# Patient Record
Sex: MALE | Race: WHITE | Employment: OTHER | URBAN - METROPOLITAN AREA
[De-identification: names, ages, dates, MRNs, and addresses within clinical notes are randomized per-mention and may not be internally consistent; named-entity substitution may affect disease eponyms.]

---

## 2017-02-07 DIAGNOSIS — E10.65 HYPERGLYCEMIA DUE TO TYPE 1 DIABETES MELLITUS (HCC): ICD-10-CM

## 2017-02-07 DIAGNOSIS — Z96.41 INSULIN PUMP STATUS: ICD-10-CM

## 2017-02-07 RX ORDER — LOSARTAN POTASSIUM 100 MG/1
100 TABLET ORAL DAILY
Qty: 30 TAB | Refills: 6 | Status: SHIPPED | OUTPATIENT
Start: 2017-02-07 | End: 2019-11-27 | Stop reason: ALTCHOICE

## 2017-02-07 NOTE — TELEPHONE ENCOUNTER
Patient needs a refill of blood pressure medication Losartan and test strips. He said she usually doesn't send in the bp med but he hasn't been seen at a PCP here since he moved back.

## 2017-03-23 RX ORDER — INSULIN LISPRO 100 [IU]/ML
INJECTION, SOLUTION INTRAVENOUS; SUBCUTANEOUS
Qty: 2 VIAL | Refills: 6 | Status: SHIPPED | OUTPATIENT
Start: 2017-03-23 | End: 2019-11-27 | Stop reason: SDUPTHER

## 2019-02-13 ENCOUNTER — ED HISTORICAL/CONVERTED ENCOUNTER (OUTPATIENT)
Dept: OTHER | Age: 49
End: 2019-02-13

## 2019-10-06 ENCOUNTER — ED HISTORICAL/CONVERTED ENCOUNTER (OUTPATIENT)
Dept: OTHER | Age: 49
End: 2019-10-06

## 2019-11-27 ENCOUNTER — OFFICE VISIT (OUTPATIENT)
Dept: ENDOCRINOLOGY | Age: 49
End: 2019-11-27

## 2019-11-27 VITALS
TEMPERATURE: 97.8 F | WEIGHT: 206.2 LBS | RESPIRATION RATE: 18 BRPM | OXYGEN SATURATION: 100 % | HEIGHT: 76 IN | BODY MASS INDEX: 25.11 KG/M2 | HEART RATE: 105 BPM | DIASTOLIC BLOOD PRESSURE: 108 MMHG | SYSTOLIC BLOOD PRESSURE: 175 MMHG

## 2019-11-27 DIAGNOSIS — G62.9 NEUROPATHY: ICD-10-CM

## 2019-11-27 DIAGNOSIS — Z96.41 INSULIN PUMP STATUS: ICD-10-CM

## 2019-11-27 DIAGNOSIS — E10.65 HYPERGLYCEMIA DUE TO TYPE 1 DIABETES MELLITUS (HCC): Primary | ICD-10-CM

## 2019-11-27 DIAGNOSIS — I10 ESSENTIAL HYPERTENSION: ICD-10-CM

## 2019-11-27 RX ORDER — LANCING DEVICE/LANCETS
KIT MISCELLANEOUS
Qty: 200 KIT | Refills: 6 | Status: SHIPPED | OUTPATIENT
Start: 2019-11-27 | End: 2021-07-16

## 2019-11-27 RX ORDER — SILDENAFIL 25 MG/1
25 TABLET, FILM COATED ORAL AS NEEDED
Qty: 20 TAB | Refills: 6 | Status: SHIPPED | OUTPATIENT
Start: 2019-11-27 | End: 2021-11-19 | Stop reason: SDUPTHER

## 2019-11-27 RX ORDER — INSULIN LISPRO 100 [IU]/ML
INJECTION, SOLUTION INTRAVENOUS; SUBCUTANEOUS
Qty: 2 VIAL | Refills: 6 | Status: SHIPPED | OUTPATIENT
Start: 2019-11-27 | End: 2020-11-25 | Stop reason: SDUPTHER

## 2019-11-27 NOTE — PROGRESS NOTES
1. Have you been to the ER, urgent care clinic since your last visit? Patient [facility]/October 2019  Hospitalized since your last visit? No    2. Have you seen or consulted any other health care providers outside of the [address] [place] [address] since your last visit? Include any pap smears or colon screening. No       Wt Readings from Last 3 Encounters:   11/27/19 206 lb 3.2 oz (93.5 kg)   12/20/16 202 lb (91.6 kg)   07/28/15 210 lb (95.3 kg)     Temp Readings from Last 3 Encounters:   11/27/19 97.8 °F (36.6 °C) (Oral)   12/20/16 97.7 °F (36.5 °C) (Oral)   07/28/15 98.5 °F (36.9 °C)     BP Readings from Last 3 Encounters:   11/27/19 (!) 175/108   12/20/16 141/89   07/28/15 141/86     Pulse Readings from Last 3 Encounters:   11/27/19 (!) 105   12/20/16 99   07/28/15 100     Lab Results   Component Value Date/Time    Hemoglobin A1c (POC) 13.2 12/20/2016 10:34 AM     Patient has pump today.   Patient needs eye exam referral.

## 2019-11-27 NOTE — PROGRESS NOTES
HISTORY OF PRESENT ILLNESS  Arlen Vargas is a 54 y.o. male. HPI  Patient here for Lana  initial visit of Type 1 diabetes mellitus  FROM DEC 2016     ALMOST A GAP OF 3 YEARS   HE GOT THE PUMP STARTED AND NEVER CAME BACK FOR FOLLOW UP     HE IS BACK HERE AS HE [place]OV[place] INTO TOWN   HE USED TO  BE A [employer] OFFICER   Comes in with 2 hear old grand daughter           OLD HISTORY       Current A1C is 13.2 % and symptoms/problems include polyuria, polydipsia and visual disturbances   H/o DM type 1 for 30 years         He moved from [address] [place] [address], goyo blair was his pcp   Many years ago , he was seeing Dr. Trisha Claude      Current diabetic medications include insulin pump     Current monitoring regimen: home blood tests - rarely  Any episodes of hypoglycemia? no     Weight trend: increasing steadily  Prior visit with dietician: no  Current diet: \"unhealthy\" diet in general  Current exercise: no regular exercise     Known diabetic complications: retinopathy and peripheral neuropathy  Cardiovascular risk factors: dyslipidemia, diabetes mellitus, male gender, stress      Eye exam current (within one year): no  CESAR: no                 Past Medical History   Diagnosis Date    DM [facility]diabetes mellitus) ([facility] [address]      HTN (hypertension)      Hypogonadism, male               Past Surgical History   Procedure Laterality Date    Hx tonsillectomy        Hx free skin graft Right         Leg    Hx other surgical Left         4 surgeries for staph infection           Current Outpatient Prescriptions   Medication Sig    HUMALOG 100 unit/mL injection USE IN INSULIN PUMP AS DIRECTED. DX: 250.00    losartan (COZAAR) 100 mg tablet Take 1 Tab by mouth daily.      No current facility-administered medications for this visit.          Review of Systems   Constitutional: Negative. HENT: Negative. Eyes: Negative for pain and redness. Respiratory: Negative.     Cardiovascular: Negative for chest pain, palpitations and leg swelling. Gastrointestinal: Negative. Negative for constipation. Genitourinary: Negative. Musculoskeletal: Negative for myalgias. Skin: Negative. Neurological: Negative. Endo/Heme/Allergies: Negative. Psychiatric/Behavioral: Negative for depression and memory loss. The patient does not have insomnia.          Physical Exam   Constitutional: He is oriented to person, place, and time. He appears well-developed and well-nourished. HENT:   Head: Normocephalic. Eyes: Conjunctivae and EOM are normal. Pupils are equal, round, and reactive to light. Neck: Normal range of motion. Neck supple. No JVD present. No tracheal deviation present. No thyromegaly present. Cardiovascular: Normal rate, regular rhythm and normal heart sounds. Pulmonary/Chest: Effort normal and breath sounds normal.   Abdominal: Soft. Bowel sounds are normal.   Musculoskeletal: Normal range of motion. Lymphadenopathy:     He has no cervical adenopathy. Neurological: He is alert and oriented to person, place, and time. He has normal reflexes. Skin: Skin is warm. Psychiatric: He has a normal mood and affect.    Diabetic foot exam:     Left Foot:   Visual Exam: callous - present on plantar aspect    Pulse DP: 2+ (normal)   Filament test: normal sensation    Vibratory sensation: normal      Right Foot:   Visual Exam: callous - present    Pulse DP: 2+ (normal)   Filament test: normal sensation    Vibratory sensation: normal  '        No labs are available          ASSESSMENT and PLAN     1. Type 1DM, POORLY controlled , on insulin pump.:    A1C IS OVER 10 %   FROM PT FIRST     Reviewed download, and noticed that there are no sugars on download and no carbs entered on it     Discussed patho-physiology of type1 diabetes again and the need for using carb wizard  Surprinsingly, I saw the wizard Is off      changes  on pump settings are made.  Refer to the note.     Patient has no clue of pump management, has no idea of carb counting, has not seen a singly entry of bolus shots   Bolus wizard is set up by me      Has no idea of pump use, insulin actions and need for glycemic control   Strictly advised on using bolus wizard for every food item. Emphasized on avoiding high glycemic index foods and provided some examples.     Patient is educated about the importance of glycemic control to prevent progression of complications. Patient is advised about checking blood sugars 6 times a day and maintaining log book.      The danger of having low blood sugars has been explained with inappropriate use of insulin  Patient voiced understanding and using the printed instructions at home. specific diabetic recommendations: foot care discussed and Podiatry visits discussed, annual eye examinations at Ophthalmology discussed, glycohemoglobin and other lab monitoring discussed and labs immediately prior to next visit        2. Hypoglycemia :  Educated on treating the hypoglycemia. Discussed BAQSIMI use and prescribed     3. HTN : SEVERELY UNCONTROLLED . Patient is educated about importance of compliance with anti-hypertensives especially ARB/ACEI  Refilled all the meds      4. Dyslipidemia : continue current meds. Patient is educated about benefits and adverse effects of statins and explained how benefits outweigh risk.     5. use of aspirin to prevent MI and TIA's discussed      6. PT HAS RHEUMATOLOGICAL PROBLEM - GETS STEROIDS ON AND OFF   HE HAS ARTHRITIS        7. NEUROPATHY  - MILD TO MODERATE       8.  ED - pt  Is requesting viagra samples- filled it         F/u in 4 weeks     > 50 % of time is spent on counseling on pump use, carb counting insulin actions, contacted medtronic rep  Use

## 2019-11-27 NOTE — PATIENT INSTRUCTIONS
Do not skip meals Do not eat in between meals Reduce carbs- pasta, rice, potatoes, bread Do not drink juices or sodas Donot eat peanut butter Do not eat sugar free cookies and cakes Do not eat peaCHES, ORANGES, GRAPES,  PINEAPPLES, AND FRUIT MEDLEYS  
 
 
------------------------------------------------------------------------------------------------------------------- 
 
START ON MICARDIS  40 MG A DAY Check blood sugars immediately before each meal and at bedtime Take  LANTUS  insulin  36 units  at bed time Take NOVOLOG  insulin  10   GM   : 1 UNIT   before  MEALS Also, add additional NOVOLOG  as follows with meals  If blood sugars are[de-identified] 
 
150-200 mg 1 units 201-250 mg 2 units 251-300 mg 3 units 301-350 mg 4 units 351-400 mg 5 units 401-450 mg 6 units 451-500 mg 7 units Less than 70 mg NO INSULIN

## 2019-11-27 NOTE — Clinical Note
11/27/19 Patient: Sean Dunbar YOB: 1970 Date of Visit: 11/27/2019 Self Referred, MD 
Self Referred Provider VIA Dear Tasha Gallegos MD, Thank you for referring Mr. Sean Dunbar to [facility] for evaluation. My notes for this consultation are attached. If you have questions, please do not hesitate to call me. I look forward to following your patient along with you. Sincerely, Keysha Stock MD

## 2019-11-28 LAB
ALBUMIN SERPL-MCNC: 4.5 G/DL (ref 3.5–5.5)
ALBUMIN/CREAT UR: 111 MG/G CREAT (ref 0–30)
ALBUMIN/GLOB SERPL: 1.6 {RATIO} (ref 1.2–2.2)
ALP SERPL-CCNC: 102 IU/L (ref 39–117)
ALT SERPL-CCNC: 17 IU/L (ref 0–44)
AST SERPL-CCNC: 23 IU/L (ref 0–40)
BILIRUB SERPL-MCNC: 0.4 MG/DL (ref 0–1.2)
BUN SERPL-MCNC: 13 MG/DL (ref 6–24)
BUN/CREAT SERPL: 12 (ref 9–20)
CALCIUM SERPL-MCNC: 9.8 MG/DL (ref 8.7–10.2)
CHLORIDE SERPL-SCNC: 101 MMOL/L (ref 96–106)
CHOLEST SERPL-MCNC: 209 MG/DL (ref 100–199)
CO2 SERPL-SCNC: 24 MMOL/L (ref 20–29)
CREAT SERPL-MCNC: 1.09 MG/DL (ref 0.76–1.27)
CREAT UR-MCNC: 78 MG/DL
EST. AVERAGE GLUCOSE BLD GHB EST-MCNC: 298 MG/DL
GLOBULIN SER CALC-MCNC: 2.8 G/DL (ref 1.5–4.5)
GLUCOSE SERPL-MCNC: 130 MG/DL (ref 65–99)
HBA1C MFR BLD: 12 % (ref 4.8–5.6)
HDLC SERPL-MCNC: 61 MG/DL
INTERPRETATION, 910389: NORMAL
LDLC SERPL CALC-MCNC: 137 MG/DL (ref 0–99)
Lab: NORMAL
MICROALBUMIN UR-MCNC: 86.6 UG/ML
POTASSIUM SERPL-SCNC: 4.5 MMOL/L (ref 3.5–5.2)
PROT SERPL-MCNC: 7.3 G/DL (ref 6–8.5)
SODIUM SERPL-SCNC: 141 MMOL/L (ref 134–144)
TRIGL SERPL-MCNC: 55 MG/DL (ref 0–149)
VLDLC SERPL CALC-MCNC: 11 MG/DL (ref 5–40)

## 2020-01-22 ENCOUNTER — OFFICE VISIT (OUTPATIENT)
Dept: ENDOCRINOLOGY | Age: 50
End: 2020-01-22

## 2020-01-22 VITALS
SYSTOLIC BLOOD PRESSURE: 169 MMHG | OXYGEN SATURATION: 100 % | RESPIRATION RATE: 20 BRPM | WEIGHT: 207.6 LBS | DIASTOLIC BLOOD PRESSURE: 90 MMHG | BODY MASS INDEX: 25.28 KG/M2 | TEMPERATURE: 97.3 F | HEART RATE: 98 BPM | HEIGHT: 76 IN

## 2020-01-22 DIAGNOSIS — Z96.41 INSULIN PUMP STATUS: ICD-10-CM

## 2020-01-22 DIAGNOSIS — I10 ESSENTIAL HYPERTENSION: ICD-10-CM

## 2020-01-22 DIAGNOSIS — G62.9 NEUROPATHY: ICD-10-CM

## 2020-01-22 RX ORDER — ESCITALOPRAM OXALATE 10 MG/1
10 TABLET ORAL
Qty: 30 TAB | Refills: 5 | Status: SHIPPED | OUTPATIENT
Start: 2020-01-22 | End: 2020-04-21 | Stop reason: ALTCHOICE

## 2020-01-22 RX ORDER — TELMISARTAN AND HYDROCHLORTHIAZIDE 40; 12.5 MG/1; MG/1
1 TABLET ORAL DAILY
Qty: 30 TAB | Refills: 6 | Status: SHIPPED | OUTPATIENT
Start: 2020-01-22 | End: 2020-08-13 | Stop reason: SDUPTHER

## 2020-01-22 RX ORDER — GABAPENTIN 300 MG/1
300 CAPSULE ORAL 2 TIMES DAILY
Qty: 60 CAP | Refills: 4 | Status: SHIPPED | OUTPATIENT
Start: 2020-01-22 | End: 2020-04-21 | Stop reason: SDUPTHER

## 2020-01-22 RX ORDER — ATORVASTATIN CALCIUM 20 MG/1
20 TABLET, FILM COATED ORAL
Qty: 30 TAB | Refills: 6 | Status: SHIPPED | OUTPATIENT
Start: 2020-01-22 | End: 2020-08-13 | Stop reason: SDUPTHER

## 2020-01-22 NOTE — PROGRESS NOTES
1. Have you been to the ER, urgent care clinic since your last visit? No  Hospitalized since your last visit? No    2. Have you seen or consulted any other health care providers outside of the [address] [place] [address] since your last visit? Include any pap smears or colon screening. No    Wt Readings from Last 3 Encounters:   01/22/20 207 lb 9.6 oz (94.2 kg)   11/27/19 206 lb 3.2 oz (93.5 kg)   12/20/16 202 lb (91.6 kg)     Temp Readings from Last 3 Encounters:   01/22/20 97.3 °F (36.3 °C) (Oral)   11/27/19 97.8 °F (36.6 °C) (Oral)   12/20/16 97.7 °F (36.5 °C) (Oral)     BP Readings from Last 3 Encounters:   01/22/20 169/90   11/27/19 (!) 175/108   12/20/16 141/89     Pulse Readings from Last 3 Encounters:   01/22/20 98   11/27/19 (!) 105   12/20/16 99     Lab Results   Component Value Date/Time    Hemoglobin A1c 12.0 (H) 11/27/2019 02:10 PM    Hemoglobin A1c (POC) 13.2 12/20/2016 10:34 AM     Patient has pump today.   Patient needs eye exam referral.

## 2020-01-22 NOTE — PATIENT INSTRUCTIONS
-------------------------------------------------------------------------------------------- Refills    -    please call your pharmacy and have them send us a refill request 
 
Results  -  allow up to a week for lab results to be processed and reviewed. Phone calls  -  Allow upto 24 hrs. for non-urgent calls to be retained Prior authorization - It may take up to 4 weeks to process, depending on your insurance Forms  -  FMLA, DMV, patient assistance, etc. will take up to 2 weeks to process Cancellations - please notify the office in advance if you cannot keep your appointment Samples  - will only be dispensed at visits as supply is limited If you are having a medical emergency call 911 
 
-------------------------------------------------------------------------------------------- Start on lexapro 10 mg at night Start on  micardis   20-12.5 one pill  A day Start  On atorvostatin 20 mg at night Start  On  Gabapentin  300 mg  Twice a day

## 2020-01-22 NOTE — Clinical Note
1/22/20 Patient: Laisha Ambrosio YOB: 1970 Date of Visit: 1/22/2020 Self Referred, MD 
Self Referred Provider VIA Dear Gaurav Brooks MD, Thank you for referring Mr. Laisha Ambrosio to [address] [place] [address] for evaluation. My notes for this consultation are attached. If you have questions, please do not hesitate to call me. I look forward to following your patient along with you. Sincerely, Madeleine Dawn MD

## 2020-01-22 NOTE — PROGRESS NOTES
HISTORY OF PRESENT ILLNESS  Kathrine Conn is a 54 y.o. male. 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 Bettie Buck      Current diabetic medications include insulin pump     Current monitoring regimen: home blood tests - rarely  Any episodes of hypoglycemia? no     Weight trend: increasing steadily  Prior visit with dietician: no  Current diet: \"unhealthy\" diet in general  Current exercise: no regular exercise     Known diabetic complications: retinopathy and peripheral neuropathy  Cardiovascular risk factors: dyslipidemia, diabetes mellitus, male gender, stress      Eye exam current (within one year): no  CESAR: no                 Past Medical History   Diagnosis Date    DM (diabetes mellitus) ([address]      HTN (hypertension)      Hypogonadism, male               Past Surgical History   Procedure Laterality Date    Hx tonsillectomy        Hx free skin graft Right         Leg    Hx other surgical Left         4 surgeries for staph infection           Current Outpatient Prescriptions   Medication Sig    HUMALOG 100 unit/mL injection USE IN INSULIN PUMP AS DIRECTED.  DX: 250.00    losartan (COZAAR) 100 mg tablet Take 1 Tab by mouth daily.      No current facility-administered medications for this visit.          Review of Systems   Constitutional: Negative. HENT: Negative. Eyes: Negative for pain and redness. Respiratory: Negative. Cardiovascular: Negative for chest pain, palpitations and leg swelling. Gastrointestinal: Negative. Negative for constipation. Genitourinary: Negative. Musculoskeletal: Negative for myalgias. Skin: Negative. Neurological: tingling numbness of feet bilaterally   Endo/Heme/Allergies: Negative. Psychiatric/Behavioral: Negative for depression and memory loss. The patient does not have insomnia.          Physical Exam   Constitutional: He is oriented to person, place, and time. He appears well-developed and well-nourished. HENT:   Head: Normocephalic. Eyes: Conjunctivae and EOM are normal. Pupils are equal, round, and reactive to light. Neck: Normal range of motion. Neck supple. No JVD present. No tracheal deviation present. No thyromegaly present. Cardiovascular: Normal rate, regular rhythm and normal heart sounds. Pulmonary/Chest: Effort normal and breath sounds normal.   Abdominal: Soft. Bowel sounds are normal.   Musculoskeletal: Normal range of motion. Lymphadenopathy:     He has no cervical adenopathy. Neurological: He is alert and oriented to person, place, and time. He has normal reflexes. Skin: Skin is warm. Psychiatric: He has a normal mood and affect.    Diabetic foot exam:     Left Foot:   Visual Exam: callous - present on plantar aspect    Pulse DP: 2+ (normal)   Filament test: normal sensation    Vibratory sensation: normal      Right Foot:   Visual Exam: callous - present    Pulse DP: 2+ (normal)   Filament test: normal sensation    Vibratory sensation: normal  '        No labs are available          ASSESSMENT and PLAN     1.  Type 1DM, POORLY controlled , on insulin pump.:    A1C IS OVER 10 %   FROM PT FIRST     A long gap in follow up visit from 2016 to 2019 - almost 3 years     Reviewed download, and noticed that there are no sugars on download and no carbs entered on it  Again this visit  Jan 2020   Showed him to use bolus wizard again today   The bolus wizard was turned back on by me at last visit, but he has not used it yet    Encouraged him to do so   He is using relion meter and test strips      Strictly advised on using bolus wizard for every food item. Emphasized on avoiding high glycemic index foods and provided some examples.     Patient is educated about the importance of glycemic control to prevent progression of complications. Patient is advised about checking blood sugars 6 times a day and maintaining log book.      The danger of having low blood sugars has been explained with inappropriate use of insulin  Patient voiced understanding and using the printed instructions at home. specific diabetic recommendations: foot care discussed and Podiatry visits discussed, annual eye examinations at Ophthalmology discussed, glycohemoglobin and other lab monitoring discussed and labs immediately prior to next visit        2. Hypoglycemia :  Educated on treating the hypoglycemia. Discussed BAQSIMI use and prescribed     3. HTN :  UNCONTROLLED . Patient is educated about importance of compliance with anti-hypertensives especially ARB/ACEI  Start on micardis 40 - 12.5      4. Dyslipidemia : start on statin  Patient is educated about benefits and adverse effects of statins and explained how benefits outweigh risk.     5. use of aspirin to prevent MI and TIA's discussed      6. PT HAS RHEUMATOLOGICAL PROBLEM - GETS STEROIDS ON AND OFF   HE HAS ARTHRITIS        7. NEUROPATHY  - MILD TO MODERATE   Starting on neurontin 300 mg bid       8. ED - pt  Is requesting viagra samples- filled it nov 2019      9.  Depression - start on lexapro 10 mg at night         F/u in 6 weeks     > 50 % of time is spent on counseling on pump use, carb counting insulin actions, contacted medtronic rep  Use

## 2020-04-03 ENCOUNTER — TELEPHONE (OUTPATIENT)
Dept: ENDOCRINOLOGY | Age: 50
End: 2020-04-03

## 2020-04-03 ENCOUNTER — PATIENT MESSAGE (OUTPATIENT)
Dept: ENDOCRINOLOGY | Age: 50
End: 2020-04-03

## 2020-04-03 DIAGNOSIS — E10.65 HYPERGLYCEMIA DUE TO TYPE 1 DIABETES MELLITUS (HCC): Primary | ICD-10-CM

## 2020-04-03 DIAGNOSIS — I10 ESSENTIAL HYPERTENSION: ICD-10-CM

## 2020-04-03 NOTE — TELEPHONE ENCOUNTER
Order placed for pt per verbal order with read back from Dr. Anny Frey 04/03/20      Lab slip mailed

## 2020-04-21 ENCOUNTER — VIRTUAL VISIT (OUTPATIENT)
Dept: ENDOCRINOLOGY | Age: 50
End: 2020-04-21

## 2020-04-21 DIAGNOSIS — F32.A DEPRESSION, UNSPECIFIED DEPRESSION TYPE: ICD-10-CM

## 2020-04-21 DIAGNOSIS — G62.9 NEUROPATHY: ICD-10-CM

## 2020-04-21 DIAGNOSIS — E78.2 MIXED HYPERLIPIDEMIA: ICD-10-CM

## 2020-04-21 DIAGNOSIS — I10 ESSENTIAL HYPERTENSION: ICD-10-CM

## 2020-04-21 DIAGNOSIS — E10.65 HYPERGLYCEMIA DUE TO TYPE 1 DIABETES MELLITUS (HCC): Primary | ICD-10-CM

## 2020-04-21 DIAGNOSIS — Z79.4 ENCOUNTER FOR LONG-TERM (CURRENT) USE OF INSULIN (HCC): ICD-10-CM

## 2020-04-21 DIAGNOSIS — Z96.41 INSULIN PUMP STATUS: ICD-10-CM

## 2020-04-21 RX ORDER — GABAPENTIN 300 MG/1
300 CAPSULE ORAL 2 TIMES DAILY
Qty: 60 CAP | Refills: 4 | Status: SHIPPED | OUTPATIENT
Start: 2020-04-21 | End: 2020-08-13 | Stop reason: SDUPTHER

## 2020-04-21 RX ORDER — GLUCAGON INJECTION, SOLUTION 1 MG/.2ML
1 INJECTION, SOLUTION SUBCUTANEOUS ONCE
Qty: 1 EACH | Refills: 1 | Status: SHIPPED | OUTPATIENT
Start: 2020-04-21 | End: 2020-04-21

## 2020-04-21 RX ORDER — PAROXETINE HYDROCHLORIDE 20 MG/1
20 TABLET, FILM COATED ORAL
Qty: 30 TAB | Refills: 5 | Status: SHIPPED | OUTPATIENT
Start: 2020-04-21 | End: 2020-08-13 | Stop reason: ALTCHOICE

## 2020-04-21 NOTE — PROGRESS NOTES
1. Have you been to the ER, urgent care clinic since your last visit? No  Hospitalized since your last visit? No    2. Have you seen or consulted any other health care providers outside of the [address] [place] [address] since your last visit? Include any pap smears or colon screening.  No

## 2020-04-21 NOTE — PATIENT INSTRUCTIONS
Stop lexapro Start on paxil  20 mg at night Stay  on  micardis   20-12.5 one pill  A day Stay   On atorvostatin 20 mg at night Stay   On  Gabapentin  300 mg  Twice a day Back up regimen  :  
Could never be done as patient never gave an idea of how much insulin he needs other than basal insulin he is gotten from the insulin pump

## 2020-04-21 NOTE — PROGRESS NOTES
**THIS IS A VIRTUAL VISIT VIA AUDIO- VIDEO SYNCHRONOUS DISCUSSION. PATIENT AGREED TO HAVE THEIR CARE DELIVERED OVER A Quantitative Medicine/DOXY. ME VIDEO VISIT IN PLACE OF THEIR REGULARLY SCHEDULED OFFICE VISIT**   Pt  is aware that this is a billable encounter and is responsible for copays/deductibles   Patient gave a verbal consent to proceed with virtual video visit   Patient is at home and I, the provider,  am at the office care diabetes and endocrinology        HISTORY OF PRESENT ILLNESS  Sebastian Jean is a 54 y.o. male. HPI  Patient here for    FOLLOW UP  AFTER  initial visit of Type 1 diabetes mellitus  FROM January 2020       He has no meter in hand   Wants to get a new pump -         Old history :     He came in with no meter   The pump download has no info   Accompanied by his grand daughter     He is requesting help with neuropathy  Requesting small dose of anti-depressant       Old history ;     Last visit  Sept 2017   ALMOST A GAP OF 3 YEARS   HE GOT THE PUMP STARTED AND NEVER CAME BACK FOR FOLLOW UP     HE IS BACK HERE AS HE MOVED INTO [facility]   HE USED TO  BE A [employer] OFFICER   Comes in with 2 yr old grand daughter           OLD HISTORY       Current A1C is 13.2 % and symptoms/problems include polyuria, polydipsia and visual disturbances   H/o DM type 1 for 30 years         He moved from [address] [place] [address], goyo blair was his pcp   Many years ago , he was seeing Dr. Cali Galeana      Current diabetic medications include insulin pump     Current monitoring regimen: home blood tests - rarely  Any episodes of hypoglycemia? no     Weight trend: increasing steadily  Prior visit with dietician: no  Current diet: \"unhealthy\" diet in general  Current exercise: no regular exercise     Known diabetic complications: retinopathy and peripheral neuropathy  Cardiovascular risk factors: dyslipidemia, diabetes mellitus, male gender, stress      Eye exam current (within one year): no  CESAR: no                 Past Medical History Diagnosis Date    DM [facility]diabetes mellitus) ([facility] [address]      HTN (hypertension)      Hypogonadism, male               Past Surgical History   Procedure Laterality Date    Hx tonsillectomy        Hx free skin graft Right         Leg    Hx other surgical Left         4 surgeries for staph infection           Current Outpatient Prescriptions   Medication Sig    HUMALOG 100 unit/mL injection USE IN INSULIN PUMP AS DIRECTED. DX: 250.00    losartan (COZAAR) 100 mg tablet Take 1 Tab by mouth daily.      No current facility-administered medications for this visit.                      Review of Systems   Constitutional: Negative. HENT: Negative. Eyes: Negative for pain and redness. Respiratory: Negative. Cardiovascular: Negative for chest pain, palpitations and leg swelling. Gastrointestinal: Negative. Negative for constipation. Genitourinary: Negative. Musculoskeletal: Negative for myalgias. Skin: Negative. Neurological: Negative. Endo/Heme/Allergies: Negative. Psychiatric/Behavioral: Negative for depression and memory loss. The patient does not have insomnia. Physical Exam   Constitutional: oriented to person, place, and time. appears well-developed and well-nourished. HENT:   Head: Normocephalic. Eyes: normal , noted no swelling or redness. Neck: Normal range of motion. Cardiovascular: could nto be examined  Pulmonary/Chest: appears breathing effortlessly  Abdominal: Soft. Musculoskeletal: appears relatively nprmal  range of motion. Neurological: He is alert and oriented to person, place, and time.  Appears to have no focal deficits    Psychiatric: He has a normal mood and affect.             Lab Results   Component Value Date/Time    Hemoglobin A1c 12.0 (H) 11/27/2019 02:10 PM    Glucose 130 (H) 11/27/2019 02:10 PM    Glucose (POC) 168 (H) 07/28/2015 10:57 AM    Microalb/Creat ratio (ug/mg creat.) 111.0 (H) 11/27/2019 02:10 PM    LDL, calculated 137 (H) 11/27/2019 02:10 PM    Creatinine 1.09 11/27/2019 02:10 PM      Lab Results   Component Value Date/Time    Cholesterol, total 209 (H) 11/27/2019 02:10 PM    HDL Cholesterol 61 11/27/2019 02:10 PM    LDL, calculated 137 (H) 11/27/2019 02:10 PM    Triglyceride 55 11/27/2019 02:10 PM     Lab Results   Component Value Date/Time    ALT (SGPT) 17 11/27/2019 02:10 PM    AST (SGOT) 23 11/27/2019 02:10 PM    Alk. phosphatase 102 11/27/2019 02:10 PM    Bilirubin, total 0.4 11/27/2019 02:10 PM    Albumin 4.5 11/27/2019 02:10 PM    Protein, total 7.3 11/27/2019 02:10 PM     Lab Results   Component Value Date/Time    GFR est non-AA 80 11/27/2019 02:10 PM    GFR est AA 93 11/27/2019 02:10 PM    Creatinine 1.09 11/27/2019 02:10 PM    BUN 13 11/27/2019 02:10 PM    Sodium 141 11/27/2019 02:10 PM    Potassium 4.5 11/27/2019 02:10 PM    Chloride 101 11/27/2019 02:10 PM    CO2 24 11/27/2019 02:10 PM            ASSESSMENT and PLAN     1. Type 1DM, POORLY controlled , on insulin pump.:  a1c is 12 %   From nov 2019, compared to  A1C IS OVER 10 %   FROM PT FIRST   A long gap in follow up visit from 2016 to 2019 - almost 3 years     April 2020 visit   He hardly checks sugars -670 G Medtronic pump is going to be not helping this patient at all because of his noncompliance with checking sugars  Non compliant pt   Told him to upload medtronic pump for review   Recommending T slim pump and dexcom         Jan 2020  :  Reviewed download, and noticed that there are no sugars on download and no carbs entered on it  Again this visit  Jan 2020   Sharene Lints him to use bolus wizard again today   The bolus wizard was turned back on by me at last visit, but he has not used it yet  Encouraged him to do so   He is using relion meter and test strips    Strictly advised on using bolus wizard for every food item.  Emphasized on avoiding high glycemic index foods and provided some examples.     Patient is educated about the importance of glycemic control to prevent progression of complications. Patient is advised about checking blood sugars 6 times a day and maintaining log book.           2. Hypoglycemia :  Educated on treating the hypoglycemia. Discussed g-voke and prescribed      3. HTN :  Cannot comment   on micardis 40 - 12.5      4. Dyslipidemia : on statin  Patient is educated about benefits and adverse effects of statins and explained how benefits outweigh risk.     5. use of aspirin to prevent MI and TIA's discussed      6. PT HAS RHEUMATOLOGICAL PROBLEM - GETS STEROIDS ON AND OFF   HE HAS ARTHRITIS        7. NEUROPATHY  - MILD TO MODERATE   Starting on neurontin 300 mg bid       8. ED - pt  Is requesting viagra samples- filled it nov 2019      9. Depression -had nightmares on  Lexapro 10 mg . Stopped it .   Will try Paxil        F/u in 3  months     > 50 % of time is spent on counseling on pump use, carb counting insulin actions, contacted T-slim      Pursuant  To the Emergency declaration under the [address] and the [address] authority and the [facility], to reduce the patient's risk of exposure to  COVID-19 and provide continuity of care for an established patient.

## 2020-08-13 ENCOUNTER — OFFICE VISIT (OUTPATIENT)
Dept: ENDOCRINOLOGY | Age: 50
End: 2020-08-13
Payer: COMMERCIAL

## 2020-08-13 VITALS
BODY MASS INDEX: 25.45 KG/M2 | RESPIRATION RATE: 12 BRPM | HEART RATE: 95 BPM | SYSTOLIC BLOOD PRESSURE: 152 MMHG | WEIGHT: 209 LBS | DIASTOLIC BLOOD PRESSURE: 95 MMHG | TEMPERATURE: 97.8 F | HEIGHT: 76 IN

## 2020-08-13 DIAGNOSIS — Z79.4 ENCOUNTER FOR LONG-TERM (CURRENT) USE OF INSULIN (HCC): ICD-10-CM

## 2020-08-13 DIAGNOSIS — E78.2 MIXED HYPERLIPIDEMIA: ICD-10-CM

## 2020-08-13 DIAGNOSIS — R53.83 FATIGUE, UNSPECIFIED TYPE: ICD-10-CM

## 2020-08-13 DIAGNOSIS — E10.65 HYPERGLYCEMIA DUE TO TYPE 1 DIABETES MELLITUS (HCC): ICD-10-CM

## 2020-08-13 DIAGNOSIS — Z96.41 INSULIN PUMP STATUS: ICD-10-CM

## 2020-08-13 DIAGNOSIS — G62.9 NEUROPATHY: ICD-10-CM

## 2020-08-13 LAB — HBA1C MFR BLD HPLC: 12.9 %

## 2020-08-13 PROCEDURE — 99215 OFFICE O/P EST HI 40 MIN: CPT | Performed by: INTERNAL MEDICINE

## 2020-08-13 PROCEDURE — 83036 HEMOGLOBIN GLYCOSYLATED A1C: CPT | Performed by: INTERNAL MEDICINE

## 2020-08-13 RX ORDER — GABAPENTIN 300 MG/1
300 CAPSULE ORAL 2 TIMES DAILY
Qty: 60 CAP | Refills: 4 | Status: SHIPPED | OUTPATIENT
Start: 2020-08-13 | End: 2020-12-02 | Stop reason: SDUPTHER

## 2020-08-13 RX ORDER — GLUCAGON INJECTION, SOLUTION 1 MG/.2ML
1 INJECTION, SOLUTION SUBCUTANEOUS AS NEEDED
Qty: 1 BOX | Refills: 1 | Status: SHIPPED | OUTPATIENT
Start: 2020-08-13 | End: 2021-07-16 | Stop reason: SDUPTHER

## 2020-08-13 RX ORDER — TELMISARTAN AND HYDROCHLORTHIAZIDE 40; 12.5 MG/1; MG/1
1 TABLET ORAL DAILY
Qty: 30 TAB | Refills: 6 | Status: SHIPPED | OUTPATIENT
Start: 2020-08-13 | End: 2020-11-18 | Stop reason: ALTCHOICE

## 2020-08-13 RX ORDER — ATORVASTATIN CALCIUM 20 MG/1
20 TABLET, FILM COATED ORAL
Qty: 30 TAB | Refills: 6 | Status: SHIPPED | OUTPATIENT
Start: 2020-08-13 | End: 2021-07-08 | Stop reason: SDUPTHER

## 2020-08-13 RX ORDER — PAROXETINE HYDROCHLORIDE 20 MG/1
20 TABLET, FILM COATED ORAL DAILY
Qty: 30 TAB | Refills: 6 | Status: CANCELLED | OUTPATIENT
Start: 2020-08-13

## 2020-08-13 NOTE — PROGRESS NOTES
Wt Readings from Last 3 Encounters:   08/13/20 209 lb (94.8 kg)   01/22/20 207 lb 9.6 oz (94.2 kg)   11/27/19 206 lb 3.2 oz (93.5 kg)     Temp Readings from Last 3 Encounters:   08/13/20 97.8 °F (36.6 °C) (Oral)   01/22/20 97.3 °F (36.3 °C) (Oral)   11/27/19 97.8 °F (36.6 °C) (Oral)     BP Readings from Last 3 Encounters:   08/13/20 (!) 165/99   01/22/20 169/90   11/27/19 (!) 175/108     Pulse Readings from Last 3 Encounters:   08/13/20 95   01/22/20 98   11/27/19 (!) 105     Lab Results   Component Value Date/Time    Hemoglobin A1c 12.0 (H) 11/27/2019 02:10 PM    Hemoglobin A1c (POC) 13.2 12/20/2016 10:34 AM

## 2020-08-13 NOTE — LETTER
8/13/20 Patient: Carmela Kilgore YOB: 1970 Date of Visit: 8/13/2020 [place] 
[id number] [facility] [address] [address] VIA Facsimile: [phone] Dear Lavonda Holter, MD, Thank you for referring Mr. Carmela Kilgore to [address] [place] [address] for evaluation. My notes for this consultation are attached. If you have questions, please do not hesitate to call me. I look forward to following your patient along with you. Sincerely, Debby Dowell MD

## 2020-08-13 NOTE — PATIENT INSTRUCTIONS
Stay  on  micardis   20-12.5 one pill  A day Stay   On atorvostatin 20 mg at night Stay   On  Gabapentin  300 mg  Twice a day  
 
 
-------------------------------------------------------------------------------------------------------------------- Back up regimen  :  
Could never be done as patient never gave an idea of how much insulin he needs other than basal insulin he is gotten from the insulin pump 
 
 
----------------------------------------------------------------------------------------------------------------- Check blood sugars immediately before each meal and at bedtime Take  lantus  insulin  30  units  at bed time Take humalog  Insulin at carb to insulin ratio of 10 gm : 1 unit Also, add additional humalog  as follows with meals  If blood sugars are[de-identified] 
 
150-200 mg 1 units 201-250 mg 2 units 251-300 mg 3 units 301-350 mg 4 units 351-400 mg 5 units 401-450 mg 6 units 451-500 mg 7 units Less than 70 mg NO INSULIN

## 2020-08-13 NOTE — PROGRESS NOTES
HISTORY OF PRESENT ILLNESS  Bhavik Henry is a 54 y.o. male. HPI  Patient here for    FOLLOW UP  AFTER  initial visit of Type 1 diabetes mellitus  FROM April 2020     He has no meter in hand   He got the new tandem pump     He had one virtual  Brief visit   with the  and was not helpful       He is asking for TT shots   He feels fatigued       Old history :     He came in with no meter   The pump download has no info   Accompanied by his grand daughter   He is requesting help with neuropathy  Requesting small dose of anti-depressant       Old history ;     Last visit  Sept 2017   ALMOST A GAP OF 3 YEARS   HE GOT THE PUMP STARTED AND NEVER CAME BACK FOR FOLLOW UP   HE IS BACK HERE AS [place]   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 Ethan Amaral      Current diabetic medications include insulin pump     Current monitoring regimen: home blood tests - rarely  Any episodes of hypoglycemia? no     Weight trend: increasing steadily  Prior visit with dietician: no  Current diet: \"unhealthy\" diet in general  Current exercise: no regular exercise     Known diabetic complications: retinopathy and peripheral neuropathy  Cardiovascular risk factors: dyslipidemia, diabetes mellitus, male gender, stress      Eye exam current (within one year): no  CESAR: no                 Past Medical History   Diagnosis Date    DM [facility]diabetes mellitus) ([facility] [address]      HTN (hypertension)      Hypogonadism, male               Past Surgical History   Procedure Laterality Date    Hx tonsillectomy        Hx free skin graft Right         Leg    Hx other surgical Left         4 surgeries for staph infection           Current Outpatient Prescriptions Medication Sig    HUMALOG 100 unit/mL injection USE IN INSULIN PUMP AS DIRECTED. DX: 250.00    losartan (COZAAR) 100 mg tablet Take 1 Tab by mouth daily.      No current facility-administered medications for this visit.          Review of Systems   Constitutional: Negative. HENT: Negative. Eyes: Negative for pain and redness. Respiratory: Negative. Cardiovascular: Negative for chest pain, palpitations and leg swelling. Gastrointestinal: Negative. Negative for constipation. Genitourinary: Negative. Musculoskeletal: Negative for myalgias. Skin: Negative. Neurological: Negative. Endo/Heme/Allergies: Negative. Psychiatric/Behavioral: Negative for depression and memory loss. The patient does not have insomnia. Physical Exam   Constitutional: oriented to person, place, and time. appears well-developed and well-nourished. HENT:   Head: Normocephalic. Eyes: normal , noted no swelling or redness. Neck: Normal range of motion. Cardiovascular: could nto be examined  Pulmonary/Chest: appears breathing effortlessly  Abdominal: Soft. Musculoskeletal: appears relatively nprmal  range of motion. Neurological: He is alert and oriented to person, place, and time.  Appears to have no focal deficits    Psychiatric: He has a normal mood and affect.             Lab Results   Component Value Date/Time    Hemoglobin A1c 12.0 (H) 11/27/2019 02:10 PM    Glucose 130 (H) 11/27/2019 02:10 PM    Glucose (POC) 168 (H) 07/28/2015 10:57 AM    Microalb/Creat ratio (ug/mg creat.) 111.0 (H) 11/27/2019 02:10 PM    LDL, calculated 137 (H) 11/27/2019 02:10 PM    Creatinine 1.09 11/27/2019 02:10 PM      Lab Results   Component Value Date/Time    Cholesterol, total 209 (H) 11/27/2019 02:10 PM    HDL Cholesterol 61 11/27/2019 02:10 PM    LDL, calculated 137 (H) 11/27/2019 02:10 PM    Triglyceride 55 11/27/2019 02:10 PM     Lab Results   Component Value Date/Time    ALT (SGPT) 17 11/27/2019 02:10 PM Alk. phosphatase 102 11/27/2019 02:10 PM    Bilirubin, total 0.4 11/27/2019 02:10 PM    Albumin 4.5 11/27/2019 02:10 PM    Protein, total 7.3 11/27/2019 02:10 PM     Lab Results   Component Value Date/Time    GFR est non-AA 80 11/27/2019 02:10 PM    GFR est AA 93 11/27/2019 02:10 PM    Creatinine 1.09 11/27/2019 02:10 PM    BUN 13 11/27/2019 02:10 PM    Sodium 141 11/27/2019 02:10 PM    Potassium 4.5 11/27/2019 02:10 PM    Chloride 101 11/27/2019 02:10 PM    CO2 24 11/27/2019 02:10 PM            ASSESSMENT and PLAN     1. Type 1DM, POORLY controlled , on insulin pump.:  a1c is   12.9 %      By  POC     Compared   To    12 %   From nov 2019, compared to  A1C IS OVER 10 %   FROM PT [facility]   A long gap in follow up visit from 2016 to 2019 - almost 3 years       August 2020   No glycemic control at all   Has T-slim  And dexcom in hand   Await training   Does not  Maintain  Log , he does not check sugars at all    STOP diet cokes       Hoping that when he gets onto dexcom , that he can get better control          April 2020 visit   He hardly checks sugars -670 G Medtronic pump is going to be not helping this patient at all because of his noncompliance with checking sugars  Non compliant pt   Told him to upload medtronic pump for review   Recommending T slim pump and dexcom         Jan 2020  :  Reviewed download, and noticed that there are no sugars on download and no carbs entered on it  Again this visit  Jan 2020   Pedrito Casanovaa him to use bolus wizard again today   The bolus wizard was turned back on by me at last visit, but he has not used it yet  Encouraged him to do so   He is using relion meter and test strips    Strictly advised on using bolus wizard for every food item. Emphasized on avoiding high glycemic index foods and provided some examples.     Patient is educated about the importance of glycemic control to prevent progression of complications.  Patient is advised about checking blood sugars 6 times a day and maintaining log book.           2. Hypoglycemia :  Educated on treating the hypoglycemia. Discussed g-voke and prescribed      3. HTN :  Cannot comment   on micardis 40 - 12.5      4. Dyslipidemia : on statin  Patient is educated about benefits and adverse effects of statins and explained how benefits outweigh risk.     5. use of aspirin to prevent MI and TIA's discussed      6. PT HAS RHEUMATOLOGICAL PROBLEM - GETS STEROIDS ON AND OFF   HE HAS ARTHRITIS        7. NEUROPATHY  - MILD TO MODERATE   Starting on neurontin 300 mg bid       8. ED - pt  Is requesting viagra samples- filled it nov 2019      9. Depression -had nightmares on  Lexapro 10 mg . Stopped it . Gave  Paxil, not of help         10.    Hypogonadism / fatigue    He recalls getting TT shots a while ago from urologist   I mentioned to him that I will need  Labs first     Denies any  Prostrate cancer or CAD in family         F/u in 3  months     > 50 % of time is spent on counseling on pump use, carb counting insulin actions, contacted Des[facility] GONZALEZE

## 2020-08-19 LAB
FSH SERPL-ACNC: 3.2 MIU/ML (ref 1.5–12.4)
LH SERPL-ACNC: 8.3 MIU/ML (ref 1.7–8.6)
TESTOST FREE SERPL-MCNC: 10.3 PG/ML (ref 6.8–21.5)
TESTOST SERPL-MCNC: 295 NG/DL (ref 264–916)

## 2020-09-03 ENCOUNTER — DOCUMENTATION ONLY (OUTPATIENT)
Dept: ENDOCRINOLOGY | Age: 50
End: 2020-09-03

## 2020-09-03 LAB
ALBUMIN SERPL-MCNC: 4.3 G/DL (ref 4–5)
ALBUMIN/CREAT UR: 56 MG/G CREAT (ref 0–29)
ALBUMIN/GLOB SERPL: 1.8 {RATIO} (ref 1.2–2.2)
ALP SERPL-CCNC: 81 IU/L (ref 39–117)
ALT SERPL-CCNC: 13 IU/L (ref 0–44)
AST SERPL-CCNC: 22 IU/L (ref 0–40)
BILIRUB SERPL-MCNC: 0.5 MG/DL (ref 0–1.2)
BUN SERPL-MCNC: 14 MG/DL (ref 6–24)
BUN/CREAT SERPL: 14 (ref 9–20)
CALCIUM SERPL-MCNC: 9.5 MG/DL (ref 8.7–10.2)
CHLORIDE SERPL-SCNC: 103 MMOL/L (ref 96–106)
CHOLEST SERPL-MCNC: 139 MG/DL (ref 100–199)
CO2 SERPL-SCNC: 26 MMOL/L (ref 20–29)
CREAT SERPL-MCNC: 1.02 MG/DL (ref 0.76–1.27)
CREAT UR-MCNC: 57.1 MG/DL
EST. AVERAGE GLUCOSE BLD GHB EST-MCNC: 324 MG/DL
FSH SERPL-ACNC: 3.1 MIU/ML (ref 1.5–12.4)
GLOBULIN SER CALC-MCNC: 2.4 G/DL (ref 1.5–4.5)
GLUCOSE SERPL-MCNC: 174 MG/DL (ref 65–99)
HBA1C MFR BLD: 12.9 % (ref 4.8–5.6)
HDLC SERPL-MCNC: 49 MG/DL
INTERPRETATION, 910389: NORMAL
LDLC SERPL CALC-MCNC: 71 MG/DL (ref 0–99)
LH SERPL-ACNC: 7.9 MIU/ML (ref 1.7–8.6)
Lab: NORMAL
MICROALBUMIN UR-MCNC: 32.1 UG/ML
POTASSIUM SERPL-SCNC: 4.5 MMOL/L (ref 3.5–5.2)
PROT SERPL-MCNC: 6.7 G/DL (ref 6–8.5)
SODIUM SERPL-SCNC: 142 MMOL/L (ref 134–144)
TESTOST FREE SERPL-MCNC: 10.7 PG/ML (ref 6.8–21.5)
TESTOST SERPL-MCNC: 364 NG/DL (ref 264–916)
TRIGL SERPL-MCNC: 105 MG/DL (ref 0–149)
VLDLC SERPL CALC-MCNC: 19 MG/DL (ref 5–40)

## 2020-09-04 RX ORDER — CLOMIPRAMINE HYDROCHLORIDE 25 MG/1
25 CAPSULE ORAL DAILY
Qty: 30 CAP | Refills: 6 | Status: CANCELLED | OUTPATIENT
Start: 2020-09-04

## 2020-09-04 RX ORDER — CLOMIPHENE CITRATE 50 MG/1
TABLET ORAL
Qty: 15 TAB | Refills: 4 | Status: SHIPPED | OUTPATIENT
Start: 2020-09-04 | End: 2021-07-16

## 2020-09-04 NOTE — PROGRESS NOTES
Inform pt that his TT levels are low, but not low enough to get TT from his insurance plan       Michael Machado MD

## 2020-09-04 NOTE — TELEPHONE ENCOUNTER
Dennys Llanes MD  You 1 hour ago (2:11 PM)          Try clomiphene citrate 25 mg a day          Documentation       You  Dennys Llanes MD 3 hours ago (11:50 AM)          Pt would like to know what would be the next step?          Documentation       Dennys Llanes MD routed conversation to You 16 hours ago (10:38 PM)       Dennys Llanes MD 16 hours ago (10:38 PM)          Inform pt that his TT levels are low, but not low enough to get TT from his insurance plan         Dante Sanchez MD

## 2020-09-13 DIAGNOSIS — Z79.4 ENCOUNTER FOR LONG-TERM (CURRENT) USE OF INSULIN (HCC): ICD-10-CM

## 2020-09-13 DIAGNOSIS — Z96.41 INSULIN PUMP STATUS: ICD-10-CM

## 2020-09-13 DIAGNOSIS — E78.2 MIXED HYPERLIPIDEMIA: ICD-10-CM

## 2020-09-13 DIAGNOSIS — R53.83 FATIGUE, UNSPECIFIED TYPE: ICD-10-CM

## 2020-09-13 DIAGNOSIS — E10.65 HYPERGLYCEMIA DUE TO TYPE 1 DIABETES MELLITUS (HCC): ICD-10-CM

## 2020-10-16 ENCOUNTER — TELEPHONE (OUTPATIENT)
Dept: ENDOCRINOLOGY | Age: 50
End: 2020-10-16

## 2020-10-16 NOTE — TELEPHONE ENCOUNTER
Pt called in stating BS is reading \"high\", instructed pt to take Lantus 30 units and Humalog 10 units now and drink plenty of water, recheck BS in four hours and follow sliding scale, if read \"high\" again take another 10 units. If not feeling better go to ER.

## 2020-11-18 LAB
ALBUMIN SERPL-MCNC: 3.6 G/DL (ref 3.5–5)
ALBUMIN/GLOB SERPL: 1 {RATIO} (ref 1.1–2.2)
ALP SERPL-CCNC: 85 U/L (ref 45–117)
ALT SERPL-CCNC: 28 U/L (ref 12–78)
ANION GAP SERPL CALC-SCNC: 3 MMOL/L (ref 5–15)
AST SERPL-CCNC: 28 U/L (ref 15–37)
BILIRUB SERPL-MCNC: 0.3 MG/DL (ref 0.2–1)
BUN SERPL-MCNC: 31 MG/DL (ref 6–20)
BUN/CREAT SERPL: 23 (ref 12–20)
CALCIUM SERPL-MCNC: 8.8 MG/DL (ref 8.5–10.1)
CHLORIDE SERPL-SCNC: 110 MMOL/L (ref 97–108)
CHOLEST SERPL-MCNC: 172 MG/DL
CO2 SERPL-SCNC: 27 MMOL/L (ref 21–32)
CREAT SERPL-MCNC: 1.37 MG/DL (ref 0.7–1.3)
CREAT UR-MCNC: 68 MG/DL
EST. AVERAGE GLUCOSE BLD GHB EST-MCNC: 169 MG/DL
FSH SERPL-ACNC: 3.9 MIU/ML
GLOBULIN SER CALC-MCNC: 3.5 G/DL (ref 2–4)
GLUCOSE SERPL-MCNC: 179 MG/DL (ref 65–100)
HBA1C MFR BLD: 7.5 % (ref 4–5.6)
HDLC SERPL-MCNC: 52 MG/DL
HDLC SERPL: 3.3 {RATIO} (ref 0–5)
LDLC SERPL CALC-MCNC: 96.8 MG/DL (ref 0–100)
LH SERPL-ACNC: 8.7 MIU/ML
LIPID PROFILE,FLP: NORMAL
MICROALBUMIN UR-MCNC: 1.31 MG/DL
MICROALBUMIN/CREAT UR-RTO: 19 MG/G (ref 0–30)
POTASSIUM SERPL-SCNC: 4.6 MMOL/L (ref 3.5–5.1)
PROT SERPL-MCNC: 7.1 G/DL (ref 6.4–8.2)
SODIUM SERPL-SCNC: 140 MMOL/L (ref 136–145)
TRIGL SERPL-MCNC: 116 MG/DL (ref ?–150)
VLDLC SERPL CALC-MCNC: 23.2 MG/DL

## 2020-11-18 RX ORDER — LISINOPRIL 20 MG/1
20 TABLET ORAL DAILY
Qty: 30 TAB | Refills: 3 | Status: SHIPPED | OUTPATIENT
Start: 2020-11-18 | End: 2021-07-16 | Stop reason: SDUPTHER

## 2020-11-18 RX ORDER — PANTOPRAZOLE SODIUM 40 MG/1
40 TABLET, DELAYED RELEASE ORAL DAILY
Qty: 30 TAB | Refills: 3 | Status: SHIPPED | OUTPATIENT
Start: 2020-11-18 | End: 2021-07-16 | Stop reason: ALTCHOICE

## 2020-11-19 LAB
TESTOST FREE SERPL-MCNC: 15.1 PG/ML (ref 6.8–21.5)
TESTOST SERPL-MCNC: 367 NG/DL (ref 264–916)

## 2020-11-20 NOTE — PROGRESS NOTES
His TT levels are good  On clomiphene pill   He asked me to send TT shots - I do not have to based on her labs

## 2020-11-23 NOTE — PROGRESS NOTES
Patient informed. Patient states he is tired all the time and thinks he should have TT shots refilled. Patient states he does not come in for a visit until 12/2 and would need TT shot before then.

## 2020-11-24 NOTE — PROGRESS NOTES
I cannot write a TT shot without knowing background   Did I write the TT for him ? Dec 2 is not a far away at all .  Moreover, it is a controlled substance and I better document before I prescribe it   On my end, I see clomiphine pill only

## 2020-11-25 RX ORDER — INSULIN LISPRO 100 [IU]/ML
INJECTION, SOLUTION INTRAVENOUS; SUBCUTANEOUS
Qty: 2 VIAL | Refills: 6 | Status: SHIPPED | OUTPATIENT
Start: 2020-11-25 | End: 2020-12-15 | Stop reason: SDUPTHER

## 2020-12-02 ENCOUNTER — OFFICE VISIT (OUTPATIENT)
Dept: ENDOCRINOLOGY | Age: 50
End: 2020-12-02
Payer: COMMERCIAL

## 2020-12-02 VITALS
BODY MASS INDEX: 27.89 KG/M2 | OXYGEN SATURATION: 98 % | HEART RATE: 113 BPM | SYSTOLIC BLOOD PRESSURE: 113 MMHG | HEIGHT: 76 IN | WEIGHT: 229 LBS | DIASTOLIC BLOOD PRESSURE: 71 MMHG | RESPIRATION RATE: 18 BRPM | TEMPERATURE: 98.6 F

## 2020-12-02 DIAGNOSIS — Z79.4 ENCOUNTER FOR LONG-TERM (CURRENT) USE OF INSULIN (HCC): ICD-10-CM

## 2020-12-02 DIAGNOSIS — I10 ESSENTIAL HYPERTENSION: ICD-10-CM

## 2020-12-02 DIAGNOSIS — Z96.41 INSULIN PUMP STATUS: ICD-10-CM

## 2020-12-02 DIAGNOSIS — E10.65 HYPERGLYCEMIA DUE TO TYPE 1 DIABETES MELLITUS (HCC): Primary | ICD-10-CM

## 2020-12-02 DIAGNOSIS — G62.9 NEUROPATHY: ICD-10-CM

## 2020-12-02 DIAGNOSIS — E78.2 MIXED HYPERLIPIDEMIA: ICD-10-CM

## 2020-12-02 PROBLEM — E11.40 TYPE 2 DIABETES MELLITUS WITH DIABETIC NEUROPATHY (HCC): Status: ACTIVE | Noted: 2020-12-02

## 2020-12-02 PROBLEM — E11.21 TYPE 2 DIABETES WITH NEPHROPATHY (HCC): Status: ACTIVE | Noted: 2020-12-02

## 2020-12-02 PROCEDURE — 3051F HG A1C>EQUAL 7.0%<8.0%: CPT | Performed by: INTERNAL MEDICINE

## 2020-12-02 PROCEDURE — 99215 OFFICE O/P EST HI 40 MIN: CPT | Performed by: INTERNAL MEDICINE

## 2020-12-02 RX ORDER — GABAPENTIN 300 MG/1
CAPSULE ORAL
Qty: 90 CAP | Refills: 6 | Status: SHIPPED | OUTPATIENT
Start: 2020-12-02 | End: 2021-06-16

## 2020-12-02 NOTE — PROGRESS NOTES
Luis Armando Lopez is a 52 y.o. male here for   Chief Complaint   Patient presents with    Diabetes       1. Have you been to the ER, urgent care clinic since your last visit? Hospitalized since your last visit? -[facility] a month ago for DKA    2. Have you seen or consulted any other health care providers outside of the [address] [place] [address] since your last visit?   Include any pap smears or colon screening.-no

## 2020-12-02 NOTE — PATIENT INSTRUCTIONS
Stay  on  Lisinopril     Stay   On atorvostatin 20 mg at night     increase   On  Gabapentin  300 mg  One pill AM   And  2 pills at night      -----------------------------------------------------------------------------------------------------------------      Back up regimen       Check blood sugars immediately before each meal and at bedtime      Take  lantus  insulin  30  units  at bed time    Take humalog  Insulin at carb to insulin ratio of 10 gm : 1 unit       Also, add additional humalog  as follows with meals  If blood sugars are[de-identified]    150-200 mg 1 units    201-250 mg 2 units    251-300 mg 3 units    301-350 mg 4 units    351-400 mg 5 units    401-450 mg 6 units    451-500 mg 7 units     Less than 70 mg NO INSULIN

## 2020-12-02 NOTE — PROGRESS NOTES
HISTORY OF PRESENT ILLNESS  Brandon Brown is a 52 y.o. male. HPI  Patient here for    FOLLOW UP  AFTER  initial visit of Type 1 diabetes mellitus  FROM August 2020     Pt is following up first time on tandem pump   He is dis- appointed with weight gain       He feels tired, c/o ED etc.,   He is VERY EAGER TO START ON TESTOSTERONE   HE feels that he felt so good on TT in the past           August 2020     He has no meter in hand   He got the new tandem pump     He had one virtual  Brief visit   with the  and was not helpful   He is asking for TT shots   He feels fatigued       Old history :     He came in with no meter   The pump download has no info   Accompanied by his grand daughter   He is requesting help with neuropathy  Requesting small dose of anti-depressant       Old history ;     Last visit  Sept 2017   ALMOST A GAP OF 3 YEARS   HE GOT THE PUMP STARTED AND NEVER CAME BACK FOR FOLLOW UP   HE IS BACK HERE AS HE [place] INTO [place]OWN   HE USED TO  BE A [employer] OFFICER   Comes in with 2 yr old grand daughter           OLD HISTORY       Current A1C is 13.2 % and symptoms/problems include polyuria, polydipsia and visual disturbances   H/o DM type 1 for 30 years    He moved from [address] [place] [address], goyo blair was his pcp   Many years ago , he was seeing Dr. Martha Aguilera      Current diabetic medications include insulin pump     Current monitoring regimen: home blood tests - rarely  Any episodes of hypoglycemia? no     Weight trend: increasing steadily  Prior visit with dietician: no  Current diet: \"unhealthy\" diet in general  Current exercise: no regular exercise     Known diabetic complications: retinopathy and peripheral neuropathy  Cardiovascular risk factors: dyslipidemia, diabetes mellitus, male gender, stress      Eye exam current (within one year): no  CESAR: no                 Past Medical History   Diagnosis Date    DM (diabetes mellitus) ([facility] [address]      HTN (hypertension)      Hypogonadism, male       Past Surgical History   Procedure Laterality Date    Hx tonsillectomy        Hx free skin graft Right         Leg    Hx other surgical Left         4 surgeries for staph infection           Current Outpatient Prescriptions   Medication Sig    HUMALOG 100 unit/mL injection USE IN INSULIN PUMP AS DIRECTED. DX: 250.00    losartan (COZAAR) 100 mg tablet Take 1 Tab by mouth daily.      No current facility-administered medications for this visit.          Review of Systems   Constitutional: Negative. HENT: Negative. Eyes: Negative for pain and redness. Respiratory: Negative. Cardiovascular: Negative for chest pain, palpitations and leg swelling. Gastrointestinal: Negative. Negative for constipation. Genitourinary: Negative. Musculoskeletal: Negative for myalgias. Skin: Negative. Neurological: Negative. Endo/Heme/Allergies: Negative. Psychiatric/Behavioral: Negative for depression and memory loss. The patient does not have insomnia. Physical Exam   Constitutional: oriented to person, place, and time. appears well-developed and well-nourished. HENT:   Head: Normocephalic. Eyes: normal , noted no swelling or redness. Neck: Normal range of motion. Cardiovascular: could nto be examined  Pulmonary/Chest: appears breathing effortlessly  Abdominal: Soft. Musculoskeletal: appears relatively nprmal  range of motion. Neurological: He is alert and oriented to person, place, and time.  Appears to have no focal deficits    Psychiatric: He has a normal mood and affect.             Lab Results   Component Value Date/Time    Hemoglobin A1c 7.5 (H) 11/18/2020 08:26 AM    Hemoglobin A1c 12.9 (H) 09/01/2020 11:48 AM    Hemoglobin A1c 12.0 (H) 11/27/2019 02:10 PM    Glucose 179 (H) 11/18/2020 08:26 AM    Glucose (POC) 168 (H) 07/28/2015 10:57 AM    Microalbumin/Creat ratio (mg/g creat) 19 11/18/2020 08:26 AM    Microalbumin,urine random 1.31 11/18/2020 08:26 AM    LDL, calculated 96.8 11/18/2020 08:26 AM    Creatinine 1.37 (H) 11/18/2020 08:26 AM      Lab Results   Component Value Date/Time    Cholesterol, total 172 11/18/2020 08:26 AM    HDL Cholesterol 52 11/18/2020 08:26 AM    LDL, calculated 96.8 11/18/2020 08:26 AM    Triglyceride 116 11/18/2020 08:26 AM    CHOL/HDL Ratio 3.3 11/18/2020 08:26 AM     Lab Results   Component Value Date/Time    ALT (SGPT) 28 11/18/2020 08:26 AM    Alk. phosphatase 85 11/18/2020 08:26 AM    Bilirubin, total 0.3 11/18/2020 08:26 AM    Albumin 3.6 11/18/2020 08:26 AM    Protein, total 7.1 11/18/2020 08:26 AM     Lab Results   Component Value Date/Time    GFR est non-AA 55 (L) 11/18/2020 08:26 AM    GFR est AA >60 11/18/2020 08:26 AM    Creatinine 1.37 (H) 11/18/2020 08:26 AM    BUN 31 (H) 11/18/2020 08:26 AM    Sodium 140 11/18/2020 08:26 AM    Potassium 4.6 11/18/2020 08:26 AM    Chloride 110 (H) 11/18/2020 08:26 AM    CO2 27 11/18/2020 08:26 AM            ASSESSMENT and PLAN     1.  Type 1DM, POORLY controlled , on insulin pump.:  a1c is   7.5  %   By labs from nov 2020    Compared to    12.9 %      By  POC     Compared   To    12 %   From nov 2019, compared to  A1C IS OVER 10 %   FROM PT FIRST   A long gap in follow up visit from 2016 to 2019 - almost 3 years       Dec 2020     Good glycemic control from Tandem pump control IQ  I felt very happy as this is [address]   He has difficulty in understanding the weight gain    He needed good understanding  Again for use of basal bolus regimen   He has no idea of upload of pump, I contacted the CDE to train him             August 2020   No glycemic control at all   Has T-slim  And dexcom in hand   Await training   Does not  Maintain  Log , he does not check sugars at all    STOP diet cokes   Hoping that when he gets onto dexcom , that he can get better control          April 2020 visit   He hardly checks sugars -670 G Medtronic pump is going to be not helping this patient at all because of his noncompliance with checking sugars  Non compliant pt   Told him to upload medtronic pump for review   Recommending T slim pump and dexcom         Jan 2020  :  Reviewed download, and noticed that there are no sugars on download and no carbs entered on it  Again this visit  Jan 2020   Kandace Lamer him to use bolus wizard again today   The bolus wizard was turned back on by me at last visit, but he has not used it yet  Encouraged him to do so   He is using relion meter and test strips    Strictly advised on using bolus wizard for every food item. Emphasized on avoiding high glycemic index foods and provided some examples.     Patient is educated about the importance of glycemic control to prevent progression of complications. Patient is advised about checking blood sugars 6 times a day and maintaining log book.           2. Hypoglycemia :  Educated on treating the hypoglycemia. Discussed g-voke and prescribed      3. HTN :  Used to be on  micardis 40 - 12.5 , pt got hospitalized and pt was given lisinopril 20 mg at discharge        4. Dyslipidemia : on statin  Patient is educated about benefits and adverse effects of statins and explained how benefits outweigh risk.     5. use of aspirin to prevent MI and TIA's discussed      6. PT HAS RHEUMATOLOGICAL PROBLEM - GETS STEROIDS ON AND OFF   HE HAS ARTHRITIS        7. NEUROPATHY  - worsening ,  And is expected with good glycemic control   This is again a difficulty in understaning it   Increase  neurontin to 300 mg am , 600 mg pm       8. ED - pt  Is requesting viagra samples- filled it nov 2019, says it does not work   And I again encourged him to try viagra with cialis       9.     Hypogonadism / fatigue    He recalls getting TT shots a while ago from urologist     He had become very eager to be on TT and called several times  This required counseling as well   Labs were done in [facility]  And from nov 2020  -  Both times , the labs are in 360 s   I cannot start him on  TT - made it clear     I encouraged him to use clomiphene for now   Denies any  Prostrate cancer or CAD in family         F/u in 3  months     > 50 % of time is spent on counseling   Patient voiced understanding her plan of care

## 2020-12-15 RX ORDER — INSULIN LISPRO 100 [IU]/ML
INJECTION, SOLUTION INTRAVENOUS; SUBCUTANEOUS
Qty: 3 VIAL | Refills: 6 | Status: SHIPPED | OUTPATIENT
Start: 2020-12-15 | End: 2021-03-08 | Stop reason: SDUPTHER

## 2021-03-08 ENCOUNTER — OFFICE VISIT (OUTPATIENT)
Dept: ENDOCRINOLOGY | Age: 51
End: 2021-03-08
Payer: COMMERCIAL

## 2021-03-08 VITALS
SYSTOLIC BLOOD PRESSURE: 153 MMHG | WEIGHT: 239.4 LBS | OXYGEN SATURATION: 97 % | BODY MASS INDEX: 29.14 KG/M2 | DIASTOLIC BLOOD PRESSURE: 95 MMHG | TEMPERATURE: 97 F | HEART RATE: 102 BPM | RESPIRATION RATE: 18 BRPM

## 2021-03-08 DIAGNOSIS — G62.9 NEUROPATHY: ICD-10-CM

## 2021-03-08 DIAGNOSIS — E78.2 MIXED HYPERLIPIDEMIA: ICD-10-CM

## 2021-03-08 DIAGNOSIS — E10.65 HYPERGLYCEMIA DUE TO TYPE 1 DIABETES MELLITUS (HCC): Primary | ICD-10-CM

## 2021-03-08 DIAGNOSIS — Z96.41 INSULIN PUMP STATUS: ICD-10-CM

## 2021-03-08 DIAGNOSIS — I10 ESSENTIAL HYPERTENSION: ICD-10-CM

## 2021-03-08 DIAGNOSIS — Z79.4 ENCOUNTER FOR LONG-TERM (CURRENT) USE OF INSULIN (HCC): ICD-10-CM

## 2021-03-08 LAB — HBA1C MFR BLD HPLC: 6.6 %

## 2021-03-08 PROCEDURE — 83036 HEMOGLOBIN GLYCOSYLATED A1C: CPT | Performed by: INTERNAL MEDICINE

## 2021-03-08 PROCEDURE — 99215 OFFICE O/P EST HI 40 MIN: CPT | Performed by: INTERNAL MEDICINE

## 2021-03-08 RX ORDER — INSULIN LISPRO 100 [IU]/ML
INJECTION, SOLUTION INTRAVENOUS; SUBCUTANEOUS
Qty: 4 VIAL | Refills: 6 | Status: SHIPPED | OUTPATIENT
Start: 2021-03-08 | End: 2021-07-16 | Stop reason: SDUPTHER

## 2021-03-08 NOTE — PROGRESS NOTES
HISTORY OF PRESENT ILLNESS  Jacinda Stone is a 52 y.o. male. HPI  Patient here for    FOLLOW UP  AFTER  initial visit of Type 1 diabetes mellitus  FROM  Dec 2 2020  2020     Patient says he took humalog 14 units plus 14 plus  Again and another 10 units pen shot   He gained weight of 10 lbs     Patient says that his sugars are not coming down and he is taking more insulin back to back and from external shots     PT IS TALKATIVE ( BIG CHANGE FROM ALL PRIOR VISITS)          Dec 2020     Pt is following up first time on tandem pump   He is dis- appointed with weight gain   He feels tired, c/o ED etc.,   He is VERY EAGER TO START ON TESTOSTERONE   HE feels that he felt so good on TT in the past           August 2020     He has no meter in hand   He got the new tandem pump     He had one virtual  Brief visit   with the  and was not helpful   He is asking for TT shots   He feels fatigued       Old history :     He came in with no meter   The pump download has no info   Accompanied by his grand daughter   He is requesting help with neuropathy  Requesting small dose of anti-depressant       Old history ; Last visit  Sept 2017   ALMOST A GAP OF 3 YEARS   HE GOT THE PUMP STARTED AND NEVER CAME BACK FOR FOLLOW UP   HE IS BACK HERE AS HE MOVED INTO [facility]   HE USED TO  BE A [employer] OFF[employer]   Comes in with 2 yr old grand daughter           OLD HISTORY       Current A1C is 13.2 % and symptoms/problems include polyuria, polydipsia and visual disturbances   H/o DM type 1 for 30 years    He moved from [address] [place] [address], goyo blair was his pcp   Many years ago , he was seeing Dr. Cierra Melendez   Current diabetic medications include insulin pump      Review of Systems   Had weight gain       Physical Exam   Constitutional: She is oriented to person, place, and time. She appears well-developed and well-nourished. Neck:  Neck supple. No thyromegaly present.    Cardiovascular: Normal rate, regular rhythm   Pulmonary/Chest: Breath sounds normal.   Psychiatric: She has a normal mood and affect.        Lab Results   Component Value Date/Time    Hemoglobin A1c 7.5 (H) 11/18/2020 08:26 AM    Hemoglobin A1c 12.9 (H) 09/01/2020 11:48 AM    Hemoglobin A1c 12.0 (H) 11/27/2019 02:10 PM    Glucose 179 (H) 11/18/2020 08:26 AM    Glucose (POC) 168 (H) 07/28/2015 10:57 AM    Microalbumin/Creat ratio (mg/g creat) 19 11/18/2020 08:26 AM    Microalbumin,urine random 1.31 11/18/2020 08:26 AM    LDL, calculated 96.8 11/18/2020 08:26 AM    Creatinine 1.37 (H) 11/18/2020 08:26 AM      Lab Results   Component Value Date/Time    Cholesterol, total 172 11/18/2020 08:26 AM    HDL Cholesterol 52 11/18/2020 08:26 AM    LDL, calculated 96.8 11/18/2020 08:26 AM    Triglyceride 116 11/18/2020 08:26 AM    CHOL/HDL Ratio 3.3 11/18/2020 08:26 AM     Lab Results   Component Value Date/Time    ALT (SGPT) 28 11/18/2020 08:26 AM    Alk. phosphatase 85 11/18/2020 08:26 AM    Bilirubin, total 0.3 11/18/2020 08:26 AM    Albumin 3.6 11/18/2020 08:26 AM    Protein, total 7.1 11/18/2020 08:26 AM     Lab Results   Component Value Date/Time    GFR est non-AA 55 (L) 11/18/2020 08:26 AM    GFR est AA >60 11/18/2020 08:26 AM    Creatinine 1.37 (H) 11/18/2020 08:26 AM    BUN 31 (H) 11/18/2020 08:26 AM    Sodium 140 11/18/2020 08:26 AM    Potassium 4.6 11/18/2020 08:26 AM    Chloride 110 (H) 11/18/2020 08:26 AM    CO2 27 11/18/2020 08:26 AM            ASSESSMENT and PLAN     1.  Type 1DM, POORLY controlled , on insulin pump.:  a1c is  6.6 %    From    March 2021  Compared to     7.5  %   By labs from nov 2020    Compared to    12.9 %      By  POC     Compared   To    12 %   From nov 2019, compared to  A1C IS OVER 10 %   FROM PT FIRST   A long gap in follow up visit from 2016 to 2019 - almost 3 years       March 2021   Excellent control but promise is taking TOO MUCH INSULIN   REVIEWED SETTINGS ON TANDEM PUMP     TOTAL DAILY BASAL IS 29 UNITS A DAY   TOTAL DOSE IS 70 UNITS A DAY     HE NEEDS ADJUSTMENT ON BASAL , BUT DEFINITELY ON BOLUS   MAX BOLUS TO BE INCREASED TO 25 UNITS   CARB TO INSULIN RATIO BE LOWERED TO 1:8    FROM 1:10       Reviewed  Tandem pump downloaded report for 14  days and discussed with pt       - 14 day average glucose 153   -29  % for high and 4 % low sugars and % in Target  Range 66 %    - percent of utiization 96 %           Dec 2020     Good glycemic control from Tandem pump control IQ  I felt very happy as this is THE FIRST TIME SEEING THIS GOOD CONTROL   He has difficulty in understanding the weight gain  He needed good understanding  Again for use of basal bolus regimen   He has no idea of upload of pump, I contacted the CDE to train him           August 2020   No glycemic control at all   Has T-slim  And dexcom in hand   Await training   Does not  Maintain  Log , he does not check sugars at all    STOP diet cokes   Hoping that when he gets onto dexcom , that he can get better control          April 2020 visit   He hardly checks sugars -670 G Medtronic pump is going to be not helping this patient at all because of his noncompliance with checking sugars  Non compliant pt   Told him to upload medtronic pump for review   Recommending T slim pump and dexcom         Jan 2020  :  Reviewed download, and noticed that there are no sugars on download and no carbs entered on it  Again this visit  Jan 2020   Showed him to use bolus wizard again today   The bolus wizard was turned back on by me at last visit, but he has not used it yet  Encouraged him to do so   He is using relion meter and test strips    Strictly advised on using bolus wizard for every food item. Emphasized on avoiding high glycemic index foods and provided some examples.     Patient is educated about the importance of glycemic control to prevent progression of complications. Patient is advised about checking blood sugars 6 times a day and maintaining log book.           2.  Hypoglycemia :  Educated on treating the hypoglycemia. Discussed g-voke and prescribed      3. HTN :  Used to be on  micardis 40 - 12.5 , pt got hospitalized and pt was given lisinopril 20 mg at discharge        4. Dyslipidemia : on statin  Patient is educated about benefits and adverse effects of statins and explained how benefits outweigh risk.     5. use of aspirin to prevent MI and TIA's discussed      6. PT HAS RHEUMATOLOGICAL PROBLEM - GETS STEROIDS ON AND OFF   HE HAS ARTHRITIS        7. NEUROPATHY  - worsening ,  And is expected with good glycemic control   This is again a difficulty in understaning it   Increase  neurontin to 300 mg am , 600 mg pm       8. ED - pt  Is requesting viagra samples- filled it nov 2019, says it does not work   And I again encourged him to try viagra with cialis       9.     Hypogonadism / fatigue    He recalls getting TT shots a while ago from urologist     He had become very eager to be on TT and called several times  This required counseling as well   Labs were done in [facility]  And from nov 2020  -  Both times , the labs are in 360 s   I cannot start him on  TT - made it clear     I encouraged him to use clomiphene for now   Denies any  Prostrate cancer or CAD in family         F/u in 3  months      Reviewed the CGM and Pump downloads, discussed changes and spent more than 25 min in interpretation and counseling     Total time  : 42 min     Reviewed results with patient and discussed the labs being ordered today/bnv  Patient voiced understanding of plan of care

## 2021-03-08 NOTE — PROGRESS NOTES
1. Have you been to the ER, urgent care clinic since your last visit?no  Hospitalized since your last visit? No    2. Have you seen or consulted any other health care providers outside of the [address] [place] [address] since your last visit? Include any pap smears or colon screening.  No    Wt Readings from Last 3 Encounters:   03/08/21 239 lb 6.4 oz (108.6 kg)   12/02/20 229 lb (103.9 kg)   08/13/20 209 lb (94.8 kg)     Temp Readings from Last 3 Encounters:   03/08/21 97 °F (36.1 °C)   12/02/20 98.6 °F (37 °C) (Oral)   08/13/20 97.8 °F (36.6 °C) (Oral)     BP Readings from Last 3 Encounters:   03/08/21 (!) 153/95   12/02/20 113/71   08/13/20 (!) 152/95     Pulse Readings from Last 3 Encounters:   03/08/21 (!) 102   12/02/20 (!) 113   08/13/20 95     Lab Results   Component Value Date/Time    Hemoglobin A1c 7.5 (H) 11/18/2020 08:26 AM    Hemoglobin A1c (POC) 12.9 08/13/2020 04:00 PM

## 2021-03-08 NOTE — PATIENT INSTRUCTIONS
SPECIFIC INSTRUCTIONS BELOW       DRINK water   Do not skip meals  Do not eat in between meals    Reduce carbs- pasta, rice, potatoes, bread   Try to avoid processed bread products like BISCUITS, CROISSANTS, MUFFINS    Do not drink juices or sodas, even if they are calorie zero or diet drinks and especially avoid using powders like crystalloids , ALEX-AIDS     Do not eat peanut butter     Do not eat sugar free cookies and cakes   Do not eat peaches, oranges, pineapples, raisins, grapes , canteloupe , honey dew, mangoes , watermelon  and fruit medleys    ---------------------------------------------------------------------------------------------------------------    Change to non carb snacks  -       Chicken, veggies, and eggs and nuts and cheese     -----------------------------------------------------------------------------------------    Stay  on  Lisinopril     Stay   On atorvostatin 20 mg at night      Gabapentin  300 mg  One pill AM   And  2 pills at night      -----------------------------------------------------------------------------------------------------------------      Increase humalog to 4 vials in the pump   ( nearly 100 units a day )      Back up regimen       Check blood sugars immediately before each meal and at bedtime      Take  lantus  insulin  30  units  at bed time    Take humalog  Insulin at carb to insulin ratio of 10 gm : 1 unit       Also, add additional humalog  as follows with meals  If blood sugars are[de-identified]    150-200 mg 1 units    201-250 mg 2 units    251-300 mg 3 units    301-350 mg 4 units    351-400 mg 5 units    401-450 mg 6 units    451-500 mg 7 units     Less than 70 mg NO INSULIN            PAY ATTENTION TO THESE GENERAL INSTRUCTIONS     - HEALTH MAINTENANCE IS NOT GOING TO BE UP TO DATE ON YOUR AVS- PLEASE IGNORE   - YOUR MED LIST IS NOT UP TO DATE AS SOME CHANGES ARE BEING MADE AFTER THE VISIT - FOLLOW SPECIFIC INSTRUCTIONS ABOVE     Results     *Normal results will not be notified by a phone call starting January 1 2021   *If you have an upcoming visit, the results will be discussed at the visit   *Please sign up for MY CHART if you want access to your lab and test results  *Abnormal results which require immediate attention will be notified by phone call   *Abnormal results which do not require immediate assistance will be notified in 1-2 weeks       Refills    -    have your pharmacy send us a refill request  Phone calls  -  Allow  24 hrs.  for non-urgent calls to be returned  Prior authorization - It may take 2-4 weeks to process  Forms  -  FMLA, DMV etc., will take up to 2 weeks to process  Cancellations - please notify the office 2 days in advance   Samples  - will only be dispensed at visits     --------------------------------------------------------------------------------------------

## 2021-03-08 NOTE — LETTER
3/14/2021 Patient: Venancio Narayanan YOB: 1970 Date of Visit: 3/8/2021 Markel Drew MD 
[address] [facility] [phone] Via Fax: [phone] Dear Markel Drew MD, Thank you for referring Mr. Venancio Narayanan to [address] [place] [address] for evaluation. My notes for this consultation are attached. If you have questions, please do not hesitate to call me. I look forward to following your patient along with you. Sincerely, Rajeev Miller MD

## 2021-03-14 ENCOUNTER — DOCUMENTATION ONLY (OUTPATIENT)
Dept: ENDOCRINOLOGY | Age: 51
End: 2021-03-14

## 2021-03-14 NOTE — PROGRESS NOTES
Can you add TT, Free TT , fsh and LH along with future labs - DM4   Please let me know how it is done when we order labs in between, so we do not have many unresulted orders       Tegan Ovalle MD

## 2021-03-15 DIAGNOSIS — E78.2 MIXED HYPERLIPIDEMIA: ICD-10-CM

## 2021-03-15 DIAGNOSIS — R53.83 FATIGUE, UNSPECIFIED TYPE: ICD-10-CM

## 2021-03-15 DIAGNOSIS — G62.9 NEUROPATHY: ICD-10-CM

## 2021-03-15 DIAGNOSIS — I10 ESSENTIAL HYPERTENSION: ICD-10-CM

## 2021-03-15 DIAGNOSIS — F32.A DEPRESSION, UNSPECIFIED DEPRESSION TYPE: ICD-10-CM

## 2021-04-14 RX ORDER — AMLODIPINE BESYLATE 5 MG/1
TABLET ORAL
Qty: 30 TAB | Refills: 5 | Status: SHIPPED | OUTPATIENT
Start: 2021-04-14 | End: 2022-03-28 | Stop reason: SDUPTHER

## 2021-06-15 DIAGNOSIS — G62.9 NEUROPATHY: ICD-10-CM

## 2021-06-16 RX ORDER — GABAPENTIN 300 MG/1
CAPSULE ORAL
Qty: 90 CAPSULE | Refills: 4 | Status: SHIPPED | OUTPATIENT
Start: 2021-06-16 | End: 2021-11-19 | Stop reason: SDUPTHER

## 2021-07-08 RX ORDER — ATORVASTATIN CALCIUM 20 MG/1
20 TABLET, FILM COATED ORAL
Qty: 30 TABLET | Refills: 6 | Status: SHIPPED | OUTPATIENT
Start: 2021-07-08 | End: 2022-03-28 | Stop reason: SDUPTHER

## 2021-07-16 ENCOUNTER — OFFICE VISIT (OUTPATIENT)
Dept: ENDOCRINOLOGY | Age: 51
End: 2021-07-16
Payer: COMMERCIAL

## 2021-07-16 VITALS
TEMPERATURE: 97 F | SYSTOLIC BLOOD PRESSURE: 154 MMHG | RESPIRATION RATE: 18 BRPM | HEIGHT: 76 IN | BODY MASS INDEX: 28.15 KG/M2 | WEIGHT: 231.2 LBS | DIASTOLIC BLOOD PRESSURE: 92 MMHG | OXYGEN SATURATION: 98 % | HEART RATE: 89 BPM

## 2021-07-16 DIAGNOSIS — I10 ESSENTIAL HYPERTENSION: ICD-10-CM

## 2021-07-16 DIAGNOSIS — E29.1 HYPOGONADISM IN MALE: ICD-10-CM

## 2021-07-16 DIAGNOSIS — Z79.4 ENCOUNTER FOR LONG-TERM (CURRENT) USE OF INSULIN (HCC): ICD-10-CM

## 2021-07-16 DIAGNOSIS — Z96.41 INSULIN PUMP STATUS: ICD-10-CM

## 2021-07-16 DIAGNOSIS — G62.9 NEUROPATHY: ICD-10-CM

## 2021-07-16 DIAGNOSIS — E78.2 MIXED HYPERLIPIDEMIA: ICD-10-CM

## 2021-07-16 LAB — HBA1C MFR BLD HPLC: 6.6 %

## 2021-07-16 PROCEDURE — 83036 HEMOGLOBIN GLYCOSYLATED A1C: CPT | Performed by: INTERNAL MEDICINE

## 2021-07-16 PROCEDURE — 99215 OFFICE O/P EST HI 40 MIN: CPT | Performed by: INTERNAL MEDICINE

## 2021-07-16 RX ORDER — INSULIN LISPRO 100 [IU]/ML
INJECTION, SOLUTION INTRAVENOUS; SUBCUTANEOUS
Qty: 12 VIAL | Refills: 3 | Status: SHIPPED | OUTPATIENT
Start: 2021-07-16 | End: 2022-05-08

## 2021-07-16 RX ORDER — LISINOPRIL 20 MG/1
20 TABLET ORAL DAILY
Qty: 90 TABLET | Refills: 3 | Status: SHIPPED | OUTPATIENT
Start: 2021-07-16 | End: 2022-03-04 | Stop reason: SDUPTHER

## 2021-07-16 RX ORDER — GLUCAGON INJECTION, SOLUTION 1 MG/.2ML
1 INJECTION, SOLUTION SUBCUTANEOUS AS NEEDED
Qty: 1 BOX | Refills: 1 | Status: SHIPPED | OUTPATIENT
Start: 2021-07-16 | End: 2022-05-08

## 2021-07-16 NOTE — LETTER
7/18/2021    Patient: Cammy Lundberg   YOB: 1970   Date of Visit: 7/16/2021     Curt Bernstein MD  [address] [place] [phone]  Via Fax: [phone]    Dear Curt Bernstein MD,      Thank you for referring Mr. Cammy Lundberg to [address] [place] [address] for evaluation. My notes for this consultation are attached. If you have questions, please do not hesitate to call me. I look forward to following your patient along with you.       Sincerely,    Jh Peter MD

## 2021-07-16 NOTE — PROGRESS NOTES
Room 1    Identified pt with two pt identifiers(name and ). Reviewed record in preparation for visit and have obtained necessary documentation. All patient medications has been reviewed. Chief Complaint   Patient presents with    Diabetes       3 most recent PHQ Screens 2021   Little interest or pleasure in doing things Not at all   Feeling down, depressed, irritable, or hopeless Not at all   Total Score PHQ 2 0     Abuse Screening Questionnaire 2021   Do you ever feel afraid of your partner? N   Are you in a relationship with someone who physically or mentally threatens you? N   Is it safe for you to go home? Y       Health Maintenance Review: Patient reminded of \"due or due soon\" health maintenance. I have asked the patient to contact his/her primary care provider (PCP) for follow-up on his/her health maintenance. Vitals:    21 1303   BP: (!) 154/92   Pulse: 89   Resp: 18   Temp: 97 °F (36.1 °C)   TempSrc: Oral   SpO2: 98%   Weight: 231 lb 3.2 oz (104.9 kg)   Height: 6' 4\" (1.93 m)   PainSc:   0 - No pain         Lab Results   Component Value Date/Time    Hemoglobin A1c 7.5 (H) 2020 08:26 AM    Hemoglobin A1c (POC) 6.6 2021 03:51 PM       Coordination of Care Questionnaire:   1) Have you been to an emergency room, urgent care, or hospitalized since your last visit?   no       2. Have seen or consulted any other health care provider since your last visit? NO      Patient is accompanied by self I have received verbal consent from Chandan Terrazas to discuss any/all medical information while they are present in the room.

## 2021-07-16 NOTE — PROGRESS NOTES
HISTORY OF PRESENT ILLNESS      Dhruv Johnson is a 52 y.o. male. HPI  Patient here for    FOLLOW UP  AFTER  initial visit of Type 1 diabetes mellitus  FROM  March 2021     Lost 8 lbs   He is doing well on insulin pump   He is drinking \"zero \" soda     He is asking if he could go to  \" [facility]'s clinic \"          March 2021     Patient says he took humalog 14 units plus 14 plus  Again and another 10 units pen shot   He gained weight of 10 lbs   Patient says that his sugars are not coming down and he is taking more insulin back to back and from external shots   PT IS TALKATIVE ( BIG CHANGE FROM ALL PRIOR VISITS)          Dec 2020     Pt is following up first time on tandem pump   He is dis- appointed with weight gain   He feels tired, c/o ED etc.,   He is VERY EAGER TO START ON TESTOSTERONE   HE feels that he felt so good on TT in the past           August 2020     He has no meter in hand   He got the new tandem pump     He had one virtual  Brief visit   with the  and was not helpful   He is asking for TT shots   He feels fatigued           OLD HISTORY       Current A1C is 13.2 % and symptoms/problems include polyuria, polydipsia and visual disturbances   H/o DM type 1 for 30 years    He moved from [address] [place] [address], goyo blair was his pcp   Many years ago , he was seeing Dr. Shefali Avila   Current diabetic medications include insulin pump            Review of Systems   Had weight gain       Physical Exam   Constitutional: She is oriented to person, place, and time. She appears well-developed and well-nourished. Psychiatric: She has a normal mood and affect.               ASSESSMENT and PLAN     1.  Type 1DM, POORLY controlled , on insulin pump.:  a1c is      6.6 %      From    Today by  [facility]    July 2021     Compared to     6.6 %    From    March 2021  Compared to     7.5  %   By labs from nov 2020    Compared to    12.9 %      By  POC     Compared   To    12 %   From nov 2019, compared to  A1C IS OVER 10 %   FROM PT FIRST   A long gap in follow up visit from 2016 to 2019 - almost 3 years         July 2021       Reviewed  Tandem pump downloaded report for 14  days and discussed with pt       - 14 day average glucose 167   - 35  % for high and 4 % low sugars and % in Target  Range 61 %    - percent of utiization 96 %       REVIEWED SETTINGS ON TANDEM PUMP   TOTAL DAILY BASAL IS 29 UNITS A DAY   TOTAL DOSE IS 70 UNITS A DAY   HE is doing  BOLUS dosing very well   MAX BOLUS TO BE INCREASED TO 25 UNITS   I  LOWERED CARB TO INSULIN RATIO   TO 1:8    FROM 1:10             March 2021   Excellent control but promise is taking TOO MuCH INSULIN   REVIEWED SETTINGS ON TANDEM PUMP   TOTAL DAILY BASAL IS 29 UNITS A DAY   TOTAL DOSE IS 70 UNITS A DAY   HE NEEDS ADJUSTMENT ON BASAL , BUT DEFINITELY ON BOLUS   MAX BOLUS TO BE INCREASED TO 25 UNITS   CARB TO INSULIN RATIO BE LOWERED TO 1:8    FROM 1:10     Reviewed  Tandem pump downloaded report for 14  days and discussed with pt    - 14 day average glucose 153   -29  % for high and 4 % low sugars and % in Target  Range 66 %    - percent of utiization 96 %           Dec 2020     Good glycemic control from Tandem pump control IQ  I felt very happy as this is THE FIRST TIME SEEING THIS GOOD CONTROL   He has difficulty in understanding the weight gain  He needed good understanding  Again for use of basal bolus regimen   He has no idea of upload of pump, I contacted the CDE to train him           August 2020   No glycemic control at all   Has T-slim  And dexcom in hand   Await training   Does not  Maintain  Log , he does not check sugars at all    STOP diet cokes   Hoping that when he gets onto dexcom , that he can get better control          April 2020 visit   He hardly checks sugars -670 G Medtronic pump is going to be not helping this patient at all because of his noncompliance with checking sugars  Non compliant pt   Told him to upload medtronic pump for review   Recommending T slim pump and dexcom         2. Hypoglycemia :  Educated on treating the hypoglycemia. Discussed g-voke and prescribed      3. HTN :  Used to be on  micardis 40 - 12.5 , pt got hospitalized and pt was given lisinopril 20 mg at discharge        4. Dyslipidemia : on statin        5. use of aspirin to prevent MI and TIA's discussed      6. PT HAS RHEUMATOLOGICAL PROBLEM - GETS STEROIDS ON AND OFF   HE HAS ARTHRITIS        7. NEUROPATHY  - worsening ,  And is expected with good glycemic control   Stay on   neurontin to 300 mg am , 600 mg pm ( asking if medical Mami Rojas is good ? )      8. ED - pt  Is requesting viagra samples- filled it nov 2019, says it does not work   And I again encourged him to try viagra        9.     Hypogonadism / fatigue    He recalls getting TT shots a while ago from urologist     He had become very eager to be on TT and called several times  This required counseling as well   Labs were done in [facility]  And from nov 2020  -  Both times , the labs are in 360 s   I cannot start him on  TT - made it clear     He is off  clomiphene for now   Denies any  Prostrate cancer or CAD in family     Fasting 2 sets of  TT ordered -                Reviewed the CGM and Pump downloads, discussed changes and spent more than 25 min in interpretation and counseling     Total time  : 42 min   Reviewed results with patient and discussed the labs being ordered today/bnv  Patient voiced understanding of plan of care

## 2021-07-16 NOTE — PATIENT INSTRUCTIONS
SPECIFIC INSTRUCTIONS BELOW           DRINK water   Do not skip meals  Do not eat in between meals    Reduce carbs- pasta, rice, potatoes, bread   Try to avoid processed bread products like BISCUITS, CROISSANTS, MUFFINS    Do not drink juices or sodas, even if they are calorie zero or diet drinks and especially avoid using powders like crystalloids , ALEX-AIDS     Do not eat peanut butter     Do not eat sugar free cookies and cakes   Do not eat peaches, oranges, pineapples, raisins, grapes , canteloupe , honey dew, mangoes , watermelon  and fruit medleys    ---------------------------------------------------------------------------------------------------------------    Change to non carb snacks  -       Chicken, veggies, and eggs and nuts and cheese     -----------------------------------------------------------------------------------------    Stay  on  Lisinopril     Stay   On atorvostatin 20 mg at night      Gabapentin  300 mg  One pill AM   And  2 pills at night      -----------------------------------------------------------------------------------------------------------------      Gets the pump and dexcom supplies   - [employer]       Increase humalog to 4 vials in the pump   ( nearly 100 units a day )      Back up regimen       Check blood sugars immediately before each meal and at bedtime      Take  lantus  insulin  30  units  at bed time    Take humalog  Insulin at carb to insulin ratio of 10 gm : 1 unit       Also, add additional humalog  as follows with meals  If blood sugars are[de-identified]    150-200 mg 1 units    201-250 mg 2 units    251-300 mg 3 units    301-350 mg 4 units    351-400 mg 5 units    401-450 mg 6 units    451-500 mg 7 units     Less than 70 mg NO INSULIN          -------------PAY ATTENTION TO THESE GENERAL INSTRUCTIONS -----------------    -ANY tests other than blood work, which you opt to do  outside the  [facility] imaging facilities, you are responsible for prior authorizations if required    - HEALTH MAINTENANCE IS NOT GOING TO BE UP TO DATE ON YOUR AVS- PLEASE IGNORE   - YOUR MED LIST IS NOT UP TO DATE AS SOME CHANGES ARE BEING MADE AFTER THE VISIT - FOLLOW SPECIFIC INSTRUCTIONS  ABOVE     Results     *Normal results will not be notified by a phone call starting January 1 2021   *If you have an upcoming visit, the results will be discussed at the visit   *Please sign up for MY CHART if you want access to your lab and test results  *Abnormal results which require immediate attention will be notified by phone call   *Abnormal results which do not require immediate assistance will be notified in 1-2 weeks       Refills    -    have your pharmacy send us a refill request . Refills are done max for one year and a visit is a must before refills are extended    Follow up appointments -  highly encourage you to make it when you are checking out. We can accommodate you into the schedule based on your clinical situation, but not for extending refills beyond a year. Labs are important to give refills and is important to get labs before the visit     Phone calls  -  Allow  24 hrs.  for non-urgent calls to be returned  Prior authorization - It may take 2-4 weeks to process  Forms  -  FMLA, DMV etc., will take up to 2 weeks to process  Cancellations - please notify the office 2 days in advance   Samples  - will only be dispensed at visits       If not showing for the appointments and cancelling appointments within 24 hours are kept track of and three  of such situations in  two consecutive years will likely be considered for termination from the practice    -------------------------------------------------------------------------------------------------------------------

## 2021-11-19 ENCOUNTER — OFFICE VISIT (OUTPATIENT)
Dept: ENDOCRINOLOGY | Age: 51
End: 2021-11-19
Payer: COMMERCIAL

## 2021-11-19 VITALS
BODY MASS INDEX: 28.69 KG/M2 | SYSTOLIC BLOOD PRESSURE: 154 MMHG | HEIGHT: 76 IN | WEIGHT: 235.6 LBS | RESPIRATION RATE: 20 BRPM | DIASTOLIC BLOOD PRESSURE: 90 MMHG | HEART RATE: 109 BPM | OXYGEN SATURATION: 98 % | TEMPERATURE: 97.8 F

## 2021-11-19 DIAGNOSIS — Z96.41 INSULIN PUMP STATUS: ICD-10-CM

## 2021-11-19 DIAGNOSIS — E78.2 MIXED HYPERLIPIDEMIA: ICD-10-CM

## 2021-11-19 DIAGNOSIS — I10 ESSENTIAL HYPERTENSION: ICD-10-CM

## 2021-11-19 DIAGNOSIS — E29.1 HYPOGONADISM IN MALE: ICD-10-CM

## 2021-11-19 DIAGNOSIS — G62.9 NEUROPATHY: ICD-10-CM

## 2021-11-19 DIAGNOSIS — L97.515 ULCER OF RIGHT FOOT WITH MUSCLE INVOLVEMENT WITHOUT EVIDENCE OF NECROSIS (HCC): Primary | ICD-10-CM

## 2021-11-19 DIAGNOSIS — Z79.4 ENCOUNTER FOR LONG-TERM (CURRENT) USE OF INSULIN (HCC): ICD-10-CM

## 2021-11-19 PROCEDURE — 99215 OFFICE O/P EST HI 40 MIN: CPT | Performed by: INTERNAL MEDICINE

## 2021-11-19 RX ORDER — BUSPIRONE HYDROCHLORIDE 5 MG/1
5 TABLET ORAL 2 TIMES DAILY
Qty: 60 TABLET | Refills: 5 | Status: SHIPPED | OUTPATIENT
Start: 2021-11-19 | End: 2022-03-04

## 2021-11-19 RX ORDER — CLINDAMYCIN HYDROCHLORIDE 300 MG/1
CAPSULE ORAL
Qty: 30 CAPSULE | Refills: 0 | Status: SHIPPED | OUTPATIENT
Start: 2021-11-19 | End: 2022-03-04 | Stop reason: ALTCHOICE

## 2021-11-19 RX ORDER — SILDENAFIL 25 MG/1
TABLET, FILM COATED ORAL
Qty: 32 TABLET | Refills: 6 | Status: SHIPPED | OUTPATIENT
Start: 2021-11-19 | End: 2022-05-08

## 2021-11-19 RX ORDER — GABAPENTIN 300 MG/1
CAPSULE ORAL
Qty: 150 CAPSULE | Refills: 5 | Status: SHIPPED | OUTPATIENT
Start: 2021-11-19 | End: 2022-06-21

## 2021-11-19 NOTE — PATIENT INSTRUCTIONS
SPECIFIC INSTRUCTIONS BELOW       DRINK water   Do not skip meals  Do not eat in between meals    Reduce carbs- pasta, rice, potatoes, bread   Try to avoid processed bread products like BISCUITS, CROISSANTS, MUFFINS    Do not drink juices or sodas, even if they are calorie zero or diet drinks and especially avoid using powders like crystalloids , ALEX-AIDS     Do not eat peanut butter     Do not eat sugar free cookies and cakes   Do not eat peaches, oranges, pineapples, raisins, grapes , canteloupe , honey dew, mangoes , watermelon  and fruit medleys    ---------------------------------------------------------------------------------------------------------------    Change to non carb snacks  -       Chicken, veggies, and eggs and nuts and cheese     -----------------------------------------------------------------------------------------    Stay  on  Lisinopril     Stay   On atorvostatin 20 mg at night      increase  To  Gabapentin  300 mg  2  Pills  AM   And  3 pills at night      Start on clindamycin 300 mg 1 cap three times a day along with ciproflox       Start on buspar 5 mg twice a day     -----------------------------------------------------------------------------------------------------------------      Gets the pump and dexcom supplies   - [employer]        humalog to 4 vials in the pump   ( nearly 100 units a day )      Back up regimen       Check blood sugars immediately before each meal and at bedtime      Take  lantus  insulin  30  units  at bed time    Take humalog  Insulin at carb to insulin ratio of 10 gm : 1 unit       Also, add additional humalog  as follows with meals  If blood sugars are[de-identified]    150-200 mg 1 units    201-250 mg 2 units    251-300 mg 3 units    301-350 mg 4 units    351-400 mg 5 units    401-450 mg 6 units    451-500 mg 7 units     Less than 70 mg NO INSULIN      -------------PAY ATTENTION TO THESE GENERAL INSTRUCTIONS -----------------      - The medications prescribed at this visit will not be available at pharmacy until 6 pm       - YOUR MED LIST IS NOT UP TO DATE AS SOME CHANGES ARE BEING MADE AFTER THE VISIT - FOLLOW SPECIFIC INSTRUCTIONS  ABOVE     -ANY tests other than blood work, which you opt to do  outside the  [facility] imaging facilities, you are responsible for prior authorizations if  required    - [address] UP TO DATE ON YOUR AVS- PLEASE IGNORE     Results     *Normal results will not be notified by a phone call starting January 1 2021   *If you have an upcoming visit, the results will be discussed at the visit   *Please sign up for MY CHART if you want access to your lab and test results  *Abnormal results which require immediate attention will be notified by phone call   *Abnormal results which do not require immediate assistance will be notified in 1-2 weeks       Refills    -    have your pharmacy send us a refill request . Refills are done max for one year and a visit is a must before refills are extended    Follow up appointments -  highly encourage you to make it when you are checking out. We can accommodate you into the schedule based on your clinical situation, but not for extending refills beyond a year. Labs are important to give refills and is important to get labs before the visit     Phone calls  -  Allow  24 hrs.  for non-urgent calls to be returned  Prior authorization - It may take 2-4 weeks to process  Forms  -  FMLA, DMV etc., will take up to 2 weeks to process  Cancellations - please notify the office 2 days in advance   Samples  - will only be dispensed at visits       If not showing for the appointments and cancelling appointments within 24 hours are kept track of and three  of such situations in  two consecutive years will likely be considered for termination from the practice    -------------------------------------------------------------------------------------------------------------------

## 2021-11-19 NOTE — LETTER
11/19/2021    Patient: Priyanka Marino   YOB: 1970   Date of Visit: 11/19/2021     Harvinder Tellez MD  [address] [place] [id number]  Via Fax: [phone]    Dear Harvinder Tellez MD,      Thank you for referring Mr. Priyanka Marino to [address] [place] [address] for evaluation. My notes for this consultation are attached. If you have questions, please do not hesitate to call me. I look forward to following your patient along with you.       Sincerely,    Criss Matos MD

## 2021-11-19 NOTE — PROGRESS NOTES
HISTORY OF PRESENT ILLNESS      Chuckie Gardiner is a 54  y.o. male. HPI  Patient here for    FOLLOW UP  AFTER  initial visit of Type 1 diabetes mellitus  FROM  July 2021       He has developed an ulcer on right metatarsel head area   He is now on ciproflox  A week ago     C/o ED     He wants an anti-anxiety med         July 2021     Lost 8 lbs   He is doing well on insulin pump   He is drinking \"zero \" soda   He is asking if he could go to  \" [facility] clinic \"          March 2021     Patient says he took humalog 14 units plus 14 plus  Again and another 10 units pen shot   He gained weight of 10 lbs   Patient says that his sugars are not coming down and he is taking more insulin back to back and from external shots   PT IS TALKATIVE ( BIG CHANGE FROM ALL PRIOR VISITS)            OLD HISTORY       Current A1C is 13.2 % and symptoms/problems include polyuria, polydipsia and visual disturbances   H/o DM type 1 for 30 years    He moved from [address] [place] [address], goyo blair was his pcp   Many years ago , he was seeing Dr. Merrick Rivero   Current diabetic medications include insulin pump            Review of Systems   Had weight gain       Physical Exam   Constitutional: She is oriented to person, place, and time. She appears well-developed and well-nourished. Psychiatric: She has a normal mood and affect. Right metatarsel area   - has Moderate Diabetic foot ulcer - induration present        NO LABS        ASSESSMENT and PLAN        1. Diabetic foot ulcer -   Refer to podiatry   Added clindamycin to cipro          2. Type 1DM, POORLY controlled , on insulin pump.:  a1c is     ?      Nov 2021    Compared to       6.6 %      From    Today by  [facility]    July 2021     Compared to     6.6 %    From    March 2021  Compared to     7.5  %   By labs from nov 2020    Compared to    12.9 %      By  POC     Compared   To    12 %   From nov 2019, compared to  A1C IS OVER 10 %   FROM PT [facility]   A long gap in follow up visit from 2016 to 2019 - almost 3 years         Nov 2021    Came in with no labs     Downloaded the tandem pump     - 14 day average glucose 180   - 44 %  Above target  2 %  Below target  and % in Target  Range 54 %    - percent of utiization 96 %     TDD   Basal is 28 units a day     1:50  And 1 :8         July 2021       Reviewed  Tandem pump downloaded report for 14  days and discussed with pt       - 14 day average glucose 167   - 35  % for high and 4 % low sugars and % in Target  Range 61 %    - percent of utiization 96 %     REVIEWED SETTINGS ON TANDEM PUMP   TOTAL DAILY BASAL IS 29 UNITS A DAY   TOTAL DOSE IS 70 UNITS A DAY   HE is doing  BOLUS dosing very well   MAX BOLUS TO BE INCREASED TO 25 UNITS   I  LOWERED CARB TO INSULIN RATIO   TO 1:8    FROM 1:10             March 2021   Excellent control but promise is taking TOO MuCH INSULIN   REVIEWED SETTINGS ON TANDEM PUMP   TOTAL DAILY BASAL IS 29 UNITS A DAY   TOTAL DOSE IS 70 UNITS A DAY   HE NEEDS ADJUSTMENT ON BASAL , BUT DEFINITELY ON BOLUS   MAX BOLUS TO BE INCREASED TO 25 UNITS   CARB TO INSULIN RATIO BE LOWERED TO 1:8    FROM 1:10     Reviewed  Tandem pump downloaded report for 14  days and discussed with pt    - 14 day average glucose 153   -29  % for high and 4 % low sugars and % in Target  Range 66 %    - percent of utiization 96 %         2. Hypoglycemia :  Educated on treating the hypoglycemia. Discussed g-voke and prescribed      3. HTN :  Used to be on  micardis 40 - 12.5 , pt got hospitalized and pt was given lisinopril 20 mg at discharge        4. Dyslipidemia : on statin        5. Anxiety disorder - start on buspar bid dosing       7. NEUROPATHY  - worsening ,  And is expected with good glycemic control   Increase    neurontin to 600 mg am , 900 mg pm ( asking if medical [place] [facility] is good ? )      8. ED - pt  Is requesting viagra samples- filled it nov 2019, says it does not work   And I again encourged him to try viagra        9.     Hypogonadism / fatigue    He is interested in taking TT            Reviewed the  Pump downloads, discussed changes and spent more than 25 min in interpretation and counseling   Total time  : 42 min   Reviewed results with patient and discussed the labs being ordered today/bnv  Patient voiced understanding of plan of care

## 2021-12-08 ENCOUNTER — OFFICE VISIT (OUTPATIENT)
Dept: PODIATRY | Age: 51
End: 2021-12-08
Payer: COMMERCIAL

## 2021-12-08 VITALS
TEMPERATURE: 98 F | DIASTOLIC BLOOD PRESSURE: 102 MMHG | OXYGEN SATURATION: 98 % | SYSTOLIC BLOOD PRESSURE: 166 MMHG | BODY MASS INDEX: 28.98 KG/M2 | HEART RATE: 107 BPM | WEIGHT: 238 LBS | HEIGHT: 76 IN

## 2021-12-08 DIAGNOSIS — Z79.4 TYPE 2 DIABETES MELLITUS WITH DIABETIC NEUROPATHY, WITH LONG-TERM CURRENT USE OF INSULIN (HCC): Primary | ICD-10-CM

## 2021-12-08 DIAGNOSIS — E11.40 TYPE 2 DIABETES MELLITUS WITH DIABETIC NEUROPATHY, WITH LONG-TERM CURRENT USE OF INSULIN (HCC): Primary | ICD-10-CM

## 2021-12-08 DIAGNOSIS — L60.3 ONYCHODYSTROPHY: ICD-10-CM

## 2021-12-08 PROCEDURE — 99203 OFFICE O/P NEW LOW 30 MIN: CPT | Performed by: PODIATRIST

## 2021-12-08 PROCEDURE — 11721 DEBRIDE NAIL 6 OR MORE: CPT | Performed by: PODIATRIST

## 2021-12-08 NOTE — PROGRESS NOTES
Chief Complaint   Patient presents with    Diabetic Foot Exam     A1C-6.6 [id number] nonfasting;states he has tingling in his feet and sharp pain states R foot is worse     1. Have you been to the ER, urgent care clinic since your last visit? Hospitalized since your last visit? No    2. Have you seen or consulted any other health care providers outside of the [address] [place] [address] since your last visit? Include any pap smears or colon screening.  No  PCP-Dr Ab Shannon
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
## 2022-02-03 ENCOUNTER — TELEPHONE (OUTPATIENT)
Dept: ENDOCRINOLOGY | Age: 52
End: 2022-02-03

## 2022-02-03 NOTE — TELEPHONE ENCOUNTER
Patient asked if nurse could contact with a list of places other than [facility] for diabetic supplies

## 2022-02-03 NOTE — TELEPHONE ENCOUNTER
Patient states he is not happy with [employer] Requested alternative companies. Patient given 3 additional company names and contact info.

## 2022-03-04 ENCOUNTER — OFFICE VISIT (OUTPATIENT)
Dept: ENDOCRINOLOGY | Age: 52
End: 2022-03-04
Payer: COMMERCIAL

## 2022-03-04 VITALS
OXYGEN SATURATION: 99 % | SYSTOLIC BLOOD PRESSURE: 142 MMHG | BODY MASS INDEX: 28.74 KG/M2 | HEIGHT: 76 IN | WEIGHT: 236 LBS | HEART RATE: 93 BPM | TEMPERATURE: 98.4 F | DIASTOLIC BLOOD PRESSURE: 81 MMHG

## 2022-03-04 DIAGNOSIS — Z79.4 ENCOUNTER FOR LONG-TERM (CURRENT) USE OF INSULIN (HCC): ICD-10-CM

## 2022-03-04 DIAGNOSIS — G62.9 NEUROPATHY: ICD-10-CM

## 2022-03-04 DIAGNOSIS — L97.515 ULCER OF RIGHT FOOT WITH MUSCLE INVOLVEMENT WITHOUT EVIDENCE OF NECROSIS (HCC): ICD-10-CM

## 2022-03-04 DIAGNOSIS — Z96.41 INSULIN PUMP STATUS: ICD-10-CM

## 2022-03-04 DIAGNOSIS — I10 ESSENTIAL HYPERTENSION: ICD-10-CM

## 2022-03-04 PROCEDURE — 99215 OFFICE O/P EST HI 40 MIN: CPT | Performed by: INTERNAL MEDICINE

## 2022-03-04 RX ORDER — BUSPIRONE HYDROCHLORIDE 10 MG/1
TABLET ORAL
Qty: 60 TABLET | Refills: 4 | Status: SHIPPED | OUTPATIENT
Start: 2022-03-04 | End: 2022-03-28 | Stop reason: SDUPTHER

## 2022-03-04 RX ORDER — LISINOPRIL 20 MG/1
20 TABLET ORAL DAILY
Qty: 90 TABLET | Refills: 3 | Status: SHIPPED | OUTPATIENT
Start: 2022-03-04 | End: 2022-03-28 | Stop reason: SDUPTHER

## 2022-03-04 RX ORDER — AMOXICILLIN AND CLAVULANATE POTASSIUM 875; 125 MG/1; MG/1
1 TABLET, FILM COATED ORAL EVERY 12 HOURS
Qty: 14 TABLET | Refills: 0 | Status: SHIPPED | OUTPATIENT
Start: 2022-03-04 | End: 2022-03-28

## 2022-03-04 RX ORDER — SULFAMETHOXAZOLE AND TRIMETHOPRIM 800; 160 MG/1; MG/1
TABLET ORAL
Qty: 14 TABLET | Refills: 0 | Status: SHIPPED | OUTPATIENT
Start: 2022-03-04 | End: 2022-03-28

## 2022-03-04 NOTE — PROGRESS NOTES
Parth Coronel is a 54 y.o. male here for   Chief Complaint   Patient presents with    Diabetes       1. Have you been to the ER, urgent care clinic since your last visit? Hospitalized since your last visit? - no    2. Have you seen or consulted any other health care providers outside of the [address] [place] [address] since your last visit?   Include any pap smears or colon screening.- Podiatrist

## 2022-03-04 NOTE — LETTER
3/6/2022    Patient: Mynor Loera   YOB: 1970   Date of Visit: 3/4/2022     Lb Jacobsen MD  [facility] [address]  Via Fax: [phone]    Dear Lb Jacobsen MD,      Thank you for referring Mr. Mynor Loera to [address] [place] [address] for evaluation. My notes for this consultation are attached. If you have questions, please do not hesitate to call me. I look forward to following your patient along with you.       Sincerely,    Augusta Estevez MD

## 2022-03-04 NOTE — PROGRESS NOTES
HISTORY OF PRESENT ILLNESS      Shazia Garcia is a 54  y.o. male. HPI  Patient here for    FOLLOW UP  AFTER  Last  visit  For  Type 1 diabetes mellitus  FROM nov 2021       He is seeing Dr. Charles Dunbar for podiatry care   He Is c/o diabetic foot ulcer getting worse for few weeks now  On tanadem t-slim pump           Nov 2021       He has developed an ulcer on right metatarsel head area   He is now on ciproflox  A week ago     C/o ED   He wants an anti-anxiety med         July 2021     Lost 8 lbs   He is doing well on insulin pump   He is drinking \"zero \" soda   He is asking if he could go to  \" [facility] \"          March 2021     Patient says he took humalog 14 units plus 14 plus  Again and another 10 units pen shot   He gained weight of 10 lbs   Patient says that his sugars are not coming down and he is taking more insulin back to back and from external shots   PT IS TALKATIVE ( BIG CHANGE FROM ALL PRIOR VISITS)            OLD HISTORY       Current A1C is 13.2 % and symptoms/problems include polyuria, polydipsia and visual disturbances   H/o DM type 1 for 30 years    He moved from [address] [place] [address], goyo blair was his pcp   Many years ago , he was seeing Dr. Colby Burns   Current diabetic medications include insulin pump            Review of Systems   C/o foot ulcer getting worse       Physical Exam   Constitutional :   oriented to person, place, and time. appears well-developed and well-nourished. Right metatarsel area   - has Moderate grade Diabetic foot ulcer - induration present        NO LABS again before the visit        ASSESSMENT and PLAN        1. Diabetic foot ulcer -  Right foot metatarsel area , first   Referred to podiatry again   Starting on  bactrim   And augmentin   Spoke to Dr. Charles Dunbar , he will be seeing him on Monday   ? ID  Or  Wound care            2. Type 1DM, POORLY controlled , on insulin pump.:  a1c is     ?      Nov 2021    Compared to       6.6 %      From    Today by  [facility]    July 2021     Compared to     6.6 %    From    March 2021  Compared to     7.5  %   By labs from nov 2020    Compared to    12.9 %      By  POC     Compared   To    12 %   From nov 2019, compared to  A1C IS OVER 10 %   FROM PT FIRST   A long gap in follow up visit from 2016 to 2019 - almost 3 years         March 2022     Downloaded the tandem pump thru [employer]   Again came in with no labs prior to the visit  ( chronic problem )     i  - 14 day average glucose over 250 to 300 mg   ? Infection or  ? Diet   Significant change in blood sugars       Nov 2021    Came in with no labs     Downloaded the tandem pump     - 14 day average glucose 180   - 44 %  Above target  2 %  Below target  and % in Target  Range 54 %    - percent of utiization 96 %     TDD   Basal is 28 units a day     1:50  And 1 :8         July 2021       Reviewed  Tandem pump downloaded report for 14  days and discussed with pt       - 14 day average glucose 167   - 35  % for high and 4 % low sugars and % in Target  Range 61 %    - percent of utiization 96 %     REVIEWED SETTINGS ON TANDEM PUMP   TOTAL DAILY BASAL IS 29 UNITS A DAY   TOTAL DOSE IS 70 UNITS A DAY   HE is doing  BOLUS dosing very well   MAX BOLUS TO BE INCREASED TO 25 UNITS   I  LOWERED CARB TO INSULIN RATIO   TO 1:8    FROM 1:10             March 2021   Excellent control but promise is taking TOO MuCH INSULIN   REVIEWED SETTINGS ON TANDEM PUMP   TOTAL DAILY BASAL IS 29 UNITS A DAY   TOTAL DOSE IS 70 UNITS A DAY   HE NEEDS ADJUSTMENT ON BASAL , BUT DEFINITELY ON BOLUS   MAX BOLUS TO BE INCREASED TO 25 UNITS   CARB TO INSULIN RATIO BE LOWERED TO 1:8    FROM 1:10     Reviewed  Tandem pump downloaded report for 14  days and discussed with pt    - 14 day average glucose 153   -29  % for high and 4 % low sugars and % in Target  Range 66 %    - percent of utiization 96 %         2. Hypoglycemia :  Educated on treating the hypoglycemia. Discussed gabriela and prescribed      3.  HTN :  On  lisinopril 20 mg      4. Dyslipidemia : on statin        5. Anxiety disorder - increasedc buspar bid dosing       7. NEUROPATHY  - severe,        neurontin to 600 mg am , 900 mg pm        8.  ED - Hypogonadism / fatigue    He is interested in taking TT            Reviewed the  Pump downloads, discussed changes and spent more than 25 min in interpretation and counseling     High risk - diabetic foot ulcer     Total time  : 42 min   Reviewed results with patient and discussed the labs being ordered today/bnv  Patient voiced understanding of plan of care

## 2022-03-04 NOTE — PATIENT INSTRUCTIONS
SPECIFIC INSTRUCTIONS BELOW       DRINK water   Do not skip meals  Do not eat in between meals    Reduce carbs- pasta, rice, potatoes, bread   Try to avoid processed bread products like BISCUITS, CROISSANTS, MUFFINS    Do not drink juices or sodas, even if they are calorie zero or diet drinks and especially avoid using powders like crystalloids , ALEX-AIDS     Do not eat peanut butter     Do not eat sugar free cookies and cakes   Do not eat peaches, oranges, pineapples, raisins, grapes , canteloupe , honey dew, mangoes , watermelon  and fruit medleys    ---------------------------------------------------------------------------------------------------------------    Augmentin   Twice  A day      And     Bactrim twice a day     -----------------------------------------------------------------------------------------    Stay  on  Lisinopril     Stay   On atorvostatin 20 mg at night       Gabapentin  300 mg  2  Pills  AM   And  3 pills at night        -----------------------------------------------------------------------------------------------------------------      Gets the pump and dexcom supplies   - arnoldok        humalog to 4 vials in the pump   ( nearly 100 units a day )      Back up regimen       Check blood sugars immediately before each meal and at bedtime      Take  lantus  insulin  30  units  at bed time    Take humalog  Insulin at carb to insulin ratio of 10 gm : 1 unit       Also, add additional humalog  as follows with meals  If blood sugars are[de-identified]    150-200 mg 1 units    201-250 mg 2 units    251-300 mg 3 units    301-350 mg 4 units    351-400 mg 5 units    401-450 mg 6 units    451-500 mg 7 units     Less than 70 mg NO INSULIN        -------------PAY ATTENTION TO THESE GENERAL INSTRUCTIONS -----------------      - The medications prescribed at this visit will not be available at pharmacy until 6 pm       - YOUR MED LIST IS NOT UP TO DATE AS SOME CHANGES ARE BEING MADE AFTER THE VISIT - FOLLOW SPECIFIC INSTRUCTIONS  ABOVE     -ANY tests other than blood work, which you opt to do  outside the  [facility] imaging facilities, you are responsible for prior authorizations if  required    - [address] UP TO DATE ON YOUR AVS- PLEASE IGNORE     Results     *Normal results will not be notified by a phone call starting January 1 2021   *If you have an upcoming visit, the results will be discussed at the visit   *Please sign up for MY CHART if you want access to your lab and test results  *Abnormal results which require immediate attention will be notified by phone call   *Abnormal results which do not require immediate assistance will be notified in 1-2 weeks       Refills    -    have your pharmacy send us a refill request . Refills are done max for one year and a visit is a must before refills are extended    Follow up appointments -  highly encourage you to make it when you are checking out. We can accommodate you into the schedule based on your clinical situation, but not for extending refills beyond a year. Labs are important to give refills and is important to get labs before the visit     Phone calls  -  Allow  24 hrs.  for non-urgent calls to be returned  Prior authorization - It may take 2-4 weeks to process  Forms  -  FMLA, DMV etc., will take up to 2 weeks to process  Cancellations - please notify the office 2 days in advance   Samples  - will only be dispensed at visits       If not showing for the appointments and cancelling appointments within 24 hours are kept track of and three  of such situations in  two consecutive years will likely be considered for termination from the practice    -------------------------------------------------------------------------------------------------------------------

## 2022-03-07 ENCOUNTER — OFFICE VISIT (OUTPATIENT)
Dept: PODIATRY | Age: 52
End: 2022-03-07
Payer: COMMERCIAL

## 2022-03-07 VITALS
WEIGHT: 236 LBS | TEMPERATURE: 97.7 F | HEIGHT: 76 IN | DIASTOLIC BLOOD PRESSURE: 89 MMHG | HEART RATE: 95 BPM | BODY MASS INDEX: 28.74 KG/M2 | SYSTOLIC BLOOD PRESSURE: 150 MMHG

## 2022-03-07 DIAGNOSIS — L97.512 RIGHT FOOT ULCER, WITH FAT LAYER EXPOSED (HCC): Primary | ICD-10-CM

## 2022-03-07 DIAGNOSIS — Z79.4 TYPE 2 DIABETES MELLITUS WITH DIABETIC NEUROPATHY, WITH LONG-TERM CURRENT USE OF INSULIN (HCC): ICD-10-CM

## 2022-03-07 DIAGNOSIS — E11.40 TYPE 2 DIABETES MELLITUS WITH DIABETIC NEUROPATHY, WITH LONG-TERM CURRENT USE OF INSULIN (HCC): ICD-10-CM

## 2022-03-07 DIAGNOSIS — T14.8XXD DELAYED WOUND HEALING: ICD-10-CM

## 2022-03-07 PROCEDURE — 11042 DBRDMT SUBQ TIS 1ST 20SQCM/<: CPT | Performed by: PODIATRIST

## 2022-03-07 PROCEDURE — 99214 OFFICE O/P EST MOD 30 MIN: CPT | Performed by: PODIATRIST

## 2022-03-07 NOTE — PROGRESS NOTES
Chief Complaint   Patient presents with    Wound Check    Diabetic Foot Exam     1. Have you been to the ER, urgent care clinic since your last visit? Hospitalized since your last visit? No    2. Have you seen or consulted any other health care providers outside of the [address] [place] [address] since your last visit? Include any pap smears or colon screening.  No  PCP-Dr Sushil Baird

## 2022-03-07 NOTE — LETTER
NOTIFICATION RETURN TO WORK / SCHOOL        3/7/2022 9:59 AM          Mr. Milton [address] [id number]      To Whom It May Concern:    Milton Vance is currently under the care of Rober Juan. He will return to work/school on: 03/21/2022    If there are questions or concerns please have the patient contact our office.         Sincerely,              Garcia Tyson DPM

## 2022-03-09 ENCOUNTER — TELEPHONE (OUTPATIENT)
Dept: PODIATRY | Age: 52
End: 2022-03-09
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
## 2022-03-09 NOTE — TELEPHONE ENCOUNTER
Left thomas at [facility] for return call. Crystal Ng with [facility] return call. States that left a welcome message on voice mail and have him scheduled for 03/10/2022. She will reach back out to him.

## 2022-03-14 ENCOUNTER — OFFICE VISIT (OUTPATIENT)
Dept: PODIATRY | Age: 52
End: 2022-03-14
Payer: COMMERCIAL

## 2022-03-14 VITALS
HEIGHT: 76 IN | HEART RATE: 92 BPM | SYSTOLIC BLOOD PRESSURE: 173 MMHG | DIASTOLIC BLOOD PRESSURE: 106 MMHG | TEMPERATURE: 97.5 F | BODY MASS INDEX: 28.74 KG/M2 | WEIGHT: 236 LBS

## 2022-03-14 DIAGNOSIS — Z79.4 TYPE 2 DIABETES MELLITUS WITH DIABETIC NEUROPATHY, WITH LONG-TERM CURRENT USE OF INSULIN (HCC): Primary | ICD-10-CM

## 2022-03-14 DIAGNOSIS — L97.512 RIGHT FOOT ULCER, WITH FAT LAYER EXPOSED (HCC): ICD-10-CM

## 2022-03-14 DIAGNOSIS — E11.40 TYPE 2 DIABETES MELLITUS WITH DIABETIC NEUROPATHY, WITH LONG-TERM CURRENT USE OF INSULIN (HCC): Primary | ICD-10-CM

## 2022-03-14 PROCEDURE — 99213 OFFICE O/P EST LOW 20 MIN: CPT | Performed by: PODIATRIST

## 2022-03-14 RX ORDER — HONEY 100 %
PASTE (ML) TOPICAL DAILY
Qty: 44 ML | Refills: 2 | Status: SHIPPED | OUTPATIENT
Start: 2022-03-14 | End: 2022-05-08

## 2022-03-14 NOTE — PROGRESS NOTES
Chief Complaint   Patient presents with    Wound Check     1. Have you been to the ER, urgent care clinic since your last visit? Hospitalized since your last visit? No    2. Have you seen or consulted any other health care providers outside of the [address] [place] [address] since your last visit? Include any pap smears or colon screening.  No  PCP-Dr Lacey Calderón

## 2022-03-19 PROBLEM — E11.40 TYPE 2 DIABETES MELLITUS WITH DIABETIC NEUROPATHY (HCC): Status: ACTIVE | Noted: 2020-12-02

## 2022-03-20 PROBLEM — E11.21 TYPE 2 DIABETES WITH NEPHROPATHY (HCC): Status: ACTIVE | Noted: 2020-12-02

## 2022-03-28 ENCOUNTER — OFFICE VISIT (OUTPATIENT)
Dept: ENDOCRINOLOGY | Age: 52
End: 2022-03-28

## 2022-03-28 ENCOUNTER — OFFICE VISIT (OUTPATIENT)
Dept: PODIATRY | Age: 52
End: 2022-03-28
Payer: COMMERCIAL

## 2022-03-28 VITALS
WEIGHT: 231.4 LBS | OXYGEN SATURATION: 94 % | BODY MASS INDEX: 28.18 KG/M2 | SYSTOLIC BLOOD PRESSURE: 157 MMHG | TEMPERATURE: 98.4 F | RESPIRATION RATE: 20 BRPM | HEART RATE: 96 BPM | HEIGHT: 76 IN | DIASTOLIC BLOOD PRESSURE: 88 MMHG

## 2022-03-28 VITALS
WEIGHT: 236 LBS | BODY MASS INDEX: 28.74 KG/M2 | SYSTOLIC BLOOD PRESSURE: 160 MMHG | TEMPERATURE: 97.7 F | DIASTOLIC BLOOD PRESSURE: 94 MMHG | HEIGHT: 76 IN | HEART RATE: 94 BPM

## 2022-03-28 DIAGNOSIS — E11.40 TYPE 2 DIABETES MELLITUS WITH DIABETIC NEUROPATHY, WITH LONG-TERM CURRENT USE OF INSULIN (HCC): ICD-10-CM

## 2022-03-28 DIAGNOSIS — E78.2 MIXED HYPERLIPIDEMIA: ICD-10-CM

## 2022-03-28 DIAGNOSIS — L97.512 RIGHT FOOT ULCER, WITH FAT LAYER EXPOSED (HCC): ICD-10-CM

## 2022-03-28 DIAGNOSIS — Z96.41 INSULIN PUMP STATUS: ICD-10-CM

## 2022-03-28 DIAGNOSIS — L97.515 ULCER OF RIGHT FOOT WITH MUSCLE INVOLVEMENT WITHOUT EVIDENCE OF NECROSIS (HCC): ICD-10-CM

## 2022-03-28 DIAGNOSIS — Z79.4 TYPE 2 DIABETES MELLITUS WITH DIABETIC NEUROPATHY, WITH LONG-TERM CURRENT USE OF INSULIN (HCC): ICD-10-CM

## 2022-03-28 DIAGNOSIS — S91.002A ANKLE WOUND, LEFT, INITIAL ENCOUNTER: ICD-10-CM

## 2022-03-28 DIAGNOSIS — M79.672 LEFT FOOT PAIN: Primary | ICD-10-CM

## 2022-03-28 PROCEDURE — 99214 OFFICE O/P EST MOD 30 MIN: CPT | Performed by: PODIATRIST

## 2022-03-28 PROCEDURE — 99215 OFFICE O/P EST HI 40 MIN: CPT | Performed by: INTERNAL MEDICINE

## 2022-03-28 PROCEDURE — 95251 CONT GLUC MNTR ANALYSIS I&R: CPT | Performed by: INTERNAL MEDICINE

## 2022-03-28 RX ORDER — LISINOPRIL 20 MG/1
20 TABLET ORAL DAILY
Qty: 90 TABLET | Refills: 3 | Status: SHIPPED | OUTPATIENT
Start: 2022-03-28 | End: 2022-05-08

## 2022-03-28 RX ORDER — HYDROCHLOROTHIAZIDE 12.5 MG/1
TABLET ORAL
Qty: 90 TABLET | Refills: 3 | Status: SHIPPED | OUTPATIENT
Start: 2022-03-28 | End: 2022-05-08

## 2022-03-28 RX ORDER — BUSPIRONE HYDROCHLORIDE 10 MG/1
TABLET ORAL
Qty: 60 TABLET | Refills: 4 | Status: SHIPPED | OUTPATIENT
Start: 2022-03-28 | End: 2022-08-21

## 2022-03-28 RX ORDER — ATORVASTATIN CALCIUM 20 MG/1
20 TABLET, FILM COATED ORAL
Qty: 90 TABLET | Refills: 3 | Status: SHIPPED | OUTPATIENT
Start: 2022-03-28 | End: 2022-09-21 | Stop reason: SDUPTHER

## 2022-03-28 RX ORDER — AMLODIPINE BESYLATE 5 MG/1
TABLET ORAL
Qty: 90 TABLET | Refills: 3 | Status: SHIPPED | OUTPATIENT
Start: 2022-03-28 | End: 2022-05-08

## 2022-03-28 NOTE — PROGRESS NOTES
Chief Complaint   Patient presents with    Wound Check     pt states he is having sharp throbbing pain     1. Have you been to the ER, urgent care clinic since your last visit? Hospitalized since your last visit? No    2. Have you seen or consulted any other health care providers outside of the [address] [place] [address] since your last visit? Include any pap smears or colon screening.  No  PCP-Dr Min Vilchis

## 2022-03-28 NOTE — LETTER
NOTIFICATION RETURN TO WORK / SCHOOL        3/28/2022 10:36 AM          Mr. Leonardo [facility]Dnovan  [facility] [address] [id number]      To Whom It May Concern:    Leonardo Donovan is currently under the care of Rober Juan. He will return to work/school on: 05/02/2022    If there are questions or concerns please have the patient contact our office.         Sincerely,              Jewel Garcia DPM

## 2022-03-28 NOTE — PATIENT INSTRUCTIONS
SPECIFIC INSTRUCTIONS BELOW       DRINK water   Do not skip meals  Do not eat in between meals    Reduce carbs- pasta, rice, potatoes, bread   Try to avoid processed bread products like BISCUITS, CROISSANTS, MUFFINS    Do not drink juices or sodas, even if they are calorie zero or diet drinks and especially avoid using powders like crystalloids , ALEX-AIDS     Do not eat peanut butter     Do not eat sugar free cookies and cakes   Do not eat peaches, oranges, pineapples, raisins, grapes , canteloupe , honey dew, mangoes , watermelon  and fruit medleys    ---------------------------------------------------------------------------------------------------------------    stay  on  Lisinopril     Stay   On atorvostatin 20 mg at night       Gabapentin  300 mg  2  Pills  AM   And  3 pills at night        -----------------------------------------------------------------------------------------------------------------      Gets the pump and [employer] supplies   - [employer]        humalog to 4 vials in the pump   ( nearly 100 units a day )      Back up regimen       Check blood sugars immediately before each meal and at bedtime      Take  lantus  insulin  30  units  at bed time    Take humalog  Insulin at carb to insulin ratio of 10 gm : 1 unit       Also, add additional humalog  as follows with meals  If blood sugars are[de-identified]    150-200 mg 1 units    201-250 mg 2 units    251-300 mg 3 units    301-350 mg 4 units    351-400 mg 5 units    401-450 mg 6 units    451-500 mg 7 units     Less than 70 mg NO INSULIN        -------------PAY ATTENTION TO THESE GENERAL INSTRUCTIONS -----------------      - The medications prescribed at this visit will not be available at pharmacy until 6 pm       - YOUR MED LIST IS NOT UP TO DATE AS SOME CHANGES ARE BEING MADE AFTER THE VISIT - FOLLOW SPECIFIC INSTRUCTIONS  ABOVE     -ANY tests other than blood work, which you opt to do  outside the  [facility] imaging facilities, you are responsible for prior authorizations if  required    - HEALTH MAINTENANCE IS NOT GOING TO BE UP TO DATE ON YOUR AVS- PLEASE IGNORE     Results     *Normal results will not be notified by a phone call starting January 1 2021   *If you have an upcoming visit, the results will be discussed at the visit   *Please sign up for MY CHART if you want access to your lab and test results  *Abnormal results which require immediate attention will be notified by phone call   *Abnormal results which do not require immediate assistance will be notified in 1-2 weeks       Refills    -    have your pharmacy send us a refill request . Refills are done max for one year and a visit is a must before refills are extended    Follow up appointments -  highly encourage you to make it when you are checking out. We can accommodate you into the schedule based on your clinical situation, but not for extending refills beyond a year. Labs are important to give refills and is important to get labs before the visit     Phone calls  -  Allow  24 hrs.  for non-urgent calls to be returned  Prior authorization - It may take 2-4 weeks to process  Forms  -  FMLA, DMV etc., will take up to 2 weeks to process  Cancellations - please notify the office 2 days in advance   Samples  - will only be dispensed at visits       If not showing for the appointments and cancelling appointments within 24 hours are kept track of and three  of such situations in  two consecutive years will likely be considered for termination from the practice    -------------------------------------------------------------------------------------------------------------------

## 2022-03-30 NOTE — PROGRESS NOTES
HISTORY OF PRESENT ILLNESS      Ranjith Jeronimo is a 54  y.o. male. HPI  Patient here for    FOLLOW UP  AFTER  Last  visit  For  Type 1 diabetes mellitus  FROM  March 4 2022     He is seeing Dr. Zac Ken for podiatry care   Patient felt worse taking the antibiotics. He saw Dr. Gregory Thakkar again this morning. Using tandem pump with Dexcom  Accompanied by his daughter who is around 11years old        March 4 2022     He Is c/o diabetic foot ulcer getting worse for few weeks now  On tanadem t-slim pump           Nov 2021     He has developed an ulcer on right metatarsel head area   He is now on ciproflox  A week ago   C/o ED   He wants an anti-anxiety med         July 2021     Lost 8 lbs   He is doing well on insulin pump   He is drinking \"zero \" soda   He is asking if he could go to  \" [facility] \"          March 2021     Patient says he took humalog 14 units plus 14 plus  Again and another 10 units pen shot   He gained weight of 10 lbs   Patient says that his sugars are not coming down and he is taking more insulin back to back and from external shots   PT IS TALKATIVE ( BIG CHANGE FROM ALL PRIOR VISITS)            OLD HISTORY       Current A1C is 13.2 % and symptoms/problems include polyuria, polydipsia and visual disturbances   H/o DM type 1 for 30 years    He moved from [address] [place] [address], goyo blair was his pcp   Many years ago , he was seeing Dr. Nas Townsend   Current diabetic medications include insulin pump            Review of Systems   C/o foot ulcer getting worse       Physical Exam   Constitutional :   oriented to person, place, and time. appears well-developed and well-nourished. Right metatarsel area   - has Moderate grade Diabetic foot ulcer - induration present        NO LABS again before the visit        ASSESSMENT and PLAN        1.  Diabetic foot ulcer -  Right foot metatarsel area , first   Spoke to Dr. Zac Ken again today  , and to my surprise he mentions that patient had a bone injury for the food as he used to warm compressions to relieve swelling or pain    Patient completely missed giving me this history and had to request Dr. Jessica Duran to keep up with him much more often because of severe noncompliance of this patient       2. Type 1DM, POORLY controlled , on insulin pump.:  a1c is     ? Nov 2021    Compared to       6.6 %      From    Today by  [facility]    July 2021     Compared to     6.6 %    From    March 2021  Compared to     7.5  %   By labs from nov 2020    Compared to    12.9 %      By  POC     Compared   To    12 %   From nov 2019, compared to  A1C IS OVER 10 %   FROM PT FIRST   A long gap in follow up visit from 2016 to 2019 - almost 3 years     March second visit in f/u   Did not get labs done as requested and he wants to go get that done tomorrow. Downloaded the tandem pump again today and noted that the average blood sugar reading is at 200 the sugars seem to be in target range only 40% of time  He is using basal 34 units a day and 4 boluses 40 units a day and total daily dose is at 80 units a day and total carbs she is consuming is 344 g a day    Upon review it did not seem like he is using falls carbs but I strongly had to  him again to not consume any high glycemic drinks and he repeats the same questions again if he could have diet free Gatorade or some other sugar-free sodas    Increase the carb to insulin ratio as well as the sensitivities    He can do a better job which she did in the past and I am surprised that he does not give the same effort to help himself   To help his anxiety I am giving him BuSpar        March 2022     Downloaded the tandem pump thru amber   Again came in with no labs prior to the visit  ( chronic problem )     - 14 day average glucose over 250 to 300 mg   ? Infection or  ?  Diet   Significant change in blood sugars       Nov 2021    Came in with no labs     Downloaded the tandem pump     - 14 day average glucose 180   - 44 %  Above target  2 % Below target  and % in Target  Range 54 %    - percent of utiization 96 %     TDD   Basal is 28 units a day     1:50  And 1 :8         July 2021       Reviewed  Tandem pump downloaded report for 14  days and discussed with pt       - 14 day average glucose 167   - 35  % for high and 4 % low sugars and % in Target  Range 61 %    - percent of utiization 96 %     REVIEWED SETTINGS ON TANDEM PUMP   TOTAL DAILY BASAL IS 29 UNITS A DAY   TOTAL DOSE IS 70 UNITS A DAY   HE is doing  BOLUS dosing very well   MAX BOLUS TO BE INCREASED TO 25 UNITS   I  LOWERED CARB TO INSULIN RATIO   TO 1:8    FROM 1:10           2. Hypoglycemia :  Educated on treating the hypoglycemia. Discussed g-voke and prescribed      3. HTN :  On  lisinopril 20 mg      4. Dyslipidemia : on statin        5. Anxiety disorder - increasedc buspar bid dosing       7. NEUROPATHY  - severe,        neurontin to 600 mg am , 900 mg pm        8.  ED - Hypogonadism / fatigue    He is interested in taking TT , but not started on it          Reviewed the  Pump downloads, discussed changes and spent more than 25 min in interpretation and counseling     High risk - diabetic foot ulcer     Total time  : 42 min   Reviewed results with patient and discussed the labs being ordered today/bnv  Patient voiced understanding of plan of care

## 2022-03-31 RX ORDER — LEVOFLOXACIN 750 MG/1
750 TABLET ORAL DAILY
Qty: 10 TABLET | Refills: 0 | Status: SHIPPED | OUTPATIENT
Start: 2022-03-31 | End: 2022-04-10

## 2022-04-02 LAB
BACTERIA SPEC AEROBE CULT: ABNORMAL
BACTERIA SPEC ANAEROBE CULT: ABNORMAL

## 2022-04-04 ENCOUNTER — OFFICE VISIT (OUTPATIENT)
Dept: PODIATRY | Age: 52
End: 2022-04-04
Payer: COMMERCIAL

## 2022-04-04 VITALS
WEIGHT: 229.6 LBS | DIASTOLIC BLOOD PRESSURE: 91 MMHG | HEIGHT: 76 IN | HEART RATE: 105 BPM | SYSTOLIC BLOOD PRESSURE: 184 MMHG | BODY MASS INDEX: 27.96 KG/M2 | TEMPERATURE: 97.3 F

## 2022-04-04 DIAGNOSIS — Z79.4 TYPE 2 DIABETES MELLITUS WITH DIABETIC NEUROPATHY, WITH LONG-TERM CURRENT USE OF INSULIN (HCC): Primary | ICD-10-CM

## 2022-04-04 DIAGNOSIS — L97.321 SKIN ULCER OF LEFT ANKLE, LIMITED TO BREAKDOWN OF SKIN (HCC): ICD-10-CM

## 2022-04-04 DIAGNOSIS — L97.512 RIGHT FOOT ULCER, WITH FAT LAYER EXPOSED (HCC): ICD-10-CM

## 2022-04-04 DIAGNOSIS — E11.40 TYPE 2 DIABETES MELLITUS WITH DIABETIC NEUROPATHY, WITH LONG-TERM CURRENT USE OF INSULIN (HCC): Primary | ICD-10-CM

## 2022-04-04 PROCEDURE — 11042 DBRDMT SUBQ TIS 1ST 20SQCM/<: CPT | Performed by: PODIATRIST

## 2022-04-04 PROCEDURE — 99213 OFFICE O/P EST LOW 20 MIN: CPT | Performed by: PODIATRIST

## 2022-04-04 NOTE — PROGRESS NOTES
1. Have you been to the ER, urgent care clinic since your last visit? Hospitalized since your last visit? No    2. Have you seen or consulted any other health care providers outside of the [address] [place] [address] since your last visit? Include any pap smears or colon screening.  No     Chief Complaint   Patient presents with    Follow-up     L foot pain
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
## 2022-04-07 ENCOUNTER — HOSPITAL ENCOUNTER (OUTPATIENT)
Dept: GENERAL RADIOLOGY | Age: 52
Discharge: HOME | End: 2022-04-07
Payer: MEDICAID

## 2022-04-07 ENCOUNTER — OFFICE VISIT (OUTPATIENT)
Dept: PODIATRY | Age: 52
End: 2022-04-07
Payer: COMMERCIAL

## 2022-04-07 VITALS
DIASTOLIC BLOOD PRESSURE: 78 MMHG | HEART RATE: 97 BPM | SYSTOLIC BLOOD PRESSURE: 130 MMHG | TEMPERATURE: 98 F | HEIGHT: 76 IN | WEIGHT: 229 LBS | BODY MASS INDEX: 27.89 KG/M2

## 2022-04-07 DIAGNOSIS — M79.672 LEFT FOOT PAIN: ICD-10-CM

## 2022-04-07 DIAGNOSIS — S91.002A ANKLE WOUND, LEFT, INITIAL ENCOUNTER: ICD-10-CM

## 2022-04-07 DIAGNOSIS — S99.192A CLOSED FRACTURE OF BASE OF FIFTH METATARSAL BONE OF LEFT FOOT AT METAPHYSEAL-DIAPHYSEAL JUNCTION, INITIAL ENCOUNTER: Primary | ICD-10-CM

## 2022-04-07 DIAGNOSIS — S92.345A CLOSED NONDISPLACED FRACTURE OF FOURTH METATARSAL BONE OF LEFT FOOT, INITIAL ENCOUNTER: ICD-10-CM

## 2022-04-07 PROCEDURE — 99213 OFFICE O/P EST LOW 20 MIN: CPT | Performed by: PODIATRIST

## 2022-04-07 PROCEDURE — 73630 X-RAY EXAM OF FOOT: CPT

## 2022-04-07 PROCEDURE — 28470 CLTX METATARSAL FX WO MNP EA: CPT | Performed by: PODIATRIST

## 2022-04-07 PROCEDURE — 73610 X-RAY EXAM OF ANKLE: CPT

## 2022-04-07 RX ORDER — OXYCODONE AND ACETAMINOPHEN 5; 325 MG/1; MG/1
1 TABLET ORAL
Qty: 12 TABLET | Refills: 0 | Status: SHIPPED | OUTPATIENT
Start: 2022-04-07 | End: 2022-04-10

## 2022-04-07 NOTE — PROGRESS NOTES
Chief Complaint   Patient presents with    Results     xr results/boot L foot     1. Have you been to the ER, urgent care clinic since your last visit? Hospitalized since your last visit? No    2. Have you seen or consulted any other health care providers outside of the [address] [place] [address] since your last visit? Include any pap smears or colon screening.  No  PCP-Dr Savannah Bland

## 2022-04-13 ENCOUNTER — TELEPHONE (OUTPATIENT)
Dept: PODIATRY | Age: 52
End: 2022-04-13

## 2022-04-13 DIAGNOSIS — G89.4 CHRONIC PAIN SYNDROME: Primary | ICD-10-CM

## 2022-04-18 ENCOUNTER — OFFICE VISIT (OUTPATIENT)
Dept: PODIATRY | Age: 52
End: 2022-04-18
Payer: COMMERCIAL

## 2022-04-18 VITALS
DIASTOLIC BLOOD PRESSURE: 95 MMHG | BODY MASS INDEX: 27.89 KG/M2 | SYSTOLIC BLOOD PRESSURE: 149 MMHG | HEART RATE: 92 BPM | TEMPERATURE: 96.9 F | WEIGHT: 229 LBS | HEIGHT: 76 IN

## 2022-04-18 DIAGNOSIS — E11.40 TYPE 2 DIABETES MELLITUS WITH DIABETIC NEUROPATHY, WITH LONG-TERM CURRENT USE OF INSULIN (HCC): Primary | ICD-10-CM

## 2022-04-18 DIAGNOSIS — E11.621 DIABETIC ULCER OF RIGHT MIDFOOT ASSOCIATED WITH TYPE 2 DIABETES MELLITUS, WITH FAT LAYER EXPOSED (HCC): ICD-10-CM

## 2022-04-18 DIAGNOSIS — L97.909 ULCER OF LOWER EXTREMITY DUE TO DIABETES MELLITUS (HCC): ICD-10-CM

## 2022-04-18 DIAGNOSIS — E11.622 ULCER OF LOWER EXTREMITY DUE TO DIABETES MELLITUS (HCC): ICD-10-CM

## 2022-04-18 DIAGNOSIS — Z79.4 TYPE 2 DIABETES MELLITUS WITH DIABETIC NEUROPATHY, WITH LONG-TERM CURRENT USE OF INSULIN (HCC): Primary | ICD-10-CM

## 2022-04-18 DIAGNOSIS — L97.412 DIABETIC ULCER OF RIGHT MIDFOOT ASSOCIATED WITH TYPE 2 DIABETES MELLITUS, WITH FAT LAYER EXPOSED (HCC): ICD-10-CM

## 2022-04-18 PROCEDURE — 99213 OFFICE O/P EST LOW 20 MIN: CPT | Performed by: PODIATRIST

## 2022-04-18 PROCEDURE — 3051F HG A1C>EQUAL 7.0%<8.0%: CPT | Performed by: PODIATRIST

## 2022-04-18 NOTE — PROGRESS NOTES
Chief Complaint   Patient presents with    Wound Check     pt states he is generally a high tolerance to pain so his pain level is saying something     1. Have you been to the ER, urgent care clinic since your last visit? Hospitalized since your last visit? No    2. Have you seen or consulted any other health care providers outside of the [address] [place] [address] since your last visit? Include any pap smears or colon screening.  No  PCP-Dr Benjamin Elmore

## 2022-04-20 ENCOUNTER — TELEPHONE (OUTPATIENT)
Dept: ENDOCRINOLOGY | Age: 52
End: 2022-04-20

## 2022-04-20 NOTE — TELEPHONE ENCOUNTER
Patient called and asked if we could provide a Dexcom G6 sensor, due to his sensor running out and the supply company running behind on delivery. Called patient and advised that we can provide a 10 day sample sensor. Patient verbally acknowledged understanding.

## 2022-04-21 ENCOUNTER — TELEPHONE (OUTPATIENT)
Dept: PODIATRY | Age: 52
End: 2022-04-21
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
## 2022-04-28 ENCOUNTER — TELEPHONE (OUTPATIENT)
Dept: PODIATRY | Age: 52
End: 2022-04-28

## 2022-04-29 ENCOUNTER — TELEPHONE (OUTPATIENT)
Dept: PODIATRY | Age: 52
End: 2022-04-29

## 2022-04-29 RX ORDER — LEVOFLOXACIN 750 MG/1
750 TABLET ORAL DAILY
Qty: 10 TABLET | Refills: 0 | Status: ON HOLD | OUTPATIENT
Start: 2022-04-29 | End: 2022-05-08 | Stop reason: SDUPTHER

## 2022-04-30 ENCOUNTER — HOSPITAL ENCOUNTER (EMERGENCY)
Age: 52
Discharge: HOME | DRG: 710 | End: 2022-04-30
Attending: EMERGENCY MEDICINE
Payer: MEDICAID

## 2022-04-30 ENCOUNTER — APPOINTMENT (OUTPATIENT)
Dept: GENERAL RADIOLOGY | Age: 52
DRG: 710 | End: 2022-04-30
Attending: EMERGENCY MEDICINE
Payer: MEDICAID

## 2022-04-30 VITALS
OXYGEN SATURATION: 99 % | HEIGHT: 76 IN | TEMPERATURE: 98.9 F | HEART RATE: 109 BPM | BODY MASS INDEX: 28.01 KG/M2 | DIASTOLIC BLOOD PRESSURE: 89 MMHG | RESPIRATION RATE: 18 BRPM | WEIGHT: 230 LBS | SYSTOLIC BLOOD PRESSURE: 174 MMHG

## 2022-04-30 DIAGNOSIS — L97.511 DIABETIC ULCER OF TOE OF RIGHT FOOT ASSOCIATED WITH DIABETES MELLITUS DUE TO UNDERLYING CONDITION, LIMITED TO BREAKDOWN OF SKIN (HCC): Primary | ICD-10-CM

## 2022-04-30 DIAGNOSIS — L03.115 CELLULITIS OF RIGHT LOWER EXTREMITY: ICD-10-CM

## 2022-04-30 DIAGNOSIS — E08.621 DIABETIC ULCER OF TOE OF RIGHT FOOT ASSOCIATED WITH DIABETES MELLITUS DUE TO UNDERLYING CONDITION, LIMITED TO BREAKDOWN OF SKIN (HCC): Primary | ICD-10-CM

## 2022-04-30 LAB
ALBUMIN SERPL-MCNC: 3.4 G/DL (ref 3.5–5)
ALBUMIN/GLOB SERPL: 0.8 {RATIO} (ref 1.1–2.2)
ALP SERPL-CCNC: 96 U/L (ref 45–117)
ALT SERPL-CCNC: 19 U/L (ref 12–78)
ANION GAP SERPL CALC-SCNC: 12 MMOL/L (ref 5–15)
AST SERPL W P-5'-P-CCNC: 20 U/L (ref 15–37)
BASOPHILS # BLD: 0 K/UL (ref 0–0.1)
BASOPHILS NFR BLD: 0 % (ref 0–1)
BILIRUB SERPL-MCNC: 0.7 MG/DL (ref 0.2–1)
BUN SERPL-MCNC: 24 MG/DL (ref 6–20)
BUN/CREAT SERPL: 17 (ref 12–20)
CA-I BLD-MCNC: 9.1 MG/DL (ref 8.5–10.1)
CHLORIDE SERPL-SCNC: 101 MMOL/L (ref 97–108)
CO2 SERPL-SCNC: 24 MMOL/L (ref 21–32)
CREAT SERPL-MCNC: 1.38 MG/DL (ref 0.7–1.3)
CRP SERPL-MCNC: 14.8 MG/DL (ref 0–0.6)
DIFFERENTIAL METHOD BLD: ABNORMAL
EOSINOPHIL # BLD: 0 K/UL (ref 0–0.4)
EOSINOPHIL NFR BLD: 0 % (ref 0–7)
ERYTHROCYTE [DISTWIDTH] IN BLOOD BY AUTOMATED COUNT: 13.9 % (ref 11.5–14.5)
GLOBULIN SER CALC-MCNC: 4.5 G/DL (ref 2–4)
GLUCOSE SERPL-MCNC: 153 MG/DL (ref 65–100)
HCT VFR BLD AUTO: 37.7 % (ref 36.6–50.3)
HGB BLD-MCNC: 12.6 G/DL (ref 12.1–17)
IMM GRANULOCYTES # BLD AUTO: 0 K/UL (ref 0–0.04)
IMM GRANULOCYTES NFR BLD AUTO: 0 % (ref 0–0.5)
LYMPHOCYTES # BLD: 0.9 K/UL (ref 0.8–3.5)
LYMPHOCYTES NFR BLD: 7 % (ref 12–49)
MCH RBC QN AUTO: 29.1 PG (ref 26–34)
MCHC RBC AUTO-ENTMCNC: 33.4 G/DL (ref 30–36.5)
MCV RBC AUTO: 87.1 FL (ref 80–99)
MONOCYTES # BLD: 0.6 K/UL (ref 0–1)
MONOCYTES NFR BLD: 4 % (ref 5–13)
NEUTS SEG # BLD: 12.7 K/UL (ref 1.8–8)
NEUTS SEG NFR BLD: 89 % (ref 32–75)
PLATELET # BLD AUTO: 346 K/UL (ref 150–400)
PMV BLD AUTO: 9.4 FL (ref 8.9–12.9)
POTASSIUM SERPL-SCNC: 4.4 MMOL/L (ref 3.5–5.1)
PROT SERPL-MCNC: 7.9 G/DL (ref 6.4–8.2)
RBC # BLD AUTO: 4.33 M/UL (ref 4.1–5.7)
SODIUM SERPL-SCNC: 137 MMOL/L (ref 136–145)
WBC # BLD AUTO: 14.3 K/UL (ref 4.1–11.1)

## 2022-04-30 PROCEDURE — 86140 C-REACTIVE PROTEIN: CPT

## 2022-04-30 PROCEDURE — 96365 THER/PROPH/DIAG IV INF INIT: CPT

## 2022-04-30 PROCEDURE — 74011250636 HC RX REV CODE- 250/636: Performed by: EMERGENCY MEDICINE

## 2022-04-30 PROCEDURE — 87205 SMEAR GRAM STAIN: CPT

## 2022-04-30 PROCEDURE — 36415 COLL VENOUS BLD VENIPUNCTURE: CPT

## 2022-04-30 PROCEDURE — 87077 CULTURE AEROBIC IDENTIFY: CPT

## 2022-04-30 PROCEDURE — 96375 TX/PRO/DX INJ NEW DRUG ADDON: CPT

## 2022-04-30 PROCEDURE — 80053 COMPREHEN METABOLIC PANEL: CPT

## 2022-04-30 PROCEDURE — 73630 X-RAY EXAM OF FOOT: CPT

## 2022-04-30 PROCEDURE — 96366 THER/PROPH/DIAG IV INF ADDON: CPT

## 2022-04-30 PROCEDURE — 74011250637 HC RX REV CODE- 250/637: Performed by: EMERGENCY MEDICINE

## 2022-04-30 PROCEDURE — 99284 EMERGENCY DEPT VISIT MOD MDM: CPT

## 2022-04-30 PROCEDURE — 87186 SC STD MICRODIL/AGAR DIL: CPT

## 2022-04-30 PROCEDURE — 85025 COMPLETE CBC W/AUTO DIFF WBC: CPT

## 2022-04-30 RX ORDER — MORPHINE SULFATE 4 MG/ML
4 INJECTION INTRAVENOUS ONCE
Status: COMPLETED | OUTPATIENT
Start: 2022-04-30 | End: 2022-04-30

## 2022-04-30 RX ORDER — CEPHALEXIN 500 MG/1
500 CAPSULE ORAL 3 TIMES DAILY
Qty: 21 CAPSULE | Refills: 0 | Status: SHIPPED | OUTPATIENT
Start: 2022-04-30 | End: 2022-05-08

## 2022-04-30 RX ORDER — ACETAMINOPHEN 500 MG
1000 TABLET ORAL ONCE
Status: COMPLETED | OUTPATIENT
Start: 2022-04-30 | End: 2022-04-30

## 2022-04-30 RX ADMIN — SODIUM CHLORIDE 1000 MG: 9 INJECTION, SOLUTION INTRAVENOUS at 21:00

## 2022-04-30 RX ADMIN — ACETAMINOPHEN 1000 MG: 500 TABLET ORAL at 20:21

## 2022-04-30 RX ADMIN — MORPHINE SULFATE 4 MG: 4 INJECTION, SOLUTION INTRAMUSCULAR; INTRAVENOUS at 20:21

## 2022-04-30 RX ADMIN — SODIUM CHLORIDE 1000 MG: 9 INJECTION, SOLUTION INTRAVENOUS at 20:01
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
## 2022-04-30 NOTE — DISCHARGE INSTRUCTIONS
Thank you! Thank you for allowing me to care for you in the emergency department. I sincerely hope that you are satisfied with your visit today. It is my goal to provide you with excellent care. Below you will find a list of your labs and imaging from your visit today. Should you have any questions regarding these results please do not hesitate to call the emergency department. Labs -     Recent Results (from the past 12 hour(s))   CBC WITH AUTOMATED DIFF    Collection Time: 04/30/22  6:30 PM   Result Value Ref Range    WBC 14.3 (H) 4.1 - 11.1 K/uL    RBC 4.33 4.10 - 5.70 M/uL    HGB 12.6 12.1 - 17.0 g/dL    HCT 37.7 36.6 - 50.3 %    MCV 87.1 80.0 - 99.0 FL    MCH 29.1 26.0 - 34.0 PG    MCHC 33.4 30.0 - 36.5 g/dL    RDW 13.9 11.5 - 14.5 %    PLATELET 488 717 - 967 K/uL    MPV 9.4 8.9 - 12.9 FL    NEUTROPHILS 89 (H) 32 - 75 %    LYMPHOCYTES 7 (L) 12 - 49 %    MONOCYTES 4 (L) 5 - 13 %    EOSINOPHILS 0 0 - 7 %    BASOPHILS 0 0 - 1 %    IMMATURE GRANULOCYTES 0 0.0 - 0.5 %    ABS. NEUTROPHILS 12.7 (H) 1.8 - 8.0 K/UL    ABS. LYMPHOCYTES 0.9 0.8 - 3.5 K/UL    ABS. MONOCYTES 0.6 0.0 - 1.0 K/UL    ABS. EOSINOPHILS 0.0 0.0 - 0.4 K/UL    ABS. BASOPHILS 0.0 0.0 - 0.1 K/UL    ABS. IMM.  GRANS. 0.0 0.00 - 0.04 K/UL    DF AUTOMATED     C REACTIVE PROTEIN, QT    Collection Time: 04/30/22  6:30 PM   Result Value Ref Range    C-Reactive protein 14.80 (H) 0.00 - 6.34 mg/dL   METABOLIC PANEL, COMPREHENSIVE    Collection Time: 04/30/22  6:30 PM   Result Value Ref Range    Sodium 137 136 - 145 mmol/L    Potassium 4.4 3.5 - 5.1 mmol/L    Chloride 101 97 - 108 mmol/L    CO2 24 21 - 32 mmol/L    Anion gap 12 5 - 15 mmol/L    Glucose 153 (H) 65 - 100 mg/dL    BUN 24 (H) 6 - 20 mg/dL    Creatinine 1.38 (H) 0.70 - 1.30 mg/dL    BUN/Creatinine ratio 17 12 - 20      GFR est AA >60 >60 ml/min/1.73m2    GFR est non-AA 55 (L) >60 ml/min/1.73m2    Calcium 9.1 8.5 - 10.1 mg/dL    Bilirubin, total 0.7 0.2 - 1.0 mg/dL    AST (SGOT) 20 15 - 37 U/L    ALT (SGPT) 19 12 - 78 U/L    Alk. phosphatase 96 45 - 117 U/L    Protein, total 7.9 6.4 - 8.2 g/dL    Albumin 3.4 (L) 3.5 - 5.0 g/dL    Globulin 4.5 (H) 2.0 - 4.0 g/dL    A-G Ratio 0.8 (L) 1.1 - 2.2         Radiologic Studies -   XR FOOT RT MIN 3 V   Final Result   No acute abnormality demonstrated. CT Results  (Last 48 hours)      None          CXR Results  (Last 48 hours)      None               If you feel that you have not received excellent quality care or timely care, please ask to speak to the nurse manager. Please choose us in the future for your continued health care needs. ------------------------------------------------------------------------------------------------------------  The exam and treatment you received in the Emergency Department were for an urgent problem and are not intended as complete care. It is important that you follow-up with a doctor, nurse practitioner, or physician assistant to:  (1) confirm your diagnosis,  (2) re-evaluation of changes in your illness and treatment, and  (3) for ongoing care. If your symptoms become worse or you do not improve as expected and you are unable to reach your usual health care provider, you should return to the Emergency Department. We are available 24 hours a day. Please take your discharge instructions with you when you go to your follow-up appointment. If you have any problem arranging a follow-up appointment, contact the Emergency Department immediately. If a prescription has been provided, please have it filled as soon as possible to prevent a delay in treatment. Read the entire medication instruction sheet provided to you by the pharmacy. If you have any questions or reservations about taking the medication due to side effects or interactions with other medications, please call your primary care physician or contact the ER to speak with the charge nurse.      Make an appointment with your family doctor or the physician you were referred to for follow-up of this visit as instructed on your discharge paperwork, as this is a mandatory follow-up. Return to the ER if you are unable to be seen or if you are unable to be seen in a timely manner. If you have any problem arranging the follow-up visit, contact the Emergency Department immediately.

## 2022-04-30 NOTE — Clinical Note
[address] [facility] [place] 2[facility] EMERGENCY DEPARTMENT  [address] [facility] [address] [phone]    Work/School Note    Date: 4/30/2022    To Whom It May concern:    Parth Coronel was seen and treated today in the emergency room by the following provider(s):  Attending Provider: Sharif Macedo MD.      Parth Coronel is excused from work/school on 4/30/2022 through 5/2/2022. He is medically clear to return to work/school on 5/3/2022.          Sincerely,          Sadie Walker MD

## 2022-04-30 NOTE — ED NOTES
Report received from VERITO SANDERSON [facility].

## 2022-05-01 NOTE — ED NOTES
Abx infusing, patient c/o sharp shooting pain to R foot, requesting pain medication, provider aware.

## 2022-05-01 NOTE — ED NOTES
RASS score upon discharge 0    Pt rates pain 4 at time of discharge. Discharge instructions reviewed with patient at bedside. Patient denies any questions or concerns at this time. IV d/c'ed, catheter intact, gauze dressing applied. Rx x 1 sent to patient's preferred pharmacy and work note.

## 2022-05-02 ENCOUNTER — HOSPITAL ENCOUNTER (INPATIENT)
Age: 52
LOS: 6 days | Discharge: HOME | DRG: 710 | End: 2022-05-08
Attending: EMERGENCY MEDICINE | Admitting: INTERNAL MEDICINE
Payer: MEDICAID

## 2022-05-02 ENCOUNTER — APPOINTMENT (OUTPATIENT)
Dept: MRI IMAGING | Age: 52
DRG: 710 | End: 2022-05-02
Attending: INTERNAL MEDICINE
Payer: MEDICAID

## 2022-05-02 ENCOUNTER — OFFICE VISIT (OUTPATIENT)
Dept: PODIATRY | Age: 52
End: 2022-05-02

## 2022-05-02 VITALS
BODY MASS INDEX: 28.01 KG/M2 | DIASTOLIC BLOOD PRESSURE: 92 MMHG | HEART RATE: 103 BPM | WEIGHT: 230 LBS | TEMPERATURE: 97.5 F | SYSTOLIC BLOOD PRESSURE: 162 MMHG | HEIGHT: 76 IN

## 2022-05-02 DIAGNOSIS — E11.628 DIABETIC FOOT INFECTION (HCC): ICD-10-CM

## 2022-05-02 DIAGNOSIS — E11.40 TYPE 2 DIABETES MELLITUS WITH DIABETIC NEUROPATHY, WITH LONG-TERM CURRENT USE OF INSULIN (HCC): Primary | ICD-10-CM

## 2022-05-02 DIAGNOSIS — L97.514 DIABETIC ULCER OF TOE OF RIGHT FOOT ASSOCIATED WITH TYPE 1 DIABETES MELLITUS, WITH NECROSIS OF BONE (HCC): ICD-10-CM

## 2022-05-02 DIAGNOSIS — L08.9 WOUND INFECTION: Primary | ICD-10-CM

## 2022-05-02 DIAGNOSIS — E11.65 POORLY CONTROLLED DIABETES MELLITUS (HCC): ICD-10-CM

## 2022-05-02 DIAGNOSIS — E10.42 DIABETIC POLYNEUROPATHY ASSOCIATED WITH TYPE 1 DIABETES MELLITUS (HCC): ICD-10-CM

## 2022-05-02 DIAGNOSIS — E10.621 DIABETIC ULCER OF TOE OF RIGHT FOOT ASSOCIATED WITH TYPE 1 DIABETES MELLITUS, WITH NECROSIS OF BONE (HCC): ICD-10-CM

## 2022-05-02 DIAGNOSIS — L08.9 DIABETIC FOOT INFECTION (HCC): ICD-10-CM

## 2022-05-02 DIAGNOSIS — A41.9 SEPSIS, DUE TO UNSPECIFIED ORGANISM, UNSPECIFIED WHETHER ACUTE ORGAN DYSFUNCTION PRESENT (HCC): ICD-10-CM

## 2022-05-02 DIAGNOSIS — M86.9 OSTEOMYELITIS OF GREAT TOE OF RIGHT FOOT (HCC): ICD-10-CM

## 2022-05-02 DIAGNOSIS — T14.8XXA WOUND INFECTION: Primary | ICD-10-CM

## 2022-05-02 DIAGNOSIS — Z79.4 TYPE 2 DIABETES MELLITUS WITH DIABETIC NEUROPATHY, WITH LONG-TERM CURRENT USE OF INSULIN (HCC): Primary | ICD-10-CM

## 2022-05-02 LAB
ALBUMIN SERPL-MCNC: 3.2 G/DL (ref 3.5–5)
ALBUMIN/GLOB SERPL: 0.7 {RATIO} (ref 1.1–2.2)
ALP SERPL-CCNC: 104 U/L (ref 45–117)
ALT SERPL-CCNC: 33 U/L (ref 12–78)
ANION GAP SERPL CALC-SCNC: 5 MMOL/L (ref 5–15)
AST SERPL W P-5'-P-CCNC: 49 U/L (ref 15–37)
BASOPHILS # BLD: 0.1 K/UL (ref 0–0.1)
BASOPHILS NFR BLD: 0 % (ref 0–1)
BILIRUB SERPL-MCNC: 0.3 MG/DL (ref 0.2–1)
BUN SERPL-MCNC: 28 MG/DL (ref 6–20)
BUN/CREAT SERPL: 18 (ref 12–20)
CA-I BLD-MCNC: 9 MG/DL (ref 8.5–10.1)
CHLORIDE SERPL-SCNC: 109 MMOL/L (ref 97–108)
CO2 SERPL-SCNC: 25 MMOL/L (ref 21–32)
CREAT SERPL-MCNC: 1.57 MG/DL (ref 0.7–1.3)
DIFFERENTIAL METHOD BLD: ABNORMAL
EOSINOPHIL # BLD: 0 K/UL (ref 0–0.4)
EOSINOPHIL NFR BLD: 0 % (ref 0–7)
ERYTHROCYTE [DISTWIDTH] IN BLOOD BY AUTOMATED COUNT: 14.3 % (ref 11.5–14.5)
GLOBULIN SER CALC-MCNC: 4.3 G/DL (ref 2–4)
GLUCOSE SERPL-MCNC: 154 MG/DL (ref 65–100)
HCT VFR BLD AUTO: 36.5 % (ref 36.6–50.3)
HGB BLD-MCNC: 11.9 G/DL (ref 12.1–17)
IMM GRANULOCYTES # BLD AUTO: 0.1 K/UL (ref 0–0.04)
IMM GRANULOCYTES NFR BLD AUTO: 1 % (ref 0–0.5)
LACTATE SERPL-SCNC: 0.8 MMOL/L (ref 0.4–2)
LYMPHOCYTES # BLD: 1.7 K/UL (ref 0.8–3.5)
LYMPHOCYTES NFR BLD: 11 % (ref 12–49)
MCH RBC QN AUTO: 28.5 PG (ref 26–34)
MCHC RBC AUTO-ENTMCNC: 32.6 G/DL (ref 30–36.5)
MCV RBC AUTO: 87.3 FL (ref 80–99)
MONOCYTES # BLD: 0.6 K/UL (ref 0–1)
MONOCYTES NFR BLD: 4 % (ref 5–13)
NEUTS SEG # BLD: 13.3 K/UL (ref 1.8–8)
NEUTS SEG NFR BLD: 84 % (ref 32–75)
NRBC # BLD: 0 K/UL (ref 0–0.01)
NRBC BLD-RTO: 0 PER 100 WBC
PLATELET # BLD AUTO: 414 K/UL (ref 150–400)
PMV BLD AUTO: 9.7 FL (ref 8.9–12.9)
POTASSIUM SERPL-SCNC: 4.4 MMOL/L (ref 3.5–5.1)
PROT SERPL-MCNC: 7.5 G/DL (ref 6.4–8.2)
RBC # BLD AUTO: 4.18 M/UL (ref 4.1–5.7)
SODIUM SERPL-SCNC: 139 MMOL/L (ref 136–145)
WBC # BLD AUTO: 15.8 K/UL (ref 4.1–11.1)

## 2022-05-02 PROCEDURE — 87040 BLOOD CULTURE FOR BACTERIA: CPT

## 2022-05-02 PROCEDURE — 99223 1ST HOSP IP/OBS HIGH 75: CPT | Performed by: PODIATRIST

## 2022-05-02 PROCEDURE — 74011250637 HC RX REV CODE- 250/637: Performed by: INTERNAL MEDICINE

## 2022-05-02 PROCEDURE — 74011250637 HC RX REV CODE- 250/637: Performed by: EMERGENCY MEDICINE

## 2022-05-02 PROCEDURE — 73720 MRI LWR EXTREMITY W/O&W/DYE: CPT

## 2022-05-02 PROCEDURE — A9577 INJ MULTIHANCE: HCPCS | Performed by: INTERNAL MEDICINE

## 2022-05-02 PROCEDURE — 74011250636 HC RX REV CODE- 250/636: Performed by: INTERNAL MEDICINE

## 2022-05-02 PROCEDURE — 85025 COMPLETE CBC W/AUTO DIFF WBC: CPT

## 2022-05-02 PROCEDURE — 99285 EMERGENCY DEPT VISIT HI MDM: CPT

## 2022-05-02 PROCEDURE — 83605 ASSAY OF LACTIC ACID: CPT

## 2022-05-02 PROCEDURE — 65270000029 HC RM PRIVATE

## 2022-05-02 PROCEDURE — 36415 COLL VENOUS BLD VENIPUNCTURE: CPT

## 2022-05-02 PROCEDURE — 74011000250 HC RX REV CODE- 250: Performed by: INTERNAL MEDICINE

## 2022-05-02 PROCEDURE — 80053 COMPREHEN METABOLIC PANEL: CPT

## 2022-05-02 RX ORDER — LABETALOL HCL 20 MG/4 ML
10 SYRINGE (ML) INTRAVENOUS
Status: DISCONTINUED | OUTPATIENT
Start: 2022-05-02 | End: 2022-05-02

## 2022-05-02 RX ORDER — LABETALOL HCL 20 MG/4 ML
10 SYRINGE (ML) INTRAVENOUS
Status: DISCONTINUED | OUTPATIENT
Start: 2022-05-02 | End: 2022-05-03

## 2022-05-02 RX ORDER — HYDROMORPHONE HYDROCHLORIDE 1 MG/ML
1 INJECTION, SOLUTION INTRAMUSCULAR; INTRAVENOUS; SUBCUTANEOUS
Status: DISPENSED | OUTPATIENT
Start: 2022-05-02 | End: 2022-05-04

## 2022-05-02 RX ORDER — OXYCODONE AND ACETAMINOPHEN 5; 325 MG/1; MG/1
2 TABLET ORAL
Status: COMPLETED | OUTPATIENT
Start: 2022-05-02 | End: 2022-05-02

## 2022-05-02 RX ORDER — ONDANSETRON 4 MG/1
4 TABLET, ORALLY DISINTEGRATING ORAL
Status: DISCONTINUED | OUTPATIENT
Start: 2022-05-02 | End: 2022-05-08 | Stop reason: HOSPADM

## 2022-05-02 RX ORDER — POLYETHYLENE GLYCOL 3350 17 G/17G
17 POWDER, FOR SOLUTION ORAL DAILY PRN
Status: DISCONTINUED | OUTPATIENT
Start: 2022-05-02 | End: 2022-05-08 | Stop reason: HOSPADM

## 2022-05-02 RX ORDER — ENOXAPARIN SODIUM 100 MG/ML
40 INJECTION SUBCUTANEOUS DAILY
Status: DISCONTINUED | OUTPATIENT
Start: 2022-05-03 | End: 2022-05-08 | Stop reason: HOSPADM

## 2022-05-02 RX ORDER — SODIUM CHLORIDE 0.9 % (FLUSH) 0.9 %
5-40 SYRINGE (ML) INJECTION AS NEEDED
Status: DISCONTINUED | OUTPATIENT
Start: 2022-05-02 | End: 2022-05-08 | Stop reason: HOSPADM

## 2022-05-02 RX ORDER — SODIUM CHLORIDE 9 MG/ML
50 INJECTION, SOLUTION INTRAVENOUS CONTINUOUS
Status: DISPENSED | OUTPATIENT
Start: 2022-05-02 | End: 2022-05-03

## 2022-05-02 RX ORDER — SODIUM CHLORIDE 0.9 % (FLUSH) 0.9 %
5-40 SYRINGE (ML) INJECTION EVERY 8 HOURS
Status: DISCONTINUED | OUTPATIENT
Start: 2022-05-02 | End: 2022-05-08 | Stop reason: HOSPADM

## 2022-05-02 RX ORDER — ONDANSETRON 2 MG/ML
4 INJECTION INTRAMUSCULAR; INTRAVENOUS
Status: DISCONTINUED | OUTPATIENT
Start: 2022-05-02 | End: 2022-05-08 | Stop reason: HOSPADM

## 2022-05-02 RX ORDER — ACETAMINOPHEN 325 MG/1
650 TABLET ORAL
Status: DISCONTINUED | OUTPATIENT
Start: 2022-05-02 | End: 2022-05-08 | Stop reason: HOSPADM

## 2022-05-02 RX ORDER — ACETAMINOPHEN 650 MG/1
650 SUPPOSITORY RECTAL
Status: DISCONTINUED | OUTPATIENT
Start: 2022-05-02 | End: 2022-05-08 | Stop reason: HOSPADM

## 2022-05-02 RX ADMIN — GADOBENATE DIMEGLUMINE 20 ML: 529 INJECTION, SOLUTION INTRAVENOUS at 23:12

## 2022-05-02 RX ADMIN — VANCOMYCIN HYDROCHLORIDE 2000 MG: 1 INJECTION, POWDER, LYOPHILIZED, FOR SOLUTION INTRAVENOUS at 23:44

## 2022-05-02 RX ADMIN — HYDROMORPHONE HYDROCHLORIDE 1 MG: 1 INJECTION, SOLUTION INTRAMUSCULAR; INTRAVENOUS; SUBCUTANEOUS at 23:39

## 2022-05-02 RX ADMIN — OXYCODONE AND ACETAMINOPHEN 2 TABLET: 5; 325 TABLET ORAL at 20:48

## 2022-05-02 RX ADMIN — SODIUM CHLORIDE 1000 ML: 9 INJECTION, SOLUTION INTRAVENOUS at 21:45

## 2022-05-02 RX ADMIN — SODIUM CHLORIDE, PRESERVATIVE FREE 2 G: 5 INJECTION INTRAVENOUS at 23:40

## 2022-05-02 RX ADMIN — SODIUM CHLORIDE 50 ML/HR: 9 INJECTION, SOLUTION INTRAVENOUS at 23:28

## 2022-05-02 RX ADMIN — ACETAMINOPHEN 650 MG: 325 TABLET ORAL at 23:39

## 2022-05-02 RX ADMIN — LABETALOL HYDROCHLORIDE 10 MG: 5 INJECTION, SOLUTION INTRAVENOUS at 23:39

## 2022-05-02 NOTE — PROGRESS NOTES
Chief Complaint   Patient presents with    Wound Check     pt states his R great toe exploded with puss when he took the bandage off states before that it got real puffy and red states there is a smell    Foot Swelling     R foot    Foot Pain     states it feels like someone is stabbing a knife in his toe states one minute it is ok and the next it puts him on the ground     1. Have you been to the ER, urgent care clinic since your last visit? Hospitalized since your last visit? No    2. Have you seen or consulted any other health care providers outside of the [address] [place] [address] since your last visit? Include any pap smears or colon screening.  No  PCP-Dr Costa Ramires

## 2022-05-02 NOTE — ED TRIAGE NOTES
Reported to have rt foot infection/dm wound. Pt seen by Dr. Jenise Tiwari and told to come to ER for admission and have surgery tomorrow. MRI of foot requested.
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
## 2022-05-03 ENCOUNTER — ANESTHESIA EVENT (OUTPATIENT)
Dept: SURGERY | Age: 52
DRG: 710 | End: 2022-05-03
Payer: COMMERCIAL

## 2022-05-03 ENCOUNTER — ANESTHESIA (OUTPATIENT)
Dept: SURGERY | Age: 52
DRG: 710 | End: 2022-05-03
Payer: COMMERCIAL

## 2022-05-03 LAB
ANION GAP SERPL CALC-SCNC: 6 MMOL/L (ref 5–15)
BASOPHILS # BLD: 0.1 K/UL (ref 0–0.1)
BASOPHILS NFR BLD: 1 % (ref 0–1)
BUN SERPL-MCNC: 22 MG/DL (ref 6–20)
BUN/CREAT SERPL: 13 (ref 12–20)
CA-I BLD-MCNC: 8.6 MG/DL (ref 8.5–10.1)
CHLORIDE SERPL-SCNC: 107 MMOL/L (ref 97–108)
CO2 SERPL-SCNC: 25 MMOL/L (ref 21–32)
CREAT SERPL-MCNC: 1.63 MG/DL (ref 0.7–1.3)
DATE LAST DOSE: NORMAL
DIFFERENTIAL METHOD BLD: ABNORMAL
EOSINOPHIL # BLD: 0.1 K/UL (ref 0–0.4)
EOSINOPHIL NFR BLD: 1 % (ref 0–7)
ERYTHROCYTE [DISTWIDTH] IN BLOOD BY AUTOMATED COUNT: 14.6 % (ref 11.5–14.5)
ERYTHROCYTE [SEDIMENTATION RATE] IN BLOOD: 68 MM/HR (ref 0–15)
GLUCOSE BLD STRIP.AUTO-MCNC: 181 MG/DL (ref 65–117)
GLUCOSE BLD STRIP.AUTO-MCNC: 202 MG/DL (ref 65–117)
GLUCOSE BLD STRIP.AUTO-MCNC: 208 MG/DL (ref 65–117)
GLUCOSE BLD STRIP.AUTO-MCNC: 216 MG/DL (ref 65–117)
GLUCOSE BLD STRIP.AUTO-MCNC: 228 MG/DL (ref 65–117)
GLUCOSE SERPL-MCNC: 285 MG/DL (ref 65–100)
HCT VFR BLD AUTO: 34.2 % (ref 36.6–50.3)
HGB BLD-MCNC: 11 G/DL (ref 12.1–17)
IMM GRANULOCYTES # BLD AUTO: 0.1 K/UL (ref 0–0.04)
IMM GRANULOCYTES NFR BLD AUTO: 1 % (ref 0–0.5)
LYMPHOCYTES # BLD: 2.7 K/UL (ref 0.8–3.5)
LYMPHOCYTES NFR BLD: 18 % (ref 12–49)
MCH RBC QN AUTO: 28.6 PG (ref 26–34)
MCHC RBC AUTO-ENTMCNC: 32.2 G/DL (ref 30–36.5)
MCV RBC AUTO: 88.8 FL (ref 80–99)
MONOCYTES # BLD: 1 K/UL (ref 0–1)
MONOCYTES NFR BLD: 7 % (ref 5–13)
MRSA DNA SPEC QL NAA+PROBE: NOT DETECTED
NEUTS SEG # BLD: 11.5 K/UL (ref 1.8–8)
NEUTS SEG NFR BLD: 72 % (ref 32–75)
NRBC # BLD: 0 K/UL (ref 0–0.01)
NRBC BLD-RTO: 0 PER 100 WBC
PERFORMED BY, TECHID: ABNORMAL
PLATELET # BLD AUTO: 400 K/UL (ref 150–400)
PMV BLD AUTO: 10 FL (ref 8.9–12.9)
POTASSIUM SERPL-SCNC: 4.7 MMOL/L (ref 3.5–5.1)
RBC # BLD AUTO: 3.85 M/UL (ref 4.1–5.7)
REPORTED DOSE,DOSE: NORMAL UNITS
SODIUM SERPL-SCNC: 138 MMOL/L (ref 136–145)
VANCOMYCIN SERPL-MCNC: 13.3 UG/ML
WBC # BLD AUTO: 15.5 K/UL (ref 4.1–11.1)

## 2022-05-03 PROCEDURE — 87186 SC STD MICRODIL/AGAR DIL: CPT

## 2022-05-03 PROCEDURE — 87205 SMEAR GRAM STAIN: CPT

## 2022-05-03 PROCEDURE — 80048 BASIC METABOLIC PNL TOTAL CA: CPT

## 2022-05-03 PROCEDURE — 11044 DBRDMT BONE 1ST 20 SQ CM/<: CPT | Performed by: PODIATRIST

## 2022-05-03 PROCEDURE — 74011250636 HC RX REV CODE- 250/636: Performed by: INTERNAL MEDICINE

## 2022-05-03 PROCEDURE — 2709999900 HC NON-CHARGEABLE SUPPLY: Performed by: PODIATRIST

## 2022-05-03 PROCEDURE — 65270000029 HC RM PRIVATE

## 2022-05-03 PROCEDURE — 74011000250 HC RX REV CODE- 250: Performed by: PODIATRIST

## 2022-05-03 PROCEDURE — 77030039465 HC PRB ASPIR SONICVAC MSNX -F: Performed by: PODIATRIST

## 2022-05-03 PROCEDURE — 99222 1ST HOSP IP/OBS MODERATE 55: CPT | Performed by: INTERNAL MEDICINE

## 2022-05-03 PROCEDURE — 11047 DBRDMT BONE EACH ADDL: CPT | Performed by: PODIATRIST

## 2022-05-03 PROCEDURE — 76010000138 HC OR TIME 0.5 TO 1 HR: Performed by: PODIATRIST

## 2022-05-03 PROCEDURE — 87641 MR-STAPH DNA AMP PROBE: CPT

## 2022-05-03 PROCEDURE — 74011250636 HC RX REV CODE- 250/636: Performed by: NURSE ANESTHETIST, CERTIFIED REGISTERED

## 2022-05-03 PROCEDURE — 74011000258 HC RX REV CODE- 258: Performed by: INTERNAL MEDICINE

## 2022-05-03 PROCEDURE — 82962 GLUCOSE BLOOD TEST: CPT

## 2022-05-03 PROCEDURE — 74011250636 HC RX REV CODE- 250/636: Performed by: ANESTHESIOLOGY

## 2022-05-03 PROCEDURE — 76210000016 HC OR PH I REC 1 TO 1.5 HR: Performed by: PODIATRIST

## 2022-05-03 PROCEDURE — 87077 CULTURE AEROBIC IDENTIFY: CPT

## 2022-05-03 PROCEDURE — 99233 SBSQ HOSP IP/OBS HIGH 50: CPT | Performed by: PODIATRIST

## 2022-05-03 PROCEDURE — 80202 ASSAY OF VANCOMYCIN: CPT

## 2022-05-03 PROCEDURE — 77030039464 HC TU IRR ENDO DISP MSNX -B: Performed by: PODIATRIST

## 2022-05-03 PROCEDURE — 74011250637 HC RX REV CODE- 250/637: Performed by: INTERNAL MEDICINE

## 2022-05-03 PROCEDURE — 88311 DECALCIFY TISSUE: CPT

## 2022-05-03 PROCEDURE — 76060000032 HC ANESTHESIA 0.5 TO 1 HR: Performed by: PODIATRIST

## 2022-05-03 PROCEDURE — 0QBL0ZZ EXCISION OF RIGHT TARSAL, OPEN APPROACH: ICD-10-PCS | Performed by: PODIATRIST

## 2022-05-03 PROCEDURE — 88304 TISSUE EXAM BY PATHOLOGIST: CPT

## 2022-05-03 PROCEDURE — 85025 COMPLETE CBC W/AUTO DIFF WBC: CPT

## 2022-05-03 PROCEDURE — 85652 RBC SED RATE AUTOMATED: CPT

## 2022-05-03 PROCEDURE — 74011000250 HC RX REV CODE- 250: Performed by: INTERNAL MEDICINE

## 2022-05-03 PROCEDURE — 74011000250 HC RX REV CODE- 250: Performed by: NURSE ANESTHETIST, CERTIFIED REGISTERED

## 2022-05-03 RX ORDER — FENTANYL CITRATE 50 UG/ML
50 INJECTION, SOLUTION INTRAMUSCULAR; INTRAVENOUS AS NEEDED
Status: DISCONTINUED | OUTPATIENT
Start: 2022-05-03 | End: 2022-05-03 | Stop reason: HOSPADM

## 2022-05-03 RX ORDER — SODIUM CHLORIDE 0.9 % (FLUSH) 0.9 %
5-40 SYRINGE (ML) INJECTION EVERY 8 HOURS
Status: DISCONTINUED | OUTPATIENT
Start: 2022-05-03 | End: 2022-05-06 | Stop reason: SDUPTHER

## 2022-05-03 RX ORDER — SODIUM CHLORIDE 0.9 % (FLUSH) 0.9 %
5-40 SYRINGE (ML) INJECTION AS NEEDED
Status: DISCONTINUED | OUTPATIENT
Start: 2022-05-03 | End: 2022-05-03 | Stop reason: HOSPADM

## 2022-05-03 RX ORDER — METHOTREXATE 2.5 MG/1
2.5 TABLET ORAL
COMMUNITY
End: 2022-05-08

## 2022-05-03 RX ORDER — FENTANYL CITRATE 50 UG/ML
INJECTION, SOLUTION INTRAMUSCULAR; INTRAVENOUS AS NEEDED
Status: DISCONTINUED | OUTPATIENT
Start: 2022-05-03 | End: 2022-05-03 | Stop reason: HOSPADM

## 2022-05-03 RX ORDER — HYDROMORPHONE HYDROCHLORIDE 1 MG/ML
0.4 INJECTION, SOLUTION INTRAMUSCULAR; INTRAVENOUS; SUBCUTANEOUS
Status: DISCONTINUED | OUTPATIENT
Start: 2022-05-03 | End: 2022-05-03 | Stop reason: HOSPADM

## 2022-05-03 RX ORDER — SODIUM CHLORIDE 0.9 % (FLUSH) 0.9 %
5-40 SYRINGE (ML) INJECTION AS NEEDED
Status: DISCONTINUED | OUTPATIENT
Start: 2022-05-03 | End: 2022-05-08 | Stop reason: HOSPADM

## 2022-05-03 RX ORDER — METOPROLOL TARTRATE 5 MG/5ML
2.5 INJECTION INTRAVENOUS
Status: DISCONTINUED | OUTPATIENT
Start: 2022-05-03 | End: 2022-05-03 | Stop reason: HOSPADM

## 2022-05-03 RX ORDER — LABETALOL HCL 20 MG/4 ML
5 SYRINGE (ML) INTRAVENOUS
Status: DISCONTINUED | OUTPATIENT
Start: 2022-05-03 | End: 2022-05-03 | Stop reason: HOSPADM

## 2022-05-03 RX ORDER — HYDRALAZINE HYDROCHLORIDE 20 MG/ML
20 INJECTION INTRAMUSCULAR; INTRAVENOUS
Status: DISCONTINUED | OUTPATIENT
Start: 2022-05-03 | End: 2022-05-06

## 2022-05-03 RX ORDER — MORPHINE SULFATE 2 MG/ML
2 INJECTION, SOLUTION INTRAMUSCULAR; INTRAVENOUS
Status: DISCONTINUED | OUTPATIENT
Start: 2022-05-03 | End: 2022-05-03 | Stop reason: HOSPADM

## 2022-05-03 RX ORDER — OXYCODONE AND ACETAMINOPHEN 5; 325 MG/1; MG/1
1 TABLET ORAL AS NEEDED
Status: DISCONTINUED | OUTPATIENT
Start: 2022-05-03 | End: 2022-05-03 | Stop reason: HOSPADM

## 2022-05-03 RX ORDER — SODIUM CHLORIDE 0.9 % (FLUSH) 0.9 %
5-40 SYRINGE (ML) INJECTION EVERY 8 HOURS
Status: DISCONTINUED | OUTPATIENT
Start: 2022-05-03 | End: 2022-05-03 | Stop reason: HOSPADM

## 2022-05-03 RX ORDER — NORETHINDRONE AND ETHINYL ESTRADIOL 0.5-0.035
5 KIT ORAL AS NEEDED
Status: DISCONTINUED | OUTPATIENT
Start: 2022-05-03 | End: 2022-05-03 | Stop reason: HOSPADM

## 2022-05-03 RX ORDER — LIDOCAINE HYDROCHLORIDE 20 MG/ML
INJECTION, SOLUTION EPIDURAL; INFILTRATION; INTRACAUDAL; PERINEURAL AS NEEDED
Status: DISCONTINUED | OUTPATIENT
Start: 2022-05-03 | End: 2022-05-03 | Stop reason: HOSPADM

## 2022-05-03 RX ORDER — FENTANYL CITRATE 50 UG/ML
50 INJECTION, SOLUTION INTRAMUSCULAR; INTRAVENOUS
Status: DISCONTINUED | OUTPATIENT
Start: 2022-05-03 | End: 2022-05-03 | Stop reason: HOSPADM

## 2022-05-03 RX ORDER — SODIUM CHLORIDE, SODIUM LACTATE, POTASSIUM CHLORIDE, CALCIUM CHLORIDE 600; 310; 30; 20 MG/100ML; MG/100ML; MG/100ML; MG/100ML
INJECTION, SOLUTION INTRAVENOUS
Status: DISCONTINUED | OUTPATIENT
Start: 2022-05-03 | End: 2022-05-03 | Stop reason: HOSPADM

## 2022-05-03 RX ORDER — LIDOCAINE HYDROCHLORIDE 10 MG/ML
0.1 INJECTION, SOLUTION EPIDURAL; INFILTRATION; INTRACAUDAL; PERINEURAL AS NEEDED
Status: DISCONTINUED | OUTPATIENT
Start: 2022-05-03 | End: 2022-05-03 | Stop reason: HOSPADM

## 2022-05-03 RX ORDER — MIDAZOLAM HYDROCHLORIDE 1 MG/ML
1 INJECTION, SOLUTION INTRAMUSCULAR; INTRAVENOUS AS NEEDED
Status: DISCONTINUED | OUTPATIENT
Start: 2022-05-03 | End: 2022-05-03 | Stop reason: HOSPADM

## 2022-05-03 RX ORDER — PROPOFOL 10 MG/ML
INJECTION, EMULSION INTRAVENOUS
Status: DISCONTINUED | OUTPATIENT
Start: 2022-05-03 | End: 2022-05-03 | Stop reason: HOSPADM

## 2022-05-03 RX ORDER — MIDAZOLAM HYDROCHLORIDE 1 MG/ML
INJECTION, SOLUTION INTRAMUSCULAR; INTRAVENOUS AS NEEDED
Status: DISCONTINUED | OUTPATIENT
Start: 2022-05-03 | End: 2022-05-03 | Stop reason: HOSPADM

## 2022-05-03 RX ORDER — MIDAZOLAM HYDROCHLORIDE 1 MG/ML
0.5 INJECTION, SOLUTION INTRAMUSCULAR; INTRAVENOUS
Status: DISCONTINUED | OUTPATIENT
Start: 2022-05-03 | End: 2022-05-03 | Stop reason: HOSPADM

## 2022-05-03 RX ORDER — FOLIC ACID 1 MG/1
1 TABLET ORAL DAILY
COMMUNITY

## 2022-05-03 RX ORDER — ONDANSETRON 2 MG/ML
4 INJECTION INTRAMUSCULAR; INTRAVENOUS AS NEEDED
Status: DISCONTINUED | OUTPATIENT
Start: 2022-05-03 | End: 2022-05-03 | Stop reason: HOSPADM

## 2022-05-03 RX ORDER — DIPHENHYDRAMINE HYDROCHLORIDE 50 MG/ML
12.5 INJECTION, SOLUTION INTRAMUSCULAR; INTRAVENOUS AS NEEDED
Status: DISCONTINUED | OUTPATIENT
Start: 2022-05-03 | End: 2022-05-03 | Stop reason: HOSPADM

## 2022-05-03 RX ORDER — PREDNISONE 5 MG/1
TABLET ORAL
COMMUNITY
End: 2022-06-15

## 2022-05-03 RX ORDER — HYDRALAZINE HYDROCHLORIDE 20 MG/ML
10 INJECTION INTRAMUSCULAR; INTRAVENOUS
Status: DISCONTINUED | OUTPATIENT
Start: 2022-05-03 | End: 2022-05-03 | Stop reason: HOSPADM

## 2022-05-03 RX ADMIN — FENTANYL CITRATE 25 MCG: 0.05 INJECTION, SOLUTION INTRAMUSCULAR; INTRAVENOUS at 17:41

## 2022-05-03 RX ADMIN — CEFEPIME HYDROCHLORIDE 2 G: 2 INJECTION, POWDER, FOR SOLUTION INTRAVENOUS at 12:13

## 2022-05-03 RX ADMIN — SODIUM CHLORIDE, PRESERVATIVE FREE 10 ML: 5 INJECTION INTRAVENOUS at 22:00

## 2022-05-03 RX ADMIN — HYDROMORPHONE HYDROCHLORIDE 1 MG: 1 INJECTION, SOLUTION INTRAMUSCULAR; INTRAVENOUS; SUBCUTANEOUS at 12:20

## 2022-05-03 RX ADMIN — FENTANYL CITRATE 50 MCG: 50 INJECTION, SOLUTION INTRAMUSCULAR; INTRAVENOUS at 18:39

## 2022-05-03 RX ADMIN — HYDROMORPHONE HYDROCHLORIDE 1 MG: 1 INJECTION, SOLUTION INTRAMUSCULAR; INTRAVENOUS; SUBCUTANEOUS at 16:24

## 2022-05-03 RX ADMIN — PIPERACILLIN AND TAZOBACTAM 3.38 G: 3; .375 INJECTION, POWDER, LYOPHILIZED, FOR SOLUTION INTRAVENOUS at 15:39

## 2022-05-03 RX ADMIN — VANCOMYCIN HYDROCHLORIDE 750 MG: 750 INJECTION, POWDER, LYOPHILIZED, FOR SOLUTION INTRAVENOUS at 16:24

## 2022-05-03 RX ADMIN — MIDAZOLAM HYDROCHLORIDE 2 MG: 2 INJECTION, SOLUTION INTRAMUSCULAR; INTRAVENOUS at 17:28

## 2022-05-03 RX ADMIN — SODIUM CHLORIDE, POTASSIUM CHLORIDE, SODIUM LACTATE AND CALCIUM CHLORIDE: 600; 310; 30; 20 INJECTION, SOLUTION INTRAVENOUS at 17:28

## 2022-05-03 RX ADMIN — FENTANYL CITRATE 25 MCG: 0.05 INJECTION, SOLUTION INTRAMUSCULAR; INTRAVENOUS at 17:35

## 2022-05-03 RX ADMIN — SODIUM CHLORIDE, PRESERVATIVE FREE 10 ML: 5 INJECTION INTRAVENOUS at 15:43

## 2022-05-03 RX ADMIN — PIPERACILLIN AND TAZOBACTAM 3.38 G: 3; .375 INJECTION, POWDER, LYOPHILIZED, FOR SOLUTION INTRAVENOUS at 21:00

## 2022-05-03 RX ADMIN — ONDANSETRON 4 MG: 4 TABLET, ORALLY DISINTEGRATING ORAL at 17:38

## 2022-05-03 RX ADMIN — FENTANYL CITRATE 50 MCG: 50 INJECTION, SOLUTION INTRAMUSCULAR; INTRAVENOUS at 18:24

## 2022-05-03 RX ADMIN — HYDROMORPHONE HYDROCHLORIDE 1 MG: 1 INJECTION, SOLUTION INTRAMUSCULAR; INTRAVENOUS; SUBCUTANEOUS at 03:25

## 2022-05-03 RX ADMIN — LIDOCAINE HYDROCHLORIDE 80 MG: 20 INJECTION, SOLUTION EPIDURAL; INFILTRATION; INTRACAUDAL; PERINEURAL at 17:31

## 2022-05-03 RX ADMIN — LABETALOL HYDROCHLORIDE 10 MG: 5 INJECTION, SOLUTION INTRAVENOUS at 12:32

## 2022-05-03 RX ADMIN — PROPOFOL 50 MCG/KG/MIN: 10 INJECTION, EMULSION INTRAVENOUS at 17:30

## 2022-05-03 RX ADMIN — HYDROMORPHONE HYDROCHLORIDE 1 MG: 1 INJECTION, SOLUTION INTRAMUSCULAR; INTRAVENOUS; SUBCUTANEOUS at 20:17

## 2022-05-03 NOTE — PROGRESS NOTES
Progress Note  Date:5/3/2022       Room:PACU/PL  Patient Name:Gabe Liu     YOB: 1970     Age:51 y.o. Subjective    Subjective   Pt seen at [facility]. Denies any new complaints. Per nursing, no acute overnight events    Review of Systems   Constitutional: Negative.    HENT: Negative.    Eyes: Negative.    Respiratory: Negative.    Cardiovascular: Negative.    Gastrointestinal: Negative.    Endocrine: Negative.    Genitourinary: Negative.    Musculoskeletal: Negative.    Skin: Negative.    Allergic/Immunologic: Positive for immunocompromised state. Neurological: Positive for numbness. Hematological: Negative.    Psychiatric/Behavioral: Negative.    All other systems reviewed and are negative. Objective         Vitals Last 24 Hours:  TEMPERATURE:  Temp  Av.7 °F (37.1 °C)  Min: 97.9 °F (36.6 °C)  Max: 100.1 °F (37.8 °C)  RESPIRATIONS RANGE: Resp  Av.2  Min: 14  Max: 22  PULSE OXIMETRY RANGE: SpO2  Av.1 %  Min: 90 %  Max: 100 %  PULSE RANGE: Pulse  Av.2  Min: 90  Max: 115  BLOOD PRESSURE RANGE: Systolic (81MMO), IUM:562 , Min:127 , PQI:650   ; Diastolic (75MQL), FXC:58, Min:76, Max:105    I/O (24Hr): Intake/Output Summary (Last 24 hours) at 5/3/2022 1840  Last data filed at 5/3/2022 1754  Gross per 24 hour   Intake 447.5 ml   Output --   Net 447.5 ml     Objective   Vitals reviewed. Constitutional:       Appearance: He is overweight. Cardiovascular:      Pulses:           Dorsalis pedis pulses are 2+ on the right side and 2+ on the left side.        Posterior tibial pulses are 2+ on the right side and 2+ on the left side. Pulmonary:      Effort: Pulmonary effort is normal.   Musculoskeletal:      Right lower leg: No edema.      Left lower leg: No edema.      Right foot: Normal range of motion. No deformity or bunion.      Left foot: Normal range of motion. No deformity or bunion. Feet:      Right foot:      Protective Sensation: 10 sites tested. 0 sites sensed.    Skin integrity: Ulcer and erythema present.      Toenail Condition: Right toenails are abnormally thick.      Left foot:      Protective Sensation: 10 sites tested. 0 sites sensed.      Skin integrity: Skin integrity normal.      Toenail Condition: Left toenails are abnormally thick. Lymphadenopathy:      Lower Body: No right inguinal adenopathy. No left inguinal adenopathy. Skin:     General: Skin is warm. Ulcer to the lateral left ankle     Capillary Refill: Capillary refill takes 2 to 3 seconds.      Comments: Absent pedal hair growth with hyperpigmentation   Neurological:      Mental Status: He is alert and oriented to person, place, and time.      Comments: Paresthesias/burning noted   Psychiatric:         Mood and Affect: Mood and affect normal.         Behavior: Behavior is cooperative. Labs/Imaging/Diagnostics    Labs:  CBC:  Recent Labs     05/03/22 0315 05/02/22  1737   WBC 15.5* 15.8*   RBC 3.85* 4.18   HGB 11.0* 11.9*   HCT 34.2* 36.5*   MCV 88.8 87.3   RDW 14.6* 14.3    414*     CHEMISTRIES:  Recent Labs     05/03/22 0315 05/02/22  1737    139   K 4.7 4.4    109*   CO2 25 25   BUN 22* 28*   CA 8.6 9.0   PT/INR:No results for input(s): INR, INREXT in the last 72 hours. No lab exists for component: PROTIME  APTT:No results for input(s): APTT in the last 72 hours. LIVER PROFILE:  Recent Labs     05/02/22  1737   AST 49*   ALT 33     Lab Results   Component Value Date/Time    ALT (SGPT) 33 05/02/2022 05:37 PM    AST (SGOT) 49 (H) 05/02/2022 05:37 PM    Alk. phosphatase 104 05/02/2022 05:37 PM    Bilirubin, total 0.3 05/02/2022 05:37 PM       Imaging Last 24 Hours:  MRI FOOT RT W WO CONT    Result Date: 5/3/2022  Examination: MRI FOOT RT W WO CONT History: osteo? Comparison: Right foot radiographs 4/30/2022.  TECHNIQUE: Multiplanar multisequence MR images of the right forefoot were obtained prior to and following the uneventful administration of 20 mL MultiHance intravenous contrast. FINDINGS: There are multiple soft tissue wounds along the medial aspect of the great toe at the level of the proximal phalanx and the MTP joint. There is also a probable soft tissue ulcer at the plantar aspect of the great toe at the level of the proximal phalanx. Soft tissue swelling of the great toe. No definite organized fluid collection is evident. Hyperenhancement of the soft tissues of the great toe with patchy areas of hypoenhancement, including at the soft tissue ulcers raising concern for some devitalized tissue in these locations. Marrow edema most significantly involving the great toe proximal phalanx with associated enhancement. Given proximity to the soft tissue ulcers this is suspicious for osteomyelitis. Degenerative change of the first MTP joint as well as the great toe interphalangeal joint. The subchondral marrow signal changes within the head of the first metatarsal are favored to be related to the degenerative change without definite osteomyelitis in this location. No significant tenosynovitis within the forefoot. Soft tissue edema additionally involves the second toe. Mild degenerative change within the midfoot. Trace first MTP joint effusion. There is diffuse edema and atrophy within the intrinsic foot musculature, nonspecific, but most commonly the result of diabetic myopathy/denervation. 1. Multiple soft tissue ulcers of the great toe. The degree of edema and enhancement within the marrow of the proximal phalanx adjacent to the soft tissue ulcers is suspicious for osteomyelitis. 2. No organized fluid collections are evident.      Assessment//Plan   Principal Problem:    Diabetic foot infection ([facility] [address] (5/2/2022)      Assessment & Plan    Diabetic foot infection, right foot  Sepsis (leukocytosis, elevated CRP, tachycardia)  Uncontrolled diabetes mellitus with neuropathy      Patient seen and evaluated at bedside  - Current labs personally reviewed and findings dicussed with patient  - MRI images of the right foot personally reviewed and findings discussed with patient. Treatment options discussed in depth, conservative versus procedural.  Possible complications of each reviewed. Patient elected for surgical debridement today in the operating room  - NPO and consent to be signed/witnessed  - Local wound care performed. Strict offloading to the right foot  - Abx per ID  - I will follow closely           Elmo Rahman, [facility], [address] [place] [address] and [address], [address] [place] [address] Goo:  [phone]  F:  [phone]  C:  [phone]    Electronically signed by Will Hair DPM on 5/3/2022 at 6:40 PM
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
## 2022-05-03 NOTE — PROGRESS NOTES
Family updated at 6:07 PM by Sumit Robledo RN. Pt states he does not want me to call family to update.

## 2022-05-03 NOTE — OP NOTES
Operative Report    Patient: Alban Seip MRN: [id number]  SSN: [id number]    YOB: 1970  Age: 54 y.o. Sex: male       Date of Surgery:   05/03/2022     Preoperative Diagnosis:   Diabetic foot ulcer, right foot    Postoperative Diagnosis:   Same    Surgeon(s) and Role:     * Nannette Lozano DPM - Primary    Assistant(s):  None    Anesthesia:   MAC    Procedure:   Debridement of Right Foot Ulcer     Procedure in Detail:   Pt was seen in the pre-operative holding area and all questions were answered and all concerns were addressed. The operative procedure was discussed in great detail, with all possible complications highlighted. Pt verbalized complete understanding and the consent was signed and witnessed. Pt was on scheduled abx per ID, thus no additional abx ordered for surgical prophylaxis. The operative limb was marked and the pt was transported to the operating room. The pt was transferred to the operating table and anesthesia was administered as indicated above. The RLE was scrubbed and draped in sterile fashion. A time out was performed to confirm the correct pt, correct procedure, correct limb, abx, allergies, fire risk and attendees within the operating room. Upon completion, procedure #1 commenced. With a 15 blade, a sharp/excisional debridement was performed to the ulcer noted to the distal/medial aspect of the right foot. The debridement was taken down to the level of bone (with extensive bone taken). Upon completion of the sharp/excisional debridement, an ultrasonic debridement was performed with the Voalteonix system to remove any remaining biofilm. The total area debrided measured 44 cm². Necrotic tissue with malodor was noted initially but bleeding/granular tissue was noted post procedure. A piece of the proximal phalanx was sent to pathology for analysis and a separate piece was sent to microbiology for C&S.   The surgical site was packed with Betadine soaked 4 x 4's and covered with dry 4 x 4's, Kerlix and a light Ace wrap. This is the first of a staged procedure. Patient will need additional debridement/amputation in the near future once the tissue has demarcated and pathology/culture has returned. Pt tolerated the above procedures well and all post operative counts were correct. Pt was transferred to PACU without incident. A thorough neurovascular check was then performed. Upon meeting transfer criteria, pt was transferred back to the medical floor.     Estimated Blood Loss:    20cc    Tourniquet Time:   None    Implants:   None           Specimens:   ID Type Source Tests Collected by Time Destination   1 : RIGHT FOOT BONE AND TISSUE Preservative Foot, Right  Kyle Pichardo DPM 5/3/2022 1750 Pathology   1 : RIGHT FOOT TISSUE Tissue Foot, Right CULTURE, TISSUE [place], [place] 5/3/2022 1745 Microbiology         Drains: None                Complications: None      Signed By:  Branden Lindsay DPM     May 3, 2022
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
## 2022-05-03 NOTE — PROGRESS NOTES
Start   Ordered   05/02/22 2130  IP CONSULT TO PHARMACY - VANCOMYCIN DOSING  (CEFEPIME PLUS VANCOMYCIN )  ONE TIME     Complete     Comments: Pharmacy to dose after once dose.    Ordering Provider: Bella Duran MD   Authorizing Provider: Bella Duran MD   Question Answer Comment   Reason for Consult: Vancomycin dosing    Antibiotic Indications Bone and Joint Infection             Patient received 2000 mg dose in ED 5-2-22 at 2344; Random Vancomycin level scheduled for 1400 today (5-3-2022)

## 2022-05-03 NOTE — ED NOTES
TRANSFER - OUT REPORT:    Verbal report given to [facility] RN on Marilu France  being transferred to [facility] for routine progression of care       Report consisted of patients Situation, Background, Assessment and   Recommendations(SBAR). Information from the following report(s) SBAR, ED Summary and MAR was reviewed with the receiving nurse. Lines:   Peripheral IV 05/02/22 Left Antecubital (Active)        Opportunity for questions and clarification was provided.       Patient transported with:   Monitor  Tech

## 2022-05-03 NOTE — PROGRESS NOTES
Reason for Admission: Diabetic foot infection ([facility] [address]                      RUR Score:  12%                   Plan for utilizing home health:   TBD       PCP: First and Last name:  Ganesh Tinsley MD     Name of Practice:    Are you a current patient: Yes/No: yes   Approximate date of last visit: last summer/2021   Can you participate in a virtual visit with your PCP:                     Current Advanced Directive/Advance Care Plan: Full Code      Healthcare Decision Maker:   Click here to complete [id number] including selection of the Healthcare Decision Maker Relationship (ie \"Primary\")             Primary Decision Maker: Anahi Duckworth - Mother - [phone]                  Transition of Care Plan:                    Spoke with patient at bedside. Lives in two story home on file with daughter, 3 steps to enter dwelling. Has crutches at home and CPAP machine. Stated he got his CPAP machine somewhere in [place] but not sure where. No issues with transportation per patient. Has used Home Health in past but not sure of company. No needs stated by patient currently. Will re-assess after procedure to see what patient needs at time of discharge. Will likely need HH vs placement.     DC plan TBD
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
## 2022-05-03 NOTE — PROGRESS NOTES
Hospitalist Progress Note         UBALDO Evans, FNP-C    Daily Progress Note: 5/3/2022      Subjective:   Subjective   Patient examined alert and oriented lying in bed  Reports occasional nausea or due to n.p.o. status and taking pain medication  No acute distress noted on examination  No overnight events reported    Review of Systems:   Review of Systems   Respiratory: Negative for cough. Cardiovascular: Negative for chest pain. Gastrointestinal: Negative for heartburn. Genitourinary: Negative for dysuria. Musculoskeletal: Negative for myalgias. Skin:        Right foot wound         Objective:   Objective      Vitals:  Patient Vitals for the past 12 hrs:   Temp Pulse Resp BP SpO2   05/03/22 0800 -- (!) 109 -- -- --   05/03/22 0757 99.3 °F (37.4 °C) (!) 109 -- (!) 178/98 90 %   05/03/22 0615 -- (!) 110 -- (!) 148/94 --   05/03/22 0159 98.1 °F (36.7 °C) (!) 104 18 (!) 180/97 97 %   05/03/22 0114 98 °F (36.7 °C) 99 18 (!) 147/87 98 %   05/03/22 0034 -- 97 17 (!) 161/90 96 %   05/02/22 2352 -- 97 19 (!) 183/100 97 %   05/02/22 2330 97.9 °F (36.6 °C) 94 19 (!) 197/105 96 %        Physical Exam:  Physical Exam  Vitals and nursing note reviewed. Constitutional:       Appearance: Normal appearance. Eyes:      Extraocular Movements: Extraocular movements intact. Cardiovascular:      Rate and Rhythm: Tachycardia present. Heart sounds: Normal heart sounds. Pulmonary:      Breath sounds: Normal breath sounds. Abdominal:      General: Bowel sounds are normal.   Musculoskeletal:         General: Normal range of motion. Skin:     General: Skin is warm and dry. Comments: Right plantar great toe and lateral great toe wound. Erythema and swelling noted   Neurological:      Mental Status: He is alert and oriented to person, place, and time.           Lab Results:  Recent Results (from the past 24 hour(s))   CBC WITH AUTOMATED DIFF    Collection Time: 05/02/22  5:37 PM   Result Value Ref Range    WBC 15.8 (H) 4.1 - 11.1 K/uL    RBC 4.18 4.10 - 5.70 M/uL    HGB 11.9 (L) 12.1 - 17.0 g/dL    HCT 36.5 (L) 36.6 - 50.3 %    MCV 87.3 80.0 - 99.0 FL    MCH 28.5 26.0 - 34.0 PG    MCHC 32.6 30.0 - 36.5 g/dL    RDW 14.3 11.5 - 14.5 %    PLATELET 850 (H) 175 - 400 K/uL    MPV 9.7 8.9 - 12.9 FL    NRBC 0.0 0.0  WBC    ABSOLUTE NRBC 0.00 0.00 - 0.01 K/uL    NEUTROPHILS 84 (H) 32 - 75 %    LYMPHOCYTES 11 (L) 12 - 49 %    MONOCYTES 4 (L) 5 - 13 %    EOSINOPHILS 0 0 - 7 %    BASOPHILS 0 0 - 1 %    IMMATURE GRANULOCYTES 1 (H) 0 - 0.5 %    ABS. NEUTROPHILS 13.3 (H) 1.8 - 8.0 K/UL    ABS. LYMPHOCYTES 1.7 0.8 - 3.5 K/UL    ABS. MONOCYTES 0.6 0.0 - 1.0 K/UL    ABS. EOSINOPHILS 0.0 0.0 - 0.4 K/UL    ABS. BASOPHILS 0.1 0.0 - 0.1 K/UL    ABS. IMM. GRANS. 0.1 (H) 0.00 - 0.04 K/UL    DF AUTOMATED     METABOLIC PANEL, COMPREHENSIVE    Collection Time: 05/02/22  5:37 PM   Result Value Ref Range    Sodium 139 136 - 145 mmol/L    Potassium 4.4 3.5 - 5.1 mmol/L    Chloride 109 (H) 97 - 108 mmol/L    CO2 25 21 - 32 mmol/L    Anion gap 5 5 - 15 mmol/L    Glucose 154 (H) 65 - 100 mg/dL    BUN 28 (H) 6 - 20 mg/dL    Creatinine 1.57 (H) 0.70 - 1.30 mg/dL    BUN/Creatinine ratio 18 12 - 20      GFR est AA 57 (L) >60 ml/min/1.73m2    GFR est non-AA 47 (L) >60 ml/min/1.73m2    Calcium 9.0 8.5 - 10.1 mg/dL    Bilirubin, total 0.3 0.2 - 1.0 mg/dL    AST (SGOT) 49 (H) 15 - 37 U/L    ALT (SGPT) 33 12 - 78 U/L    Alk.  phosphatase 104 45 - 117 U/L    Protein, total 7.5 6.4 - 8.2 g/dL    Albumin 3.2 (L) 3.5 - 5.0 g/dL    Globulin 4.3 (H) 2.0 - 4.0 g/dL    A-G Ratio 0.7 (L) 1.1 - 2.2     LACTIC ACID    Collection Time: 05/02/22  5:37 PM   Result Value Ref Range    Lactic acid 0.8 0.4 - 2.0 mmol/L   GLUCOSE, POC    Collection Time: 05/03/22  1:51 AM   Result Value Ref Range    Glucose (POC) 202 (H) 65 - 117 mg/dL    Performed by Sheldon Mercado, BASIC    Collection Time: 05/03/22  3:15 AM   Result Value Ref Range    Sodium 138 136 - 145 mmol/L    Potassium 4.7 3.5 - 5.1 mmol/L    Chloride 107 97 - 108 mmol/L    CO2 25 21 - 32 mmol/L    Anion gap 6 5 - 15 mmol/L    Glucose 285 (H) 65 - 100 mg/dL    BUN 22 (H) 6 - 20 mg/dL    Creatinine 1.63 (H) 0.70 - 1.30 mg/dL    BUN/Creatinine ratio 13 12 - 20      GFR est AA 55 (L) >60 ml/min/1.73m2    GFR est non-AA 45 (L) >60 ml/min/1.73m2    Calcium 8.6 8.5 - 10.1 mg/dL   CBC WITH AUTOMATED DIFF    Collection Time: 05/03/22  3:15 AM   Result Value Ref Range    WBC 15.5 (H) 4.1 - 11.1 K/uL    RBC 3.85 (L) 4.10 - 5.70 M/uL    HGB 11.0 (L) 12.1 - 17.0 g/dL    HCT 34.2 (L) 36.6 - 50.3 %    MCV 88.8 80.0 - 99.0 FL    MCH 28.6 26.0 - 34.0 PG    MCHC 32.2 30.0 - 36.5 g/dL    RDW 14.6 (H) 11.5 - 14.5 %    PLATELET 977 605 - 943 K/uL    MPV 10.0 8.9 - 12.9 FL    NRBC 0.0 0.0  WBC    ABSOLUTE NRBC 0.00 0.00 - 0.01 K/uL    NEUTROPHILS 72 32 - 75 %    LYMPHOCYTES 18 12 - 49 %    MONOCYTES 7 5 - 13 %    EOSINOPHILS 1 0 - 7 %    BASOPHILS 1 0 - 1 %    IMMATURE GRANULOCYTES 1 (H) 0 - 0.5 %    ABS. NEUTROPHILS 11.5 (H) 1.8 - 8.0 K/UL    ABS. LYMPHOCYTES 2.7 0.8 - 3.5 K/UL    ABS. MONOCYTES 1.0 0.0 - 1.0 K/UL    ABS. EOSINOPHILS 0.1 0.0 - 0.4 K/UL    ABS. BASOPHILS 0.1 0.0 - 0.1 K/UL    ABS. IMM.  GRANS. 0.1 (H) 0.00 - 0.04 K/UL    DF AUTOMATED     GLUCOSE, POC    Collection Time: 05/03/22  6:18 AM   Result Value Ref Range    Glucose (POC) 228 (H) 65 - 117 mg/dL    Performed by ELLIS VAIL    MRSA SCREEN - PCR (NASAL)    Collection Time: 05/03/22  8:30 AM   Result Value Ref Range    MRSA by PCR, Nasal Not Detected Not Detected            Diagnostic Images:  CT Results  (Last 48 hours)    None          Current Medications:    Current Facility-Administered Medications:     cefepime (MAXIPIME) 2 g in 0.9% sodium chloride (MBP/ADV) 100 mL MBP, 2 g, IntraVENous, Q12H, Cyrus Ramsay MD    Vancomycin Pharmacy Dosing, , Other, Rx Dosing/Monitoring, Cyrus Ramsay MD    Vancomycin Random Level 5-3-22 at 1400, , Other, ONCE, Jack Ramsay MD    HYDROmorphone (DILAUDID) injection 1 mg, 1 mg, IntraVENous, Q4H PRN, Jack Ramsay MD, 1 mg at 05/03/22 0325    sodium chloride (NS) flush 5-40 mL, 5-40 mL, IntraVENous, Q8H, Jack Ramsay MD    sodium chloride (NS) flush 5-40 mL, 5-40 mL, IntraVENous, PRN, Jack Ramsay MD    acetaminophen (TYLENOL) tablet 650 mg, 650 mg, Oral, Q6H PRN, 650 mg at 05/02/22 2339 **OR** acetaminophen (TYLENOL) suppository 650 mg, 650 mg, Rectal, Q6H PRN, Jack Ramsay MD    polyethylene glycol (MIRALAX) packet 17 g, 17 g, Oral, DAILY PRN, Jack Ramsay MD    ondansetron (ZOFRAN ODT) tablet 4 mg, 4 mg, Oral, Q8H PRN **OR** ondansetron (ZOFRAN) injection 4 mg, 4 mg, IntraVENous, Q6H PRN, Jack Ramsay MD    enoxaparin (LOVENOX) injection 40 mg, 40 mg, SubCUTAneous, DAILY, Jack Ramsay MD    labetaloL (NORMODYNE;TRANDATE) 20 mg/4 mL (5 mg/mL) injection 10 mg, 10 mg, IntraVENous, Q2H PRN, Myranda Miller MD, 10 mg at 05/02/22 3094       ASSESSMENT:    Marianne Fernández is a 54 y.o. male with PMH of diabetic foot ulcer, diabetes on insulin pump, hypertension presented the ED with chief complaint of right foot pain, associated with draining wound and redness. Patient states that he had 1 on the plantar surface for the past few months months that stopped healing.  In addition, since last week, he has developed another ulcer on the medial aspect of the right foot over the ball of the foot, associated with purulent discharge and is not healing as well.  Says he was seen by Dr. Ray Pedroza, podiatrist and asked him to be admitted to the hospital. Otherwise denies fever or chills.      #1  Sepsis/Diabetic foot ulcer, osteomyelitis suspicion  -Tachycardia, low-grade fever, elevated CRP, wound infection  - Imaging: MRI shows soft tissue swelling and osteomyelitis suspicion  - Blood and would culture obtained.   -Wound culture preliminary shows heavy staph auerus  - Antibiotics: Vancomycin and cefepime.   - Consulted podiatry and ID.   - NPO for surgery     #2 Diabetes  -Continue insulin pump  -ac/hs accu check     #3 RUDDY  - likely secondary to to sepsis  - hold IV fluids due to worsening renal function  - continue to follow       #4 Hypertension  -Hold oral medications  -Labetalol as needed  -bp slightly elevated         Full Code  Dvt Prophylaxis lovenox  GI Prophylaxis none  Patient barriers:  - Podiatry evaluation  - Infectious disease evaluation  - Follow wound cultures  - Plan for surgical debridement  - MRI results reviewed  - Case management for long-term IV antibiotic  - Can discontinue IV fluids      Above treatment plan reviewed and discussed with patient in detail at bedside, all questions answered. Care Plan discussed with: interDisciplinary team, patient    Total time spent with patient: 35 minutes.     Katt Bates, NP
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
## 2022-05-03 NOTE — PROGRESS NOTES
Vancomycin Dosing Consult  Day #2 of vancomycin therapy  Consult ordered by Dr. Kevin Dia for this 54 y.o. male for indication of bone and joint infection.   Antibiotic regimen: Vancomycin + Zosyn    Temp (24hrs), Av.4 °F (36.9 °C), Min:97.9 °F (36.6 °C), Max:99.3 °F (37.4 °C)    Recent Labs     22  0315 22  1737 22  1830   WBC 15.5* 15.8* 14.3*   CREA 1.63* 1.57* 1.38*   BUN 22* 28* 24*     Est CrCl: 72.7 ml/min  Concomitant nephrotoxic drugs: None    Cultures:   /2 Blood, paired: NGTD    MRSA Swab: Not detected -Final    Target range: Trough 10-15 mcg/mL    Recent level history:  Date/Time Dose & Interval Measured Level (mcg/mL) Associated AUC/MANDY   5/3 @ 1356 2000 mg IV x 1 13.3         Assessment/Plan:   Afebrile, leukocytosis  RUDDY on CKD  Give Vancomycin 750 mg x 1   Schedule random level for tomorrow with AM labs   Antimicrobial stop date TBD

## 2022-05-03 NOTE — PROGRESS NOTES
[facility]            See hospital consult note          Judie Perez.  Laura Jones, [address] [facility], [address] [place] [address] and [facility]  [address] [place] [address]  O: [phone]  F: [phone]  C: [phone]

## 2022-05-04 LAB
ANION GAP SERPL CALC-SCNC: 10 MMOL/L (ref 5–15)
BACTERIA SPEC CULT: NORMAL
BACTERIA SPEC CULT: NORMAL
BASOPHILS # BLD: 0.1 K/UL (ref 0–0.1)
BASOPHILS NFR BLD: 1 % (ref 0–1)
BUN SERPL-MCNC: 20 MG/DL (ref 6–20)
BUN/CREAT SERPL: 12 (ref 12–20)
CA-I BLD-MCNC: 9 MG/DL (ref 8.5–10.1)
CHLORIDE SERPL-SCNC: 105 MMOL/L (ref 97–108)
CO2 SERPL-SCNC: 21 MMOL/L (ref 21–32)
CREAT SERPL-MCNC: 1.62 MG/DL (ref 0.7–1.3)
CRP SERPL-MCNC: 18.3 MG/DL (ref 0–0.6)
DIFFERENTIAL METHOD BLD: ABNORMAL
EOSINOPHIL # BLD: 0 K/UL (ref 0–0.4)
EOSINOPHIL NFR BLD: 0 % (ref 0–7)
ERYTHROCYTE [DISTWIDTH] IN BLOOD BY AUTOMATED COUNT: 14.4 % (ref 11.5–14.5)
GLUCOSE BLD STRIP.AUTO-MCNC: 441 MG/DL (ref 65–117)
GLUCOSE BLD STRIP.AUTO-MCNC: 529 MG/DL (ref 65–117)
GLUCOSE BLD STRIP.AUTO-MCNC: 550 MG/DL (ref 65–117)
GLUCOSE BLD STRIP.AUTO-MCNC: 562 MG/DL (ref 65–117)
GLUCOSE BLD STRIP.AUTO-MCNC: 566 MG/DL (ref 65–117)
GLUCOSE BLD STRIP.AUTO-MCNC: 585 MG/DL (ref 65–117)
GLUCOSE BLD STRIP.AUTO-MCNC: >600 MG/DL (ref 65–117)
GLUCOSE SERPL-MCNC: 441 MG/DL (ref 65–100)
GRAM STN SPEC: NORMAL
GRAM STN SPEC: NORMAL
HCT VFR BLD AUTO: 35.8 % (ref 36.6–50.3)
HGB BLD-MCNC: 11.4 G/DL (ref 12.1–17)
IMM GRANULOCYTES # BLD AUTO: 0.1 K/UL (ref 0–0.04)
IMM GRANULOCYTES NFR BLD AUTO: 1 % (ref 0–0.5)
LYMPHOCYTES # BLD: 1.1 K/UL (ref 0.8–3.5)
LYMPHOCYTES NFR BLD: 7 % (ref 12–49)
MCH RBC QN AUTO: 28.5 PG (ref 26–34)
MCHC RBC AUTO-ENTMCNC: 31.8 G/DL (ref 30–36.5)
MCV RBC AUTO: 89.5 FL (ref 80–99)
MONOCYTES # BLD: 0.8 K/UL (ref 0–1)
MONOCYTES NFR BLD: 5 % (ref 5–13)
NEUTS SEG # BLD: 14.4 K/UL (ref 1.8–8)
NEUTS SEG NFR BLD: 86 % (ref 32–75)
NRBC # BLD: 0 K/UL (ref 0–0.01)
NRBC BLD-RTO: 0 PER 100 WBC
PERFORMED BY, TECHID: ABNORMAL
PLATELET # BLD AUTO: 419 K/UL (ref 150–400)
PMV BLD AUTO: 10.1 FL (ref 8.9–12.9)
POTASSIUM SERPL-SCNC: 5 MMOL/L (ref 3.5–5.1)
RBC # BLD AUTO: 4 M/UL (ref 4.1–5.7)
SODIUM SERPL-SCNC: 136 MMOL/L (ref 136–145)
SPECIAL REQUESTS,SREQ: NORMAL
VANCOMYCIN SERPL-MCNC: 11.8 UG/ML
WBC # BLD AUTO: 16.5 K/UL (ref 4.1–11.1)

## 2022-05-04 PROCEDURE — 85025 COMPLETE CBC W/AUTO DIFF WBC: CPT

## 2022-05-04 PROCEDURE — 74011250636 HC RX REV CODE- 250/636: Performed by: INTERNAL MEDICINE

## 2022-05-04 PROCEDURE — 80048 BASIC METABOLIC PNL TOTAL CA: CPT

## 2022-05-04 PROCEDURE — 65270000029 HC RM PRIVATE

## 2022-05-04 PROCEDURE — 74011000258 HC RX REV CODE- 258: Performed by: INTERNAL MEDICINE

## 2022-05-04 PROCEDURE — 82962 GLUCOSE BLOOD TEST: CPT

## 2022-05-04 PROCEDURE — 36415 COLL VENOUS BLD VENIPUNCTURE: CPT

## 2022-05-04 PROCEDURE — 74011636637 HC RX REV CODE- 636/637: Performed by: NURSE PRACTITIONER

## 2022-05-04 PROCEDURE — 74011636637 HC RX REV CODE- 636/637: Performed by: STUDENT IN AN ORGANIZED HEALTH CARE EDUCATION/TRAINING PROGRAM

## 2022-05-04 PROCEDURE — 74011000250 HC RX REV CODE- 250: Performed by: INTERNAL MEDICINE

## 2022-05-04 PROCEDURE — 74011636637 HC RX REV CODE- 636/637: Performed by: HOSPITALIST

## 2022-05-04 PROCEDURE — 86140 C-REACTIVE PROTEIN: CPT

## 2022-05-04 PROCEDURE — 74011000250 HC RX REV CODE- 250: Performed by: PODIATRIST

## 2022-05-04 PROCEDURE — 74011250637 HC RX REV CODE- 250/637: Performed by: INTERNAL MEDICINE

## 2022-05-04 PROCEDURE — 80202 ASSAY OF VANCOMYCIN: CPT

## 2022-05-04 PROCEDURE — 99233 SBSQ HOSP IP/OBS HIGH 50: CPT | Performed by: PODIATRIST

## 2022-05-04 PROCEDURE — 99232 SBSQ HOSP IP/OBS MODERATE 35: CPT | Performed by: INTERNAL MEDICINE

## 2022-05-04 PROCEDURE — 74011250636 HC RX REV CODE- 250/636: Performed by: NURSE PRACTITIONER

## 2022-05-04 RX ORDER — DEXTROSE MONOHYDRATE 100 MG/ML
0-250 INJECTION, SOLUTION INTRAVENOUS AS NEEDED
Status: CANCELLED | OUTPATIENT
Start: 2022-05-04

## 2022-05-04 RX ORDER — INSULIN LISPRO 100 [IU]/ML
15 INJECTION, SOLUTION INTRAVENOUS; SUBCUTANEOUS ONCE
Status: COMPLETED | OUTPATIENT
Start: 2022-05-04 | End: 2022-05-04

## 2022-05-04 RX ORDER — INSULIN LISPRO 100 [IU]/ML
INJECTION, SOLUTION INTRAVENOUS; SUBCUTANEOUS
Status: CANCELLED | OUTPATIENT
Start: 2022-05-04

## 2022-05-04 RX ORDER — MAGNESIUM SULFATE 100 %
4 CRYSTALS MISCELLANEOUS AS NEEDED
Status: DISCONTINUED | OUTPATIENT
Start: 2022-05-04 | End: 2022-05-04

## 2022-05-04 RX ORDER — DEXTROSE MONOHYDRATE 100 MG/ML
0-250 INJECTION, SOLUTION INTRAVENOUS AS NEEDED
Status: DISCONTINUED | OUTPATIENT
Start: 2022-05-04 | End: 2022-05-08 | Stop reason: HOSPADM

## 2022-05-04 RX ORDER — INSULIN LISPRO 100 [IU]/ML
INJECTION, SOLUTION INTRAVENOUS; SUBCUTANEOUS
Status: DISCONTINUED | OUTPATIENT
Start: 2022-05-04 | End: 2022-05-04

## 2022-05-04 RX ORDER — OXYCODONE AND ACETAMINOPHEN 5; 325 MG/1; MG/1
1 TABLET ORAL ONCE
Status: COMPLETED | OUTPATIENT
Start: 2022-05-04 | End: 2022-05-04

## 2022-05-04 RX ORDER — MAGNESIUM SULFATE 100 %
4 CRYSTALS MISCELLANEOUS AS NEEDED
Status: DISCONTINUED | OUTPATIENT
Start: 2022-05-04 | End: 2022-05-08 | Stop reason: HOSPADM

## 2022-05-04 RX ORDER — SODIUM CHLORIDE 9 MG/ML
150 INJECTION, SOLUTION INTRAVENOUS CONTINUOUS
Status: DISCONTINUED | OUTPATIENT
Start: 2022-05-04 | End: 2022-05-08 | Stop reason: HOSPADM

## 2022-05-04 RX ORDER — INSULIN LISPRO 100 [IU]/ML
10 INJECTION, SOLUTION INTRAVENOUS; SUBCUTANEOUS
Status: DISCONTINUED | OUTPATIENT
Start: 2022-05-05 | End: 2022-05-05

## 2022-05-04 RX ORDER — INSULIN GLARGINE 100 [IU]/ML
10 INJECTION, SOLUTION SUBCUTANEOUS ONCE
Status: COMPLETED | OUTPATIENT
Start: 2022-05-04 | End: 2022-05-04

## 2022-05-04 RX ORDER — INSULIN GLARGINE 100 [IU]/ML
20 INJECTION, SOLUTION SUBCUTANEOUS
Status: DISCONTINUED | OUTPATIENT
Start: 2022-05-04 | End: 2022-05-04

## 2022-05-04 RX ORDER — INSULIN LISPRO 100 [IU]/ML
10 INJECTION, SOLUTION INTRAVENOUS; SUBCUTANEOUS
Status: COMPLETED | OUTPATIENT
Start: 2022-05-04 | End: 2022-05-04

## 2022-05-04 RX ORDER — INSULIN LISPRO 100 [IU]/ML
5 INJECTION, SOLUTION INTRAVENOUS; SUBCUTANEOUS
Status: DISCONTINUED | OUTPATIENT
Start: 2022-05-04 | End: 2022-05-04

## 2022-05-04 RX ORDER — INSULIN GLARGINE 100 [IU]/ML
35 INJECTION, SOLUTION SUBCUTANEOUS
Status: DISCONTINUED | OUTPATIENT
Start: 2022-05-04 | End: 2022-05-08 | Stop reason: HOSPADM

## 2022-05-04 RX ORDER — AMLODIPINE BESYLATE 5 MG/1
5 TABLET ORAL DAILY
Status: DISCONTINUED | OUTPATIENT
Start: 2022-05-04 | End: 2022-05-06

## 2022-05-04 RX ORDER — MAGNESIUM SULFATE 100 %
4 CRYSTALS MISCELLANEOUS AS NEEDED
Status: CANCELLED | OUTPATIENT
Start: 2022-05-04

## 2022-05-04 RX ORDER — INSULIN LISPRO 100 [IU]/ML
INJECTION, SOLUTION INTRAVENOUS; SUBCUTANEOUS EVERY 4 HOURS
Status: DISCONTINUED | OUTPATIENT
Start: 2022-05-04 | End: 2022-05-08 | Stop reason: HOSPADM

## 2022-05-04 RX ORDER — INSULIN LISPRO 100 [IU]/ML
10 INJECTION, SOLUTION INTRAVENOUS; SUBCUTANEOUS ONCE
Status: COMPLETED | OUTPATIENT
Start: 2022-05-04 | End: 2022-05-04

## 2022-05-04 RX ADMIN — INSULIN LISPRO 15 UNITS: 100 INJECTION, SOLUTION INTRAVENOUS; SUBCUTANEOUS at 13:31

## 2022-05-04 RX ADMIN — ONDANSETRON 4 MG: 2 INJECTION INTRAMUSCULAR; INTRAVENOUS at 10:53

## 2022-05-04 RX ADMIN — VANCOMYCIN HYDROCHLORIDE 750 MG: 750 INJECTION, POWDER, LYOPHILIZED, FOR SOLUTION INTRAVENOUS at 09:02

## 2022-05-04 RX ADMIN — INSULIN LISPRO 10 UNITS: 100 INJECTION, SOLUTION INTRAVENOUS; SUBCUTANEOUS at 17:13

## 2022-05-04 RX ADMIN — INSULIN GLARGINE 10 UNITS: 100 INJECTION, SOLUTION SUBCUTANEOUS at 13:00

## 2022-05-04 RX ADMIN — ONDANSETRON 4 MG: 2 INJECTION INTRAMUSCULAR; INTRAVENOUS at 19:56

## 2022-05-04 RX ADMIN — AMLODIPINE BESYLATE 5 MG: 5 TABLET ORAL at 01:23

## 2022-05-04 RX ADMIN — PIPERACILLIN AND TAZOBACTAM 3.38 G: 3; .375 INJECTION, POWDER, LYOPHILIZED, FOR SOLUTION INTRAVENOUS at 14:02

## 2022-05-04 RX ADMIN — OXYCODONE AND ACETAMINOPHEN 1 TABLET: 5; 325 TABLET ORAL at 22:50

## 2022-05-04 RX ADMIN — SODIUM CHLORIDE, PRESERVATIVE FREE 10 ML: 5 INJECTION INTRAVENOUS at 14:03

## 2022-05-04 RX ADMIN — INSULIN LISPRO 10 UNITS: 100 INJECTION, SOLUTION INTRAVENOUS; SUBCUTANEOUS at 15:42

## 2022-05-04 RX ADMIN — HYDROMORPHONE HYDROCHLORIDE 1 MG: 1 INJECTION, SOLUTION INTRAMUSCULAR; INTRAVENOUS; SUBCUTANEOUS at 01:23

## 2022-05-04 RX ADMIN — SODIUM CHLORIDE 100 ML/HR: 9 INJECTION, SOLUTION INTRAVENOUS at 17:17

## 2022-05-04 RX ADMIN — INSULIN LISPRO 5 UNITS: 100 INJECTION, SOLUTION INTRAVENOUS; SUBCUTANEOUS at 17:16

## 2022-05-04 RX ADMIN — INSULIN LISPRO 15 UNITS: 100 INJECTION, SOLUTION INTRAVENOUS; SUBCUTANEOUS at 18:50

## 2022-05-04 RX ADMIN — ENOXAPARIN SODIUM 40 MG: 100 INJECTION SUBCUTANEOUS at 09:02

## 2022-05-04 RX ADMIN — INSULIN LISPRO 8 UNITS: 100 INJECTION, SOLUTION INTRAVENOUS; SUBCUTANEOUS at 22:48

## 2022-05-04 RX ADMIN — SODIUM CHLORIDE, PRESERVATIVE FREE 10 ML: 5 INJECTION INTRAVENOUS at 14:09

## 2022-05-04 RX ADMIN — SODIUM CHLORIDE, PRESERVATIVE FREE 10 ML: 5 INJECTION INTRAVENOUS at 22:06

## 2022-05-04 RX ADMIN — ONDANSETRON 4 MG: 2 INJECTION INTRAMUSCULAR; INTRAVENOUS at 01:30

## 2022-05-04 RX ADMIN — INSULIN GLARGINE 35 UNITS: 100 INJECTION, SOLUTION SUBCUTANEOUS at 22:00

## 2022-05-04 RX ADMIN — PIPERACILLIN AND TAZOBACTAM 3.38 G: 3; .375 INJECTION, POWDER, LYOPHILIZED, FOR SOLUTION INTRAVENOUS at 21:59

## 2022-05-04 RX ADMIN — INSULIN LISPRO 13 UNITS: 100 INJECTION, SOLUTION INTRAVENOUS; SUBCUTANEOUS at 17:16

## 2022-05-04 RX ADMIN — SODIUM CHLORIDE, PRESERVATIVE FREE 10 ML: 5 INJECTION INTRAVENOUS at 06:00

## 2022-05-04 RX ADMIN — PIPERACILLIN AND TAZOBACTAM 3.38 G: 3; .375 INJECTION, POWDER, LYOPHILIZED, FOR SOLUTION INTRAVENOUS at 05:27

## 2022-05-04 RX ADMIN — SODIUM CHLORIDE, PRESERVATIVE FREE 2.5 MG: 5 INJECTION INTRAVENOUS at 22:42

## 2022-05-04 NOTE — PROGRESS NOTES
Vancomycin Dosing Consult  Day #3 of vancomycin therapy  Consult ordered by Dr. Rayo Flores for this 54 y.o. male for indication of bone and joint infection. Antibiotic regimen: Vancomycin + Zosyn    Temp (24hrs), Av °F (37.2 °C), Min:98.3 °F (36.8 °C), Max:100.1 °F (37.8 °C)    Recent Labs     22  0301 22  0315 22  1737   WBC 16.5* 15.5* 15.8*   CREA 1.62* 1.63* 1.57*   BUN 20 22* 28*     Est CrCl: 73.2 ml/min  Concomitant nephrotoxic drugs: None    Cultures:    Blood, paired: NGTD  5/3 Tissue w/ gram stain: NGTD    MRSA Swab: Not detected -Final    Target range: AUC/MANDY 400-600    Recent level history:  Date/Time Dose & Interval Measured Level (mcg/mL) Associated AUC/MANDY   5/3 @ 1356 2000 mg IV x 1 13.3     @ 0301 750 mg IV x 1  11.8        Assessment/Plan:   Afebrile, leukocytosis trending up, CRP elevated   RUDDY on CKD;  Scr relatively stable   Start on 750 mg q12h with projected AUC of 451  Schedule random level tomorrow @ 0400 on   Antimicrobial stop date TBD

## 2022-05-04 NOTE — PROGRESS NOTES
Patient currently not feeling well, does not want to discuss discharge plans currently due to not feeling well. Nurse in with patient and will reassess discharge plans.

## 2022-05-04 NOTE — PROGRESS NOTES
Problem: Falls - Risk of  Goal: *Absence of Falls  Description: Document Marivel Joseshyann Fall Risk and appropriate interventions in the flowsheet.   Outcome: Progressing Towards Goal  Note: Fall Risk Interventions:  Mobility Interventions: Strengthening exercises (ROM-active/passive)         Medication Interventions: Patient to call before getting OOB    Elimination Interventions: Call light in reach              Problem: Patient Education: Go to Patient Education Activity  Goal: Patient/Family Education  Outcome: Progressing Towards Goal

## 2022-05-04 NOTE — PROGRESS NOTES
Infectious Disease Progress Note               Subjective:   Pt seen and examined at bedside, episode of nausea and vomiting this morning. Denies new complaints. Underwent right foot debridement on , plans for repeat  Debridement per records.      Objective:   Physical Exam:     Visit Vitals  /64   Pulse (!) 120   Temp 99.5 °F (37.5 °C)   Resp 16   Ht 6' 4\" (1.93 m)   Wt 230 lb (104.3 kg)   SpO2 98%   BMI 28.00 kg/m²    O2 Flow Rate (L/min): 2 l/min O2 Device: None (Room air)    Temp (24hrs), Av °F (37.2 °C), Min:98.3 °F (36.8 °C), Max:100.1 °F (37.8 °C)    701 -  1900  In: 350 [I.V.:350]  Out: 650 [Urine:600]    190 -  0700  In: 447.5 [I.V.:447.5]  Out: 925 [Urine:925]    General: NAD, AAO x 4  HEENT: AN, Moist mucosa   Lungs: CTA b/l, decreased BS at the bases   Heart: S1S2+, RRR, no murmur  Abdo: Soft, NT, ND, +BS   : No wetzel   Exts: Right foot dressing in place   Skin: Right great toe ulcer       Data Review:       Recent Days:  Recent Labs     22  0301 22  0315 22  1737   WBC 16.5* 15.5* 15.8*   HGB 11.4* 11.0* 11.9*   HCT 35.8* 34.2* 36.5*   * 400 414*     Recent Labs     22  0301 22  0315 22  1737   BUN 20 22* 28*   CREA 1.62* 1.63* 1.57*       Lab Results   Component Value Date/Time    C-Reactive protein 18.30 (H) 2022 03:01 AM          Microbiology     Results     Procedure Component Value Units Date/Time    CULTURE, TISSUE [place] STAIN [[id number]] Collected: 22    Order Status: Completed Specimen: Tissue Updated: 22    MRSA SCREEN - PCR (NASAL) [[id number]] Collected: 22 0830    Order Status: Completed Specimen: Swab Updated: 22     MRSA by PCR, Nasal Not Detected       CULTURE, WOUND [place] STAIN [[id number]]     Order Status: Sent Specimen: Wound from Foot     CULTURE, BLOOD, PAIRED [[id number]] Collected: 22 1737    Order Status: Completed Specimen: Blood Updated: 05/04/22 0716     Special Requests: No Special Requests        Culture result: No growth 1 day       CULTURE, Portillo Blaise STAIN [[id number]]  (Susceptibility) Collected: 04/30/22 1830    Order Status: Completed Specimen: Wound from Swab Updated: 05/04/22 0710     Special Requests: No Special Requests        GRAM STAIN Occasional WBCs seen               2+ Gram Positive Cocci in pairs           Culture result:       Heavy Staphylococcus aureus                  Moderate Enterococcus faecalis          Susceptibility      Staphylococcus aureus     MANDY     Ciprofloxacin ($) Susceptible     Clindamycin ($) Resistant     Daptomycin ($$$$$) Susceptible     Doxycycline ($$) Susceptible     Erythromycin ($$$$) Susceptible     Gentamicin ($) Susceptible     Levofloxacin ($) Susceptible     Linezolid ($$$$$) Susceptible     Moxifloxacin ($$$$) Susceptible     Oxacillin Susceptible     Rifampin ($$$$) Susceptible  [1]      Tetracycline Susceptible     Trimeth/Sulfa Susceptible     Vancomycin ($) Susceptible                 [1]  Rifampin is not to be used for mono-therapy. Linear View               Susceptibility      Enterococcus faecalis     MANDY     Ampicillin ($) Susceptible     Daptomycin ($$$$$) Susceptible     Linezolid ($$$$$) Susceptible     Vancomycin ($) Susceptible                  Linear View                            Diagnostics   CXR Results  (Last 48 hours)    None             Assessment/Plan     1. 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 PICC line for out pt antibiotics.  Routine labs in the morning    2. Nausea/vomiting, suspected gastroparesis, pt denies being diagnosed w gastroparesis in the past       Continue on Zofran, consider Reglan if no improvement      3.  Acute on chronic renal failure: Cr staying stable w/o evidence of worsening on serial labs      4. Long standing h/o DM type 1 and hypogonadism:BS control to help w wound healing     Marylen Leventhal, MD    5/4/2022

## 2022-05-04 NOTE — PROGRESS NOTES
Bedside shift change report given to Kathie Sands RN (oncoming nurse) by Angelika Griffin RN (offgoing nurse). Report included the following information SBAR.

## 2022-05-04 NOTE — PROGRESS NOTES
Hospitalist Progress Note         UBALDO Putnam, FNP-C    Daily Progress Note: 5/4/2022      Subjective:   Subjective   Patient examined alert and oriented lying in bed  No acute distress noted on examination  No overnight events reported  Reports pain 7/10  Patient educated to ask for prn pain medications    Review of Systems:   Review of Systems   Respiratory: Negative for cough. Cardiovascular: Negative for chest pain. Gastrointestinal: Negative for heartburn. Genitourinary: Negative for dysuria. Musculoskeletal: Negative for myalgias. Skin:        Right foot wound         Objective:   Objective      Vitals:  Patient Vitals for the past 12 hrs:   Temp Pulse Resp BP SpO2   05/04/22 0945 99.5 °F (37.5 °C) (!) 120 16 119/64 --   05/04/22 0800 -- (!) 118 -- -- --   05/04/22 0718 98.4 °F (36.9 °C) (!) 121 18 111/73 98 %   05/04/22 0400 -- 97 -- -- --   05/04/22 0116 98.9 °F (37.2 °C) (!) 125 -- (!) 140/83 --   05/04/22 0000 -- Giovana Roberts 125 -- -- --        Physical Exam:  Physical Exam  Vitals and nursing note reviewed. Constitutional:       Appearance: Normal appearance. Eyes:      Extraocular Movements: Extraocular movements intact. Cardiovascular:      Rate and Rhythm: Tachycardia present. Heart sounds: Normal heart sounds. Pulmonary:      Breath sounds: Normal breath sounds. Abdominal:      General: Bowel sounds are normal.   Musculoskeletal:         General: Normal range of motion. Skin:     General: Skin is warm and dry. Comments: Right foot surgical dressing intact   Neurological:      Mental Status: He is alert and oriented to person, place, and time.           Lab Results:  Recent Results (from the past 24 hour(s))   GLUCOSE, POC    Collection Time: 05/03/22 11:24 AM   Result Value Ref Range    Glucose (POC) 216 (H) 65 - 117 mg/dL    Performed by FAY Prater    Collection Time: 05/03/22  1:56 PM   Result Value Ref Range    Vancomycin, random 13.3 ug/mL Reported dose date Dose Dependent      Reported dose: Dose Dependent Units   GLUCOSE, POC    Collection Time: 05/03/22  4:03 PM   Result Value Ref Range    Glucose (POC) 181 (H) 65 - 117 mg/dL    Performed by Camila Thomas, POC    Collection Time: 05/03/22  7:56 PM   Result Value Ref Range    Glucose (POC) 208 (H) 65 - 117 mg/dL    Performed by Sugey Thomas (Float Pool)    SED RATE (ESR)    Collection Time: 05/03/22 10:43 PM   Result Value Ref Range    Sed rate, automated 68 (H) 0 - 15 mm/hr   CBC WITH AUTOMATED DIFF    Collection Time: 05/04/22  3:01 AM   Result Value Ref Range    WBC 16.5 (H) 4.1 - 11.1 K/uL    RBC 4.00 (L) 4.10 - 5.70 M/uL    HGB 11.4 (L) 12.1 - 17.0 g/dL    HCT 35.8 (L) 36.6 - 50.3 %    MCV 89.5 80.0 - 99.0 FL    MCH 28.5 26.0 - 34.0 PG    MCHC 31.8 30.0 - 36.5 g/dL    RDW 14.4 11.5 - 14.5 %    PLATELET 406 (H) 213 - 400 K/uL    MPV 10.1 8.9 - 12.9 FL    NRBC 0.0 0.0  WBC    ABSOLUTE NRBC 0.00 0.00 - 0.01 K/uL    NEUTROPHILS 86 (H) 32 - 75 %    LYMPHOCYTES 7 (L) 12 - 49 %    MONOCYTES 5 5 - 13 %    EOSINOPHILS 0 0 - 7 %    BASOPHILS 1 0 - 1 %    IMMATURE GRANULOCYTES 1 (H) 0 - 0.5 %    ABS. NEUTROPHILS 14.4 (H) 1.8 - 8.0 K/UL    ABS. LYMPHOCYTES 1.1 0.8 - 3.5 K/UL    ABS. MONOCYTES 0.8 0.0 - 1.0 K/UL    ABS. EOSINOPHILS 0.0 0.0 - 0.4 K/UL    ABS. BASOPHILS 0.1 0.0 - 0.1 K/UL    ABS. IMM.  GRANS. 0.1 (H) 0.00 - 0.04 K/UL    DF AUTOMATED     C REACTIVE PROTEIN, QT    Collection Time: 05/04/22  3:01 AM   Result Value Ref Range    C-Reactive protein 18.30 (H) 0.00 - 9.22 mg/dL   METABOLIC PANEL, BASIC    Collection Time: 05/04/22  3:01 AM   Result Value Ref Range    Sodium 136 136 - 145 mmol/L    Potassium 5.0 3.5 - 5.1 mmol/L    Chloride 105 97 - 108 mmol/L    CO2 21 21 - 32 mmol/L    Anion gap 10 5 - 15 mmol/L    Glucose 441 (H) 65 - 100 mg/dL    BUN 20 6 - 20 mg/dL    Creatinine 1.62 (H) 0.70 - 1.30 mg/dL    BUN/Creatinine ratio 12 12 - 20      GFR est AA 55 (L) >60 ml/min/1.73m2    GFR est non-AA 46 (L) >60 ml/min/1.73m2    Calcium 9.0 8.5 - 10.1 mg/dL   VANCOMYCIN, RANDOM    Collection Time: 05/04/22  3:01 AM   Result Value Ref Range    Vancomycin, random 11.8 ug/mL          Diagnostic Images:  CT Results  (Last 48 hours)    None          Current Medications:    Current Facility-Administered Medications:     amLODIPine (NORVASC) tablet 5 mg, 5 mg, Oral, DAILY, Guero Beltrán MD, 5 mg at 05/04/22 0123    vancomycin (VANCOCIN) 750 mg in 0.9% sodium chloride 250 mL (VIAL-MATE), 750 mg, IntraVENous, Q12H, Adryan Ramsay MD, Last Rate: 250 mL/hr at 05/04/22 0902, 750 mg at 05/04/22 0902    [START ON 5/5/2022] Vancomycin - Draw random level at 0400 on 5/5.  Thanks!, , Other, ONCE, Adryan Ramsay MD    Vancomycin Pharmacy Dosing, , Other, Rx Dosing/Monitoring, Adryan Ramsay MD    piperacillin-tazobactam (ZOSYN) 3.375 g in 0.9% sodium chloride (MBP/ADV) 100 mL MBP, 3.375 g, IntraVENous, Q8H, Vangie White MD, Last Rate: 200 mL/hr at 05/04/22 0527, 3.375 g at 05/04/22 0527    sodium chloride (NS) flush 5-40 mL, 5-40 mL, IntraVENous, Q8H, Jose Raulingane, Elmo, DPM, 10 mL at 05/04/22 0600    sodium chloride (NS) flush 5-40 mL, 5-40 mL, IntraVENous, PRN, Phillingane, Elmo, DPM    hydrALAZINE (APRESOLINE) 20 mg/mL injection 20 mg, 20 mg, IntraVENous, Q6H PRN, Guero Beltrán MD    HYDROmorphone (DILAUDID) injection 1 mg, 1 mg, IntraVENous, Q4H PRN, Adryan Ramsay MD, 1 mg at 05/04/22 0123    sodium chloride (NS) flush 5-40 mL, 5-40 mL, IntraVENous, Q8H, Juan Luis Ramsay MD, 10 mL at 05/03/22 1543    sodium chloride (NS) flush 5-40 mL, 5-40 mL, IntraVENous, PRN, Adryan Ramsay MD    acetaminophen (TYLENOL) tablet 650 mg, 650 mg, Oral, Q6H PRN, 650 mg at 05/02/22 0579 **OR** acetaminophen (TYLENOL) suppository 650 mg, 650 mg, Rectal, Q6H PRN, Adryan Ramsay MD    polyethylene glycol (MIRALAX) packet 17 g, 17 g, Oral, DAILY PRN, Adryan Ramsay MD    ondansetron (ZOFRAN ODT) tablet 4 mg, 4 mg, Oral, Q8H PRN, 4 mg at 05/03/22 1738 **OR** ondansetron (ZOFRAN) injection 4 mg, 4 mg, IntraVENous, Q6H PRN, Gabriela Ramsay MD, 4 mg at 05/04/22 0130    enoxaparin (LOVENOX) injection 40 mg, 40 mg, SubCUTAneous, DAILY, Gabriela Ramsay MD, 40 mg at 05/04/22 0902       ASSESSMENT:    Wendie Jules is a 54 y.o. male with PMH of diabetic foot ulcer, diabetes on insulin pump, hypertension presented the ED with chief complaint of right foot pain, associated with draining wound and redness. Patient states that he had 1 on the plantar surface for the past few months months that stopped healing. In addition, since last week, he has developed another ulcer on the medial aspect of the right foot over the ball of the foot, associated with purulent discharge and is not healing as well.  Says he was seen by Dr. Talita Acosta, podiatrist and asked him to be admitted to the hospital. Otherwise denies fever or chills. On 5/3/22 underwent Debridement of Right Foot Ulcer.      #1  Sepsis  #2 Diabetic foot ulcer, osteomyelitis suspicion  -Tachycardia, low-grade fever, elevated CRP, wound infection  - Imaging: MRI shows soft tissue swelling and osteomyelitis suspicion  - Blood and would culture obtained.   -Wound culture preliminary shows heavy staph auerus and moderate Enterococcus faecalis  - Antibiotics: Vancomycin and zosyn.  Cefepime discontinued  - Podiatry and ID following  - on 5/3/22 underwent Debridement of Right Foot Ulcer   - PT/OT evaluation  - Long term IV antbx, pending neg bcx  - Follow wcx sensitivity report     #3 RUDDY  - likely secondary to to sepsis  - hold IV fluids due to worsening renal function  - continue to follow    #4 Hypertension  -Hold oral medications  -Labetalol as needed  -bp currently at goal     #5 Diabetes  -Continue insulin pump  -ac/hs accu check       Full Code  Dvt Prophylaxis lovenox  GI Prophylaxis none  Patient barriers:  - Podiatry and Infectious disease following  - Follow wound cultures  - Long term IV antbx, pending neg bcx  - Follow bcxs  - Case management for long-term IV antibiotic    NOK: Elisabet Freedman (Mother)   [phone] [facility] Phone)    Above treatment plan reviewed and discussed with patient in detail at bedside, all questions answered. Care Plan discussed with: interDisciplinary team, patient    Total time spent with patient: 35 minutes.     Rosalee Delgadillo, ALINA

## 2022-05-04 NOTE — PROGRESS NOTES
Problem: Falls - Risk of  Goal: *Absence of Falls  Description: Document Nathalie Skill Fall Risk and appropriate interventions in the flowsheet.   Outcome: Progressing Towards Goal  Note: Fall Risk Interventions:  Mobility Interventions: Bed/chair exit alarm,Communicate number of staff needed for ambulation/transfer         Medication Interventions: Evaluate medications/consider consulting pharmacy                   Problem: Patient Education: Go to Patient Education Activity  Goal: Patient/Family Education  Outcome: Progressing Towards Goal

## 2022-05-05 ENCOUNTER — APPOINTMENT (OUTPATIENT)
Dept: ULTRASOUND IMAGING | Age: 52
DRG: 710 | End: 2022-05-05
Attending: INTERNAL MEDICINE
Payer: MEDICAID

## 2022-05-05 ENCOUNTER — DOCUMENTATION ONLY (OUTPATIENT)
Dept: ENDOCRINOLOGY | Age: 52
End: 2022-05-05

## 2022-05-05 LAB
ANION GAP SERPL CALC-SCNC: 6 MMOL/L (ref 5–15)
BASOPHILS # BLD: 0 K/UL (ref 0–0.1)
BASOPHILS NFR BLD: 0 % (ref 0–1)
BUN SERPL-MCNC: 51 MG/DL (ref 6–20)
BUN/CREAT SERPL: 18 (ref 12–20)
CA-I BLD-MCNC: 9.3 MG/DL (ref 8.5–10.1)
CHLORIDE SERPL-SCNC: 112 MMOL/L (ref 97–108)
CO2 SERPL-SCNC: 25 MMOL/L (ref 21–32)
CREAT SERPL-MCNC: 2.83 MG/DL (ref 0.7–1.3)
CRP SERPL-MCNC: 26 MG/DL (ref 0–0.6)
DIFFERENTIAL METHOD BLD: ABNORMAL
EOSINOPHIL # BLD: 0 K/UL (ref 0–0.4)
EOSINOPHIL NFR BLD: 0 % (ref 0–7)
ERYTHROCYTE [DISTWIDTH] IN BLOOD BY AUTOMATED COUNT: 14.1 % (ref 11.5–14.5)
EST. AVERAGE GLUCOSE BLD GHB EST-MCNC: 177 MG/DL
GLUCOSE BLD STRIP.AUTO-MCNC: 111 MG/DL (ref 65–117)
GLUCOSE BLD STRIP.AUTO-MCNC: 111 MG/DL (ref 65–117)
GLUCOSE BLD STRIP.AUTO-MCNC: 141 MG/DL (ref 65–117)
GLUCOSE BLD STRIP.AUTO-MCNC: 183 MG/DL (ref 65–117)
GLUCOSE BLD STRIP.AUTO-MCNC: 213 MG/DL (ref 65–117)
GLUCOSE BLD STRIP.AUTO-MCNC: 256 MG/DL (ref 65–117)
GLUCOSE BLD STRIP.AUTO-MCNC: 260 MG/DL (ref 65–117)
GLUCOSE BLD STRIP.AUTO-MCNC: 275 MG/DL (ref 65–117)
GLUCOSE SERPL-MCNC: 319 MG/DL (ref 65–100)
HBA1C MFR BLD: 7.8 % (ref 4–5.6)
HCT VFR BLD AUTO: 30.8 % (ref 36.6–50.3)
HGB BLD-MCNC: 10.3 G/DL (ref 12.1–17)
IMM GRANULOCYTES # BLD AUTO: 0.1 K/UL (ref 0–0.04)
IMM GRANULOCYTES NFR BLD AUTO: 1 % (ref 0–0.5)
LYMPHOCYTES # BLD: 1.6 K/UL (ref 0.8–3.5)
LYMPHOCYTES NFR BLD: 8 % (ref 12–49)
MCH RBC QN AUTO: 28.9 PG (ref 26–34)
MCHC RBC AUTO-ENTMCNC: 33.4 G/DL (ref 30–36.5)
MCV RBC AUTO: 86.3 FL (ref 80–99)
MONOCYTES # BLD: 1.3 K/UL (ref 0–1)
MONOCYTES NFR BLD: 7 % (ref 5–13)
NEUTS SEG # BLD: 17 K/UL (ref 1.8–8)
NEUTS SEG NFR BLD: 84 % (ref 32–75)
NRBC # BLD: 0 K/UL (ref 0–0.01)
NRBC BLD-RTO: 0 PER 100 WBC
PERFORMED BY, TECHID: ABNORMAL
PERFORMED BY, TECHID: NORMAL
PERFORMED BY, TECHID: NORMAL
PLATELET # BLD AUTO: 414 K/UL (ref 150–400)
PMV BLD AUTO: 9.9 FL (ref 8.9–12.9)
POTASSIUM SERPL-SCNC: 3.8 MMOL/L (ref 3.5–5.1)
RBC # BLD AUTO: 3.57 M/UL (ref 4.1–5.7)
SODIUM SERPL-SCNC: 143 MMOL/L (ref 136–145)
WBC # BLD AUTO: 20.1 K/UL (ref 4.1–11.1)

## 2022-05-05 PROCEDURE — 74011636637 HC RX REV CODE- 636/637: Performed by: HOSPITALIST

## 2022-05-05 PROCEDURE — 76770 US EXAM ABDO BACK WALL COMP: CPT

## 2022-05-05 PROCEDURE — 74011000250 HC RX REV CODE- 250: Performed by: INTERNAL MEDICINE

## 2022-05-05 PROCEDURE — 74011250637 HC RX REV CODE- 250/637: Performed by: INTERNAL MEDICINE

## 2022-05-05 PROCEDURE — 80048 BASIC METABOLIC PNL TOTAL CA: CPT

## 2022-05-05 PROCEDURE — 99232 SBSQ HOSP IP/OBS MODERATE 35: CPT | Performed by: INTERNAL MEDICINE

## 2022-05-05 PROCEDURE — 65270000029 HC RM PRIVATE

## 2022-05-05 PROCEDURE — 86140 C-REACTIVE PROTEIN: CPT

## 2022-05-05 PROCEDURE — 74011000250 HC RX REV CODE- 250: Performed by: PODIATRIST

## 2022-05-05 PROCEDURE — 83036 HEMOGLOBIN GLYCOSYLATED A1C: CPT

## 2022-05-05 PROCEDURE — 85025 COMPLETE CBC W/AUTO DIFF WBC: CPT

## 2022-05-05 PROCEDURE — 74011250636 HC RX REV CODE- 250/636: Performed by: NURSE PRACTITIONER

## 2022-05-05 PROCEDURE — 74011250636 HC RX REV CODE- 250/636: Performed by: HOSPITALIST

## 2022-05-05 PROCEDURE — 74011250636 HC RX REV CODE- 250/636: Performed by: INTERNAL MEDICINE

## 2022-05-05 PROCEDURE — 82962 GLUCOSE BLOOD TEST: CPT

## 2022-05-05 PROCEDURE — 36415 COLL VENOUS BLD VENIPUNCTURE: CPT

## 2022-05-05 PROCEDURE — 74011000258 HC RX REV CODE- 258: Performed by: INTERNAL MEDICINE

## 2022-05-05 RX ORDER — SODIUM CHLORIDE 9 MG/ML
125 INJECTION, SOLUTION INTRAVENOUS CONTINUOUS
Status: DISCONTINUED | OUTPATIENT
Start: 2022-05-05 | End: 2022-05-08 | Stop reason: HOSPADM

## 2022-05-05 RX ORDER — METOCLOPRAMIDE HYDROCHLORIDE 5 MG/ML
5 INJECTION INTRAMUSCULAR; INTRAVENOUS EVERY 6 HOURS
Status: DISCONTINUED | OUTPATIENT
Start: 2022-05-05 | End: 2022-05-08 | Stop reason: HOSPADM

## 2022-05-05 RX ORDER — BLOOD-GLUCOSE SENSOR
EACH MISCELLANEOUS
Qty: 9 EACH | Refills: 3 | Status: SHIPPED | OUTPATIENT
Start: 2022-05-05

## 2022-05-05 RX ADMIN — PIPERACILLIN AND TAZOBACTAM 3.38 G: 3; .375 INJECTION, POWDER, LYOPHILIZED, FOR SOLUTION INTRAVENOUS at 05:37

## 2022-05-05 RX ADMIN — PROMETHAZINE HYDROCHLORIDE 25 MG: 25 INJECTION INTRAMUSCULAR; INTRAVENOUS at 22:17

## 2022-05-05 RX ADMIN — INSULIN GLARGINE 35 UNITS: 100 INJECTION, SOLUTION SUBCUTANEOUS at 22:00

## 2022-05-05 RX ADMIN — SODIUM CHLORIDE, PRESERVATIVE FREE 10 ML: 5 INJECTION INTRAVENOUS at 14:24

## 2022-05-05 RX ADMIN — ENOXAPARIN SODIUM 40 MG: 100 INJECTION SUBCUTANEOUS at 08:37

## 2022-05-05 RX ADMIN — INSULIN LISPRO 10 UNITS: 100 INJECTION, SOLUTION INTRAVENOUS; SUBCUTANEOUS at 12:07

## 2022-05-05 RX ADMIN — ONDANSETRON 4 MG: 2 INJECTION INTRAMUSCULAR; INTRAVENOUS at 19:36

## 2022-05-05 RX ADMIN — INSULIN LISPRO 3 UNITS: 100 INJECTION, SOLUTION INTRAVENOUS; SUBCUTANEOUS at 10:13

## 2022-05-05 RX ADMIN — HYDRALAZINE HYDROCHLORIDE 20 MG: 20 INJECTION, SOLUTION INTRAMUSCULAR; INTRAVENOUS at 16:16

## 2022-05-05 RX ADMIN — SODIUM CHLORIDE, PRESERVATIVE FREE 10 ML: 5 INJECTION INTRAVENOUS at 22:08

## 2022-05-05 RX ADMIN — ONDANSETRON 4 MG: 2 INJECTION INTRAMUSCULAR; INTRAVENOUS at 08:37

## 2022-05-05 RX ADMIN — INSULIN LISPRO 2 UNITS: 100 INJECTION, SOLUTION INTRAVENOUS; SUBCUTANEOUS at 22:00

## 2022-05-05 RX ADMIN — PIPERACILLIN AND TAZOBACTAM 3.38 G: 3; .375 INJECTION, POWDER, LYOPHILIZED, FOR SOLUTION INTRAVENOUS at 22:28

## 2022-05-05 RX ADMIN — INSULIN LISPRO 4 UNITS: 100 INJECTION, SOLUTION INTRAVENOUS; SUBCUTANEOUS at 02:00

## 2022-05-05 RX ADMIN — SODIUM CHLORIDE 150 ML/HR: 9 INJECTION, SOLUTION INTRAVENOUS at 05:34

## 2022-05-05 RX ADMIN — INSULIN LISPRO 10 UNITS: 100 INJECTION, SOLUTION INTRAVENOUS; SUBCUTANEOUS at 08:37

## 2022-05-05 RX ADMIN — SODIUM CHLORIDE, PRESERVATIVE FREE 10 ML: 5 INJECTION INTRAVENOUS at 05:45

## 2022-05-05 RX ADMIN — METOCLOPRAMIDE HYDROCHLORIDE 5 MG: 5 INJECTION INTRAMUSCULAR; INTRAVENOUS at 12:07

## 2022-05-05 RX ADMIN — PIPERACILLIN AND TAZOBACTAM 3.38 G: 3; .375 INJECTION, POWDER, LYOPHILIZED, FOR SOLUTION INTRAVENOUS at 14:24

## 2022-05-05 RX ADMIN — METOCLOPRAMIDE HYDROCHLORIDE 5 MG: 5 INJECTION INTRAMUSCULAR; INTRAVENOUS at 17:50

## 2022-05-05 RX ADMIN — AMLODIPINE BESYLATE 5 MG: 5 TABLET ORAL at 08:37

## 2022-05-05 RX ADMIN — ONDANSETRON 4 MG: 2 INJECTION INTRAMUSCULAR; INTRAVENOUS at 01:58

## 2022-05-05 RX ADMIN — SODIUM CHLORIDE, PRESERVATIVE FREE 2.5 MG: 5 INJECTION INTRAVENOUS at 05:39

## 2022-05-05 RX ADMIN — INSULIN LISPRO 9 UNITS: 100 INJECTION, SOLUTION INTRAVENOUS; SUBCUTANEOUS at 06:00

## 2022-05-05 NOTE — PROGRESS NOTES
Bedside shift change report given to [address] [place] [address] (oncoming nurse) by Michelle Porter RN (offgoing nurse). Report included the following information SBAR.
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
## 2022-05-05 NOTE — PROGRESS NOTES
Problem: Falls - Risk of  Goal: *Absence of Falls  Description: Document Cathy Brizuela Fall Risk and appropriate interventions in the flowsheet. Outcome: Progressing Towards Goal  Note: Fall Risk Interventions:  Mobility Interventions: Strengthening exercises (ROM-active/passive)         Medication Interventions: Patient to call before getting OOB    Elimination Interventions: Call light in reach              Problem: Patient Education: Go to Patient Education Activity  Goal: Patient/Family Education  Outcome: Progressing Towards Goal     Problem: Pressure Injury - Risk of  Goal: *Prevention of pressure injury  Description: Document Nickolas Scale and appropriate interventions in the flowsheet.   Outcome: Progressing Towards Goal  Note: Pressure Injury Interventions:  Sensory Interventions: Minimize linen layers,Keep linens dry and wrinkle-free    Moisture Interventions: Absorbent underpads,Minimize layers    Activity Interventions: Increase time out of bed    Mobility Interventions: HOB 30 degrees or less    Nutrition Interventions: Document food/fluid/supplement intake,Offer support with meals,snacks and hydration    Friction and Shear Interventions: HOB 30 degrees or less,Minimize layers                Problem: Patient Education: Go to Patient Education Activity  Goal: Patient/Family Education  Outcome: Progressing Towards Goal

## 2022-05-05 NOTE — PROGRESS NOTES
Smith Valentin received a phone call to refill G6 sensors to [facility] pharmacies     Lemuel Marcelo MD

## 2022-05-05 NOTE — PROGRESS NOTES
Infectious Disease Progress Note               Subjective:   Stable, intermittent nausea/vomiting. Leukocytosis trending up, repeat wound debridement planned for chart documentations. BS in the 500s on , rising Cr, nephrology consulted   Objective:   Physical Exam:     Visit Vitals  BP (!) 158/96 (BP 1 Location: Right upper arm, BP Patient Position: At rest)   Pulse (!) 110   Temp 99.4 °F (37.4 °C)   Resp 18   Ht 6' 4\" (1.93 m)   Wt 230 lb (104.3 kg)   SpO2 95%   BMI 28.00 kg/m²    O2 Flow Rate (L/min): 2 l/min O2 Device: None (Room air)    Temp (24hrs), Av °F (37.2 °C), Min:98.4 °F (36.9 °C), Max:99.7 °F (37.6 °C)    701 - 1900  In: 1245.8 [P.O.:100;  I.V.:1145.8]  Out: 600 [Urine:550]   1901 -  0700  In: 666.7 [I.V.:666.7]  Out: 1875 [Urine:1825]    General: NAD, AAO x 4  HEENT: AN, Moist mucosa   Lungs: CTA b/l, decreased BS at the bases   Heart: S1S2+, RRR, no murmur  Abdo: Soft, NT, ND, +BS   : No wetzel   Exts: Right foot dressing in place   Skin: Right great toe ulcer       Data Review:       Recent Days:  Recent Labs     22  0236 22  03022  0315   WBC 20.1* 16.5* 15.5*   HGB 10.3* 11.4* 11.0*   HCT 30.8* 35.8* 34.2*   * 419* 400     Recent Labs     22  0236 22  0301 22  0315   BUN 51* 20 22*   CREA 2.83* 1.62* 1.63*       Lab Results   Component Value Date/Time    C-Reactive protein 26.00 (H) 2022 02:36 AM          Microbiology     Results     Procedure Component Value Units Date/Time    CULTURE, TISSUE Alois Citizen STAIN [[id number]] Collected: 22 1745    Order Status: Completed Specimen: Tissue Updated: 22 1506     Special Requests: No Special Requests        GRAM STAIN No wbc's seen         No organisms seen        Culture result:       culture in progress,further updates to follow          MRSA SCREEN - PCR (NASAL) [[id number]] Collected: 22 0830    Order Status: Completed Specimen: Swab Updated: 05/03/22 0951     MRSA by PCR, Nasal Not Detected       CULTURE, Gabriel Belling STAIN [[id number]] Collected: 05/02/22 1800    Order Status: Canceled Specimen: Wound from Foot     CULTURE, BLOOD, PAIRED [[id number]] Collected: 05/02/22 1737    Order Status: Completed Specimen: Blood Updated: 05/05/22 0721     Special Requests: No Special Requests        Culture result: No growth 2 days       CULTURE, Gabriel Belling STAIN [[id number]]  (Susceptibility) Collected: 04/30/22 1830    Order Status: Completed Specimen: Wound from Swab Updated: 05/04/22 0710     Special Requests: No Special Requests        GRAM STAIN Occasional WBCs seen               2+ Gram Positive Cocci in pairs           Culture result:       Heavy Staphylococcus aureus                  Moderate Enterococcus faecalis          Susceptibility      Staphylococcus aureus     MANDY     Ciprofloxacin ($) Susceptible     Clindamycin ($) Resistant     Daptomycin ($$$$$) Susceptible     Doxycycline ($$) Susceptible     Erythromycin ($$$$) Susceptible     Gentamicin ($) Susceptible     Levofloxacin ($) Susceptible     Linezolid ($$$$$) Susceptible     Moxifloxacin ($$$$) Susceptible     Oxacillin Susceptible     Rifampin ($$$$) Susceptible  [1]      Tetracycline Susceptible     Trimeth/Sulfa Susceptible     Vancomycin ($) Susceptible                 [1]  Rifampin is not to be used for mono-therapy. Linear View               Susceptibility      Enterococcus faecalis     MANDY     Ampicillin ($) Susceptible     Daptomycin ($$$$$) Susceptible     Linezolid ($$$$$) Susceptible     Vancomycin ($) Susceptible                  Linear View                            Diagnostics   CXR Results  (Last 48 hours)    None             Assessment/Plan     1. 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  Faecalis isolated from wound Cx (04/30)       Rising WBC, Afebrile, CRP elevated at 26.0      Continue on Zosyn for MSSA, GNR and E.faecalis coverage, d/c Vanc       Repeat wound debridement reportedly planned       Continue routine wound care, CBC, CRP and ESR ordered for AM       Will be needing long term IV antibiotics upon d/c         2. Nausea/vomiting, suspected gastroparesis, Reglan ordered       Gastric emptying study ordered      3.  Acute on chronic renal failure: Rising Cr, seen by nephrology      4. Long standing h/o DM type 1, Blood sugars have been uncontrolled on current admission         Cheri Soares MD    5/5/2022

## 2022-05-05 NOTE — PROGRESS NOTES
Progress Note  Date: 2022     Room:[facility]  Patient Name:Gabe Wynne     YOB: 1970     Age:51 y.o. Subjective    Subjective   Patient seen at bedside. Slurred speech and confusion noted. Sister at bedside. Per nursing, uncontrolled hyperglycemia throughout the day. Last fingerstick glucose was unreadable    Review of Systems   Unable to accurately obtain due to patient's current medical status    Objective         Vitals Last 24 Hours:  TEMPERATURE:  Temp  Av °F (37.2 °C)  Min: 98.4 °F (36.9 °C)  Max: 99.7 °F (37.6 °C)  RESPIRATIONS RANGE: Resp  Av.7  Min: 16  Max: 20  PULSE OXIMETRY RANGE: SpO2  Av %  Min: 94 %  Max: 98 %  PULSE RANGE: Pulse  Av.7  Min: 102  Max: 126  BLOOD PRESSURE RANGE: Systolic (50ROK), GSI:251 , Min:99 , WTY:131   ; Diastolic (10YEU), USP:94, Min:56, Max:90    I/O (24Hr): Intake/Output Summary (Last 24 hours) at 2022 0846  Last data filed at 2022 0709  Gross per 24 hour   Intake 666.67 ml   Output 1500 ml   Net -833.33 ml     Objective   Vitals reviewed. Constitutional:       Appearance: He is overweight. Cardiovascular:      Pulses:           Dorsalis pedis pulses are 2+ on the right side and 2+ on the left side.        Posterior tibial pulses are 2+ on the right side and 2+ on the left side. Pulmonary:      Effort: Pulmonary effort is normal.   Musculoskeletal:      Right lower leg: No edema.      Left lower leg: No edema.      Right foot: Normal range of motion. No deformity or bunion.      Left foot: Normal range of motion. No deformity or bunion. Feet:      Right foot:      Protective Sensation: 10 sites tested. 0 sites sensed.      Skin integrity: Ulcer and erythema present.      Toenail Condition: Right toenails are abnormally thick.      Left foot:      Protective Sensation: 10 sites tested. 0 sites sensed.      Skin integrity: Skin integrity normal.      Toenail Condition: Left toenails are abnormally thick. Lymphadenopathy:      Lower Body: No right inguinal adenopathy. No left inguinal adenopathy. Skin:     General: Skin is warm. Ulcer to the lateral left ankle     Capillary Refill: Capillary refill takes 2 to 3 seconds.      Comments: Absent pedal hair growth with hyperpigmentation   Neurological:      Mental Status: He is alert and oriented to person, place, and time.      Comments: Paresthesias/burning noted   Psychiatric:         Mood and Affect: Mood and affect normal.         Behavior: Behavior is cooperative.     Labs/Imaging/Diagnostics    Labs:  CBC:  Recent Labs     05/05/22 0236 05/04/22  0301 05/03/22 0315   WBC 20.1* 16.5* 15.5*   RBC 3.57* 4.00* 3.85*   HGB 10.3* 11.4* 11.0*   HCT 30.8* 35.8* 34.2*   MCV 86.3 89.5 88.8   RDW 14.1 14.4 14.6*   * 419* 400     CHEMISTRIES:  Recent Labs     05/05/22 0236 05/04/22  0301 05/03/22 0315    136 138   K 3.8 5.0 4.7   * 105 107   CO2 25 21 25   BUN 51* 20 22*   CA 9.3 9.0 8.6   PT/INR:No results for input(s): INR, INREXT in the last 72 hours. No lab exists for component: PROTIME  APTT:No results for input(s): APTT in the last 72 hours. LIVER PROFILE:  Recent Labs     05/02/22  1737   AST 49*   ALT 33     Lab Results   Component Value Date/Time    ALT (SGPT) 33 05/02/2022 05:37 PM    AST (SGOT) 49 (H) 05/02/2022 05:37 PM    Alk. phosphatase 104 05/02/2022 05:37 PM    Bilirubin, total 0.3 05/02/2022 05:37 PM       Imaging Last 24 Hours:  No results found. Assessment//Plan   Principal Problem:    Diabetic foot infection ([address] (5/2/2022)      Assessment & Plan    Diabetic foot infection, right foot (s/p debridement on 05/03/2022)  Sepsis (worsening leukocytosis, elevated SED rate, elevated CRP, CR trending up)  Uncontrolled diabetes mellitus with neuropathy          Patient seen and evaluated at bedside  - Current labs personally reviewed and findings dicussed with patient  - Local wound care performed to the right foot.  Orders placed in epic for dakins flush with dakins soaked packing BID  - Elevation with offloading for wound healing purposes to the RLE  - Abx per ID  - I will follow closely     Plan for additional surgical intervention with a more extensive debridement (possible great toe amputation) as lab work is trending in the wrong direction. Will leave open and continue to trend lab work          Elmo Fowler, [facility], [address] [place] [address] and [facility]  [address] [place] [address], [address] [place] [address], [address] [place] [address],[address]  [facility] Mariia:  [phone]  F:  [phone]  C:  [phone]    Electronically signed by Belkys Garcia DPM on 5/5/2022 at 8:46 AM

## 2022-05-05 NOTE — PROGRESS NOTES
Hospitalist Progress Note         UBALDO Todd, FNP-C    Daily Progress Note: 5/5/2022      Subjective:   Subjective   Patient examined alert and oriented lying in bed  No acute distress noted on examination  No overnight events reported  Patient educated to ask for prn pain medications    Review of Systems:   Review of Systems   Respiratory: Negative for cough. Cardiovascular: Negative for chest pain. Gastrointestinal: Negative for heartburn. Genitourinary: Negative for dysuria. Musculoskeletal: Negative for myalgias. Skin:        Right foot wound         Objective:   Objective      Vitals:  Patient Vitals for the past 12 hrs:   Temp Pulse Resp BP SpO2   05/05/22 0714 98.4 °F (36.9 °C) (!) 104 18 124/76 95 %   05/05/22 0214 98.7 °F (37.1 °C) (!) 102 18 124/75 96 %   05/05/22 0000 -- (!) 105 -- -- --        Physical Exam:  Physical Exam  Vitals and nursing note reviewed. Constitutional:       Appearance: Normal appearance. Eyes:      Extraocular Movements: Extraocular movements intact. Cardiovascular:      Rate and Rhythm: Tachycardia present. Heart sounds: Normal heart sounds. Pulmonary:      Breath sounds: Normal breath sounds. Abdominal:      General: Bowel sounds are normal.   Musculoskeletal:         General: Normal range of motion. Skin:     General: Skin is warm and dry. Comments: Right foot surgical dressing intact   Neurological:      Mental Status: He is alert and oriented to person, place, and time.           Lab Results:  Recent Results (from the past 24 hour(s))   GLUCOSE, POC    Collection Time: 05/04/22 12:19 PM   Result Value Ref Range    Glucose (POC) >600 (HH) 65 - 117 mg/dL    Performed by Tonna Ogren    GLUCOSE, POC    Collection Time: 05/04/22 12:21 PM   Result Value Ref Range    Glucose (POC) 566 (H) 65 - 117 mg/dL    Performed by Tonna Ogren    GLUCOSE, POC    Collection Time: 05/04/22  1:06 PM   Result Value Ref Range    Glucose (POC) >600 (HH) 65 - 117 mg/dL    Performed by Tonye [place]    GLUCOSE, POC    Collection Time: 05/04/22  1:08 PM   Result Value Ref Range    Glucose (POC) >600 (HH) 65 - 117 mg/dL    Performed by Tonye [place]    GLUCOSE, POC    Collection Time: 05/04/22  1:51 PM   Result Value Ref Range    Glucose (POC) >600 (HH) 65 - 117 mg/dL    Performed by Tonye [place]    GLUCOSE, POC    Collection Time: 05/04/22  1:53 PM   Result Value Ref Range    Glucose (POC) >600 (HH) 65 - 117 mg/dL    Performed by Tonye [place]    GLUCOSE, POC    Collection Time: 05/04/22  2:12 PM   Result Value Ref Range    Glucose (POC) 585 (H) 65 - 117 mg/dL    Performed by Tonye [place]    GLUCOSE, POC    Collection Time: 05/04/22  3:09 PM   Result Value Ref Range    Glucose (POC) >600 (HH) 65 - 117 mg/dL    Performed by ToniAdvize [place]    GLUCOSE, POC    Collection Time: 05/04/22  4:08 PM   Result Value Ref Range    Glucose (POC) 562 (H) 65 - 117 mg/dL    Performed by Tonye [place]    GLUCOSE, POC    Collection Time: 05/04/22  5:43 PM   Result Value Ref Range    Glucose (POC) 550 (H) 65 - 117 mg/dL    Performed by ToniAdvize [place]    GLUCOSE, POC    Collection Time: 05/04/22  6:25 PM   Result Value Ref Range    Glucose (POC) 529 (H) 65 - 117 mg/dL    Performed by ToniAdvize [place]    GLUCOSE, POC    Collection Time: 05/04/22  8:42 PM   Result Value Ref Range    Glucose (POC) 441 (H) 65 - 117 mg/dL    Performed by Ad Le    GLUCOSE, POC    Collection Time: 05/05/22  1:58 AM   Result Value Ref Range    Glucose (POC) 260 (H) 65 - 117 mg/dL    Performed by Mora Forbes    CBC WITH AUTOMATED DIFF    Collection Time: 05/05/22  2:36 AM   Result Value Ref Range    WBC 20.1 (H) 4.1 - 11.1 K/uL    RBC 3.57 (L) 4.10 - 5.70 M/uL    HGB 10.3 (L) 12.1 - 17.0 g/dL    HCT 30.8 (L) 36.6 - 50.3 %    MCV 86.3 80.0 - 99.0 FL    MCH 28.9 26.0 - 34.0 PG    MCHC 33.4 30.0 - 36.5 g/dL    RDW 14.1 11.5 - 14.5 %    PLATELET 493 (H) 516 - 400 K/uL    MPV 9.9 8.9 - 12.9 FL    NRBC 0.0 0.0 PER 100 WBC    ABSOLUTE NRBC 0.00 0.00 - 0.01 K/uL    NEUTROPHILS 84 (H) 32 - 75 %    LYMPHOCYTES 8 (L) 12 - 49 %    MONOCYTES 7 5 - 13 %    EOSINOPHILS 0 0 - 7 %    BASOPHILS 0 0 - 1 %    IMMATURE GRANULOCYTES 1 (H) 0 - 0.5 %    ABS. NEUTROPHILS 17.0 (H) 1.8 - 8.0 K/UL    ABS. LYMPHOCYTES 1.6 0.8 - 3.5 K/UL    ABS. MONOCYTES 1.3 (H) 0.0 - 1.0 K/UL    ABS. EOSINOPHILS 0.0 0.0 - 0.4 K/UL    ABS. BASOPHILS 0.0 0.0 - 0.1 K/UL    ABS. IMM.  GRANS. 0.1 (H) 0.00 - 0.04 K/UL    DF AUTOMATED     METABOLIC PANEL, BASIC    Collection Time: 05/05/22  2:36 AM   Result Value Ref Range    Sodium 143 136 - 145 mmol/L    Potassium 3.8 3.5 - 5.1 mmol/L    Chloride 112 (H) 97 - 108 mmol/L    CO2 25 21 - 32 mmol/L    Anion gap 6 5 - 15 mmol/L    Glucose 319 (H) 65 - 100 mg/dL    BUN 51 (H) 6 - 20 mg/dL    Creatinine 2.83 (H) 0.70 - 1.30 mg/dL    BUN/Creatinine ratio 18 12 - 20      GFR est AA 29 (L) >60 ml/min/1.73m2    GFR est non-AA 24 (L) >60 ml/min/1.73m2    Calcium 9.3 8.5 - 10.1 mg/dL   C REACTIVE PROTEIN, QT    Collection Time: 05/05/22  2:36 AM   Result Value Ref Range    C-Reactive protein 26.00 (H) 0.00 - 0.60 mg/dL   GLUCOSE, POC    Collection Time: 05/05/22  5:43 AM   Result Value Ref Range    Glucose (POC) 275 (H) 65 - 117 mg/dL    Performed by Britt Santizo, POC    Collection Time: 05/05/22  8:00 AM   Result Value Ref Range    Glucose (POC) 256 (H) 65 - 117 mg/dL    Performed by Amee American    GLUCOSE, POC    Collection Time: 05/05/22 10:03 AM   Result Value Ref Range    Glucose (POC) 183 (H) 65 - 117 mg/dL    Performed by Amee American           Diagnostic Images:  CT Results  (Last 48 hours)    None          Current Medications:    Current Facility-Administered Medications:     metoclopramide HCl (REGLAN) injection 5 mg, 5 mg, IntraVENous, Q6H, Charito Bailey NP    amLODIPine (NORVASC) tablet 5 mg, 5 mg, Oral, DAILY, Guero Beltrán MD, 5 mg at 05/05/22 0837    glucose chewable tablet 16 g, 4 Tablet, Oral, PRN, Aranza Cross NP    dextrose 10% infusion 0-250 mL, 0-250 mL, IntraVENous, PRN, Aranza Cross NP    glucagon (GLUCAGEN) injection 1 mg, 1 mg, IntraMUSCular, PRN, Aranza Cross NP    [Held by provider] 0.9% sodium chloride infusion, 150 mL/hr, IntraVENous, CONTINUOUS, Rosalie Johnson MD, Last Rate: 150 mL/hr at 05/05/22 0534, 150 mL/hr at 05/05/22 0534    insulin glargine (LANTUS) injection 35 Units, 35 Units, SubCUTAneous, QHS, Bhaty, Rachel Davenport MD, 35 Units at 05/04/22 2200    dextrose 10% infusion 0-250 mL, 0-250 mL, IntraVENous, PRN, Rosalie Johnson MD    insulin lispro (HUMALOG) injection, , SubCUTAneous, Q4H, Rosalie Johnson MD, 3 Units at 05/05/22 1013    insulin lispro (HUMALOG) injection 10 Units, 10 Units, SubCUTAneous, TIDAC, Rosalie Johnson MD, 10 Units at 05/05/22 0837    piperacillin-tazobactam (ZOSYN) 3.375 g in 0.9% sodium chloride (MBP/ADV) 100 mL MBP, 3.375 g, IntraVENous, Q8H, Vangie White MD, Last Rate: 200 mL/hr at 05/05/22 0537, 3.375 g at 05/05/22 0537    sodium chloride (NS) flush 5-40 mL, 5-40 mL, IntraVENous, Q8H, Phillingane, Elmo, DPM, 10 mL at 05/05/22 0545    sodium chloride (NS) flush 5-40 mL, 5-40 mL, IntraVENous, PRN, Phillingane, Elmo, DPM, 10 mL at 05/05/22 0545    hydrALAZINE (APRESOLINE) 20 mg/mL injection 20 mg, 20 mg, IntraVENous, Q6H PRN, Guero Beltrán MD    sodium chloride (NS) flush 5-40 mL, 5-40 mL, IntraVENous, Q8H, Juan Luis Ramsay MD, 10 mL at 05/05/22 0545    sodium chloride (NS) flush 5-40 mL, 5-40 mL, IntraVENous, PRN, Cha, Darice Brunner, MD    acetaminophen (TYLENOL) tablet 650 mg, 650 mg, Oral, Q6H PRN, 650 mg at 05/02/22 2339 **OR** acetaminophen (TYLENOL) suppository 650 mg, 650 mg, Rectal, Q6H PRN, Cha, Darice Brunner, MD    polyethylene glycol (MIRALAX) packet 17 g, 17 g, Oral, DAILY PRN, Cha, Darice Brunner, MD    ondansetron (ZOFRAN ODT) tablet 4 mg, 4 mg, Oral, Q8H PRN, 4 mg at 05/03/22 1738 **OR** ondansetron (ZOFRAN) injection 4 mg, 4 mg, IntraVENous, Q6H PRN, Cyrus Ramsay MD, 4 mg at 05/05/22 0863    enoxaparin (LOVENOX) injection 40 mg, 40 mg, SubCUTAneous, DAILY, Cyrus Ramsay MD, 40 mg at 05/05/22 2131       ASSESSMENT:    Radha Dorsey is a 54 y.o. male with PMH of diabetic foot ulcer, diabetes on insulin pump, hypertension presented the ED with chief complaint of right foot pain, associated with draining wound and redness. Patient states that he had 1 on the plantar surface for the past few months months that stopped healing. In addition, since last week, he has developed another ulcer on the medial aspect of the right foot over the ball of the foot, associated with purulent discharge and is not healing as well.  Says he was seen by Dr. Jabier Lazo, podiatrist and asked him to be admitted to the hospital. Otherwise denies fever or chills. On 5/3/22 underwent Debridement of Right Foot Ulcer. 5/5/22 Infection markers worsen.     #1  Sepsis  #2 Diabetic foot ulcer, osteomyelitis suspicion  -Tachycardia, low-grade fever, elevated CRP, wound infection, leukocytosis  - Imaging: MRI shows soft tissue swelling and osteomyelitis suspicion  - Blood and would culture obtained.   -Wound culture preliminary shows heavy staph auerus and moderate Enterococcus faecalis  - Antibiotics: Vancomycin and zosyn.  Cefepime discontinued  - Podiatry and ID following  - on 5/3/22 underwent Debridement of Right Foot Ulcer   - PT/OT evaluation  - Long term IV antbx, pending neg bcx (no growth x 2 days)  - Follow wcx sensitivity report  - Infection markers worsening  - Plan for Great toe amputation in AM  - NPO after midnight     #3 RUDDY  - likely secondary to to sepsis  - hold IV fluids due to worsening renal function  - continue to follow  - obtain nephrology evaluation    #4 Hypertension  -Hold oral medications  -Labetalol as needed  -bp currently at goal     #5 Diabetes  -continue lantus, lispro, med dose ssi  -ac/hs accu check       Full Code  Dvt Prophylaxis lovenox  GI Prophylaxis none  Discharge barriers:  - Podiatry and Infectious disease following  - Follow wound cultures  - Long term IV antbx, pending neg bcx  - Follow bcxs  - Case management for long-term IV antibiotic  - Surgery in AM  - Nephrology consult      NOK: Bubba Ulloa (Mother) [phone] [facility] Phone)      Above treatment plan reviewed and discussed with patient in detail at bedside, all questions answered. Care Plan discussed with: interDisciplinary team, patient    Total time spent with patient: 35 minutes.     Teresa Payan NP

## 2022-05-06 ENCOUNTER — APPOINTMENT (OUTPATIENT)
Dept: NUCLEAR MEDICINE | Age: 52
DRG: 710 | End: 2022-05-06
Attending: INTERNAL MEDICINE
Payer: MEDICAID

## 2022-05-06 ENCOUNTER — ANESTHESIA EVENT (OUTPATIENT)
Dept: SURGERY | Age: 52
DRG: 710 | End: 2022-05-06
Payer: COMMERCIAL

## 2022-05-06 ENCOUNTER — ANESTHESIA (OUTPATIENT)
Dept: SURGERY | Age: 52
DRG: 710 | End: 2022-05-06
Payer: COMMERCIAL

## 2022-05-06 LAB
ALBUMIN SERPL-MCNC: 2.5 G/DL (ref 3.5–5)
ANION GAP SERPL CALC-SCNC: 8 MMOL/L (ref 5–15)
ANION GAP SERPL CALC-SCNC: 9 MMOL/L (ref 5–15)
BASOPHILS # BLD: 0 K/UL (ref 0–0.1)
BASOPHILS NFR BLD: 0 % (ref 0–1)
BUN SERPL-MCNC: 32 MG/DL (ref 6–20)
BUN SERPL-MCNC: 32 MG/DL (ref 6–20)
BUN/CREAT SERPL: 18 (ref 12–20)
BUN/CREAT SERPL: 18 (ref 12–20)
CA-I BLD-MCNC: 8.9 MG/DL (ref 8.5–10.1)
CA-I BLD-MCNC: 9 MG/DL (ref 8.5–10.1)
CHLORIDE SERPL-SCNC: 118 MMOL/L (ref 97–108)
CHLORIDE SERPL-SCNC: 118 MMOL/L (ref 97–108)
CO2 SERPL-SCNC: 24 MMOL/L (ref 21–32)
CO2 SERPL-SCNC: 24 MMOL/L (ref 21–32)
CREAT SERPL-MCNC: 1.74 MG/DL (ref 0.7–1.3)
CREAT SERPL-MCNC: 1.75 MG/DL (ref 0.7–1.3)
CRP SERPL-MCNC: 13.2 MG/DL (ref 0–0.6)
DIFFERENTIAL METHOD BLD: ABNORMAL
EOSINOPHIL # BLD: 0 K/UL (ref 0–0.4)
EOSINOPHIL NFR BLD: 0 % (ref 0–7)
ERYTHROCYTE [DISTWIDTH] IN BLOOD BY AUTOMATED COUNT: 14.5 % (ref 11.5–14.5)
ERYTHROCYTE [SEDIMENTATION RATE] IN BLOOD: 107 MM/HR (ref 0–15)
GLUCOSE BLD STRIP.AUTO-MCNC: 193 MG/DL (ref 65–117)
GLUCOSE BLD STRIP.AUTO-MCNC: 198 MG/DL (ref 65–117)
GLUCOSE BLD STRIP.AUTO-MCNC: 217 MG/DL (ref 65–117)
GLUCOSE BLD STRIP.AUTO-MCNC: 217 MG/DL (ref 65–117)
GLUCOSE BLD STRIP.AUTO-MCNC: 225 MG/DL (ref 65–117)
GLUCOSE BLD STRIP.AUTO-MCNC: 231 MG/DL (ref 65–117)
GLUCOSE BLD STRIP.AUTO-MCNC: 250 MG/DL (ref 65–117)
GLUCOSE SERPL-MCNC: 231 MG/DL (ref 65–100)
GLUCOSE SERPL-MCNC: 238 MG/DL (ref 65–100)
HCT VFR BLD AUTO: 32.4 % (ref 36.6–50.3)
HGB BLD-MCNC: 10.9 G/DL (ref 12.1–17)
IMM GRANULOCYTES # BLD AUTO: 0.2 K/UL (ref 0–0.04)
IMM GRANULOCYTES NFR BLD AUTO: 1 % (ref 0–0.5)
LYMPHOCYTES # BLD: 1.3 K/UL (ref 0.8–3.5)
LYMPHOCYTES NFR BLD: 7 % (ref 12–49)
MCH RBC QN AUTO: 28.8 PG (ref 26–34)
MCHC RBC AUTO-ENTMCNC: 33.6 G/DL (ref 30–36.5)
MCV RBC AUTO: 85.5 FL (ref 80–99)
MONOCYTES # BLD: 1 K/UL (ref 0–1)
MONOCYTES NFR BLD: 5 % (ref 5–13)
NEUTS SEG # BLD: 16.3 K/UL (ref 1.8–8)
NEUTS SEG NFR BLD: 87 % (ref 32–75)
NRBC # BLD: 0 K/UL (ref 0–0.01)
NRBC BLD-RTO: 0 PER 100 WBC
PERFORMED BY, TECHID: ABNORMAL
PHOSPHATE SERPL-MCNC: 2.2 MG/DL (ref 2.6–4.7)
PLATELET # BLD AUTO: 455 K/UL (ref 150–400)
PMV BLD AUTO: 10 FL (ref 8.9–12.9)
POTASSIUM SERPL-SCNC: 3.8 MMOL/L (ref 3.5–5.1)
POTASSIUM SERPL-SCNC: 3.8 MMOL/L (ref 3.5–5.1)
RBC # BLD AUTO: 3.79 M/UL (ref 4.1–5.7)
SODIUM SERPL-SCNC: 150 MMOL/L (ref 136–145)
SODIUM SERPL-SCNC: 151 MMOL/L (ref 136–145)
WBC # BLD AUTO: 18.8 K/UL (ref 4.1–11.1)

## 2022-05-06 PROCEDURE — 88305 TISSUE EXAM BY PATHOLOGIST: CPT

## 2022-05-06 PROCEDURE — 85652 RBC SED RATE AUTOMATED: CPT

## 2022-05-06 PROCEDURE — 74011250636 HC RX REV CODE- 250/636: Performed by: ANESTHESIOLOGY

## 2022-05-06 PROCEDURE — 77030031129 HC SUT MCRYL1 J&J -A: Performed by: PODIATRIST

## 2022-05-06 PROCEDURE — 77030039464 HC TU IRR ENDO DISP MSNX -B: Performed by: PODIATRIST

## 2022-05-06 PROCEDURE — 76010000138 HC OR TIME 0.5 TO 1 HR: Performed by: PODIATRIST

## 2022-05-06 PROCEDURE — 74011250636 HC RX REV CODE- 250/636: Performed by: INTERNAL MEDICINE

## 2022-05-06 PROCEDURE — 74011000250 HC RX REV CODE- 250: Performed by: NURSE ANESTHETIST, CERTIFIED REGISTERED

## 2022-05-06 PROCEDURE — 86140 C-REACTIVE PROTEIN: CPT

## 2022-05-06 PROCEDURE — P9045 ALBUMIN (HUMAN), 5%, 250 ML: HCPCS | Performed by: NURSE ANESTHETIST, CERTIFIED REGISTERED

## 2022-05-06 PROCEDURE — 74011000250 HC RX REV CODE- 250: Performed by: INTERNAL MEDICINE

## 2022-05-06 PROCEDURE — 80048 BASIC METABOLIC PNL TOTAL CA: CPT

## 2022-05-06 PROCEDURE — 11044 DBRDMT BONE 1ST 20 SQ CM/<: CPT | Performed by: PODIATRIST

## 2022-05-06 PROCEDURE — 74011000250 HC RX REV CODE- 250: Performed by: PODIATRIST

## 2022-05-06 PROCEDURE — 76210000006 HC OR PH I REC 0.5 TO 1 HR: Performed by: PODIATRIST

## 2022-05-06 PROCEDURE — 74011000250 HC RX REV CODE- 250: Performed by: NURSE PRACTITIONER

## 2022-05-06 PROCEDURE — 74011250636 HC RX REV CODE- 250/636: Performed by: NURSE PRACTITIONER

## 2022-05-06 PROCEDURE — 82962 GLUCOSE BLOOD TEST: CPT

## 2022-05-06 PROCEDURE — 74011250636 HC RX REV CODE- 250/636: Performed by: NURSE ANESTHETIST, CERTIFIED REGISTERED

## 2022-05-06 PROCEDURE — 2709999900 HC NON-CHARGEABLE SUPPLY: Performed by: PODIATRIST

## 2022-05-06 PROCEDURE — 74011636637 HC RX REV CODE- 636/637: Performed by: HOSPITALIST

## 2022-05-06 PROCEDURE — 36415 COLL VENOUS BLD VENIPUNCTURE: CPT

## 2022-05-06 PROCEDURE — 11047 DBRDMT BONE EACH ADDL: CPT | Performed by: PODIATRIST

## 2022-05-06 PROCEDURE — 76060000032 HC ANESTHESIA 0.5 TO 1 HR: Performed by: PODIATRIST

## 2022-05-06 PROCEDURE — 74011250637 HC RX REV CODE- 250/637: Performed by: INTERNAL MEDICINE

## 2022-05-06 PROCEDURE — 77030002916 HC SUT ETHLN J&J -A: Performed by: PODIATRIST

## 2022-05-06 PROCEDURE — 80069 RENAL FUNCTION PANEL: CPT

## 2022-05-06 PROCEDURE — 88311 DECALCIFY TISSUE: CPT

## 2022-05-06 PROCEDURE — 0Y6P0Z0 DETACHMENT AT RIGHT 1ST TOE, COMPLETE, OPEN APPROACH: ICD-10-PCS | Performed by: PODIATRIST

## 2022-05-06 PROCEDURE — 77030039465 HC PRB ASPIR SONICVAC MSNX -F: Performed by: PODIATRIST

## 2022-05-06 PROCEDURE — 85025 COMPLETE CBC W/AUTO DIFF WBC: CPT

## 2022-05-06 PROCEDURE — 28820 AMPUTATION OF TOE: CPT | Performed by: PODIATRIST

## 2022-05-06 PROCEDURE — 99232 SBSQ HOSP IP/OBS MODERATE 35: CPT | Performed by: INTERNAL MEDICINE

## 2022-05-06 PROCEDURE — 99233 SBSQ HOSP IP/OBS HIGH 50: CPT | Performed by: PODIATRIST

## 2022-05-06 PROCEDURE — 65270000029 HC RM PRIVATE

## 2022-05-06 PROCEDURE — 74011000258 HC RX REV CODE- 258: Performed by: INTERNAL MEDICINE

## 2022-05-06 RX ORDER — SODIUM CHLORIDE 0.9 % (FLUSH) 0.9 %
5-40 SYRINGE (ML) INJECTION EVERY 8 HOURS
Status: DISCONTINUED | OUTPATIENT
Start: 2022-05-06 | End: 2022-05-06 | Stop reason: SDUPTHER

## 2022-05-06 RX ORDER — ALBUMIN HUMAN 50 G/1000ML
SOLUTION INTRAVENOUS AS NEEDED
Status: DISCONTINUED | OUTPATIENT
Start: 2022-05-06 | End: 2022-05-06 | Stop reason: HOSPADM

## 2022-05-06 RX ORDER — SODIUM CHLORIDE 450 MG/100ML
125 INJECTION, SOLUTION INTRAVENOUS CONTINUOUS
Status: DISCONTINUED | OUTPATIENT
Start: 2022-05-06 | End: 2022-05-06

## 2022-05-06 RX ORDER — SUCCINYLCHOLINE CHLORIDE 20 MG/ML
INJECTION INTRAMUSCULAR; INTRAVENOUS AS NEEDED
Status: DISCONTINUED | OUTPATIENT
Start: 2022-05-06 | End: 2022-05-06 | Stop reason: HOSPADM

## 2022-05-06 RX ORDER — SODIUM CHLORIDE, SODIUM LACTATE, POTASSIUM CHLORIDE, CALCIUM CHLORIDE 600; 310; 30; 20 MG/100ML; MG/100ML; MG/100ML; MG/100ML
INJECTION, SOLUTION INTRAVENOUS
Status: DISCONTINUED | OUTPATIENT
Start: 2022-05-06 | End: 2022-05-06 | Stop reason: HOSPADM

## 2022-05-06 RX ORDER — HYDRALAZINE HYDROCHLORIDE 20 MG/ML
20 INJECTION INTRAMUSCULAR; INTRAVENOUS
Status: DISCONTINUED | OUTPATIENT
Start: 2022-05-06 | End: 2022-05-08 | Stop reason: HOSPADM

## 2022-05-06 RX ORDER — LIDOCAINE HYDROCHLORIDE 10 MG/ML
0.1 INJECTION, SOLUTION EPIDURAL; INFILTRATION; INTRACAUDAL; PERINEURAL AS NEEDED
Status: CANCELLED | OUTPATIENT
Start: 2022-05-06

## 2022-05-06 RX ORDER — FENTANYL CITRATE 50 UG/ML
50 INJECTION, SOLUTION INTRAMUSCULAR; INTRAVENOUS
Status: DISCONTINUED | OUTPATIENT
Start: 2022-05-06 | End: 2022-05-06 | Stop reason: HOSPADM

## 2022-05-06 RX ORDER — LIDOCAINE HYDROCHLORIDE 20 MG/ML
INJECTION, SOLUTION EPIDURAL; INFILTRATION; INTRACAUDAL; PERINEURAL AS NEEDED
Status: DISCONTINUED | OUTPATIENT
Start: 2022-05-06 | End: 2022-05-06 | Stop reason: HOSPADM

## 2022-05-06 RX ORDER — MORPHINE SULFATE 2 MG/ML
2 INJECTION, SOLUTION INTRAMUSCULAR; INTRAVENOUS
Status: DISCONTINUED | OUTPATIENT
Start: 2022-05-06 | End: 2022-05-06 | Stop reason: HOSPADM

## 2022-05-06 RX ORDER — MORPHINE SULFATE 2 MG/ML
1 INJECTION, SOLUTION INTRAMUSCULAR; INTRAVENOUS
Status: DISCONTINUED | OUTPATIENT
Start: 2022-05-06 | End: 2022-05-08 | Stop reason: HOSPADM

## 2022-05-06 RX ORDER — SODIUM CHLORIDE 0.9 % (FLUSH) 0.9 %
5-40 SYRINGE (ML) INJECTION AS NEEDED
Status: DISCONTINUED | OUTPATIENT
Start: 2022-05-06 | End: 2022-05-06 | Stop reason: HOSPADM

## 2022-05-06 RX ORDER — LABETALOL HCL 20 MG/4 ML
10 SYRINGE (ML) INTRAVENOUS
Status: DISCONTINUED | OUTPATIENT
Start: 2022-05-06 | End: 2022-05-08 | Stop reason: HOSPADM

## 2022-05-06 RX ORDER — DIPHENHYDRAMINE HYDROCHLORIDE 50 MG/ML
12.5 INJECTION, SOLUTION INTRAMUSCULAR; INTRAVENOUS AS NEEDED
Status: DISCONTINUED | OUTPATIENT
Start: 2022-05-06 | End: 2022-05-06 | Stop reason: HOSPADM

## 2022-05-06 RX ORDER — SODIUM CHLORIDE 0.9 % (FLUSH) 0.9 %
5-40 SYRINGE (ML) INJECTION AS NEEDED
Status: DISCONTINUED | OUTPATIENT
Start: 2022-05-06 | End: 2022-05-08 | Stop reason: HOSPADM

## 2022-05-06 RX ORDER — ONDANSETRON 2 MG/ML
INJECTION INTRAMUSCULAR; INTRAVENOUS AS NEEDED
Status: DISCONTINUED | OUTPATIENT
Start: 2022-05-06 | End: 2022-05-06 | Stop reason: HOSPADM

## 2022-05-06 RX ORDER — HYDROMORPHONE HYDROCHLORIDE 1 MG/ML
0.4 INJECTION, SOLUTION INTRAMUSCULAR; INTRAVENOUS; SUBCUTANEOUS
Status: DISCONTINUED | OUTPATIENT
Start: 2022-05-06 | End: 2022-05-06 | Stop reason: HOSPADM

## 2022-05-06 RX ORDER — PROPOFOL 10 MG/ML
INJECTION, EMULSION INTRAVENOUS AS NEEDED
Status: DISCONTINUED | OUTPATIENT
Start: 2022-05-06 | End: 2022-05-06 | Stop reason: HOSPADM

## 2022-05-06 RX ORDER — SODIUM CHLORIDE 0.9 % (FLUSH) 0.9 %
5-40 SYRINGE (ML) INJECTION AS NEEDED
Status: CANCELLED | OUTPATIENT
Start: 2022-05-06

## 2022-05-06 RX ORDER — SODIUM CHLORIDE 0.9 % (FLUSH) 0.9 %
5-40 SYRINGE (ML) INJECTION EVERY 8 HOURS
Status: DISCONTINUED | OUTPATIENT
Start: 2022-05-06 | End: 2022-05-06 | Stop reason: HOSPADM

## 2022-05-06 RX ORDER — FENTANYL CITRATE 50 UG/ML
INJECTION, SOLUTION INTRAMUSCULAR; INTRAVENOUS AS NEEDED
Status: DISCONTINUED | OUTPATIENT
Start: 2022-05-06 | End: 2022-05-06 | Stop reason: HOSPADM

## 2022-05-06 RX ORDER — SODIUM CHLORIDE 0.9 % (FLUSH) 0.9 %
5-40 SYRINGE (ML) INJECTION EVERY 8 HOURS
Status: CANCELLED | OUTPATIENT
Start: 2022-05-06

## 2022-05-06 RX ORDER — DEXAMETHASONE SODIUM PHOSPHATE 4 MG/ML
INJECTION, SOLUTION INTRA-ARTICULAR; INTRALESIONAL; INTRAMUSCULAR; INTRAVENOUS; SOFT TISSUE AS NEEDED
Status: DISCONTINUED | OUTPATIENT
Start: 2022-05-06 | End: 2022-05-06 | Stop reason: HOSPADM

## 2022-05-06 RX ORDER — NORETHINDRONE AND ETHINYL ESTRADIOL 0.5-0.035
5 KIT ORAL AS NEEDED
Status: DISCONTINUED | OUTPATIENT
Start: 2022-05-06 | End: 2022-05-06 | Stop reason: HOSPADM

## 2022-05-06 RX ORDER — AMLODIPINE BESYLATE 5 MG/1
10 TABLET ORAL DAILY
Status: DISCONTINUED | OUTPATIENT
Start: 2022-05-07 | End: 2022-05-08 | Stop reason: HOSPADM

## 2022-05-06 RX ADMIN — METOCLOPRAMIDE HYDROCHLORIDE 5 MG: 5 INJECTION INTRAMUSCULAR; INTRAVENOUS at 12:51

## 2022-05-06 RX ADMIN — MORPHINE SULFATE 1 MG: 2 INJECTION, SOLUTION INTRAMUSCULAR; INTRAVENOUS at 17:02

## 2022-05-06 RX ADMIN — INSULIN GLARGINE 35 UNITS: 100 INJECTION, SOLUTION SUBCUTANEOUS at 22:00

## 2022-05-06 RX ADMIN — LABETALOL HYDROCHLORIDE 10 MG: 5 INJECTION, SOLUTION INTRAVENOUS at 17:01

## 2022-05-06 RX ADMIN — SODIUM CHLORIDE, POTASSIUM CHLORIDE, SODIUM LACTATE AND CALCIUM CHLORIDE: 600; 310; 30; 20 INJECTION, SOLUTION INTRAVENOUS at 11:10

## 2022-05-06 RX ADMIN — HYDRALAZINE HYDROCHLORIDE 20 MG: 20 INJECTION, SOLUTION INTRAMUSCULAR; INTRAVENOUS at 13:16

## 2022-05-06 RX ADMIN — DEXAMETHASONE SODIUM PHOSPHATE 4 MG: 4 INJECTION, SOLUTION INTRA-ARTICULAR; INTRALESIONAL; INTRAMUSCULAR; INTRAVENOUS; SOFT TISSUE at 11:15

## 2022-05-06 RX ADMIN — PIPERACILLIN AND TAZOBACTAM 3.38 G: 3; .375 INJECTION, POWDER, LYOPHILIZED, FOR SOLUTION INTRAVENOUS at 06:11

## 2022-05-06 RX ADMIN — SODIUM CHLORIDE, PRESERVATIVE FREE 10 ML: 5 INJECTION INTRAVENOUS at 22:37

## 2022-05-06 RX ADMIN — INSULIN LISPRO 6 UNITS: 100 INJECTION, SOLUTION INTRAVENOUS; SUBCUTANEOUS at 14:20

## 2022-05-06 RX ADMIN — METOCLOPRAMIDE HYDROCHLORIDE 5 MG: 5 INJECTION INTRAMUSCULAR; INTRAVENOUS at 01:12

## 2022-05-06 RX ADMIN — FENTANYL CITRATE 50 MCG: 50 INJECTION, SOLUTION INTRAMUSCULAR; INTRAVENOUS at 12:27

## 2022-05-06 RX ADMIN — ONDANSETRON 4 MG: 2 INJECTION INTRAMUSCULAR; INTRAVENOUS at 11:15

## 2022-05-06 RX ADMIN — INSULIN LISPRO 6 UNITS: 100 INJECTION, SOLUTION INTRAVENOUS; SUBCUTANEOUS at 18:06

## 2022-05-06 RX ADMIN — INSULIN LISPRO 2 UNITS: 100 INJECTION, SOLUTION INTRAVENOUS; SUBCUTANEOUS at 02:00

## 2022-05-06 RX ADMIN — SODIUM CHLORIDE, PRESERVATIVE FREE 10 ML: 5 INJECTION INTRAVENOUS at 21:33

## 2022-05-06 RX ADMIN — SUCCINYLCHOLINE CHLORIDE 200 MG: 20 INJECTION, SOLUTION INTRAMUSCULAR; INTRAVENOUS at 11:15

## 2022-05-06 RX ADMIN — INSULIN LISPRO 4 UNITS: 100 INJECTION, SOLUTION INTRAVENOUS; SUBCUTANEOUS at 22:00

## 2022-05-06 RX ADMIN — PIPERACILLIN AND TAZOBACTAM 3.38 G: 3; .375 INJECTION, POWDER, LYOPHILIZED, FOR SOLUTION INTRAVENOUS at 14:20

## 2022-05-06 RX ADMIN — ACETAMINOPHEN 650 MG: 325 TABLET ORAL at 15:18

## 2022-05-06 RX ADMIN — SODIUM CHLORIDE 125 ML/HR: 9 INJECTION, SOLUTION INTRAVENOUS at 01:16

## 2022-05-06 RX ADMIN — PIPERACILLIN AND TAZOBACTAM 3.38 G: 3; .375 INJECTION, POWDER, LYOPHILIZED, FOR SOLUTION INTRAVENOUS at 21:30

## 2022-05-06 RX ADMIN — FENTANYL CITRATE 50 MCG: 50 INJECTION, SOLUTION INTRAMUSCULAR; INTRAVENOUS at 11:31

## 2022-05-06 RX ADMIN — SODIUM CHLORIDE 125 ML/HR: 4.5 INJECTION, SOLUTION INTRAVENOUS at 12:51

## 2022-05-06 RX ADMIN — FENTANYL CITRATE 50 MCG: 50 INJECTION, SOLUTION INTRAMUSCULAR; INTRAVENOUS at 11:45

## 2022-05-06 RX ADMIN — AMLODIPINE BESYLATE 5 MG: 5 TABLET ORAL at 10:07

## 2022-05-06 RX ADMIN — METOCLOPRAMIDE HYDROCHLORIDE 5 MG: 5 INJECTION INTRAMUSCULAR; INTRAVENOUS at 06:14

## 2022-05-06 RX ADMIN — INSULIN LISPRO 3 UNITS: 100 INJECTION, SOLUTION INTRAVENOUS; SUBCUTANEOUS at 06:00

## 2022-05-06 RX ADMIN — ALBUMIN (HUMAN) 12.5 G: 12.5 INJECTION, SOLUTION INTRAVENOUS at 11:50

## 2022-05-06 RX ADMIN — PHENYLEPHRINE HYDROCHLORIDE 200 MCG: 10 INJECTION INTRAVENOUS at 11:41

## 2022-05-06 RX ADMIN — PROPOFOL 200 MG: 10 INJECTION, EMULSION INTRAVENOUS at 11:15

## 2022-05-06 RX ADMIN — LIDOCAINE HYDROCHLORIDE 80 MG: 20 INJECTION, SOLUTION EPIDURAL; INFILTRATION; INTRACAUDAL; PERINEURAL at 11:15

## 2022-05-06 RX ADMIN — FENTANYL CITRATE 100 MCG: 50 INJECTION, SOLUTION INTRAMUSCULAR; INTRAVENOUS at 11:15

## 2022-05-06 RX ADMIN — SODIUM CHLORIDE, PRESERVATIVE FREE 5 ML: 5 INJECTION INTRAVENOUS at 21:32

## 2022-05-06 RX ADMIN — METOCLOPRAMIDE HYDROCHLORIDE 5 MG: 5 INJECTION INTRAMUSCULAR; INTRAVENOUS at 17:02

## 2022-05-06 RX ADMIN — PHENYLEPHRINE HYDROCHLORIDE 200 MCG: 10 INJECTION INTRAVENOUS at 11:50

## 2022-05-06 NOTE — PROGRESS NOTES
1258- attemptyed to call [facility] patient's mother at [phone] rang busy both times.

## 2022-05-06 NOTE — OP NOTES
Operative Report    Patient: Yg Christie MRN: [id number]  SSN: [id number]    YOB: 1970  Age: 54 y.o. Sex: male       Date of Surgery:   05/06/2022     Preoperative Diagnosis:   Osteomyelitis, right great toe  Diabetic foot ulcer, right foot    Postoperative Diagnosis:   Same     Surgeon(s) and Role:     * Jannis Hashimoto, DPM - Primary    Assistant(s):  None    Anesthesia:   General     Procedure(s):  Right Great Toe Amputation  Debridement of Right Foot    Procedure in Detail:   Pt was seen in the pre-operative holding area and all questions were answered and all concerns were addressed. The operative procedure was discussed in great detail, with all possible complications highlighted. Pt verbalized complete understanding and the consent was signed and witnessed. Pt was on scheduled abx per ID, thus no additional abx ordered for surgical prophylaxis. The operative limb was marked and the pt was transported to the operating room. The pt was transferred to the operating table and anesthesia was administered as indicated above. The RLE was scrubbed and draped in sterile fashion. A time out was performed to confirm the correct pt, correct procedure, correct limb, abx, allergies, fire risk and attendees within the operating room. Upon completion, procedure #1 commenced.     Procedure #1: Right Great Toe Amputation  With a 15 blade, a circumferential incision was made about the right joint and the right great toe was disarticulated. The digit was sent to pathology for analysis. Procedure #2:  Debridement of Right Foot  With a fresh 15 blade, a sharp/excisional debridement was performed to the ulcer noted to the distal/medial aspect of the right foot. The debridement was taken down to the level of bone (with extensive bone taken). Upon completion of the sharp/excisional debridement, an ultrasonic debridement was performed with the [employer] system to remove any remaining biofilm.   The total area debrided measured 53 cm². Bleeding/granular tissue was noted post procedure. Primary closure was attempted and succeeded with 2-0 Monocryl coapting the deep tissue eight 2-0 nylon reapproximating the skin edges. The surgical site was packed with Betadine ointment and covered with Adaptic, dry 4 x 4's, Kerlix and a light Ace wrap.     Pt tolerated the above procedures well and all post operative counts were correct. Pt was transferred to PACU without incident. A thorough neurovascular check was then performed. Upon meeting transfer criteria, pt was transferred back to the medical floor.     Estimated Blood Loss:    30cc    Tourniquet Time:   None    Implants:   None           Specimens:   ID Type Source Tests Collected by Time Destination   1 : right great toe Preservative Toe  Ruben Guzman 5/6/2022 1129 Pathology         Drains:   None                Complications:   None      Signed By:  Nu Grayson DPM     May 6, 2022
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
## 2022-05-06 NOTE — PROGRESS NOTES
Problem: Falls - Risk of  Goal: *Absence of Falls  Description: Document Vera Snyder Fall Risk and appropriate interventions in the flowsheet. Outcome: Progressing Towards Goal  Note: Fall Risk Interventions:  Mobility Interventions: Strengthening exercises (ROM-active/passive)         Medication Interventions: Patient to call before getting OOB    Elimination Interventions: Bed/chair exit alarm,Call light in reach              Problem: Patient Education: Go to Patient Education Activity  Goal: Patient/Family Education  Outcome: Progressing Towards Goal     Problem: Pressure Injury - Risk of  Goal: *Prevention of pressure injury  Description: Document Nickolas Scale and appropriate interventions in the flowsheet. Outcome: Progressing Towards Goal  Note: Pressure Injury Interventions:  Sensory Interventions: Minimize linen layers    Moisture Interventions: Absorbent underpads    Activity Interventions: Increase time out of bed    Mobility Interventions: HOB 30 degrees or less    Nutrition Interventions: Document food/fluid/supplement intake    Friction and Shear Interventions: Minimize layers                Problem: Patient Education: Go to Patient Education Activity  Goal: Patient/Family Education  Outcome: Progressing Towards Goal     Problem: Diabetes Self-Management  Goal: *Disease process and treatment process  Description: Define diabetes and identify own type of diabetes; list 3 options for treating diabetes. Outcome: Progressing Towards Goal  Goal: *Incorporating nutritional management into lifestyle  Description: Describe effect of type, amount and timing of food on blood glucose; list 3 methods for planning meals. Outcome: Progressing Towards Goal  Goal: *Incorporating physical activity into lifestyle  Description: State effect of exercise on blood glucose levels.   Outcome: Progressing Towards Goal  Goal: *Developing strategies to promote health/change behavior  Description: Define the ABC's of diabetes; identify appropriate screenings, schedule and personal plan for screenings. Outcome: Progressing Towards Goal  Goal: *Using medications safely  Description: State effect of diabetes medications on diabetes; name diabetes medication taking, action and side effects. Outcome: Progressing Towards Goal  Goal: *Monitoring blood glucose, interpreting and using results  Description: Identify recommended blood glucose targets  and personal targets. Outcome: Progressing Towards Goal  Goal: *Prevention, detection, treatment of acute complications  Description: List symptoms of hyper- and hypoglycemia; describe how to treat low blood sugar and actions for lowering  high blood glucose level. Outcome: Progressing Towards Goal  Goal: *Prevention, detection and treatment of chronic complications  Description: Define the natural course of diabetes and describe the relationship of blood glucose levels to long term complications of diabetes.   Outcome: Progressing Towards Goal  Goal: *Developing strategies to address psychosocial issues  Description: Describe feelings about living with diabetes; identify support needed and support network  Outcome: Progressing Towards Goal  Goal: *Insulin pump training  Outcome: Progressing Towards Goal  Goal: *Sick day guidelines  Outcome: Progressing Towards Goal  Goal: *Patient Specific Goal (EDIT GOAL, INSERT TEXT)  Outcome: Progressing Towards Goal     Problem: Patient Education: Go to Patient Education Activity  Goal: Patient/Family Education  Outcome: Progressing Towards Goal

## 2022-05-06 NOTE — PROGRESS NOTES
Infectious Disease Progress Note           Subjective:   Stable, denies new complaints, underwent right foot debridement w amp of right great toe today, denies new complaints   Objective:   Physical Exam:     Visit Vitals  BP (!) 160/85   Pulse (!) 124   Temp 97.5 °F (36.4 °C)   Resp 16   Ht 6' 4\" (1.93 m)   Wt 230 lb (104.3 kg)   SpO2 99%   BMI 28.00 kg/m²    O2 Flow Rate (L/min): 2 l/min O2 Device: None (Room air)    Temp (24hrs), Av.6 °F (37 °C), Min:97.2 °F (36.2 °C), Max:99.1 °F (37.3 °C)    701 - 1900  In: 550 [I.V.:550]  Out: 240 [Urine:240]   1901 - 700  In: 1245.8 [P.O.:100; I.V.:1145.8]  Out: 2100 [Urine:]    General: NAD, AAO x 4  HEENT: AN, Moist mucosa   Lungs: CTA b/l, decreased BS at the bases   Heart: S1S2+, RRR, no murmur  Abdo: Soft, NT, ND, +BS   : No wetzel   Exts: Right foot dressing in place   Skin: Right great toe ulcer       Data Review:       Recent Days:  Recent Labs     22  0236 22  0301   WBC 18.8* 20.1* 16.5*   HGB 10.9* 10.3* 11.4*   HCT 32.4* 30.8* 35.8*   * 414* 419*     Recent Labs     22  03522  0236 22  0301   BUN 32*  32* 51* 20   CREA 1.74*  1.75* 2.83* 1.62*       Lab Results   Component Value Date/Time    C-Reactive protein 13.20 (H) 2022 03:59 AM          Microbiology     Results     Procedure Component Value Units Date/Time    CULTURE, TISSUE Eran Citizen STAIN [[id number]] Collected: 22 6679    Order Status: Completed Specimen: Tissue Updated: 22 1304     Special Requests: No Special Requests        GRAM STAIN No wbc's seen         No organisms seen        Culture result:       Scant  preliminary report of probable S. Aureus (Negative for antigen detection) confirmation and sensitivities to follow.       MRSA SCREEN - PCR (NASAL) [[id number]] Collected: 22 0830    Order Status: Completed Specimen: Swab Updated: 22 0951     MRSA by PCR, Nasal Not Detected       CULTURE, Rosa Perez STAIN [[id number]] Collected: 05/02/22 1800    Order Status: Canceled Specimen: Wound from Foot     CULTURE, BLOOD, PAIRED [[id number]] Collected: 05/02/22 1737    Order Status: Completed Specimen: Blood Updated: 05/06/22 0809     Special Requests: No Special Requests        Culture result: No growth 3 days       CULTURE, Rosa Perez STAIN [[id number]]  (Susceptibility) Collected: 04/30/22 1830    Order Status: Completed Specimen: Wound from Swab Updated: 05/04/22 0710     Special Requests: No Special Requests        GRAM STAIN Occasional WBCs seen               2+ Gram Positive Cocci in pairs           Culture result:       Heavy Staphylococcus aureus                  Moderate Enterococcus faecalis          Susceptibility      Staphylococcus aureus     MANDY     Ciprofloxacin ($) Susceptible     Clindamycin ($) Resistant     Daptomycin ($$$$$) Susceptible     Doxycycline ($$) Susceptible     Erythromycin ($$$$) Susceptible     Gentamicin ($) Susceptible     Levofloxacin ($) Susceptible     Linezolid ($$$$$) Susceptible     Moxifloxacin ($$$$) Susceptible     Oxacillin Susceptible     Rifampin ($$$$) Susceptible  [1]      Tetracycline Susceptible     Trimeth/Sulfa Susceptible     Vancomycin ($) Susceptible                 [1]  Rifampin is not to be used for mono-therapy. Linear View               Susceptibility      Enterococcus faecalis     MANDY     Ampicillin ($) Susceptible     Daptomycin ($$$$$) Susceptible     Linezolid ($$$$$) Susceptible     Vancomycin ($) Susceptible                  Linear View                            Diagnostics   CXR Results  (Last 48 hours)    None           Assessment/Plan     1. 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 Faecalis isolated from wound Cx (04/30).  MSSA isolated from repeat Cx on 05/03      S/p wound debridement on 5/03 and 05/06 w amp of right great toe Afebrile, leukocytosis trending down on todays labs       Recommend 3 wks of IV antibiotics post-pt given underlying DM       Continue on Zosyn, routine labs in the morning         2. Nausea/vomiting, suspected gastroparesis, subjectively better       Gastric emptying study to be done on 05/09 if still in pt     3.  Acute on chronic renal failure: Rising Cr, seen by nephrology      4. Long standing h/o DM type 1, BS mgt per primary, mother concerned about pt getting insuline upon d/c,       Currently unemployed, applied for disability         Jairon Meyers MD     5/6/2022
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
## 2022-05-06 NOTE — PROGRESS NOTES
Hospitalist Progress Note         UBALDO Frank, FNP-C    Daily Progress Note: 5/6/2022      Subjective:   Subjective   Patient examined alert and oriented lying in bed  No acute distress noted on examination  No overnight events reported      Review of Systems:   Review of Systems   Respiratory: Negative for cough. Cardiovascular: Negative for chest pain. Gastrointestinal: Negative for heartburn. Genitourinary: Negative for dysuria. Musculoskeletal: Negative for myalgias. Skin:        Right foot wound         Objective:   Objective      Vitals:  Patient Vitals for the past 12 hrs:   Temp Pulse Resp BP SpO2   05/06/22 1049 -- (!) 109 18 (!) 178/102 --   05/06/22 0808 99.1 °F (37.3 °C) (!) 114 20 (!) 169/98 95 %   05/06/22 0300 98.9 °F (37.2 °C) (!) 118 22 (!) 142/70 98 %        Physical Exam:  Physical Exam  Vitals and nursing note reviewed. Constitutional:       Appearance: Normal appearance. Eyes:      Extraocular Movements: Extraocular movements intact. Cardiovascular:      Rate and Rhythm: Tachycardia present. Heart sounds: Normal heart sounds. Pulmonary:      Breath sounds: Normal breath sounds. Abdominal:      General: Bowel sounds are normal.   Musculoskeletal:         General: Normal range of motion. Skin:     General: Skin is warm and dry. Comments: Right foot surgical dressing intact   Neurological:      Mental Status: He is alert and oriented to person, place, and time.           Lab Results:  Recent Results (from the past 24 hour(s))   GLUCOSE, POC    Collection Time: 05/05/22 12:01 PM   Result Value Ref Range    Glucose (POC) 141 (H) 65 - 117 mg/dL    Performed by Dain Miller, POC    Collection Time: 05/05/22  2:27 PM   Result Value Ref Range    Glucose (POC) 111 65 - 117 mg/dL    Performed by Rocky Torres    GLUCOSE, POC    Collection Time: 05/05/22  5:46 PM   Result Value Ref Range    Glucose (POC) 111 65 - 117 mg/dL    Performed by Rocky Torres GLUCOSE, POC    Collection Time: 05/05/22  9:54 PM   Result Value Ref Range    Glucose (POC) 213 (H) 65 - 117 mg/dL    Performed by Pat Farnsworth    GLUCOSE, POC    Collection Time: 05/06/22  2:14 AM   Result Value Ref Range    Glucose (POC) 231 (H) 65 - 117 mg/dL    Performed by Pat Farnsworth    CBC WITH AUTOMATED DIFF    Collection Time: 05/06/22  3:59 AM   Result Value Ref Range    WBC 18.8 (H) 4.1 - 11.1 K/uL    RBC 3.79 (L) 4.10 - 5.70 M/uL    HGB 10.9 (L) 12.1 - 17.0 g/dL    HCT 32.4 (L) 36.6 - 50.3 %    MCV 85.5 80.0 - 99.0 FL    MCH 28.8 26.0 - 34.0 PG    MCHC 33.6 30.0 - 36.5 g/dL    RDW 14.5 11.5 - 14.5 %    PLATELET 143 (H) 224 - 400 K/uL    MPV 10.0 8.9 - 12.9 FL    NRBC 0.0 0.0  WBC    ABSOLUTE NRBC 0.00 0.00 - 0.01 K/uL    NEUTROPHILS 87 (H) 32 - 75 %    LYMPHOCYTES 7 (L) 12 - 49 %    MONOCYTES 5 5 - 13 %    EOSINOPHILS 0 0 - 7 %    BASOPHILS 0 0 - 1 %    IMMATURE GRANULOCYTES 1 (H) 0 - 0.5 %    ABS. NEUTROPHILS 16.3 (H) 1.8 - 8.0 K/UL    ABS. LYMPHOCYTES 1.3 0.8 - 3.5 K/UL    ABS. MONOCYTES 1.0 0.0 - 1.0 K/UL    ABS. EOSINOPHILS 0.0 0.0 - 0.4 K/UL    ABS. BASOPHILS 0.0 0.0 - 0.1 K/UL    ABS. IMM.  GRANS. 0.2 (H) 0.00 - 0.04 K/UL    DF AUTOMATED     RENAL FUNCTION PANEL    Collection Time: 05/06/22  3:59 AM   Result Value Ref Range    Sodium 151 (H) 136 - 145 mmol/L    Potassium 3.8 3.5 - 5.1 mmol/L    Chloride 118 (H) 97 - 108 mmol/L    CO2 24 21 - 32 mmol/L    Anion gap 9 5 - 15 mmol/L    Glucose 238 (H) 65 - 100 mg/dL    BUN 32 (H) 6 - 20 mg/dL    Creatinine 1.75 (H) 0.70 - 1.30 mg/dL    BUN/Creatinine ratio 18 12 - 20      GFR est AA 50 (L) >60 ml/min/1.73m2    GFR est non-AA 42 (L) >60 ml/min/1.73m2    Calcium 8.9 8.5 - 10.1 mg/dL    Phosphorus 2.2 (L) 2.6 - 4.7 mg/dL    Albumin 2.5 (L) 3.5 - 5.0 g/dL   SED RATE (ESR)    Collection Time: 05/06/22  3:59 AM   Result Value Ref Range    Sed rate, automated 107 (H) 0 - 15 mm/hr   GLUCOSE, POC    Collection Time: 05/06/22  6:20 AM   Result Value Ref Range    Glucose (POC) 193 (H) 65 - 117 mg/dL    Performed by Emmett Christensen, POC    Collection Time: 05/06/22  7:31 AM   Result Value Ref Range    Glucose (POC) 198 (H) 65 - 117 mg/dL    Performed by Walkerpepe Hi    GLUCOSE, POC    Collection Time: 05/06/22  9:53 AM   Result Value Ref Range    Glucose (POC) 217 (H) 65 - 117 mg/dL    Performed by Walkerpepe Hi           Diagnostic Images:  CT Results  (Last 48 hours)    None          Current Medications:    Current Facility-Administered Medications:     0.45% sodium chloride infusion, 125 mL/hr, IntraVENous, CONTINUOUS, Mary Kelley NP    metoclopramide HCl (REGLAN) injection 5 mg, 5 mg, IntraVENous, Q6H, Rosalie Bailey NP, 5 mg at 05/06/22 0614    [Held by provider] 0.9% sodium chloride infusion, 125 mL/hr, IntraVENous, CONTINUOUS, Elias Trejo MD, Last Rate: 125 mL/hr at 05/06/22 0116, 125 mL/hr at 05/06/22 0116    promethazine (PHENERGAN) 25 mg in 0.9% sodium chloride 50 mL IVPB, 25 mg, IntraVENous, Q6H PRN, Vangie White MD, Last Rate: 200 mL/hr at 05/05/22 2217, 25 mg at 05/05/22 2217    amLODIPine (NORVASC) tablet 5 mg, 5 mg, Oral, DAILY, Guero Beltrán MD, 5 mg at 05/06/22 1007    glucose chewable tablet 16 g, 4 Tablet, Oral, PRN, Mary Kelley NP    dextrose 10% infusion 0-250 mL, 0-250 mL, IntraVENous, PRN, Mary Kelley NP    glucagon (GLUCAGEN) injection 1 mg, 1 mg, IntraMUSCular, PRN, Mary Kelley NP    [Held by provider] 0.9% sodium chloride infusion, 150 mL/hr, IntraVENous, CONTINUOUS, Margy Cartre MD, Last Rate: 150 mL/hr at 05/05/22 0534, 150 mL/hr at 05/05/22 0534    insulin glargine (LANTUS) injection 35 Units, 35 Units, SubCUTAneous, QHS, Rajeev Gordillo MD, 35 Units at 05/05/22 2200    dextrose 10% infusion 0-250 mL, 0-250 mL, IntraVENous, PRN, Margy Carter MD    insulin lispro (HUMALOG) injection, , SubCUTAneous, Q4H, Margy Carter MD, 3 Units at 05/06/22 0600   piperacillin-tazobactam (ZOSYN) 3.375 g in 0.9% sodium chloride (MBP/ADV) 100 mL MBP, 3.375 g, IntraVENous, Q8H, Vangie White MD, Last Rate: 200 mL/hr at 05/06/22 0611, 3.375 g at 05/06/22 0611    sodium chloride (NS) flush 5-40 mL, 5-40 mL, IntraVENous, Q8H, Phillingane, Elmo, DPM, 10 mL at 05/05/22 1424    sodium chloride (NS) flush 5-40 mL, 5-40 mL, IntraVENous, PRN, Phillingane, Elmo, DPM, 10 mL at 05/05/22 0545    hydrALAZINE (APRESOLINE) 20 mg/mL injection 20 mg, 20 mg, IntraVENous, Q6H PRN, Guero Beltrán MD, 20 mg at 05/05/22 1616    sodium chloride (NS) flush 5-40 mL, 5-40 mL, IntraVENous, Q8H, Juan Luis Ramsay MD, 10 mL at 05/05/22 2208    sodium chloride (NS) flush 5-40 mL, 5-40 mL, IntraVENous, PRN, Corrie Ramsay MD    acetaminophen (TYLENOL) tablet 650 mg, 650 mg, Oral, Q6H PRN, 650 mg at 05/02/22 2339 **OR** acetaminophen (TYLENOL) suppository 650 mg, 650 mg, Rectal, Q6H PRN, Corrie Ramsay MD    polyethylene glycol (MIRALAX) packet 17 g, 17 g, Oral, DAILY PRN, Corrie Ramsay MD    ondansetron (ZOFRAN ODT) tablet 4 mg, 4 mg, Oral, Q8H PRN, 4 mg at 05/03/22 1738 **OR** ondansetron (ZOFRAN) injection 4 mg, 4 mg, IntraVENous, Q6H PRN, Corrie Ramsay MD, 4 mg at 05/05/22 1936    enoxaparin (LOVENOX) injection 40 mg, 40 mg, SubCUTAneous, DAILY, Corrie Ramsay MD, 40 mg at 05/05/22 0793       ASSESSMENT:    Mitul Lopez is a 54 y.o. male with PMH of diabetic foot ulcer, diabetes on insulin pump, hypertension presented the ED with chief complaint of right foot pain, associated with draining wound and redness. Patient states that he had 1 on the plantar surface for the past few months months that stopped healing.  In addition, since last week, he has developed another ulcer on the medial aspect of the right foot over the ball of the foot, associated with purulent discharge and is not healing as well.  Says he was seen by Dr. Klaudia Marrufo, podiatrist and asked him to be admitted to the hospital. Otherwise denies fever or chills. On 5/3/22 underwent Debridement of Right Foot Ulcer. 5/5/22 Infection markers worsen.     #1  Sepsis  #2 Diabetic foot ulcer, osteomyelitis suspicion  -Tachycardia, low-grade fever, elevated CRP, wound infection, leukocytosis  - Imaging: MRI shows soft tissue swelling and osteomyelitis suspicion  - Blood and would culture obtained.   -Wound culture preliminary shows heavy staph auerus and moderate Enterococcus faecalis  - Antibiotics: Zosyn. Cefepime and vancomycin discontinued  - Podiatry and ID following  - on 5/3/22 underwent Debridement of Right Foot Ulcer   - PT/OT evaluation  - Long term IV antbx, pending neg bcx (no growth x 3 days)  - Follow wcx sensitivity report  - Infection markers stable  - Plan for Great toe amputation vs debridement today       #3 RUDDY  - likely secondary to to sepsis  - Gentle IV hydration 0.45% NS  - continue to follow  - nephrology following    #4 Hypertension  -resume amlodipine 10mg, prn hydralazine  -Labetalol as needed  -bp currently uncontrolled     #5 Diabetes  -continue lantus, lispro, med dose ssi  -ac/hs accu check  -A1c 7.8       Full Code  Dvt Prophylaxis lovenox  GI Prophylaxis none  Discharge barriers:  - Podiatry and Infectious disease following  - Follow wound/blood cultures  - Long term IV antbx, pending neg bcx  - Case management for long-term IV antibiotic  - Surgery today      NOK: Low Ni (Mother) [phone] [facility] Phone)      Above treatment plan reviewed and discussed with patient in detail at bedside, all questions answered. Care Plan discussed with: interDisciplinary team, patient    Total time spent with patient: 35 minutes.     Apoorva Jones NP

## 2022-05-06 NOTE — PROGRESS NOTES
Spoke with patient at bedside about discharge planning, needs for home IV antibiotics and home health. Patient understands and states he thinks he is able to do home IV antibiotics. No preference for choice for company for either home health or infusion company. Referrals being sent via [employer] along with clinicals. Will need accepting home health company and infusion service with teaching and any payment needs met as well as midline/PICC line for discharge. 1345:Patient accepted with [facility] at time of discharge. Will need wound care orders and home health orders at discharge. Once prescriptions and home health orders are received, will need to have them faxed to home health agency and infusion company. DC plan home with home health and infusion services. 1520:patient mother asked to speak with me about patient. Patient recently lost job and having difficulty with getting diabetic supplies. He uses dexcom G6 sensors and is having trouble finding company that accept patient insurance for dexcom. Explained that he may not have coverage for dexcom since change in insurance and will likely need finger stick setup for blood glucose checks. Told patient and his mother I will call his insurance and see if his insurance covers that type of diabetic supplies. 1540: tried calling his [employer] provider. Could not get a hold of anyone other than a computer program. Was final instructed by computer prompts that their office is closed currently. Will look through the medicaid website to see if that will get us anywhere.

## 2022-05-06 NOTE — PROGRESS NOTES
Progress Note  Date:2022       Room:[facility]  Patient Name:Gabe Wynne     YOB: 1970     Age:51 y.o. Subjective    Subjective   Patient seen at bedside. Denies any new complaints. Per nursing, episodic hyperglycemia since previous visit    Review of Systems   Constitutional: Negative.    HENT: Negative.    Eyes: Negative.    Respiratory: Negative.    Cardiovascular: Negative.    Gastrointestinal: Negative.    Endocrine: Negative.    Genitourinary: Negative.    Musculoskeletal: Negative.    Skin: Negative.    Allergic/Immunologic: Positive for immunocompromised state. Neurological: Positive for numbness. Hematological: Negative.    Psychiatric/Behavioral: Negative.    All other systems reviewed and are negative. Objective         Vitals Last 24 Hours:  TEMPERATURE:  Temp  Av.7 °F (37.1 °C)  Min: 97.2 °F (36.2 °C)  Max: 99.1 °F (37.3 °C)  RESPIRATIONS RANGE: Resp  Av.2  Min: 16  Max: 22  PULSE OXIMETRY RANGE: SpO2  Av %  Min: 93 %  Max: 98 %  PULSE RANGE: Pulse  Av.2  Min: 104  Max: 120  BLOOD PRESSURE RANGE: Systolic (53GRW), VYI:635 , Min:141 , SOV:523   ; Diastolic (82SYO), MVZ:81, Min:70, Max:109    I/O (24Hr): Intake/Output Summary (Last 24 hours) at 2022 1445  Last data filed at 2022 1150  Gross per 24 hour   Intake 550 ml   Output 1740 ml   Net -1190 ml     Objective   Vitals reviewed. Constitutional:       Appearance: He is overweight. Cardiovascular:      Pulses:           Dorsalis pedis pulses are 2+ on the right side and 2+ on the left side.        Posterior tibial pulses are 2+ on the right side and 2+ on the left side. Pulmonary:      Effort: Pulmonary effort is normal.   Musculoskeletal:      Right lower leg: No edema.      Left lower leg: No edema.      Right foot: Normal range of motion. No deformity or bunion.      Left foot: Normal range of motion. No deformity or bunion.    Feet:      Right foot:      Protective Sensation: 10 sites tested. 0 sites sensed.      Skin integrity: Ulcer and erythema present.      Toenail Condition: Right toenails are abnormally thick.      Left foot:      Protective Sensation: 10 sites tested. 0 sites sensed.      Skin integrity: Skin integrity normal.      Toenail Condition: Left toenails are abnormally thick. Lymphadenopathy:      Lower Body: No right inguinal adenopathy. No left inguinal adenopathy. Skin:     General: Skin is warm. Ulcer to the lateral left ankle     Capillary Refill: Capillary refill takes 2 to 3 seconds.      Comments: Absent pedal hair growth with hyperpigmentation   Neurological:      Mental Status: He is alert and oriented to person, place, and time.      Comments: Paresthesias/burning noted   Psychiatric:         Mood and Affect: Mood and affect normal.         Behavior: Behavior is cooperative.     Labs/Imaging/Diagnostics    Labs:  CBC:  Recent Labs     05/06/22 0359 05/05/22 0236 05/04/22  0301   WBC 18.8* 20.1* 16.5*   RBC 3.79* 3.57* 4.00*   HGB 10.9* 10.3* 11.4*   HCT 32.4* 30.8* 35.8*   MCV 85.5 86.3 89.5   RDW 14.5 14.1 14.4   * 414* 419*     CHEMISTRIES:  Recent Labs     05/06/22 0359 05/05/22 0236 05/04/22  0301   *  151* 143 136   K 3.8  3.8 3.8 5.0   *  118* 112* 105   CO2 24  24 25 21   BUN 32*  32* 51* 20   CA 9.0  8.9 9.3 9.0   PHOS 2.2*  --   --    PT/INR:No results for input(s): INR, INREXT in the last 72 hours. No lab exists for component: PROTIME  APTT:No results for input(s): APTT in the last 72 hours. LIVER PROFILE:No results for input(s): AST, ALT in the last 72 hours. No lab exists for component: Garlin Lacey, ALKPHOS  Lab Results   Component Value Date/Time    ALT (SGPT) 33 05/02/2022 05:37 PM    AST (SGOT) 49 (H) 05/02/2022 05:37 PM    Alk.  phosphatase 104 05/02/2022 05:37 PM    Bilirubin, total 0.3 05/02/2022 05:37 PM       Imaging Last 24 Hours:  US RETROPERITONEUM COMP    Result Date: 5/6/2022  Renal ultrasound Right kidney measures 11.7 cm for long axis. Normal corticomedullary differentiation. No hydronephrosis. Left kidney measures 10.9 cm for long axis. Normal corticomedullary differentiation. No hydronephrosis. IVC and imaged abdominal aorta appear unremarkable. Bladder appears unremarkable. No post void images obtained. Assessment//Plan   Principal Problem:    Diabetic foot infection ([address] (5/2/2022)      Assessment & Plan    Diabetic foot infection, right foot (s/p debridement on 05/03/2022)  Sepsis  Uncontrolled diabetes mellitus with neuropathy        Patient seen and evaluated at bedside  - Current labs personally reviewed and findings dicussed with patient  - Treatment options discussed in depth, conservative versus surgical.  Possible complications of each reviewed. Patient elected for amputation of the right great toe with extensive debridement to the right foot. Preoperative, intraoperative and postoperative surgical protocols discussed in great detail  - NPO and consent to be signed/witnessed  - Abx per ID  - I will follow closely           Elmo Fowler, [facility], [address] [place] [address] and [facility]  [address] [place] [address] [address], [address] [place] [address], [address] [facility],[address]  [facility]:  [phone]  F:  [phone]  C:  [phone]    Electronically signed by Belkys Garcia DPM on 5/6/2022 at 2:45 PM

## 2022-05-07 LAB
ALBUMIN SERPL-MCNC: 2.6 G/DL (ref 3.5–5)
ANION GAP SERPL CALC-SCNC: 3 MMOL/L (ref 5–15)
BASOPHILS # BLD: 0 K/UL (ref 0–0.1)
BASOPHILS NFR BLD: 0 % (ref 0–1)
BUN SERPL-MCNC: 24 MG/DL (ref 6–20)
BUN/CREAT SERPL: 19 (ref 12–20)
CA-I BLD-MCNC: 8.2 MG/DL (ref 8.5–10.1)
CHLORIDE SERPL-SCNC: 119 MMOL/L (ref 97–108)
CO2 SERPL-SCNC: 27 MMOL/L (ref 21–32)
CREAT SERPL-MCNC: 1.26 MG/DL (ref 0.7–1.3)
CRP SERPL-MCNC: 7.49 MG/DL (ref 0–0.6)
DIFFERENTIAL METHOD BLD: ABNORMAL
EOSINOPHIL # BLD: 0 K/UL (ref 0–0.4)
EOSINOPHIL NFR BLD: 0 % (ref 0–7)
ERYTHROCYTE [DISTWIDTH] IN BLOOD BY AUTOMATED COUNT: 14.6 % (ref 11.5–14.5)
GLUCOSE BLD STRIP.AUTO-MCNC: 106 MG/DL (ref 65–117)
GLUCOSE BLD STRIP.AUTO-MCNC: 116 MG/DL (ref 65–117)
GLUCOSE BLD STRIP.AUTO-MCNC: 140 MG/DL (ref 65–117)
GLUCOSE BLD STRIP.AUTO-MCNC: 166 MG/DL (ref 65–117)
GLUCOSE BLD STRIP.AUTO-MCNC: 212 MG/DL (ref 65–117)
GLUCOSE BLD STRIP.AUTO-MCNC: 247 MG/DL (ref 65–117)
GLUCOSE SERPL-MCNC: 226 MG/DL (ref 65–100)
HCT VFR BLD AUTO: 31.1 % (ref 36.6–50.3)
HGB BLD-MCNC: 10.2 G/DL (ref 12.1–17)
IMM GRANULOCYTES # BLD AUTO: 0.1 K/UL (ref 0–0.04)
IMM GRANULOCYTES NFR BLD AUTO: 1 % (ref 0–0.5)
LYMPHOCYTES # BLD: 1.2 K/UL (ref 0.8–3.5)
LYMPHOCYTES NFR BLD: 8 % (ref 12–49)
MCH RBC QN AUTO: 28.4 PG (ref 26–34)
MCHC RBC AUTO-ENTMCNC: 32.8 G/DL (ref 30–36.5)
MCV RBC AUTO: 86.6 FL (ref 80–99)
MONOCYTES # BLD: 1 K/UL (ref 0–1)
MONOCYTES NFR BLD: 6 % (ref 5–13)
NEUTS SEG # BLD: 13 K/UL (ref 1.8–8)
NEUTS SEG NFR BLD: 85 % (ref 32–75)
NRBC # BLD: 0 K/UL (ref 0–0.01)
NRBC BLD-RTO: 0 PER 100 WBC
PERFORMED BY, TECHID: ABNORMAL
PERFORMED BY, TECHID: NORMAL
PERFORMED BY, TECHID: NORMAL
PHOSPHATE SERPL-MCNC: 2.5 MG/DL (ref 2.6–4.7)
PLATELET # BLD AUTO: 411 K/UL (ref 150–400)
PMV BLD AUTO: 10 FL (ref 8.9–12.9)
POTASSIUM SERPL-SCNC: 3.8 MMOL/L (ref 3.5–5.1)
RBC # BLD AUTO: 3.59 M/UL (ref 4.1–5.7)
SODIUM SERPL-SCNC: 149 MMOL/L (ref 136–145)
WBC # BLD AUTO: 15.3 K/UL (ref 4.1–11.1)

## 2022-05-07 PROCEDURE — 80069 RENAL FUNCTION PANEL: CPT

## 2022-05-07 PROCEDURE — 74011250637 HC RX REV CODE- 250/637: Performed by: NURSE PRACTITIONER

## 2022-05-07 PROCEDURE — 74011250636 HC RX REV CODE- 250/636: Performed by: INTERNAL MEDICINE

## 2022-05-07 PROCEDURE — 99232 SBSQ HOSP IP/OBS MODERATE 35: CPT | Performed by: INTERNAL MEDICINE

## 2022-05-07 PROCEDURE — 74011000250 HC RX REV CODE- 250: Performed by: INTERNAL MEDICINE

## 2022-05-07 PROCEDURE — 74011000258 HC RX REV CODE- 258: Performed by: INTERNAL MEDICINE

## 2022-05-07 PROCEDURE — 74011250636 HC RX REV CODE- 250/636: Performed by: NURSE PRACTITIONER

## 2022-05-07 PROCEDURE — 36415 COLL VENOUS BLD VENIPUNCTURE: CPT

## 2022-05-07 PROCEDURE — 82962 GLUCOSE BLOOD TEST: CPT

## 2022-05-07 PROCEDURE — 74011250637 HC RX REV CODE- 250/637: Performed by: INTERNAL MEDICINE

## 2022-05-07 PROCEDURE — 85025 COMPLETE CBC W/AUTO DIFF WBC: CPT

## 2022-05-07 PROCEDURE — 65270000029 HC RM PRIVATE

## 2022-05-07 PROCEDURE — 74011636637 HC RX REV CODE- 636/637: Performed by: HOSPITALIST

## 2022-05-07 PROCEDURE — 86140 C-REACTIVE PROTEIN: CPT

## 2022-05-07 RX ORDER — HYDRALAZINE HYDROCHLORIDE 50 MG/1
50 TABLET, FILM COATED ORAL 3 TIMES DAILY
Status: DISCONTINUED | OUTPATIENT
Start: 2022-05-07 | End: 2022-05-07

## 2022-05-07 RX ORDER — HYDRALAZINE HYDROCHLORIDE 25 MG/1
25 TABLET, FILM COATED ORAL 3 TIMES DAILY
Status: DISCONTINUED | OUTPATIENT
Start: 2022-05-07 | End: 2022-05-08 | Stop reason: HOSPADM

## 2022-05-07 RX ADMIN — PIPERACILLIN AND TAZOBACTAM 3.38 G: 3; .375 INJECTION, POWDER, LYOPHILIZED, FOR SOLUTION INTRAVENOUS at 05:04

## 2022-05-07 RX ADMIN — AMLODIPINE BESYLATE 10 MG: 5 TABLET ORAL at 08:51

## 2022-05-07 RX ADMIN — SODIUM CHLORIDE, PRESERVATIVE FREE 10 ML: 5 INJECTION INTRAVENOUS at 14:46

## 2022-05-07 RX ADMIN — INSULIN GLARGINE 35 UNITS: 100 INJECTION, SOLUTION SUBCUTANEOUS at 22:00

## 2022-05-07 RX ADMIN — INSULIN LISPRO 3 UNITS: 100 INJECTION, SOLUTION INTRAVENOUS; SUBCUTANEOUS at 06:00

## 2022-05-07 RX ADMIN — METOCLOPRAMIDE HYDROCHLORIDE 5 MG: 5 INJECTION INTRAMUSCULAR; INTRAVENOUS at 05:04

## 2022-05-07 RX ADMIN — SODIUM CHLORIDE, PRESERVATIVE FREE 10 ML: 5 INJECTION INTRAVENOUS at 22:24

## 2022-05-07 RX ADMIN — HYDRALAZINE HYDROCHLORIDE 25 MG: 25 TABLET, FILM COATED ORAL at 16:00

## 2022-05-07 RX ADMIN — ONDANSETRON 4 MG: 2 INJECTION INTRAMUSCULAR; INTRAVENOUS at 11:26

## 2022-05-07 RX ADMIN — SODIUM CHLORIDE, PRESERVATIVE FREE 10 ML: 5 INJECTION INTRAVENOUS at 05:22

## 2022-05-07 RX ADMIN — ENOXAPARIN SODIUM 40 MG: 100 INJECTION SUBCUTANEOUS at 08:51

## 2022-05-07 RX ADMIN — LABETALOL HYDROCHLORIDE 10 MG: 5 INJECTION, SOLUTION INTRAVENOUS at 09:27

## 2022-05-07 RX ADMIN — METOCLOPRAMIDE HYDROCHLORIDE 5 MG: 5 INJECTION INTRAMUSCULAR; INTRAVENOUS at 00:24

## 2022-05-07 RX ADMIN — METOCLOPRAMIDE HYDROCHLORIDE 5 MG: 5 INJECTION INTRAMUSCULAR; INTRAVENOUS at 11:26

## 2022-05-07 RX ADMIN — MORPHINE SULFATE 1 MG: 2 INJECTION, SOLUTION INTRAMUSCULAR; INTRAVENOUS at 00:48

## 2022-05-07 RX ADMIN — METOCLOPRAMIDE HYDROCHLORIDE 5 MG: 5 INJECTION INTRAMUSCULAR; INTRAVENOUS at 17:39

## 2022-05-07 RX ADMIN — INSULIN LISPRO 6 UNITS: 100 INJECTION, SOLUTION INTRAVENOUS; SUBCUTANEOUS at 02:00

## 2022-05-07 RX ADMIN — HYDRALAZINE HYDROCHLORIDE 25 MG: 25 TABLET, FILM COATED ORAL at 11:25

## 2022-05-07 RX ADMIN — LABETALOL HYDROCHLORIDE 10 MG: 5 INJECTION, SOLUTION INTRAVENOUS at 20:49

## 2022-05-07 RX ADMIN — PIPERACILLIN AND TAZOBACTAM 3.38 G: 3; .375 INJECTION, POWDER, LYOPHILIZED, FOR SOLUTION INTRAVENOUS at 22:24

## 2022-05-07 RX ADMIN — INSULIN LISPRO 4 UNITS: 100 INJECTION, SOLUTION INTRAVENOUS; SUBCUTANEOUS at 17:39

## 2022-05-07 RX ADMIN — HYDRALAZINE HYDROCHLORIDE 25 MG: 25 TABLET, FILM COATED ORAL at 22:21

## 2022-05-07 RX ADMIN — ONDANSETRON 4 MG: 2 INJECTION INTRAMUSCULAR; INTRAVENOUS at 22:22

## 2022-05-07 RX ADMIN — ACETAMINOPHEN 650 MG: 325 TABLET ORAL at 20:50

## 2022-05-07 RX ADMIN — INSULIN LISPRO 2 UNITS: 100 INJECTION, SOLUTION INTRAVENOUS; SUBCUTANEOUS at 22:22

## 2022-05-07 RX ADMIN — MORPHINE SULFATE 1 MG: 2 INJECTION, SOLUTION INTRAMUSCULAR; INTRAVENOUS at 22:22

## 2022-05-07 RX ADMIN — PIPERACILLIN AND TAZOBACTAM 3.38 G: 3; .375 INJECTION, POWDER, LYOPHILIZED, FOR SOLUTION INTRAVENOUS at 14:42

## 2022-05-07 NOTE — PROGRESS NOTES
Problem: Falls - Risk of  Goal: *Absence of Falls  Description: Document [place] Fall Risk and appropriate interventions in the flowsheet. Outcome: Progressing Towards Goal  Note: Fall Risk Interventions:  Mobility Interventions: Patient to call before getting OOB         Medication Interventions: Patient to call before getting OOB    Elimination Interventions: Call light in reach              Problem: Patient Education: Go to Patient Education Activity  Goal: Patient/Family Education  Outcome: Progressing Towards Goal     Problem: Pressure Injury - Risk of  Goal: *Prevention of pressure injury  Description: Document Nickolas Scale and appropriate interventions in the flowsheet. Outcome: Progressing Towards Goal  Note: Pressure Injury Interventions:  Sensory Interventions: Minimize linen layers,Float heels    Moisture Interventions: Absorbent underpads    Activity Interventions: Increase time out of bed    Mobility Interventions: HOB 30 degrees or less,Float heels    Nutrition Interventions: Document food/fluid/supplement intake    Friction and Shear Interventions: Minimize layers                Problem: Patient Education: Go to Patient Education Activity  Goal: Patient/Family Education  Outcome: Progressing Towards Goal     Problem: Diabetes Self-Management  Goal: *Disease process and treatment process  Description: Define diabetes and identify own type of diabetes; list 3 options for treating diabetes. Outcome: Progressing Towards Goal  Goal: *Incorporating nutritional management into lifestyle  Description: Describe effect of type, amount and timing of food on blood glucose; list 3 methods for planning meals. Outcome: Progressing Towards Goal  Goal: *Incorporating physical activity into lifestyle  Description: State effect of exercise on blood glucose levels.   Outcome: Progressing Towards Goal  Goal: *Developing strategies to promote health/change behavior  Description: Define the ABC's of diabetes; identify appropriate screenings, schedule and personal plan for screenings. Outcome: Progressing Towards Goal  Goal: *Using medications safely  Description: State effect of diabetes medications on diabetes; name diabetes medication taking, action and side effects. Outcome: Progressing Towards Goal  Goal: *Monitoring blood glucose, interpreting and using results  Description: Identify recommended blood glucose targets  and personal targets. Outcome: Progressing Towards Goal  Goal: *Prevention, detection, treatment of acute complications  Description: List symptoms of hyper- and hypoglycemia; describe how to treat low blood sugar and actions for lowering  high blood glucose level. Outcome: Progressing Towards Goal  Goal: *Prevention, detection and treatment of chronic complications  Description: Define the natural course of diabetes and describe the relationship of blood glucose levels to long term complications of diabetes.   Outcome: Progressing Towards Goal  Goal: *Developing strategies to address psychosocial issues  Description: Describe feelings about living with diabetes; identify support needed and support network  Outcome: Progressing Towards Goal  Goal: *Insulin pump training  Outcome: Progressing Towards Goal  Goal: *Sick day guidelines  Outcome: Progressing Towards Goal  Goal: *Patient Specific Goal (EDIT GOAL, INSERT TEXT)  Outcome: Progressing Towards Goal     Problem: Patient Education: Go to Patient Education Activity  Goal: Patient/Family Education  Outcome: Progressing Towards Goal

## 2022-05-07 NOTE — PROGRESS NOTES
Infectious Disease Progress Note           Subjective:   Pt seen and examined at bedside. Stable, denies new complaints, no acute events since last seen. Ongoing nausea and vomiting, right foot pain is better   Objective:   Physical Exam:     Visit Vitals  /70   Pulse 100   Temp 99.1 °F (37.3 °C)   Resp 18   Ht 6' 4\" (1.93 m)   Wt 230 lb (104.3 kg)   SpO2 94%   BMI 28.00 kg/m²    O2 Flow Rate (L/min): 2 l/min O2 Device: None (Room air)    Temp (24hrs), Av.6 °F (37 °C), Min:97.2 °F (36.2 °C), Max:99.4 °F (37.4 °C)    701 -  190  In: 580 [P.O.:580]  Out: 500 [Urine:400]   1901 -  0700  In: 1702.1 [P.O.:100;  I.V.:1602.1]  Out: 2240 [Urine:2240]    General: NAD, AAO x 4  HEENT: AN, Moist mucosa   Lungs: CTA b/l, decreased BS at the bases   Heart: S1S2+, RRR, no murmur  Abdo: Soft, NT, ND, +BS   : No wetzel   Exts: Right foot dressing in place      Data Review:       Recent Days:  Recent Labs     22  0236   WBC 15.3* 18.8* 20.1*   HGB 10.2* 10.9* 10.3*   HCT 31.1* 32.4* 30.8*   * 455* 414*     Recent Labs     22  0248 22  0359 22  0236   BUN 24* 32*  32* 51*   CREA 1.26 1.74*  1.75* 2.83*       Lab Results   Component Value Date/Time    C-Reactive protein 7.49 (H) 2022 02:48 AM          Microbiology     Results     Procedure Component Value Units Date/Time    CULTURE, TISSUE Swati Hacking STAIN [[id number]]  (Susceptibility) Collected: 22 6357    Order Status: Completed Specimen: Tissue Updated: 22 0847     Special Requests: No Special Requests        GRAM STAIN No wbc's seen         No organisms seen        Culture result:       Scant Staphylococcus aureus          Susceptibility      Staphylococcus aureus     MANDY (Preliminary)     Ciprofloxacin ($) Susceptible     Clindamycin ($) Susceptible     Daptomycin ($$$$$) Susceptible     Doxycycline ($$) Susceptible     Erythromycin ($$$$) Susceptible     Gentamicin ($) Susceptible     Levofloxacin ($) Susceptible     Linezolid ($$$$$) Susceptible     Moxifloxacin ($$$$) Susceptible     Oxacillin Susceptible     Rifampin ($$$$) Susceptible  [1]      Tetracycline Susceptible     Trimeth/Sulfa Susceptible     Vancomycin ($) Susceptible                 [1]  Rifampin is not to be used for mono-therapy. Linear View                   MRSA SCREEN - PCR (NASAL) [[id number]] Collected: 05/03/22 0830    Order Status: Completed Specimen: Swab Updated: 05/03/22 0951     MRSA by PCR, Nasal Not Detected       CULTURE, Portillo Blaise STAIN [[id number]] Collected: 05/02/22 1800    Order Status: Canceled Specimen: Wound from Foot     CULTURE, BLOOD, PAIRED [[id number]] Collected: 05/02/22 1737    Order Status: Completed Specimen: Blood Updated: 05/07/22 0841     Special Requests: No Special Requests        Culture result: No growth 4 days       CULTURE, Portillo Blaise STAIN [[id number]]  (Susceptibility) Collected: 04/30/22 1830    Order Status: Completed Specimen: Wound from Swab Updated: 05/04/22 0710     Special Requests: No Special Requests        GRAM STAIN Occasional WBCs seen               2+ Gram Positive Cocci in pairs           Culture result:       Heavy Staphylococcus aureus                  Moderate Enterococcus faecalis          Susceptibility      Staphylococcus aureus     MANDY     Ciprofloxacin ($) Susceptible     Clindamycin ($) Resistant     Daptomycin ($$$$$) Susceptible     Doxycycline ($$) Susceptible     Erythromycin ($$$$) Susceptible     Gentamicin ($) Susceptible     Levofloxacin ($) Susceptible     Linezolid ($$$$$) Susceptible     Moxifloxacin ($$$$) Susceptible     Oxacillin Susceptible     Rifampin ($$$$) Susceptible  [1]      Tetracycline Susceptible     Trimeth/Sulfa Susceptible     Vancomycin ($) Susceptible                 [1]  Rifampin is not to be used for mono-therapy.           Linear View               Susceptibility Enterococcus faecalis     MANDY     Ampicillin ($) Susceptible     Daptomycin ($$$$$) Susceptible     Linezolid ($$$$$) Susceptible     Vancomycin ($) Susceptible                  Linear View                            Diagnostics   CXR Results  (Last 48 hours)    None           Assessment/Plan     1. 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 Faecalis isolated from wound Cx (04/30). MSSA isolated from repeat Cx on 05/03      S/p wound debridement on 5/03 and 05/06 w amp of right great toe w primary closure       Afebrile, WBC trending down after wound debridement       Per operate note it appears all infected bone was resected intra-op       Continue on Zosyn while hospitalized, plans to d.c on Augmentin 500 mg BID x 2 wks       Will need close follow up as out pt to ensure appropriate wound healing and no post-op infection         2. Nausea/vomiting, suspected gastroparesis, Ongoing       Gastric emptying study scheduled for 05/09 is still in pt     3.  Acute on chronic renal failure: Resolved, Cr at baseline      4. Long standing h/o DM type 1, BS mgt per primary, BS better controlled         Nela De Los Santos MD     5/7/2022

## 2022-05-07 NOTE — PROGRESS NOTES
Hospitalist Progress Note         UBALDO Sepulveda FNP-C    Daily Progress Note: 5/7/2022      Subjective:   Subjective   Patient examined alert and oriented sitting on bedside  No acute distress noted on examination  No overnight events reported  He reports continued nausea      Review of Systems:   Review of Systems   Respiratory: Negative for cough. Cardiovascular: Negative for chest pain. Gastrointestinal: Positive for nausea. Negative for heartburn. Genitourinary: Negative for dysuria. Musculoskeletal: Negative for myalgias. Skin:        Right foot wound         Objective:   Objective      Vitals:  Patient Vitals for the past 12 hrs:   Temp Pulse Resp BP SpO2   05/07/22 0759 99.1 °F (37.3 °C) (!) 110 18 (!) 169/96 94 %   05/07/22 0305 98.2 °F (36.8 °C) 100 18 (!) 144/87 94 %   05/06/22 2134 99.4 °F (37.4 °C) (!) 110 18 (!) 156/92 95 %        Physical Exam:  Physical Exam  Vitals and nursing note reviewed. Constitutional:       Appearance: Normal appearance. Eyes:      Extraocular Movements: Extraocular movements intact. Cardiovascular:      Rate and Rhythm: Tachycardia present. Heart sounds: Normal heart sounds. Pulmonary:      Breath sounds: Normal breath sounds. Abdominal:      General: Bowel sounds are normal.   Musculoskeletal:         General: Normal range of motion. Skin:     General: Skin is warm and dry. Comments: Right foot surgical dressing intact, right great toe amputation   Neurological:      Mental Status: He is alert and oriented to person, place, and time.           Lab Results:  Recent Results (from the past 24 hour(s))   GLUCOSE, POC    Collection Time: 05/06/22  9:53 AM   Result Value Ref Range    Glucose (POC) 217 (H) 65 - 117 mg/dL    Performed by Altaf Owens, POC    Collection Time: 05/06/22  1:38 PM   Result Value Ref Range    Glucose (POC) 225 (H) 65 - 117 mg/dL    Performed by Cara [address], POC    Collection Time: 05/06/22 5:11 PM   Result Value Ref Range    Glucose (POC) 217 (H) 65 - 117 mg/dL    Performed by Nicole Bautista    GLUCOSE, POC    Collection Time: 05/06/22  8:59 PM   Result Value Ref Range    Glucose (POC) 250 (H) 65 - 117 mg/dL    Performed by [address] [place] [address] Drive, POC    Collection Time: 05/07/22  1:40 AM   Result Value Ref Range    Glucose (POC) 212 (H) 65 - 117 mg/dL    Performed by Marty Castorena    CBC WITH AUTOMATED DIFF    Collection Time: 05/07/22  2:48 AM   Result Value Ref Range    WBC 15.3 (H) 4.1 - 11.1 K/uL    RBC 3.59 (L) 4.10 - 5.70 M/uL    HGB 10.2 (L) 12.1 - 17.0 g/dL    HCT 31.1 (L) 36.6 - 50.3 %    MCV 86.6 80.0 - 99.0 FL    MCH 28.4 26.0 - 34.0 PG    MCHC 32.8 30.0 - 36.5 g/dL    RDW 14.6 (H) 11.5 - 14.5 %    PLATELET 163 (H) 617 - 400 K/uL    MPV 10.0 8.9 - 12.9 FL    NRBC 0.0 0.0  WBC    ABSOLUTE NRBC 0.00 0.00 - 0.01 K/uL    NEUTROPHILS 85 (H) 32 - 75 %    LYMPHOCYTES 8 (L) 12 - 49 %    MONOCYTES 6 5 - 13 %    EOSINOPHILS 0 0 - 7 %    BASOPHILS 0 0 - 1 %    IMMATURE GRANULOCYTES 1 (H) 0 - 0.5 %    ABS. NEUTROPHILS 13.0 (H) 1.8 - 8.0 K/UL    ABS. LYMPHOCYTES 1.2 0.8 - 3.5 K/UL    ABS. MONOCYTES 1.0 0.0 - 1.0 K/UL    ABS. EOSINOPHILS 0.0 0.0 - 0.4 K/UL    ABS. BASOPHILS 0.0 0.0 - 0.1 K/UL    ABS. IMM.  GRANS. 0.1 (H) 0.00 - 0.04 K/UL    DF AUTOMATED     C REACTIVE PROTEIN, QT    Collection Time: 05/07/22  2:48 AM   Result Value Ref Range    C-Reactive protein 7.49 (H) 0.00 - 0.60 mg/dL   RENAL FUNCTION PANEL    Collection Time: 05/07/22  2:48 AM   Result Value Ref Range    Sodium 149 (H) 136 - 145 mmol/L    Potassium 3.8 3.5 - 5.1 mmol/L    Chloride 119 (H) 97 - 108 mmol/L    CO2 27 21 - 32 mmol/L    Anion gap 3 (L) 5 - 15 mmol/L    Glucose 226 (H) 65 - 100 mg/dL    BUN 24 (H) 6 - 20 mg/dL    Creatinine 1.26 0.70 - 1.30 mg/dL    BUN/Creatinine ratio 19 12 - 20      GFR est AA >60 >60 ml/min/1.73m2    GFR est non-AA >60 >60 ml/min/1.73m2    Calcium 8.2 (L) 8.5 - 10.1 mg/dL    Phosphorus 2.5 (L) 2.6 - 4.7 mg/dL    Albumin 2.6 (L) 3.5 - 5.0 g/dL   GLUCOSE, POC    Collection Time: 05/07/22  5:29 AM   Result Value Ref Range    Glucose (POC) 140 (H) 65 - 117 mg/dL    Performed by [address] [place] [address] Drive, POC    Collection Time: 05/07/22  7:58 AM   Result Value Ref Range    Glucose (POC) 106 65 - 117 mg/dL    Performed by Kristin Quinones (LPN)           Diagnostic Images:  CT Results  (Last 48 hours)    None          Current Medications:    Current Facility-Administered Medications:     hydrALAZINE (APRESOLINE) tablet 50 mg, 50 mg, Oral, TID, Mueller Sample, NP    amLODIPine (NORVASC) tablet 10 mg, 10 mg, Oral, DAILY, Mueller Sample, NP, 10 mg at 05/07/22 0851    hydrALAZINE (APRESOLINE) 20 mg/mL injection 20 mg, 20 mg, IntraVENous, Q6H PRN, Mueller Sample, NP, 20 mg at 05/06/22 1316    sodium chloride (NS) flush 5-40 mL, 5-40 mL, IntraVENous, PRN, He, Elmo, DPM    morphine injection 1 mg, 1 mg, IntraVENous, Q4H PRN, Mueller Sample, NP, 1 mg at 05/07/22 0048    labetaloL (NORMODYNE;TRANDATE) 20 mg/4 mL (5 mg/mL) injection 10 mg, 10 mg, IntraVENous, Q6H PRN, Mueller Sample, NP, 10 mg at 05/06/22 1701    metoclopramide HCl (REGLAN) injection 5 mg, 5 mg, IntraVENous, Q6H, Charito Bailey, NP, 5 mg at 05/07/22 0504    [Held by provider] 0.9% sodium chloride infusion, 125 mL/hr, IntraVENous, CONTINUOUS, Karma Trejo MD, Last Rate: 125 mL/hr at 05/06/22 0116, 125 mL/hr at 05/06/22 0116    promethazine (PHENERGAN) 25 mg in 0.9% sodium chloride 50 mL IVPB, 25 mg, IntraVENous, Q6H PRN, Vangie White MD, Last Rate: 200 mL/hr at 05/05/22 2217, 25 mg at 05/05/22 2217    glucose chewable tablet 16 g, 4 Tablet, Oral, PRN, Mueller Sample, NP    dextrose 10% infusion 0-250 mL, 0-250 mL, IntraVENous, PRN, Mueller Sample, NP    glucagon (GLUCAGEN) injection 1 mg, 1 mg, IntraMUSCular, PRN, Mueller Sample, NP    [Held by provider] 0.9% sodium chloride infusion, 150 mL/hr, IntraVENous, CONTINUOUS, Noemi Santamaria MD, Last Rate: 150 mL/hr at 05/05/22 0534, 150 mL/hr at 05/05/22 0534    insulin glargine (LANTUS) injection 35 Units, 35 Units, SubCUTAneous, QHS, Noemi Santamaria MD, 35 Units at 05/06/22 2200    dextrose 10% infusion 0-250 mL, 0-250 mL, IntraVENous, PRN, Noemi Santamaria MD    insulin lispro (HUMALOG) injection, , SubCUTAneous, Q4H, Noemi Santamaria MD, 3 Units at 05/07/22 0600    piperacillin-tazobactam (ZOSYN) 3.375 g in 0.9% sodium chloride (MBP/ADV) 100 mL MBP, 3.375 g, IntraVENous, Q8H, Vangie White MD, Last Rate: 200 mL/hr at 05/07/22 0504, 3.375 g at 05/07/22 0504    sodium chloride (NS) flush 5-40 mL, 5-40 mL, IntraVENous, PRN, Elmo Cutler DPM, 10 mL at 05/06/22 2133    sodium chloride (NS) flush 5-40 mL, 5-40 mL, IntraVENous, Q8H, SobiaJuan Luis MD, 10 mL at 05/07/22 0522    sodium chloride (NS) flush 5-40 mL, 5-40 mL, IntraVENous, PRN, Sandra Mock MD    acetaminophen (TYLENOL) tablet 650 mg, 650 mg, Oral, Q6H PRN, 650 mg at 05/06/22 1518 **OR** acetaminophen (TYLENOL) suppository 650 mg, 650 mg, Rectal, Q6H PRN, Farhad Ramsay MD    polyethylene glycol (MIRALAX) packet 17 g, 17 g, Oral, DAILY PRN, Farhad Ramsay MD    ondansetron (ZOFRAN ODT) tablet 4 mg, 4 mg, Oral, Q8H PRN, 4 mg at 05/03/22 1738 **OR** ondansetron (ZOFRAN) injection 4 mg, 4 mg, IntraVENous, Q6H PRN, Farhad Ramsay MD, 4 mg at 05/05/22 1936    enoxaparin (LOVENOX) injection 40 mg, 40 mg, SubCUTAneous, DAILY, Farhad Ramsay MD, 40 mg at 05/07/22 0500       ASSESSMENT:    Lord Loya is a 54 y.o. male with PMH of diabetic foot ulcer, diabetes on insulin pump, hypertension presented the ED with chief complaint of right foot pain, associated with draining wound and redness. Patient states that he had 1 on the plantar surface for the past few months months that stopped healing.  In addition, since last week, he has developed another ulcer on the medial aspect of the right foot over the ball of the foot, associated with purulent discharge and is not healing as well.  Says he was seen by Dr. Klaudia Marrufo, podiatrist and asked him to be admitted to the hospital. Otherwise denies fever or chills. On 5/3/22 underwent Debridement of Right Foot Ulcer. 5/5/22 Infection markers worsen. On 5/6/22 underwent Amputation and debridement of right great toe.      #1  Sepsis  #2 Diabetic foot ulcer, osteomyelitis suspicion  -Tachycardia, low-grade fever, elevated CRP, wound infection, leukocytosis  - Imaging: MRI shows soft tissue swelling and osteomyelitis suspicion  - Blood and would culture obtained.   -Wound culture preliminary shows heavy staph auerus and moderate Enterococcus faecalis  - Antibiotics: Zosyn. Cefepime and vancomycin discontinued  - Podiatry and ID following  - on 5/3/22 underwent Debridement of Right Foot Ulcer/ 5/6/22 Amputation and debridement of right great toe  - PT/OT evaluation  - Long term IV antbx, BCX neg  - Follow wcx sensitivity report  - Infection markers downtrending    #3 RUDDY (improving)  - likely secondary to to sepsis  - Gentle IV hydration   - continue to follow  - nephrology following, DOES NOT RECOMMEND PICC LINE    #4 Hypertension  -resume amlodipine 10mg, prn hydralazine  -Labetalol as needed  -bp currently uncontrolled  -add hydralazine 25mg tid     #5 Diabetes  -continue lantus, lispro, med dose ssi  -ac/hs accu check  -A1c 7.8  -blood sugars better controlled    #6 Nausea, abdominal discomfort  -suspect gastroparesis  -further workup outpatient with GI  -prn antiemetics       Full Code  Dvt Prophylaxis lovenox  GI Prophylaxis none  Discharge barriers:  - Podiatry and Infectious disease following  - Follow wound cultures  - Outpatient antbx therapy  - Case management for dispo planning      NOK: Richard Garcia (Mother) [phone] [facility] Phone)      Above treatment plan reviewed and discussed with patient in detail at bedside, all questions answered.       Care Plan discussed with: interDisciplinary team, patient    Total time spent with patient: 35 minutes.     Molly Ibrahim NP
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
## 2022-05-08 VITALS
SYSTOLIC BLOOD PRESSURE: 141 MMHG | HEIGHT: 76 IN | WEIGHT: 230 LBS | BODY MASS INDEX: 28.01 KG/M2 | TEMPERATURE: 98 F | DIASTOLIC BLOOD PRESSURE: 92 MMHG | RESPIRATION RATE: 20 BRPM | OXYGEN SATURATION: 98 % | HEART RATE: 104 BPM

## 2022-05-08 PROBLEM — N17.9 AKI (ACUTE KIDNEY INJURY) (HCC): Status: ACTIVE | Noted: 2022-05-08

## 2022-05-08 PROBLEM — A41.9 SEPSIS (HCC): Status: ACTIVE | Noted: 2022-05-08

## 2022-05-08 LAB
ALBUMIN SERPL-MCNC: 2.5 G/DL (ref 3.5–5)
ANION GAP SERPL CALC-SCNC: 3 MMOL/L (ref 5–15)
BASOPHILS # BLD: 0 K/UL (ref 0–0.1)
BASOPHILS NFR BLD: 0 % (ref 0–1)
BUN SERPL-MCNC: 20 MG/DL (ref 6–20)
BUN/CREAT SERPL: 17 (ref 12–20)
CA-I BLD-MCNC: 8.6 MG/DL (ref 8.5–10.1)
CHLORIDE SERPL-SCNC: 114 MMOL/L (ref 97–108)
CO2 SERPL-SCNC: 28 MMOL/L (ref 21–32)
CREAT SERPL-MCNC: 1.17 MG/DL (ref 0.7–1.3)
CRP SERPL-MCNC: 3.92 MG/DL (ref 0–0.6)
DIFFERENTIAL METHOD BLD: ABNORMAL
EOSINOPHIL # BLD: 0.1 K/UL (ref 0–0.4)
EOSINOPHIL NFR BLD: 1 % (ref 0–7)
ERYTHROCYTE [DISTWIDTH] IN BLOOD BY AUTOMATED COUNT: 14.6 % (ref 11.5–14.5)
GLUCOSE BLD STRIP.AUTO-MCNC: 162 MG/DL (ref 65–117)
GLUCOSE BLD STRIP.AUTO-MCNC: 192 MG/DL (ref 65–117)
GLUCOSE BLD STRIP.AUTO-MCNC: 236 MG/DL (ref 65–117)
GLUCOSE SERPL-MCNC: 159 MG/DL (ref 65–100)
HCT VFR BLD AUTO: 33.5 % (ref 36.6–50.3)
HGB BLD-MCNC: 10.8 G/DL (ref 12.1–17)
IMM GRANULOCYTES # BLD AUTO: 0.1 K/UL (ref 0–0.04)
IMM GRANULOCYTES NFR BLD AUTO: 1 % (ref 0–0.5)
LYMPHOCYTES # BLD: 2 K/UL (ref 0.8–3.5)
LYMPHOCYTES NFR BLD: 21 % (ref 12–49)
MCH RBC QN AUTO: 28.6 PG (ref 26–34)
MCHC RBC AUTO-ENTMCNC: 32.2 G/DL (ref 30–36.5)
MCV RBC AUTO: 88.9 FL (ref 80–99)
MONOCYTES # BLD: 0.8 K/UL (ref 0–1)
MONOCYTES NFR BLD: 8 % (ref 5–13)
NEUTS SEG # BLD: 6.5 K/UL (ref 1.8–8)
NEUTS SEG NFR BLD: 69 % (ref 32–75)
NRBC # BLD: 0 K/UL (ref 0–0.01)
NRBC BLD-RTO: 0 PER 100 WBC
PERFORMED BY, TECHID: ABNORMAL
PHOSPHATE SERPL-MCNC: 3 MG/DL (ref 2.6–4.7)
PLATELET # BLD AUTO: 421 K/UL (ref 150–400)
PMV BLD AUTO: 10.5 FL (ref 8.9–12.9)
POTASSIUM SERPL-SCNC: 4 MMOL/L (ref 3.5–5.1)
RBC # BLD AUTO: 3.77 M/UL (ref 4.1–5.7)
SODIUM SERPL-SCNC: 145 MMOL/L (ref 136–145)
WBC # BLD AUTO: 9.5 K/UL (ref 4.1–11.1)

## 2022-05-08 PROCEDURE — 74011250636 HC RX REV CODE- 250/636: Performed by: INTERNAL MEDICINE

## 2022-05-08 PROCEDURE — 74011250636 HC RX REV CODE- 250/636: Performed by: NURSE PRACTITIONER

## 2022-05-08 PROCEDURE — 74011250637 HC RX REV CODE- 250/637: Performed by: INTERNAL MEDICINE

## 2022-05-08 PROCEDURE — 74011000250 HC RX REV CODE- 250: Performed by: INTERNAL MEDICINE

## 2022-05-08 PROCEDURE — 85025 COMPLETE CBC W/AUTO DIFF WBC: CPT

## 2022-05-08 PROCEDURE — 74011636637 HC RX REV CODE- 636/637: Performed by: HOSPITALIST

## 2022-05-08 PROCEDURE — 80069 RENAL FUNCTION PANEL: CPT

## 2022-05-08 PROCEDURE — 86140 C-REACTIVE PROTEIN: CPT

## 2022-05-08 PROCEDURE — 36415 COLL VENOUS BLD VENIPUNCTURE: CPT

## 2022-05-08 PROCEDURE — 82962 GLUCOSE BLOOD TEST: CPT

## 2022-05-08 PROCEDURE — 74011000258 HC RX REV CODE- 258: Performed by: INTERNAL MEDICINE

## 2022-05-08 PROCEDURE — 99232 SBSQ HOSP IP/OBS MODERATE 35: CPT | Performed by: INTERNAL MEDICINE

## 2022-05-08 PROCEDURE — 74011250637 HC RX REV CODE- 250/637: Performed by: NURSE PRACTITIONER

## 2022-05-08 RX ORDER — HYDRALAZINE HYDROCHLORIDE 25 MG/1
25 TABLET, FILM COATED ORAL 3 TIMES DAILY
Qty: 90 TABLET | Refills: 0 | Status: SHIPPED | OUTPATIENT
Start: 2022-05-08 | End: 2022-06-07

## 2022-05-08 RX ORDER — LEVOFLOXACIN 750 MG/1
750 TABLET ORAL DAILY
Qty: 10 TABLET | Refills: 0 | Status: SHIPPED | OUTPATIENT
Start: 2022-05-08 | End: 2022-05-08

## 2022-05-08 RX ORDER — AMOXICILLIN AND CLAVULANATE POTASSIUM 500; 125 MG/1; MG/1
1 TABLET, FILM COATED ORAL 2 TIMES DAILY
Qty: 28 TABLET | Refills: 0 | Status: SHIPPED | OUTPATIENT
Start: 2022-05-08 | End: 2022-05-22

## 2022-05-08 RX ORDER — TRAMADOL HYDROCHLORIDE 50 MG/1
50 TABLET ORAL 2 TIMES DAILY
Qty: 4 TABLET | Refills: 0 | Status: SHIPPED | OUTPATIENT
Start: 2022-05-08 | End: 2022-05-10

## 2022-05-08 RX ORDER — INSULIN GLARGINE 100 [IU]/ML
35 INJECTION, SOLUTION SUBCUTANEOUS DAILY
Qty: 10.5 ML | Refills: 0 | Status: SHIPPED | OUTPATIENT
Start: 2022-05-08 | End: 2022-05-08

## 2022-05-08 RX ORDER — INSULIN LISPRO 100 [IU]/ML
INJECTION, SOLUTION INTRAVENOUS; SUBCUTANEOUS
Qty: 1 EACH | Refills: 0 | Status: SHIPPED
Start: 2022-05-08 | End: 2022-05-08

## 2022-05-08 RX ORDER — INSULIN GLARGINE 100 [IU]/ML
35 INJECTION, SOLUTION SUBCUTANEOUS DAILY
Qty: 10.5 ML | Refills: 0 | Status: SHIPPED | OUTPATIENT
Start: 2022-05-08 | End: 2022-06-07

## 2022-05-08 RX ORDER — METOCLOPRAMIDE 5 MG/1
5 TABLET ORAL
Qty: 30 TABLET | Refills: 0 | Status: SHIPPED | OUTPATIENT
Start: 2022-05-08 | End: 2022-05-18

## 2022-05-08 RX ORDER — AMLODIPINE BESYLATE 10 MG/1
10 TABLET ORAL DAILY
Qty: 30 TABLET | Refills: 0 | Status: SHIPPED | OUTPATIENT
Start: 2022-05-09 | End: 2022-05-08

## 2022-05-08 RX ORDER — TRAMADOL HYDROCHLORIDE 50 MG/1
50 TABLET ORAL 2 TIMES DAILY
Qty: 4 TABLET | Refills: 0 | Status: SHIPPED | OUTPATIENT
Start: 2022-05-08 | End: 2022-05-08 | Stop reason: SDUPTHER

## 2022-05-08 RX ORDER — AMLODIPINE BESYLATE 10 MG/1
10 TABLET ORAL DAILY
Qty: 30 TABLET | Refills: 0 | Status: SHIPPED | OUTPATIENT
Start: 2022-05-09 | End: 2022-06-08

## 2022-05-08 RX ORDER — HYDRALAZINE HYDROCHLORIDE 25 MG/1
25 TABLET, FILM COATED ORAL 3 TIMES DAILY
Qty: 90 TABLET | Refills: 0 | Status: SHIPPED | OUTPATIENT
Start: 2022-05-08 | End: 2022-05-08

## 2022-05-08 RX ORDER — METOCLOPRAMIDE 5 MG/1
5 TABLET ORAL
Qty: 30 TABLET | Refills: 0 | Status: SHIPPED | OUTPATIENT
Start: 2022-05-08 | End: 2022-05-08 | Stop reason: SDUPTHER

## 2022-05-08 RX ORDER — INSULIN LISPRO 100 [IU]/ML
INJECTION, SOLUTION INTRAVENOUS; SUBCUTANEOUS
Qty: 1 EACH | Refills: 0 | Status: SHIPPED
Start: 2022-05-08 | End: 2022-06-02 | Stop reason: SDUPTHER

## 2022-05-08 RX ADMIN — MORPHINE SULFATE 1 MG: 2 INJECTION, SOLUTION INTRAMUSCULAR; INTRAVENOUS at 02:32

## 2022-05-08 RX ADMIN — INSULIN LISPRO 2 UNITS: 100 INJECTION, SOLUTION INTRAVENOUS; SUBCUTANEOUS at 02:00

## 2022-05-08 RX ADMIN — MORPHINE SULFATE 1 MG: 2 INJECTION, SOLUTION INTRAMUSCULAR; INTRAVENOUS at 09:23

## 2022-05-08 RX ADMIN — ENOXAPARIN SODIUM 40 MG: 100 INJECTION SUBCUTANEOUS at 09:22

## 2022-05-08 RX ADMIN — METOCLOPRAMIDE HYDROCHLORIDE 5 MG: 5 INJECTION INTRAMUSCULAR; INTRAVENOUS at 11:51

## 2022-05-08 RX ADMIN — ONDANSETRON 4 MG: 2 INJECTION INTRAMUSCULAR; INTRAVENOUS at 09:23

## 2022-05-08 RX ADMIN — HYDRALAZINE HYDROCHLORIDE 25 MG: 25 TABLET, FILM COATED ORAL at 15:53

## 2022-05-08 RX ADMIN — AMLODIPINE BESYLATE 10 MG: 5 TABLET ORAL at 09:23

## 2022-05-08 RX ADMIN — METOCLOPRAMIDE HYDROCHLORIDE 5 MG: 5 INJECTION INTRAMUSCULAR; INTRAVENOUS at 01:20

## 2022-05-08 RX ADMIN — HYDRALAZINE HYDROCHLORIDE 25 MG: 25 TABLET, FILM COATED ORAL at 09:23

## 2022-05-08 RX ADMIN — METOCLOPRAMIDE HYDROCHLORIDE 5 MG: 5 INJECTION INTRAMUSCULAR; INTRAVENOUS at 06:35

## 2022-05-08 RX ADMIN — INSULIN LISPRO 3 UNITS: 100 INJECTION, SOLUTION INTRAVENOUS; SUBCUTANEOUS at 11:51

## 2022-05-08 RX ADMIN — PIPERACILLIN AND TAZOBACTAM 3.38 G: 3; .375 INJECTION, POWDER, LYOPHILIZED, FOR SOLUTION INTRAVENOUS at 06:35

## 2022-05-08 RX ADMIN — ACETAMINOPHEN 650 MG: 325 TABLET ORAL at 15:53

## 2022-05-08 RX ADMIN — SODIUM CHLORIDE, PRESERVATIVE FREE 10 ML: 5 INJECTION INTRAVENOUS at 06:35

## 2022-05-08 NOTE — DISCHARGE INSTRUCTIONS
Patient Education        Learning About ACE Inhibitors  What are ACE inhibitors? ACE (angiotensin-converting enzyme) inhibitors are medicines that lower blood pressure. They stop the release of an enzyme. This enzyme makes your blood vessels smaller. Without it, your blood vessels relax and get bigger. This lowers your blood pressure. These medicines also increase how much water and salt go into your urine. This also lowers blood pressure. You may take this kind of medicine if you have high blood pressure. Or you may take it if you have heart problems, kidney problems, or diabetes. If you have coronary artery disease, this medicine can help prevent heart attacks and strokes. Examples  · benazepril (Lotensin)  · enalapril (Vasotec)  · lisinopril (Prinivil, Zestril)  · ramipril (Altace)  This is not a complete list.  Possible side effects  Side effects may include:  · A cough. · Low blood pressure. This can make you feel dizzy or weak. · Too much potassium in your body. · Swelling of your lips, tongue, or face. If the swelling is severe, you may need treatment right away. Severe swelling can make it hard to breathe, but this is rare. You may have other side effects or reactions not listed here. Check the information that comes with your medicine. What to know about taking this medicine  · ACE inhibitors can cause a dry cough. Talk to your doctor if you have a dry cough. You may need a different medicine. · These medicines can cause an allergic reaction. This can cause a little swelling. Or it can cause red bumps on your skin that hurt. In rare cases, the swelling may make it hard for you to breathe. · Do not take this medicine if you are pregnant or plan to become pregnant. · Take your medicines exactly as prescribed. Call your doctor if you think you are having a problem with your medicine. · Check with your doctor or pharmacist before you use any other medicines.  This includes over-the-counter medicines. Make sure your doctor knows all of the medicines, vitamins, herbal products, and supplements you take. Taking some medicines together can cause problems. · You may need regular blood tests. Where can you learn more? Go to http://www.gray.com/  Enter P050 in the search box to learn more about \"Learning About ACE Inhibitors. \"  Current as of: January 10, 2022               Content Version: 13.2  © 2006-2022 [employer]. Care instructions adapted under license by [employer] (which disclaims liability or warranty for this information). If you have questions about a medical condition or this instruction, always ask your healthcare professional. [place] [address] any warranty or liability for your use of this information. * Follow-up Care/Patient Instructions:   Activity: See surgical instructions  Diet: Diabetic Diet  Wound Care: Keep wound clean and dry      Patient to continue all medications as prescribed  Patient to maintain all f/u appointments  Patient counseled on dietary and lifestyle modifications  Patient stable for discharge  DO NOT TAKE METHOTREXATE X 3 WEEKS    Follow-up Information     Follow up With Specialties Details Why Contact Info    Barb Hassan MD Internal Medicine Physician In 1 week  [address]  [facility] [address] [phone]      Ketty Rocha [facility] 26 Podiatry In 3 week  [facility] [address] [address] [phone]      Radha Toro MD Infectious Disease Physician  As needed, If symptoms worsen [address] [place] [address] S  [phone]

## 2022-05-08 NOTE — PROGRESS NOTES
Spoke with Dr Artur Pedro and ok to remove original dressing to right foot and reapply with Betadine wet to dry.

## 2022-05-08 NOTE — PROGRESS NOTES
Infectious Disease Progress Note           Subjective:   Stable, nausea is better, no acute events since last seen, ,denies new complaints.  Scheduled for d/c home today   Objective:   Physical Exam:     Visit Vitals  BP (!) 148/88 (BP 1 Location: Right upper arm)   Pulse (!) 102   Temp 98 °F (36.7 °C)   Resp 20   Ht 6' 4\" (1.93 m)   Wt 230 lb (104.3 kg)   SpO2 98%   BMI 28.00 kg/m²    O2 Flow Rate (L/min): 2 l/min O2 Device: None (Room air)    Temp (24hrs), Av.7 °F (37.1 °C), Min:98 °F (36.7 °C), Max:99.4 °F (37.4 °C)    701 - 1900  In: -   Out: 920 [Urine:920]   1901 -  0700  In: 580 [P.O.:580]  Out: 500 [Urine:400]    General: NAD, AAO x 4  HEENT: AN, Moist mucosa   Lungs: CTA b/l, decreased BS at the bases   Heart: S1S2+, RRR, no murmur  Abdo: Soft, NT, ND, +BS   : No wetzel   Exts: Right foot dressing in place      Data Review:       Recent Days:  Recent Labs     22  0722  0248 22  0359   WBC 9.5 15.3* 18.8*   HGB 10.8* 10.2* 10.9*   HCT 33.5* 31.1* 32.4*   * 411* 455*     Recent Labs     22  0701 22  0248 22  0359   BUN 20 24* 32*  32*   CREA 1.17 1.26 1.74*  1.75*       Lab Results   Component Value Date/Time    C-Reactive protein 3.92 (H) 2022 07:01 AM          Microbiology     Results     Procedure Component Value Units Date/Time    CULTURE, TISSUE Quique Hamlin STAIN [[id number]]  (Susceptibility) Collected: 22 1172    Order Status: Completed Specimen: Tissue Updated: 22 1240     Special Requests: No Special Requests        GRAM STAIN No wbc's seen         No organisms seen        Culture result:       Scant Staphylococcus aureus                  Rare Streptococcus anginosus Sensitivity to follow                  Rare Klebsiella pneumoniae          Susceptibility      Staphylococcus aureus     MANDY (Preliminary)     Ciprofloxacin ($) Susceptible     Clindamycin ($) Susceptible     Daptomycin ($$$$$) Susceptible     Doxycycline ($$) Susceptible     Erythromycin ($$$$) Susceptible     Gentamicin ($) Susceptible     Levofloxacin ($) Susceptible     Linezolid ($$$$$) Susceptible     Moxifloxacin ($$$$) Susceptible     Oxacillin Susceptible     Rifampin ($$$$) Susceptible  [1]      Tetracycline Susceptible     Trimeth/Sulfa Susceptible     Vancomycin ($) Susceptible                 [1]  Rifampin is not to be used for mono-therapy.           Linear View               Susceptibility      Klebsiella pneumoniae     MANDY (Preliminary)     Amikacin ($) Susceptible     Ampicillin ($) Resistant     Ampicillin/sulbactam ($) Intermediate     Cefazolin ($) Susceptible     Cefepime ($$) Susceptible     Cefoxitin Resistant     Ceftazidime ($) Susceptible     Ceftriaxone ($) Susceptible     Ciprofloxacin ($) Resistant     Gentamicin ($) Susceptible     Levofloxacin ($) Resistant     Meropenem ($$) Susceptible     Piperacillin/Tazobac ($) Susceptible     Tobramycin ($) Susceptible     Trimeth/Sulfa Susceptible                  Linear View                   MRSA SCREEN - PCR (NASAL) [[id number]] Collected: 05/03/22 0830    Order Status: Completed Specimen: Swab Updated: 05/03/22 0951     MRSA by PCR, Nasal Not Detected       CULTURE, Melanee Skeens STAIN [[id number]] Collected: 05/02/22 1800    Order Status: Canceled Specimen: Wound from Foot     CULTURE, BLOOD, PAIRED [[id number]] Collected: 05/02/22 1737    Order Status: Completed Specimen: Blood Updated: 05/08/22 0850     Special Requests: No Special Requests        Culture result: No growth 5 days       CULTURE, Melanee Skeens STAIN [[id number]]  (Susceptibility) Collected: 04/30/22 1830    Order Status: Completed Specimen: Wound from Swab Updated: 05/04/22 0710     Special Requests: No Special Requests        GRAM STAIN Occasional WBCs seen               2+ Gram Positive Cocci in pairs           Culture result:       Heavy Staphylococcus aureus                  Moderate Enterococcus faecalis          Susceptibility      Staphylococcus aureus     MANDY     Ciprofloxacin ($) Susceptible     Clindamycin ($) Resistant     Daptomycin ($$$$$) Susceptible     Doxycycline ($$) Susceptible     Erythromycin ($$$$) Susceptible     Gentamicin ($) Susceptible     Levofloxacin ($) Susceptible     Linezolid ($$$$$) Susceptible     Moxifloxacin ($$$$) Susceptible     Oxacillin Susceptible     Rifampin ($$$$) Susceptible  [1]      Tetracycline Susceptible     Trimeth/Sulfa Susceptible     Vancomycin ($) Susceptible                 [1]  Rifampin is not to be used for mono-therapy. Linear View               Susceptibility      Enterococcus faecalis     MANDY     Ampicillin ($) Susceptible     Daptomycin ($$$$$) Susceptible     Linezolid ($$$$$) Susceptible     Vancomycin ($) Susceptible                  Linear View                            Diagnostics   CXR Results  (Last 48 hours)    None           Assessment/Plan     1. 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 Faecalis isolated from wound Cx (04/30). MSSA isolated from repeat Cx on 05/03      S/p wound debridement on 5/03 and 05/06 w amp of right great toe w primary closure       Continue on Zosyn while hospitalized, d/c on Augmentin 500 mg BID x 2 wks      Will closely monitor as out pt for post-op infection        2. Nausea/vomiting, suspected gastroparesis, continue symptomatic mgt w Reglan       Gastric emptying study scheduled for 05/09, but can be done as out pt if discharged today     3.  Acute on chronic renal failure: Resolved, Cr at baseline      4. Long standing h/o DM type 1, needs to continue follow w endocrinology for Galo Vivas MD     5/8/2022

## 2022-05-08 NOTE — PROGRESS NOTES
Chart Reviewed:  Patient will discharge today with [address]. CM contacted Agency for a anticipated start date. CM awaiting notification. Clincals sent.

## 2022-05-08 NOTE — DISCHARGE SUMMARY
Admit date: 5/2/2022   Admitting Provider: Anthony Monet MD    Discharge date: 5/8/2022  Discharging Provider: Yokasta Brasher NP      * Admission Diagnoses: Diabetic foot infection ([facility] [E11.628, L08.9]    * Discharge Diagnoses:    Hospital Problems as of 5/8/2022 Date Reviewed: 5/7/2022          Codes Class Noted - Resolved POA    Sepsis ([facility] ICD-10-CM: A41.9  ICD-9-CM: 038.9, 995.91  5/8/2022 - Present Unknown        RUDDY (acute kidney injury) ([facility] ICD-10-CM: N17.9  ICD-9-CM: 584.9  5/8/2022 - Present Unknown        * (Principal) Diabetic foot infection ([facility] ICD-10-CM: E11.628, L08.9  ICD-9-CM: 250.80, 686.9  5/2/2022 - Present Unknown        Type 2 diabetes mellitus with diabetic neuropathy ([facility] ICD-10-CM: E11.40  ICD-9-CM: 250.60, 357.2  12/2/2020 - Present Unknown            * Hospital Course: Coral Eaton a 54 y.o. male with PMH of diabetic foot ulcer, diabetes on insulin pump, hypertension presented the ED with chief complaint of right foot pain, associated with draining wound and redness.  Patient states that he had 1 on the plantar surface for the past few months months that stopped healing. In addition, since last week, he has developed another ulcer on the medial aspect of the right foot over the ball of the foot, associated with purulent discharge and is not healing as well.  Says he was seen by Dr. Jignesh Herbert and asked him to be admitted to the hospital. Otherwise denies fever or chills. On 5/3/22 underwent Debridement of Right Foot Ulcer. 5/5/22 Infection markers worsen. On 5/6/22 underwent Amputation and debridement of right great toe. Patient to discharge on levaquin x 10 days for sepsis.     * Procedures:   Procedure(s):  Debridement Of Right Foot, Great Toe Amputation      Consults: ID and Podiatry    Significant Diagnostic Studies:  Results     Procedure Component Value Units Date/Time    CULTURE, TISSUE Sunflower Ryan STAIN [[id number]]  (Susceptibility) Collected: 05/03/22 6562 Order Status: Completed Specimen: Tissue Updated: 05/07/22 1549     Special Requests: No Special Requests        GRAM STAIN No wbc's seen         No organisms seen        Culture result:       Scant Staphylococcus aureus                  Rare Streptococcus anginosus Sensitivity to follow                  Rare Klebsiella pneumoniae Sensitivity to follow          Susceptibility      Staphylococcus aureus     MANDY (Preliminary)     Ciprofloxacin ($) Susceptible     Clindamycin ($) Susceptible     Daptomycin ($$$$$) Susceptible     Doxycycline ($$) Susceptible     Erythromycin ($$$$) Susceptible     Gentamicin ($) Susceptible     Levofloxacin ($) Susceptible     Linezolid ($$$$$) Susceptible     Moxifloxacin ($$$$) Susceptible     Oxacillin Susceptible     Rifampin ($$$$) Susceptible  [1]      Tetracycline Susceptible     Trimeth/Sulfa Susceptible     Vancomycin ($) Susceptible                 [1]  Rifampin is not to be used for mono-therapy.           Linear View                   MRSA SCREEN - PCR (NASAL) [[id number]] Collected: 05/03/22 0830    Order Status: Completed Specimen: Swab Updated: 05/03/22 0951     MRSA by PCR, Nasal Not Detected       CULTURE, Nile Bauer STAIN [[id number]] Collected: 05/02/22 1800    Order Status: Canceled Specimen: Wound from Foot     CULTURE, BLOOD, PAIRED [[id number]] Collected: 05/02/22 1737    Order Status: Completed Specimen: Blood Updated: 05/08/22 0850     Special Requests: No Special Requests        Culture result: No growth 5 days       CULTURE, Nile Bauer STAIN [[id number]]  (Susceptibility) Collected: 04/30/22 1830    Order Status: Completed Specimen: Wound from Swab Updated: 05/04/22 0710     Special Requests: No Special Requests        GRAM STAIN Occasional WBCs seen               2+ Gram Positive Cocci in pairs           Culture result:       Heavy Staphylococcus aureus                  Moderate Enterococcus faecalis          Susceptibility      Staphylococcus aureus     MANDY Ciprofloxacin ($) Susceptible     Clindamycin ($) Resistant     Daptomycin ($$$$$) Susceptible     Doxycycline ($$) Susceptible     Erythromycin ($$$$) Susceptible     Gentamicin ($) Susceptible     Levofloxacin ($) Susceptible     Linezolid ($$$$$) Susceptible     Moxifloxacin ($$$$) Susceptible     Oxacillin Susceptible     Rifampin ($$$$) Susceptible  [1]      Tetracycline Susceptible     Trimeth/Sulfa Susceptible     Vancomycin ($) Susceptible                 [1]  Rifampin is not to be used for mono-therapy. Linear View               Susceptibility      Enterococcus faecalis     MANDY     Ampicillin ($) Susceptible     Daptomycin ($$$$$) Susceptible     Linezolid ($$$$$) Susceptible     Vancomycin ($) Susceptible                  Linear View                           Discharge Exam:  Vitals and nursing note reviewed. Constitutional:       Appearance: Normal appearance. Eyes:      Extraocular Movements: Extraocular movements intact. Cardiovascular:      Rate and Rhythm: Tachycardia present. Heart sounds: Normal heart sounds. Pulmonary:      Breath sounds: Normal breath sounds. Abdominal:      General: Bowel sounds are normal.   Musculoskeletal:         General: Normal range of motion. Skin:     General: Skin is warm and dry. Comments: Right foot surgical dressing intact, right great toe amputation   Neurological:      Mental Status: He is alert and oriented to person, place, and time. * Discharge Condition: improved  * Disposition: Home    Discharge Medications:  Current Discharge Medication List      START taking these medications    Details   insulin glargine (LANTUS) 100 unit/mL injection 35 Units by SubCUTAneous route daily for 30 days. Qty: 10.5 mL, Refills: 0  Start date: 5/8/2022, End date: 6/7/2022      hydrALAZINE (APRESOLINE) 25 mg tablet Take 1 Tablet by mouth three (3) times daily for 30 days.   Qty: 90 Tablet, Refills: 0  Start date: 5/8/2022, End date: 6/7/2022      metoclopramide HCl (Reglan) 5 mg tablet Take 1 Tablet by mouth Before breakfast, lunch, and dinner for 10 days. Qty: 30 Tablet, Refills: 0  Start date: 5/8/2022, End date: 5/18/2022      traMADoL (Ultram) 50 mg tablet Take 1 Tablet by mouth two (2) times a day for 2 days. Max Daily Amount: 100 mg. Qty: 4 Tablet, Refills: 0  Start date: 5/8/2022, End date: 5/10/2022    Associated Diagnoses: Osteomyelitis of great toe of right foot (HCC)      amoxicillin-clavulanate (Augmentin) 500-125 mg per tablet Take 1 Tablet by mouth two (2) times a day for 14 days. Qty: 28 Tablet, Refills: 0  Start date: 5/8/2022, End date: 5/22/2022         CONTINUE these medications which have CHANGED    Details   amLODIPine (NORVASC) 10 mg tablet Take 1 Tablet by mouth daily for 30 days. Qty: 30 Tablet, Refills: 0  Start date: 5/9/2022, End date: 6/8/2022      insulin lispro (HUMALOG) 100 unit/mL injection 140-199=3 units           200-249=6 units 250-299=9 units 300-349=12 units 350 or greater = Call MD Give in addition to basal medications. Do Not Hold for NPO BEDTIME CORRECTIONAL sliding scale when scheduled: 200-249=2 units 250-299=4 units 300-349=5 units 350 or greater = Call MD Give in addition to basal medications. Do Not Hold for NPO  Qty: 1 Each, Refills: 0  Start date: 5/8/2022         CONTINUE these medications which have NOT CHANGED    Details   Blood-Glucose Sensor (Dexcom G6 Sensor) lorena One every 10 days  Qty: 9 Each, Refills: 3  Start date: 8/3/0298      folic acid (FOLVITE) 1 mg tablet Take 1 mg by mouth daily. predniSONE (DELTASONE) 5 mg tablet Take  by mouth. 2 tablets daily x2 weeks then 1 tablet daily for 30 days      atorvastatin (LIPITOR) 20 mg tablet Take 1 Tablet by mouth nightly.   Qty: 90 Tablet, Refills: 3      busPIRone (BUSPAR) 10 mg tablet One pill twice  A day  Qty: 60 Tablet, Refills: 4    Comments: Stop buspar 5 mg      gabapentin (NEURONTIN) 300 mg capsule Increase to 2  capsule by mouth every morning and then 3  capsules by mouth every NIGHT  Qty: 150 Capsule, Refills: 5    Associated Diagnoses: Uncontrolled type 2 diabetes mellitus with complication, with long-term current use of insulin; Neuropathy         STOP taking these medications       methotrexate (RHEUMATREX) 2.5 mg tablet Comments:   Reason for Stopping:         cephALEXin (Keflex) 500 mg capsule Comments:   Reason for Stopping:         levoFLOXacin (LEVAQUIN) 750 mg tablet Comments:   Reason for Stopping:         collagenase (SANTYL) 250 unit/gram ointment Comments:   Reason for Stopping:         lisinopriL (PRINIVIL, ZESTRIL) 20 mg tablet Comments:   Reason for Stopping:         hydroCHLOROthiazide (HYDRODIURIL) 12.5 mg tablet Comments:   Reason for Stopping:         honey (MediHoney, honey,) 80 % topical gel Comments:   Reason for Stopping:         sildenafil citrate (VIAGRA) 25 mg tablet Comments:   Reason for Stopping:         glucagon (Gvoke PFS 2-Pack Syringe) 1 mg/0.2 mL syrg Comments:   Reason for Stopping:             * Follow-up Care/Patient Instructions:   Activity: See surgical instructions  Diet: Diabetic Diet  Wound Care: Keep wound clean and dry      Patient to continue all medications as prescribed  Patient to maintain all f/u appointments  Patient counseled on dietary and lifestyle modifications  Patient stable for discharge  DO NOT TAKE METHOTREXATE X 3 WEEKS    Follow-up Information     Follow up With Specialties Details Why Contact Info    Jeffery Dc MD Internal Medicine Physician In 1 week  [facility] [address] [facility] [address] [phone]      [facility] In 3 week  [address] [address]  [phone]      Nadine Orta MD Infectious Disease Physician  As needed, If symptoms worsen [address] [facility], [facility]  [phone]          Time Spent: > 35 minutes    CC:    Jeffery Dc MD Signed:  Teresa Payan NP  5/8/2022  10:59 AM

## 2022-05-08 NOTE — PROGRESS NOTES
Pt discharged by attending, disc meds including new/changes and what meds to stop, pharmacy updated in system and   BRODIE Bailey resubmitted, antibiotic per Dr Shaina Alvarez updated also, pt aware of fu appts needing to be made, iv out and tele off, dressing changed per Dr José Miguel Peña telephone order, sutures in place and sx site wnl, Tylenol admin prior to dc, no distress noted, pt taken down via whlchair and safely assisted into truck

## 2022-05-09 ENCOUNTER — TELEPHONE (OUTPATIENT)
Dept: PODIATRY | Age: 52
End: 2022-05-09

## 2022-05-09 ENCOUNTER — TELEPHONE (OUTPATIENT)
Dept: ENDOCRINOLOGY | Age: 52
End: 2022-05-09

## 2022-05-09 LAB
BACTERIA SPEC CULT: NORMAL
GRAM STN SPEC: NORMAL
GRAM STN SPEC: NORMAL
SPECIAL REQUESTS,SREQ: NORMAL
SPECIAL REQUESTS,SREQ: NORMAL

## 2022-05-09 NOTE — TELEPHONE ENCOUNTER
Message received from patient advising that he has been unable to find a company that will accept his insurance and cover his dexcom sensors. He states via voicemail message that his current sensors have  and requests a sample if possible. Advised Dr Gulshan Brown of this. Returned call to patient and advised him per Dr Gulshan Brown he may come to the office and  a couple of samples to get him through until he can find a company to dispense his supplies. Also advised patient per Dr Gulshan Brown will contact the [facility] rep Pietro Gosselin and request his help. Advised patient if he is okay with this will give Pietro Gosselin his contact information. Patient states that he would be happy to talk to Pietro Gosselin and is fine with Pietro Gosselin having his information. Patient inquires if his mother can  samples for him as he has just recently been released from the hospital. Advised patient this is fine as long as he gives verbal permission for her to pick them up. Patient states that he gives permission. He states that his mother will be picking the samples up today and his mother's name is [place]. Called [employer] rep Pietro Gosselin and left a voicemail message for return call.

## 2022-05-09 NOTE — TELEPHONE ENCOUNTER
Fredi a physical therapist with [facility] was with the patient today and he is in the process of applying for disabilty. She states that they have a  that can come to his home and assist him but they need a verbal order for medical social work. Please advise.

## 2022-05-10 NOTE — TELEPHONE ENCOUNTER
Spoke with [employer] rep mariah regarding patient's difficulty getting his supplies and finding a supplier to distribute them. Raymon Goltz states that he is happy to help patient and requested patient's information to assist him. Patient's information given to Raymon Goltz as patient gave verbal permission to do so.

## 2022-05-16 ENCOUNTER — OFFICE VISIT (OUTPATIENT)
Dept: PODIATRY | Age: 52
End: 2022-05-16
Payer: COMMERCIAL

## 2022-05-16 VITALS
HEIGHT: 76 IN | HEART RATE: 97 BPM | BODY MASS INDEX: 28.01 KG/M2 | TEMPERATURE: 97.8 F | DIASTOLIC BLOOD PRESSURE: 87 MMHG | SYSTOLIC BLOOD PRESSURE: 148 MMHG | WEIGHT: 230 LBS

## 2022-05-16 DIAGNOSIS — E11.40 TYPE 2 DIABETES MELLITUS WITH DIABETIC NEUROPATHY, WITH LONG-TERM CURRENT USE OF INSULIN (HCC): Primary | ICD-10-CM

## 2022-05-16 DIAGNOSIS — R60.0 BILATERAL LOWER EXTREMITY EDEMA: ICD-10-CM

## 2022-05-16 DIAGNOSIS — Z79.4 TYPE 2 DIABETES MELLITUS WITH DIABETIC NEUROPATHY, WITH LONG-TERM CURRENT USE OF INSULIN (HCC): Primary | ICD-10-CM

## 2022-05-16 DIAGNOSIS — Z89.411 STATUS POST AMPUTATION OF GREAT TOE, RIGHT (HCC): ICD-10-CM

## 2022-05-16 PROCEDURE — 99213 OFFICE O/P EST LOW 20 MIN: CPT | Performed by: PODIATRIST

## 2022-05-16 PROCEDURE — 3051F HG A1C>EQUAL 7.0%<8.0%: CPT | Performed by: PODIATRIST

## 2022-05-16 NOTE — PROGRESS NOTES
Chief Complaint   Patient presents with    Wound Check     1. Have you been to the ER, urgent care clinic since your last visit? Hospitalized since your last visit? No    2. Have you seen or consulted any other health care providers outside of the [facility] since your last visit? Include any pap smears or colon screening.  No  PCP-Dr Heather Rodgers
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
## 2022-05-18 ENCOUNTER — OFFICE VISIT (OUTPATIENT)
Dept: ENDOCRINOLOGY | Age: 52
End: 2022-05-18
Payer: COMMERCIAL

## 2022-05-18 VITALS
HEIGHT: 76 IN | HEART RATE: 102 BPM | TEMPERATURE: 98.2 F | RESPIRATION RATE: 18 BRPM | WEIGHT: 230 LBS | BODY MASS INDEX: 28.01 KG/M2 | SYSTOLIC BLOOD PRESSURE: 145 MMHG | OXYGEN SATURATION: 98 % | DIASTOLIC BLOOD PRESSURE: 81 MMHG

## 2022-05-18 DIAGNOSIS — E78.2 MIXED HYPERLIPIDEMIA: ICD-10-CM

## 2022-05-18 DIAGNOSIS — G62.9 NEUROPATHY: ICD-10-CM

## 2022-05-18 DIAGNOSIS — Z79.4 ENCOUNTER FOR LONG-TERM (CURRENT) USE OF INSULIN (HCC): ICD-10-CM

## 2022-05-18 DIAGNOSIS — G62.9 NEUROPATHY: Primary | ICD-10-CM

## 2022-05-18 DIAGNOSIS — N18.4 CKD (CHRONIC KIDNEY DISEASE) STAGE 4, GFR 15-29 ML/MIN (HCC): ICD-10-CM

## 2022-05-18 DIAGNOSIS — E10.65 HYPERGLYCEMIA DUE TO TYPE 1 DIABETES MELLITUS (HCC): Primary | ICD-10-CM

## 2022-05-18 DIAGNOSIS — E10.65 HYPERGLYCEMIA DUE TO TYPE 1 DIABETES MELLITUS (HCC): ICD-10-CM

## 2022-05-18 PROCEDURE — 99215 OFFICE O/P EST HI 40 MIN: CPT | Performed by: INTERNAL MEDICINE

## 2022-05-18 PROCEDURE — 95251 CONT GLUC MNTR ANALYSIS I&R: CPT | Performed by: INTERNAL MEDICINE

## 2022-05-18 PROCEDURE — 3051F HG A1C>EQUAL 7.0%<8.0%: CPT | Performed by: INTERNAL MEDICINE

## 2022-05-18 RX ORDER — ESCITALOPRAM OXALATE 10 MG/1
TABLET ORAL
Qty: 90 TABLET | Refills: 3 | Status: SHIPPED | OUTPATIENT
Start: 2022-05-18 | End: 2022-06-15 | Stop reason: DRUGHIGH

## 2022-05-18 RX ORDER — PREGABALIN 75 MG/1
75 CAPSULE ORAL 2 TIMES DAILY
Qty: 60 CAPSULE | Refills: 6 | Status: SHIPPED | OUTPATIENT
Start: 2022-05-18 | End: 2022-09-19

## 2022-05-18 NOTE — Clinical Note
5/18/2022    Patient: Praveen Mcpherson   YOB: 1970   Date of Visit: 5/18/2022     [address]  [facility] [address]  Via Fax: [phone]     Tiana Sandhu, Technician  [address] [address]  Via     Dear MD Tiana Gonzalez, Technician,      Thank you for referring Mr. Praveen Mcpherson to [address] [place] [address] for evaluation. My notes for this consultation are attached. If you have questions, please do not hesitate to call me. I look forward to following your patient along with you.       Sincerely,    Radha Álvarez MD

## 2022-05-18 NOTE — PROGRESS NOTES
Room 1    Identified pt with two pt identifiers(name and ). Reviewed record in preparation for visit and have obtained necessary documentation. All patient medications has been reviewed. Chief Complaint   Patient presents with    Diabetes       3 most recent PHQ Screens 2022   Little interest or pleasure in doing things Not at all   Feeling down, depressed, irritable, or hopeless Not at all   Total Score PHQ 2 0         Health Maintenance Review: Patient reminded of \"due or due soon\" health maintenance. I have asked the patient to contact his/her primary care provider (PCP) for follow-up on his/her health maintenance. Vitals:    22 1609   BP: (!) 145/81   Pulse: (!) 102   Resp: 18   Temp: 98.2 °F (36.8 °C)   TempSrc: Oral   SpO2: 98%   Weight: 230 lb (104.3 kg)   Height: 6' 4\" (1.93 m)   PainSc:   5         Lab Results   Component Value Date/Time    Hemoglobin A1c 7.8 (H) 2022 02:36 AM    Hemoglobin A1c (POC) 6.6 2021 01:17 PM       Coordination of Care Questionnaire:   1) Have you been to an emergency room, urgent care, or hospitalized since your last visit?   no       2. Have seen or consulted any other health care provider since your last visit? NO      Patient is accompanied by self I have received verbal consent from Humera Munoz to discuss any/all medical information while they are present in the room.

## 2022-05-18 NOTE — PATIENT INSTRUCTIONS
SPECIFIC INSTRUCTIONS BELOW       DRINK water   Do not skip meals  Do not eat in between meals    Reduce carbs- pasta, rice, potatoes, bread   Try to avoid processed bread products like BISCUITS, CROISSANTS, MUFFINS    Do not drink juices or sodas, even if they are calorie zero or diet drinks and especially avoid using powders like crystalloids , ALEX-AIDS     Do not eat peanut butter     Do not eat sugar free cookies and cakes   Do not eat peaches, oranges, pineapples, raisins, grapes , canteloupe , honey dew, mangoes , watermelon  and fruit medleys    ---------------------------------------------------------------------------------------------------------------    stay  on  Lisinopril     Stay   On atorvostatin 20 mg at night       Gabapentin  300 mg  2  Pills  AM   And  3 pills at night      START on LYRICA 75 mg twice a day     Start on lexapro 10 mg at night         -----------------------------------------------------------------------------------------------------------------      Gets the pump and [employer] supplies   - [employer]        humalog to 4 vials in the pump   ( nearly 100 units a day )      Back up regimen       Check blood sugars immediately before each meal and at bedtime      Take  lantus  insulin  30  units  at bed time    Take humalog  Insulin at carb to insulin ratio of 10 gm : 1 unit       Also, add additional humalog  as follows with meals  If blood sugars are[de-identified]    150-200 mg 1 units    201-250 mg 2 units    251-300 mg 3 units    301-350 mg 4 units    351-400 mg 5 units    401-450 mg 6 units    451-500 mg 7 units     Less than 70 mg NO INSULIN                -------------PAY ATTENTION TO THESE GENERAL INSTRUCTIONS -----------------      - The medications prescribed at this visit will not be available at pharmacy until 6 pm       - YOUR MED LIST IS NOT UP TO DATE AS SOME CHANGES ARE BEING MADE AFTER THE VISIT - FOLLOW SPECIFIC INSTRUCTIONS  ABOVE     -ANY tests other than blood work, which you opt to do  outside the  [facility] imaging facilities, you are responsible for prior authorizations if  required    - 18 Mallorie Gage UP TO DATE ON YOUR AVS- PLEASE IGNORE     Results     *Normal results will not be notified by a phone call starting January 1 2021   *If you have an upcoming visit, the results will be discussed at the visit   *Please sign up for MY CHART if you want access to your lab and test results  *Abnormal results which require immediate attention will be notified by phone call   *Abnormal results which do not require immediate assistance will be notified in 1-2 weeks       Refills    -    have your pharmacy send us a refill request . Refills are done max for one year and a visit is a must before refills are extended    Follow up appointments -  highly encourage you to make it when you are checking out. We can accommodate you into the schedule based on your clinical situation, but not for extending refills beyond a year. Labs are important to give refills and is important to get labs before the visit     Phone calls  -  Allow  24 hrs.  for non-urgent calls to be returned  Prior authorization - It may take 2-4 weeks to process  Forms  -  FMLA, DMV etc., will take up to 2 weeks to process  Cancellations - please notify the office 2 days in advance   Samples  - will only be dispensed at visits       If not showing for the appointments and cancelling appointments within 24 hours are kept track of and three  of such situations in  two consecutive years will likely be considered for termination from the practice    -------------------------------------------------------------------------------------------------------------------

## 2022-05-18 NOTE — PROGRESS NOTES
HISTORY OF PRESENT ILLNESS    Maximo Caban is a 54  y.o. male. HPI  Patient here for    FOLLOW UP  AFTER  Last  visit  For  Type 1 diabetes mellitus  FROM  March 4 2022     He had toe amputated on right hallux    On prednisone   - because of DJD    Sugars are pretty high - over 300 mg       March 2022     He is seeing Dr. Seng Munoz for podiatry care   Patient felt worse taking the antibiotics. He saw Dr. Saskia Narvaez again this morning. Using tandem pump with Dexcom  Accompanied by his daughter who is around 11years old      March 4 2022     He Is c/o diabetic foot ulcer getting worse for few weeks now  On tanadem t-slim pump         OLD HISTORY       Current A1C is 13.2 % and symptoms/problems include polyuria, polydipsia and visual disturbances   H/o DM type 1 for 30 years    He moved from [address] [place] [address], goyo blair was his pcp   Many years ago , he was seeing Dr. Isreal Chowdary   Current diabetic medications include insulin pump            Review of Systems   C/o pain after amputation   C/o depression, requesting help       Physical Exam   Constitutional :   oriented to person, place, and time. appears well-developed and well-nourished. Right metatarsel area   - hallux amputated           ASSESSMENT and PLAN        1.   Type 1DM, POORLY controlled , on insulin pump.:  a1c is  7. 5  %   From  May  2022   Compared to       Nov 2021    Compared to       6.6 %      From    Today by  [facility]    July 2021     Compared to     6.6 %    From    March 2021  Compared to     7.5  %   By labs from nov 2020    Compared to    12.9 %      By  POC     Compared   To    12 %   From nov 2019, compared to  A1C IS OVER 10 %   FROM PT FIRST   A long gap in follow up visit from 2016 to 2019 - almost 3 years       May 2022     Reviewed the pump download and sugars   TDD  46 units a day   Daily carbs 90 gm a day     Currently on  Prednisone for DJD and is being tapered     Reviewed  Tandem pump downloaded report for 14  days and discussed with pt     Adjusted the basal settings     - 14 day average glucose 216  - 61% for high and  2  %low sugars and % in Target  Range 38 %    - percent of utiization 46 % 2.5 days     Settings   :  @  1.5 units /hr  = 36 units a day  ; CF  1 : 35 mg   :  Carb ratio  Is  1:6 gm          March second visit in f/u   Did not get labs done as requested and he wants to go get that done tomorrow. Downloaded the tandem pump again today and noted that the average blood sugar reading is at 200 the sugars seem to be in target range only 40% of time  He is using basal 34 units a day and 4 boluses 40 units a day and total daily dose is at 80 units a day and total carbs she is consuming is 344 g a day  Upon review it did not seem like he is using falls carbs but I strongly had to  him again to not consume any high glycemic drinks and he repeats the same questions again if he could have diet free Gatorade or some other sugar-free sodas  Increase the carb to insulin ratio as well as the sensitivities  He can do a better job which she did in the past and I am surprised that he does not give the same effort to help himself   To help his anxiety I am giving him BuSpar        March 2022     Downloaded the tandem pump thru [facility]   Again came in with no labs prior to the visit  ( chronic problem )     - 14 day average glucose over 250 to 300 mg   ? Infection or  ? Diet   Significant change in blood sugars       2. Hypoglycemia :  Educated on treating the hypoglycemia. Discussed g-voke and prescribed      3. HTN :  On  lisinopril 20 mg      4. Dyslipidemia : on statin        5. Anxiety disorder - on  buspar bid dosing  And am adding lexapro 10 mg hs       7. NEUROPATHY  - severe,  He is emotional in tears and says no one is helping him with pain   So, adding lyrica bid ;     neurontin to 600 mg am , 900 mg pm        8. ED - Hypogonadism / fatigue    He is interested in taking TT , but not started on it         9.  ARF - during hospitalization - ordering labs to see if it is persisting .  New to me   This needs f/u to adjust insulin doses          Reviewed the  Pump downloads, discussed changes and spent more than 25 min in interpretation and counseling   Total time  : 42 min         Reviewed results with patient and discussed the labs being ordered today/bnv  Patient voiced understanding of plan of care

## 2022-05-20 ENCOUNTER — TELEPHONE (OUTPATIENT)
Dept: PODIATRY | Age: 52
End: 2022-05-20

## 2022-05-20 ENCOUNTER — TELEPHONE (OUTPATIENT)
Dept: ENDOCRINOLOGY | Age: 52
End: 2022-05-20

## 2022-05-20 NOTE — TELEPHONE ENCOUNTER
Left message for Dr. Miriam Adrian to call office. Dr. Ramos Eugene would like patient scheduled for neuropathy pain. Also faxed records to their office requesting scheduling.

## 2022-05-23 ENCOUNTER — TELEPHONE (OUTPATIENT)
Dept: PODIATRY | Age: 52
End: 2022-05-23

## 2022-05-23 NOTE — TELEPHONE ENCOUNTER
Chavo Greenberg from [facility]. Mr. Rosa August self discharge today. He had one more visit but the patient didn't feel like he needed it.

## 2022-05-25 ENCOUNTER — OFFICE VISIT (OUTPATIENT)
Dept: PODIATRY | Age: 52
End: 2022-05-25
Payer: COMMERCIAL

## 2022-05-25 VITALS
HEART RATE: 94 BPM | SYSTOLIC BLOOD PRESSURE: 138 MMHG | BODY MASS INDEX: 28.01 KG/M2 | HEIGHT: 76 IN | WEIGHT: 230 LBS | DIASTOLIC BLOOD PRESSURE: 90 MMHG | TEMPERATURE: 96.9 F

## 2022-05-25 DIAGNOSIS — E11.65 POORLY CONTROLLED DIABETES MELLITUS (HCC): Primary | ICD-10-CM

## 2022-05-25 DIAGNOSIS — Z89.411 STATUS POST AMPUTATION OF RIGHT GREAT TOE (HCC): ICD-10-CM

## 2022-05-25 DIAGNOSIS — E11.42 DIABETIC POLYNEUROPATHY ASSOCIATED WITH TYPE 2 DIABETES MELLITUS (HCC): ICD-10-CM

## 2022-05-25 PROCEDURE — 3051F HG A1C>EQUAL 7.0%<8.0%: CPT | Performed by: PODIATRIST

## 2022-05-25 PROCEDURE — 99213 OFFICE O/P EST LOW 20 MIN: CPT | Performed by: PODIATRIST
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
## 2022-05-25 NOTE — PROGRESS NOTES
Chief Complaint   Patient presents with    Wound Check     1. Have you been to the ER, urgent care clinic since your last visit? Hospitalized since your last visit? No    2. Have you seen or consulted any other health care providers outside of the [address] [place] [address] since your last visit? Include any pap smears or colon screening.  No  PCP-Dr Angelita Smith

## 2022-06-02 DIAGNOSIS — E10.65 HYPERGLYCEMIA DUE TO TYPE 1 DIABETES MELLITUS (HCC): Primary | ICD-10-CM

## 2022-06-02 RX ORDER — INSULIN LISPRO 100 [IU]/ML
INJECTION, SOLUTION INTRAVENOUS; SUBCUTANEOUS
Qty: 12 EACH | Refills: 3 | Status: SHIPPED | OUTPATIENT
Start: 2022-06-02

## 2022-06-02 NOTE — TELEPHONE ENCOUNTER
Message received from patient advising that he needs a refill on regular insulin as he is completely out. Current Humalog prescription reads as a sliding scale that looks like it was given by Sandra Lopez. Prescription pended but unsure what directions to enter. Please update and send.

## 2022-06-02 NOTE — TELEPHONE ENCOUNTER
It is to be filled  based on old insulin regimen has has  prior to hospitalizations   Look thru med orders  or insulin instructions       He is on insulin pump  -

## 2022-06-06 ENCOUNTER — OFFICE VISIT (OUTPATIENT)
Dept: PODIATRY | Age: 52
End: 2022-06-06
Payer: COMMERCIAL

## 2022-06-06 VITALS
BODY MASS INDEX: 28.01 KG/M2 | TEMPERATURE: 97.1 F | DIASTOLIC BLOOD PRESSURE: 69 MMHG | HEIGHT: 76 IN | HEART RATE: 102 BPM | WEIGHT: 230 LBS | SYSTOLIC BLOOD PRESSURE: 105 MMHG

## 2022-06-06 DIAGNOSIS — E11.40 TYPE 2 DIABETES MELLITUS WITH DIABETIC NEUROPATHY, WITH LONG-TERM CURRENT USE OF INSULIN (HCC): Primary | ICD-10-CM

## 2022-06-06 DIAGNOSIS — E11.621 DIABETIC ULCER OF RIGHT MIDFOOT ASSOCIATED WITH TYPE 2 DIABETES MELLITUS, WITH BONE INVOLVEMENT WITHOUT EVIDENCE OF NECROSIS (HCC): ICD-10-CM

## 2022-06-06 DIAGNOSIS — L97.416 DIABETIC ULCER OF RIGHT MIDFOOT ASSOCIATED WITH TYPE 2 DIABETES MELLITUS, WITH BONE INVOLVEMENT WITHOUT EVIDENCE OF NECROSIS (HCC): ICD-10-CM

## 2022-06-06 DIAGNOSIS — Z98.890 POST-OPERATIVE STATE: ICD-10-CM

## 2022-06-06 DIAGNOSIS — Z79.4 TYPE 2 DIABETES MELLITUS WITH DIABETIC NEUROPATHY, WITH LONG-TERM CURRENT USE OF INSULIN (HCC): Primary | ICD-10-CM

## 2022-06-06 PROCEDURE — 99213 OFFICE O/P EST LOW 20 MIN: CPT | Performed by: PODIATRIST

## 2022-06-06 PROCEDURE — 3051F HG A1C>EQUAL 7.0%<8.0%: CPT | Performed by: PODIATRIST

## 2022-06-06 NOTE — PROGRESS NOTES
Chief Complaint   Patient presents with    Wound Check     1. Have you been to the ER, urgent care clinic since your last visit? Hospitalized since your last visit? No    2. Have you seen or consulted any other health care providers outside of the [address] [place] [address] since your last visit? Include any pap smears or colon screening.  No  PCP-Dr Rios Silverio

## 2022-06-14 ENCOUNTER — TELEPHONE (OUTPATIENT)
Dept: PODIATRY | Age: 52
End: 2022-06-14

## 2022-06-14 ENCOUNTER — LAB ONLY (OUTPATIENT)
Dept: ENDOCRINOLOGY | Age: 52
End: 2022-06-14

## 2022-06-14 DIAGNOSIS — E10.65 HYPERGLYCEMIA DUE TO TYPE 1 DIABETES MELLITUS (HCC): ICD-10-CM

## 2022-06-14 DIAGNOSIS — G62.9 NEUROPATHY: ICD-10-CM

## 2022-06-14 DIAGNOSIS — Z79.4 ENCOUNTER FOR LONG-TERM (CURRENT) USE OF INSULIN (HCC): ICD-10-CM

## 2022-06-14 DIAGNOSIS — E78.2 MIXED HYPERLIPIDEMIA: ICD-10-CM

## 2022-06-15 ENCOUNTER — OFFICE VISIT (OUTPATIENT)
Dept: ENDOCRINOLOGY | Age: 52
End: 2022-06-15
Payer: COMMERCIAL

## 2022-06-15 VITALS
DIASTOLIC BLOOD PRESSURE: 71 MMHG | SYSTOLIC BLOOD PRESSURE: 122 MMHG | TEMPERATURE: 97.7 F | HEIGHT: 76 IN | BODY MASS INDEX: 26.4 KG/M2 | WEIGHT: 216.8 LBS | HEART RATE: 94 BPM | OXYGEN SATURATION: 97 %

## 2022-06-15 DIAGNOSIS — Z96.41 INSULIN PUMP STATUS: ICD-10-CM

## 2022-06-15 DIAGNOSIS — G62.9 NEUROPATHY: ICD-10-CM

## 2022-06-15 DIAGNOSIS — N18.4 CKD (CHRONIC KIDNEY DISEASE) STAGE 4, GFR 15-29 ML/MIN (HCC): ICD-10-CM

## 2022-06-15 DIAGNOSIS — E10.65 HYPERGLYCEMIA DUE TO TYPE 1 DIABETES MELLITUS (HCC): Primary | ICD-10-CM

## 2022-06-15 DIAGNOSIS — E78.2 MIXED HYPERLIPIDEMIA: ICD-10-CM

## 2022-06-15 DIAGNOSIS — L97.515 ULCER OF RIGHT FOOT WITH MUSCLE INVOLVEMENT WITHOUT EVIDENCE OF NECROSIS (HCC): ICD-10-CM

## 2022-06-15 DIAGNOSIS — Z79.4 ENCOUNTER FOR LONG-TERM (CURRENT) USE OF INSULIN (HCC): ICD-10-CM

## 2022-06-15 LAB
ALBUMIN SERPL-MCNC: 3.5 G/DL (ref 3.5–5)
ALBUMIN/GLOB SERPL: 0.9 {RATIO} (ref 1.1–2.2)
ALP SERPL-CCNC: 116 U/L (ref 45–117)
ALT SERPL-CCNC: 18 U/L (ref 12–78)
ANION GAP SERPL CALC-SCNC: 5 MMOL/L (ref 5–15)
AST SERPL-CCNC: 12 U/L (ref 15–37)
BILIRUB SERPL-MCNC: 0.7 MG/DL (ref 0.2–1)
BUN SERPL-MCNC: 19 MG/DL (ref 6–20)
BUN/CREAT SERPL: 13 (ref 12–20)
CALCIUM SERPL-MCNC: 9.4 MG/DL (ref 8.5–10.1)
CHLORIDE SERPL-SCNC: 101 MMOL/L (ref 97–108)
CHOLEST SERPL-MCNC: 120 MG/DL
CO2 SERPL-SCNC: 29 MMOL/L (ref 21–32)
CREAT SERPL-MCNC: 1.42 MG/DL (ref 0.7–1.3)
CREAT UR-MCNC: 71.4 MG/DL
EST. AVERAGE GLUCOSE BLD GHB EST-MCNC: 174 MG/DL
GLOBULIN SER CALC-MCNC: 3.7 G/DL (ref 2–4)
GLUCOSE SERPL-MCNC: 481 MG/DL (ref 65–100)
HBA1C MFR BLD: 7.7 % (ref 4–5.6)
HDLC SERPL-MCNC: 45 MG/DL
HDLC SERPL: 2.7 {RATIO} (ref 0–5)
LDLC SERPL CALC-MCNC: 31 MG/DL (ref 0–100)
MICROALBUMIN UR-MCNC: 2.98 MG/DL
MICROALBUMIN/CREAT UR-RTO: 42 MG/G (ref 0–30)
POTASSIUM SERPL-SCNC: 5.2 MMOL/L (ref 3.5–5.1)
PROT SERPL-MCNC: 7.2 G/DL (ref 6.4–8.2)
SODIUM SERPL-SCNC: 135 MMOL/L (ref 136–145)
TRIGL SERPL-MCNC: 220 MG/DL (ref ?–150)
VLDLC SERPL CALC-MCNC: 44 MG/DL

## 2022-06-15 PROCEDURE — 3051F HG A1C>EQUAL 7.0%<8.0%: CPT | Performed by: INTERNAL MEDICINE

## 2022-06-15 PROCEDURE — 99214 OFFICE O/P EST MOD 30 MIN: CPT | Performed by: INTERNAL MEDICINE

## 2022-06-15 PROCEDURE — 95251 CONT GLUC MNTR ANALYSIS I&R: CPT | Performed by: INTERNAL MEDICINE

## 2022-06-15 RX ORDER — METHOTREXATE 2.5 MG/1
TABLET ORAL
COMMUNITY
Start: 2022-05-20

## 2022-06-15 RX ORDER — LISINOPRIL 20 MG/1
TABLET ORAL
COMMUNITY
Start: 2022-05-24

## 2022-06-15 RX ORDER — ESCITALOPRAM OXALATE 20 MG/1
20 TABLET ORAL DAILY
Qty: 90 TABLET | Refills: 3 | Status: SHIPPED | OUTPATIENT
Start: 2022-06-15

## 2022-06-15 NOTE — PATIENT INSTRUCTIONS
SPECIFIC INSTRUCTIONS BELOW       DRINK water   Do not skip meals  Do not eat in between meals    Reduce carbs- pasta, rice, potatoes, bread   Try to avoid processed bread products like BISCUITS, CROISSANTS, MUFFINS    Do not drink juices or sodas, even if they are calorie zero or diet drinks and especially avoid using powders like crystalloids , ALEX-AIDS     Do not eat peanut butter     Do not eat sugar free cookies and cakes   Do not eat peaches, oranges, pineapples, raisins, grapes , canteloupe , honey dew, mangoes , watermelon  and fruit medleys    ---------------------------------------------------------------------------------------------------------------    stay  on  Lisinopril     Stay   On atorvostatin 20 mg at night     Gabapentin  300 mg  2  Pills  AM   And  3 pills at night    Stay  on LYRICA 75 mg twice a day     Stay  on lexapro 10 mg at night         -----------------------------------------------------------------------------------------------------------------      Gets the pump and [employer] supplies   - [employer]        humalog to 4 vials in the pump   ( nearly 100 units a day )      Back up regimen       Check blood sugars immediately before each meal and at bedtime      Take  lantus  insulin  30  units  at bed time    Take humalog  Insulin at carb to insulin ratio of 10 gm : 1 unit       Also, add additional humalog  as follows with meals  If blood sugars are[de-identified]    150-200 mg 1 units    201-250 mg 2 units    251-300 mg 3 units    301-350 mg 4 units    351-400 mg 5 units    401-450 mg 6 units    451-500 mg 7 units     Less than 70 mg NO INSULIN                -------------PAY ATTENTION TO THESE GENERAL INSTRUCTIONS -----------------      - The medications prescribed at this visit will not be available at pharmacy until 6 pm       - YOUR MED LIST IS NOT UP TO DATE AS SOME CHANGES ARE BEING MADE AFTER THE VISIT - FOLLOW SPECIFIC INSTRUCTIONS  ABOVE     -ANY tests other than blood work, which you opt to do  outside the  [facility] imaging facilities, you are responsible for prior authorizations if  required    - [address] Mallorie Gage UP TO DATE ON YOUR AVS- PLEASE IGNORE     Results     *Normal results will not be notified by a phone call starting January 1 2021   *If you have an upcoming visit, the results will be discussed at the visit   *Please sign up for MY CHART if you want access to your lab and test results  *Abnormal results which require immediate attention will be notified by phone call   *Abnormal results which do not require immediate assistance will be notified in 1-2 weeks       Refills    -    have your pharmacy send us a refill request . Refills are done max for one year and a visit is a must before refills are extended    Follow up appointments -  highly encourage you to make it when you are checking out. We can accommodate you into the schedule based on your clinical situation, but not for extending refills beyond a year. Labs are important to give refills and is important to get labs before the visit     Phone calls  -  Allow  24 hrs.  for non-urgent calls to be returned  Prior authorization - It may take 2-4 weeks to process  Forms  -  FMLA, DMV etc., will take up to 2 weeks to process  Cancellations - please notify the office 2 days in advance   Samples  - will only be dispensed at visits       If not showing for the appointments and cancelling appointments within 24 hours are kept track of and three  of such situations in  two consecutive years will likely be considered for termination from the practice    -------------------------------------------------------------------------------------------------------------------

## 2022-06-15 NOTE — PROGRESS NOTES
HISTORY OF PRESENT ILLNESS    Chuck Espinosa is a 54  y.o. male. HPI  Patient here for    FOLLOW UP  AFTER  Last  visit  For  Type 1 diabetes mellitus  FROM  May  2022     He is now being followed by  Wound care nurse  And he has a picture  On phone from this morning   The ulcer base has yellowish discharge      He is asking for second opinion , but he changed mind and wanted to see Dr. Libia Mello again           May 2022     He had toe amputated on right hallux    On prednisone   - because of DJD    Sugars are pretty high - over 300 mg       March 2022     He is seeing Dr. Libia Mello for podiatry care   Patient felt worse taking the antibiotics. He saw Dr. Lawyer Hernandez again this morning. Using tandem pump with Dexcom  Accompanied by his daughter who is around 11years old      March 4 2022     He Is c/o diabetic foot ulcer getting worse for few weeks now  On tanadem t-slim pump         OLD HISTORY       Current A1C is 13.2 % and symptoms/problems include polyuria, polydipsia and visual disturbances   H/o DM type 1 for 30 years    He moved from [address] [place] [address], goyo blair was his pcp   Many years ago , he was seeing Dr. Alma Peña   Current diabetic medications include insulin pump            Review of Systems   C/o pain after amputation   C/o depression, requesting help       Physical Exam   Constitutional :   oriented to person, place, and time. appears well-developed and well-nourished. Right metatarsel area   - hallux amputated           ASSESSMENT and PLAN        1.   Type 1DM, POORLY controlled , on insulin pump.:  a1c is  7. 5  %   From  May  2022   Compared to       Nov 2021    Compared to       6.6 %      From    Today by  [facility]    July 2021     Compared to     6.6 %    From    March 2021  Compared to     7.5  %   By labs from nov 2020    Compared to    12.9 %      By  POC     Compared   To    12 %   From nov 2019, compared to  A1C IS OVER 10 %   FROM PT FIRST   A long gap in follow up visit from 2016 to 2019 - almost 3 years         June 2022       Noted that he is on medicaid plan   Tandem pump and dexcom could be a challenge     Reviewed the pump download and sugars   TDD  70 units a day   BASAL :   42 units  ; Bolus   25 units    Daily carbs 180  gm a day   He Is  NOT bolusing at the meal times       Reviewed  Tandem pump downloaded report for 14  days and discussed with pt     Adjusted the basal settings     - 14 day average glucose 215  - 53% for high and  1  % low sugars and % in Target  Range 46 %    - percent of utiization 46 % 2.5 days     Settings   :    Basal - MN : 1.5 unit/hr  ;  7 am-  2 units/hr,  8 pm - 1.5 units/hr  42 units a day  ;   CF  1 : 35 mg   :  Carb ratio  Is  1:6 gm            May 2022     Reviewed the pump download and sugars   TDD  46 units a day   Daily carbs 90 gm a day     Currently on  Prednisone for DJD and is being tapered   Reviewed  Tandem pump downloaded report for 14  days and discussed with pt     Adjusted the basal settings     - 14 day average glucose 216  - 61% for high and  2  %low sugars and % in Target  Range 38 %    - percent of utiization 46 % 2.5 days     Settings   :  @  1.5 units /hr  = 36 units a day  ; CF  1 : 35 mg   :  Carb ratio  Is  1:6 gm          March second visit in f/u   Did not get labs done as requested and he wants to go get that done tomorrow.   Downloaded the tandem pump again today and noted that the average blood sugar reading is at 200 the sugars seem to be in target range only 40% of time  He is using basal 34 units a day and 4 boluses 40 units a day and total daily dose is at 80 units a day and total carbs she is consuming is 344 g a day  Upon review it did not seem like he is using falls carbs but I strongly had to  him again to not consume any high glycemic drinks and he repeats the same questions again if he could have diet free Gatorade or some other sugar-free sodas  Increase the carb to insulin ratio as well as the sensitivities  He can do a better job which she did in the past and I am surprised that he does not give the same effort to help himself   To help his anxiety I am giving him BuSpar      2. Hypoglycemia :  Educated on treating the hypoglycemia. Discussed gabriela and prescribed      3. HTN :  On  lisinopril 20 mg      4. Dyslipidemia : on statin        5. Anxiety disorder - on  buspar bid dosing  And  I am increasing  lexapro 20 mg hs       7. NEUROPATHY  - severe,  He is emotional in tears and says no one is helping him with pain   So, adding lyrica bid ;     neurontin to 600 mg am , 900 mg pm  Finally he got referred to pain management   - Dr. Daniel Leon         8.  ED - Hypogonadism / fatigue    He is interested in taking TT , but not started on it             Reviewed results with patient and discussed the labs being ordered today/bnv  Patient voiced understanding of plan of care

## 2022-06-15 NOTE — LETTER
6/19/2022    Patient: Tiburcio Lee   YOB: 1970   Date of Visit: 6/15/2022     Porfirio Herman MD  [address]  [place] [address]  Via Fax: [phone]    Dear Porfirio Herman MD,      Thank you for referring Mr. Tiburcio Lee to [address] [place] [address] for evaluation. My notes for this consultation are attached. If you have questions, please do not hesitate to call me. I look forward to following your patient along with you.       Sincerely,    Dar Maynard MD

## 2022-06-20 ENCOUNTER — OFFICE VISIT (OUTPATIENT)
Dept: PODIATRY | Age: 52
End: 2022-06-20
Payer: COMMERCIAL

## 2022-06-20 VITALS
TEMPERATURE: 97.3 F | HEART RATE: 97 BPM | HEIGHT: 76 IN | BODY MASS INDEX: 26.3 KG/M2 | SYSTOLIC BLOOD PRESSURE: 144 MMHG | DIASTOLIC BLOOD PRESSURE: 83 MMHG | WEIGHT: 216 LBS

## 2022-06-20 DIAGNOSIS — E11.40 TYPE 2 DIABETES MELLITUS WITH DIABETIC NEUROPATHY, WITH LONG-TERM CURRENT USE OF INSULIN (HCC): ICD-10-CM

## 2022-06-20 DIAGNOSIS — Z79.4 TYPE 2 DIABETES MELLITUS WITH DIABETIC NEUROPATHY, WITH LONG-TERM CURRENT USE OF INSULIN (HCC): ICD-10-CM

## 2022-06-20 DIAGNOSIS — S99.192G CLOSED FRACTURE OF BASE OF FIFTH METATARSAL BONE OF LEFT FOOT AT METAPHYSEAL-DIAPHYSEAL JUNCTION WITH DELAYED HEALING, SUBSEQUENT ENCOUNTER: Primary | ICD-10-CM

## 2022-06-20 DIAGNOSIS — E11.628 DIABETIC FOOT INFECTION (HCC): ICD-10-CM

## 2022-06-20 DIAGNOSIS — L08.9 DIABETIC FOOT INFECTION (HCC): ICD-10-CM

## 2022-06-20 PROCEDURE — 99213 OFFICE O/P EST LOW 20 MIN: CPT | Performed by: PODIATRIST

## 2022-06-20 PROCEDURE — 3052F HG A1C>EQUAL 8.0%<EQUAL 9.0%: CPT | Performed by: PODIATRIST

## 2022-06-20 NOTE — PROGRESS NOTES
Chief Complaint   Patient presents with    Wound Check     1. Have you been to the ER, urgent care clinic since your last visit? Hospitalized since your last visit? No    2. Have you seen or consulted any other health care providers outside of the [address] [place] [address] since your last visit? Include any pap smears or colon screening.  No  PCP-Dr Jossie Rand

## 2022-06-21 DIAGNOSIS — G62.9 NEUROPATHY: ICD-10-CM

## 2022-06-21 RX ORDER — GABAPENTIN 300 MG/1
CAPSULE ORAL
Qty: 150 CAPSULE | Refills: 5 | Status: SHIPPED | OUTPATIENT
Start: 2022-06-21

## 2022-07-05 ENCOUNTER — OFFICE VISIT (OUTPATIENT)
Dept: PODIATRY | Age: 52
End: 2022-07-05
Payer: COMMERCIAL

## 2022-07-05 VITALS
DIASTOLIC BLOOD PRESSURE: 84 MMHG | WEIGHT: 222.6 LBS | SYSTOLIC BLOOD PRESSURE: 141 MMHG | OXYGEN SATURATION: 98 % | BODY MASS INDEX: 27.11 KG/M2 | HEART RATE: 101 BPM | HEIGHT: 76 IN | TEMPERATURE: 91.9 F | RESPIRATION RATE: 17 BRPM

## 2022-07-05 DIAGNOSIS — L97.412 DIABETIC ULCER OF RIGHT MIDFOOT ASSOCIATED WITH TYPE 2 DIABETES MELLITUS, WITH FAT LAYER EXPOSED (HCC): Primary | ICD-10-CM

## 2022-07-05 DIAGNOSIS — E11.621 DIABETIC ULCER OF RIGHT MIDFOOT ASSOCIATED WITH TYPE 2 DIABETES MELLITUS, WITH FAT LAYER EXPOSED (HCC): Primary | ICD-10-CM

## 2022-07-05 DIAGNOSIS — G89.4 CHRONIC PAIN SYNDROME: ICD-10-CM

## 2022-07-05 PROCEDURE — 99213 OFFICE O/P EST LOW 20 MIN: CPT | Performed by: PODIATRIST

## 2022-07-05 PROCEDURE — 3052F HG A1C>EQUAL 8.0%<EQUAL 9.0%: CPT | Performed by: PODIATRIST

## 2022-07-05 NOTE — PROGRESS NOTES
1. \"Have you been to the ER, urgent care clinic since your last visit? Hospitalized since your last visit? \" No    2. \"Have you seen or consulted any other health care providers outside of the [address] [place] [address] since your last visit? \" No     3. For patients aged 39-70: Has the patient had a colonoscopy / FIT/ Cologuard? No      If the patient is female:    4. For patients aged 41-77: Has the patient had a mammogram within the past 2 years? NA - based on age or sex      11. For patients aged 21-65: Has the patient had a pap smear?  NA - based on age or sex       Chief Complaint   Patient presents with    Wound Check         Visit Vitals  BP (!) 141/84 (BP 1 Location: Left upper arm, BP Patient Position: Sitting, BP Cuff Size: Adult)   Pulse (!) 101   Temp (!) 91.9 °F (33.3 °C) (Temporal)   Resp 17   Ht 6' 4\" (1.93 m)   Wt 222 lb 9.6 oz (101 kg)   SpO2 98%   BMI 27.10 kg/m²
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
## 2022-07-07 NOTE — ANESTHESIA POSTPROCEDURE EVALUATION
Procedure(s):  Debridement Of Right Foot Ulcer.     total IV anesthesia, MAC    Anesthesia Post Evaluation      Multimodal analgesia: multimodal analgesia used between 6 hours prior to anesthesia start to PACU discharge  Patient location during evaluation: PACU  Patient participation: complete - patient participated  Level of consciousness: awake  Pain score: 0  Pain management: adequate  Airway patency: patent  Anesthetic complications: no  Cardiovascular status: acceptable  Respiratory status: acceptable  Hydration status: acceptable  Post anesthesia nausea and vomiting:  controlled  Final Post Anesthesia Temperature Assessment:  Normothermia (36.0-37.5 degrees C)      INITIAL Post-op Vital signs:   Vitals Value Taken Time   /93 05/03/22 1908   Temp 37.4 °C (99.3 °F) 05/03/22 1908   Pulse 108 05/03/22 1908   Resp 20 05/03/22 1908   SpO2 99 % 05/03/22 1908

## 2022-07-08 DIAGNOSIS — E11.621 DIABETIC ULCER OF RIGHT MIDFOOT ASSOCIATED WITH TYPE 2 DIABETES MELLITUS, WITH FAT LAYER EXPOSED (HCC): Primary | ICD-10-CM

## 2022-07-08 DIAGNOSIS — L97.412 DIABETIC ULCER OF RIGHT MIDFOOT ASSOCIATED WITH TYPE 2 DIABETES MELLITUS, WITH FAT LAYER EXPOSED (HCC): Primary | ICD-10-CM

## 2022-07-08 LAB
ALBUMIN SERPL-MCNC: 4.1 G/DL (ref 4–5)
ALBUMIN/CREAT UR: 105 MG/G CREAT (ref 0–29)
ALBUMIN/GLOB SERPL: 1.2 {RATIO} (ref 1.2–2.2)
ALP SERPL-CCNC: 91 IU/L (ref 44–121)
ALT SERPL-CCNC: 8 IU/L (ref 0–44)
AST SERPL-CCNC: 18 IU/L (ref 0–40)
BILIRUB SERPL-MCNC: 0.2 MG/DL (ref 0–1.2)
BUN SERPL-MCNC: 13 MG/DL (ref 6–24)
BUN/CREAT SERPL: 12 (ref 9–20)
CALCIUM SERPL-MCNC: 9.1 MG/DL (ref 8.7–10.2)
CHLORIDE SERPL-SCNC: 106 MMOL/L (ref 96–106)
CHOLEST SERPL-MCNC: 110 MG/DL (ref 100–199)
CO2 SERPL-SCNC: 21 MMOL/L (ref 20–29)
CREAT SERPL-MCNC: 1.11 MG/DL (ref 0.76–1.27)
CREAT UR-MCNC: 26.2 MG/DL
EGFR: 81 ML/MIN/1.73
EST. AVERAGE GLUCOSE BLD GHB EST-MCNC: 183 MG/DL
GLOBULIN SER CALC-MCNC: 3.3 G/DL (ref 1.5–4.5)
GLUCOSE SERPL-MCNC: 97 MG/DL (ref 65–99)
HBA1C MFR BLD: 8 % (ref 4.8–5.6)
HDLC SERPL-MCNC: 47 MG/DL
IMP & REVIEW OF LAB RESULTS: NORMAL
LDLC SERPL CALC-MCNC: 50 MG/DL (ref 0–99)
MICROALBUMIN UR-MCNC: 27.4 UG/ML
POTASSIUM SERPL-SCNC: 4.9 MMOL/L (ref 3.5–5.2)
PROT SERPL-MCNC: 7.4 G/DL (ref 6–8.5)
SODIUM SERPL-SCNC: 141 MMOL/L (ref 134–144)
TRIGL SERPL-MCNC: 58 MG/DL (ref 0–149)
VLDLC SERPL CALC-MCNC: 13 MG/DL (ref 5–40)

## 2022-07-12 RX ORDER — LEVOFLOXACIN 750 MG/1
750 TABLET ORAL DAILY
Qty: 14 TABLET | Refills: 0 | Status: SHIPPED | OUTPATIENT
Start: 2022-07-12 | End: 2022-07-26
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
## 2022-07-20 ENCOUNTER — OFFICE VISIT (OUTPATIENT)
Dept: PODIATRY | Age: 52
End: 2022-07-20
Payer: COMMERCIAL

## 2022-07-20 VITALS
HEIGHT: 76 IN | WEIGHT: 222 LBS | BODY MASS INDEX: 27.03 KG/M2 | DIASTOLIC BLOOD PRESSURE: 85 MMHG | TEMPERATURE: 97.3 F | HEART RATE: 90 BPM | RESPIRATION RATE: 17 BRPM | SYSTOLIC BLOOD PRESSURE: 136 MMHG | OXYGEN SATURATION: 99 %

## 2022-07-20 DIAGNOSIS — G89.4 CHRONIC PAIN SYNDROME: ICD-10-CM

## 2022-07-20 DIAGNOSIS — E11.40 TYPE 2 DIABETES MELLITUS WITH DIABETIC NEUROPATHY, WITH LONG-TERM CURRENT USE OF INSULIN (HCC): ICD-10-CM

## 2022-07-20 DIAGNOSIS — Z79.4 TYPE 2 DIABETES MELLITUS WITH DIABETIC NEUROPATHY, WITH LONG-TERM CURRENT USE OF INSULIN (HCC): ICD-10-CM

## 2022-07-20 DIAGNOSIS — L08.9 DIABETIC FOOT INFECTION (HCC): Primary | ICD-10-CM

## 2022-07-20 DIAGNOSIS — E11.628 DIABETIC FOOT INFECTION (HCC): Primary | ICD-10-CM

## 2022-07-20 PROCEDURE — 3052F HG A1C>EQUAL 8.0%<EQUAL 9.0%: CPT | Performed by: PODIATRIST

## 2022-07-20 PROCEDURE — 99213 OFFICE O/P EST LOW 20 MIN: CPT | Performed by: PODIATRIST

## 2022-07-20 NOTE — PROGRESS NOTES
1. \"Have you been to the ER, urgent care clinic since your last visit? Hospitalized since your last visit? \" No    2. \"Have you seen or consulted any other health care providers outside of the [address] [place] [address] since your last visit? \" No     3. For patients aged 39-70: Has the patient had a colonoscopy / FIT/ Cologuard? No      If the patient is female:    4. For patients aged 41-77: Has the patient had a mammogram within the past 2 years? NA - based on age or sex      11. For patients aged 21-65: Has the patient had a pap smear?  NA - based on age or sex      Chief Complaint   Patient presents with    Wound Check

## 2022-08-02 ENCOUNTER — TELEPHONE (OUTPATIENT)
Dept: PODIATRY | Age: 52
End: 2022-08-02

## 2022-08-03 ENCOUNTER — OFFICE VISIT (OUTPATIENT)
Dept: PODIATRY | Age: 52
End: 2022-08-03
Payer: COMMERCIAL

## 2022-08-03 VITALS
BODY MASS INDEX: 27.03 KG/M2 | SYSTOLIC BLOOD PRESSURE: 148 MMHG | HEIGHT: 76 IN | HEART RATE: 98 BPM | DIASTOLIC BLOOD PRESSURE: 91 MMHG | TEMPERATURE: 98 F | WEIGHT: 222 LBS

## 2022-08-03 DIAGNOSIS — Z79.4 TYPE 2 DIABETES MELLITUS WITH DIABETIC NEUROPATHY, WITH LONG-TERM CURRENT USE OF INSULIN (HCC): Primary | ICD-10-CM

## 2022-08-03 DIAGNOSIS — E11.40 TYPE 2 DIABETES MELLITUS WITH DIABETIC NEUROPATHY, WITH LONG-TERM CURRENT USE OF INSULIN (HCC): Primary | ICD-10-CM

## 2022-08-03 DIAGNOSIS — L97.412 DIABETIC ULCER OF RIGHT MIDFOOT ASSOCIATED WITH TYPE 2 DIABETES MELLITUS, WITH FAT LAYER EXPOSED (HCC): ICD-10-CM

## 2022-08-03 DIAGNOSIS — E11.621 DIABETIC ULCER OF RIGHT MIDFOOT ASSOCIATED WITH TYPE 2 DIABETES MELLITUS, WITH FAT LAYER EXPOSED (HCC): ICD-10-CM

## 2022-08-03 PROCEDURE — 15004 WOUND PREP F/N/HF/G: CPT | Performed by: PODIATRIST

## 2022-08-03 PROCEDURE — 15275 SKIN SUB GRAFT FACE/NK/HF/G: CPT | Performed by: PODIATRIST

## 2022-08-03 NOTE — PROGRESS NOTES
Chief Complaint   Patient presents with    Wound Check     1. Have you been to the ER, urgent care clinic since your last visit? Hospitalized since your last visit? No    2. Have you seen or consulted any other health care providers outside of the [address] [place] [address] since your last visit? Include any pap smears or colon screening.  No  PCP-Dr Maylin Hoyos
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
## 2022-08-05 NOTE — PROGRESS NOTES
[facility]           Patient presents to the office today for wound bed preparation and allograft application to the right foot. Patient has suffered with a chronic wound to the dorsomedial aspect of right foot and today's procedures are an effort to close the wound once and for all. ICD-10-CM ICD-9-CM    1. Type 2 diabetes mellitus with diabetic neuropathy, with long-term current use of insulin ([facility], located within [facility])  E11.40 250.60     Z79.4 357.2      V58.67       2. Diabetic ulcer of right midfoot associated with type 2 diabetes mellitus, with fat layer exposed ([facility]  E11.621 250.80     L97.412 707.14           Thoroughly discussed today's procedures, highlighting possible complications. Patient verbalized understanding and written/verbal consent obtained  The right foot was scrubbed and draped in sterile fashion. No anesthesia was needed due to his diabetic peripheral neuropathy. A sharp wound bed preparation was performed with a wound curette down to the level of bleeding/granular dermal tissue. The total area prepared measured 3cm². At this time, a donated 3cmx3.5cm (totalling 10.5 cm²) oasis wound matrix allograft was prepared via  standards adhered to the wound bed. Appropriate dressing applied. Patient instructed to keep his dressings clean/dry/intact until his next [place] [address] visit. He is to limit weightbearing for wound healing purposes. He is to monitor for signs of infection and call the office immediately if needed             Santee Dent.  Willow [place], [facility], [address] [place] [address] and [facility]  [address] [place] [address]  O: [phone]  F: [phone]  C: [phone]

## 2022-08-11 ENCOUNTER — OFFICE VISIT (OUTPATIENT)
Dept: INFECTIOUS DISEASES | Age: 52
End: 2022-08-11
Payer: COMMERCIAL

## 2022-08-11 VITALS
DIASTOLIC BLOOD PRESSURE: 92 MMHG | SYSTOLIC BLOOD PRESSURE: 140 MMHG | BODY MASS INDEX: 27.03 KG/M2 | WEIGHT: 222 LBS | TEMPERATURE: 97.6 F | HEART RATE: 88 BPM | RESPIRATION RATE: 15 BRPM | OXYGEN SATURATION: 99 % | HEIGHT: 76 IN

## 2022-08-11 DIAGNOSIS — L08.9 DIABETIC INFECTION OF RIGHT FOOT (HCC): ICD-10-CM

## 2022-08-11 DIAGNOSIS — M86.9 OSTEOMYELITIS OF RIGHT FOOT, UNSPECIFIED TYPE (HCC): Primary | ICD-10-CM

## 2022-08-11 DIAGNOSIS — E11.628 DIABETIC INFECTION OF RIGHT FOOT (HCC): ICD-10-CM

## 2022-08-11 PROCEDURE — 99213 OFFICE O/P EST LOW 20 MIN: CPT | Performed by: INTERNAL MEDICINE

## 2022-08-11 NOTE — PROGRESS NOTES
Subjective  Antony Lassiter is a 50 y.o. male. HPI  Patient followed by Podiatry for diabetic foot infection involving his right foot. Last culture in May 2022 grew MSSA and Klebsiella. It appears that patient was given Augmentin, then Levaquin. MRI right foot in May 2022 showed multiple soft tissue ulcers of the great toe with edema and enhancement within the marrow of the proximal phalanx adjacent to the soft tissue ulcers is suspicious for osteomyelitis. Patient underwent right great toe amputation at that time, obviating the need for long term antibiotics, however, MRI also showed  subchondral marrow signal changes within the head of the first metatarsal but were thought to be related to the degenerative change without definite osteomyelitis in this location. Patient has been followed in Podiatry with recent concerns because right foot wound has not completely healed. Review of Systems   Constitutional:  Negative for chills and fever. Musculoskeletal:  Negative for joint pain and myalgias. Skin:  Negative for itching and rash. Right foot wound     Objective  Physical Exam  Vitals and nursing note reviewed. Constitutional:       Appearance: He is not ill-appearing. Musculoskeletal:         General: Swelling (Right foot) present. No tenderness. Right lower leg: No edema. Left lower leg: No edema. Feet:       Comments: Surgically absent right hallux with shallow wound over 1st metatarsal which only probed to 2 mm, culture taken; Surrounding erythema, no fluctuance     Skin:     Findings: Erythema (right foot) present. No lesion or rash. Neurological:      General: No focal deficit present. Mental Status: He is alert and oriented to person, place, and time. Psychiatric:         Mood and Affect: Mood normal.         Behavior: Behavior normal.         Thought Content:  Thought content normal.         Judgment: Judgment normal.      Assessment & Plan     Nonhealing wound right foot, dorsomedially, overlying 1st metatarsal, with surrounding hyperemia  Rule out osteomyelitis, right foot  Diabetes mellitus  Comment: Relatively minor wound on right foot at this time, however generally two main considerations for nonhealing wound wound be osteomyelitis, especially since MRI right foot in May 2022 also showed subchondral marrow signal changes within the head of the first metatarsal but thought to be related to degenerative change, but without definite osteomyelitis. Second consideration would be reinfection with different organism(s).    Wound culture taken from right foot  Order MRI scan right foot    Ludwin Hodgson MD

## 2022-08-17 ENCOUNTER — OFFICE VISIT (OUTPATIENT)
Dept: PODIATRY | Age: 52
End: 2022-08-17
Payer: COMMERCIAL

## 2022-08-17 VITALS
BODY MASS INDEX: 27.02 KG/M2 | SYSTOLIC BLOOD PRESSURE: 186 MMHG | HEIGHT: 76 IN | DIASTOLIC BLOOD PRESSURE: 113 MMHG | OXYGEN SATURATION: 98 % | TEMPERATURE: 97.1 F | HEART RATE: 92 BPM

## 2022-08-17 DIAGNOSIS — E11.628 DIABETIC FOOT INFECTION (HCC): ICD-10-CM

## 2022-08-17 DIAGNOSIS — E11.40 TYPE 2 DIABETES MELLITUS WITH DIABETIC NEUROPATHY, WITH LONG-TERM CURRENT USE OF INSULIN (HCC): Primary | ICD-10-CM

## 2022-08-17 DIAGNOSIS — L08.9 DIABETIC FOOT INFECTION (HCC): ICD-10-CM

## 2022-08-17 DIAGNOSIS — Z79.4 TYPE 2 DIABETES MELLITUS WITH DIABETIC NEUROPATHY, WITH LONG-TERM CURRENT USE OF INSULIN (HCC): Primary | ICD-10-CM

## 2022-08-17 PROCEDURE — 3052F HG A1C>EQUAL 8.0%<EQUAL 9.0%: CPT | Performed by: PODIATRIST

## 2022-08-17 PROCEDURE — 99213 OFFICE O/P EST LOW 20 MIN: CPT | Performed by: PODIATRIST

## 2022-08-17 NOTE — PROGRESS NOTES
1. Have you been to the ER, urgent care clinic since your last visit? Hospitalized since your last visit? No    2. Have you seen or consulted any other health care providers outside of the [address] [place] [address] since your last visit? Include any pap smears or colon screening.  No    Chief Complaint   Patient presents with    Wound Check

## 2022-08-19 ENCOUNTER — HOSPITAL ENCOUNTER (OUTPATIENT)
Dept: MRI IMAGING | Age: 52
Discharge: HOME | End: 2022-08-19
Attending: INTERNAL MEDICINE
Payer: COMMERCIAL

## 2022-08-19 DIAGNOSIS — M86.9 OSTEOMYELITIS OF RIGHT FOOT, UNSPECIFIED TYPE (HCC): ICD-10-CM

## 2022-08-19 LAB
BACTERIA SPEC AEROBE CULT: ABNORMAL
BACTERIA SPEC AEROBE CULT: ABNORMAL
BACTERIA SPEC ANAEROBE CULT: ABNORMAL

## 2022-08-19 PROCEDURE — 73718 MRI LOWER EXTREMITY W/O DYE: CPT
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
## 2022-08-21 RX ORDER — BUSPIRONE HYDROCHLORIDE 10 MG/1
TABLET ORAL
Qty: 60 TABLET | Refills: 4 | Status: SHIPPED | OUTPATIENT
Start: 2022-08-21

## 2022-08-25 ENCOUNTER — OFFICE VISIT (OUTPATIENT)
Dept: INFECTIOUS DISEASES | Age: 52
End: 2022-08-25
Payer: COMMERCIAL

## 2022-08-25 VITALS
SYSTOLIC BLOOD PRESSURE: 134 MMHG | HEART RATE: 93 BPM | HEIGHT: 76 IN | BODY MASS INDEX: 27.74 KG/M2 | OXYGEN SATURATION: 98 % | TEMPERATURE: 98.8 F | DIASTOLIC BLOOD PRESSURE: 80 MMHG | WEIGHT: 227.8 LBS

## 2022-08-25 DIAGNOSIS — M86.9 OSTEOMYELITIS OF FOOT, UNSPECIFIED LATERALITY, UNSPECIFIED TYPE (HCC): Primary | ICD-10-CM

## 2022-08-25 PROCEDURE — 99213 OFFICE O/P EST LOW 20 MIN: CPT | Performed by: INTERNAL MEDICINE

## 2022-08-25 RX ORDER — DULOXETIN HYDROCHLORIDE 30 MG/1
60 CAPSULE, DELAYED RELEASE ORAL DAILY
COMMUNITY
Start: 2022-08-12 | End: 2022-09-21 | Stop reason: SDUPTHER

## 2022-08-25 RX ORDER — PREDNISONE 5 MG/1
5 TABLET ORAL DAILY
COMMUNITY
Start: 2022-07-11 | End: 2022-09-21

## 2022-08-25 RX ORDER — HYDROCODONE BITARTRATE AND ACETAMINOPHEN 10; 325 MG/1; MG/1
TABLET ORAL
COMMUNITY
Start: 2022-08-04

## 2022-08-25 RX ORDER — HYDROCODONE BITARTRATE AND ACETAMINOPHEN 5; 325 MG/1; MG/1
TABLET ORAL
COMMUNITY
Start: 2022-07-07 | End: 2022-09-21
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
## 2022-08-31 ENCOUNTER — OFFICE VISIT (OUTPATIENT)
Dept: PODIATRY | Age: 52
End: 2022-08-31
Payer: COMMERCIAL

## 2022-08-31 VITALS
SYSTOLIC BLOOD PRESSURE: 159 MMHG | WEIGHT: 227 LBS | HEIGHT: 76 IN | DIASTOLIC BLOOD PRESSURE: 92 MMHG | BODY MASS INDEX: 27.64 KG/M2 | TEMPERATURE: 96 F | HEART RATE: 93 BPM

## 2022-08-31 DIAGNOSIS — M86.471 CHRONIC OSTEOMYELITIS OF RIGHT FOOT WITH DRAINING SINUS (HCC): ICD-10-CM

## 2022-08-31 DIAGNOSIS — E11.40 TYPE 2 DIABETES MELLITUS WITH DIABETIC NEUROPATHY, WITH LONG-TERM CURRENT USE OF INSULIN (HCC): Primary | ICD-10-CM

## 2022-08-31 DIAGNOSIS — Z79.4 TYPE 2 DIABETES MELLITUS WITH DIABETIC NEUROPATHY, WITH LONG-TERM CURRENT USE OF INSULIN (HCC): Primary | ICD-10-CM

## 2022-08-31 PROCEDURE — 99214 OFFICE O/P EST MOD 30 MIN: CPT | Performed by: PODIATRIST

## 2022-08-31 PROCEDURE — 3051F HG A1C>EQUAL 7.0%<8.0%: CPT | Performed by: PODIATRIST

## 2022-08-31 NOTE — PROGRESS NOTES
Chief Complaint   Patient presents with    Wound Check     1. \"Have you been to the ER, urgent care clinic since your last visit? Hospitalized since your last visit? \" No    2. \"Have you seen or consulted any other health care providers outside of the [address] [place] [address] since your last visit? \" No     3. For patients aged 39-70: Has the patient had a colonoscopy / FIT/ Cologuard? No      If the patient is female:    4. For patients aged 41-77: Has the patient had a mammogram within the past 2 years? NA - based on age or sex      11. For patients aged 21-65: Has the patient had a pap smear?  NA - based on age or sex

## 2022-09-07 NOTE — ANESTHESIA POSTPROCEDURE EVALUATION
Procedure(s):  Debridement Of Right Foot, Great Toe Amputation.     general    Anesthesia Post Evaluation      Multimodal analgesia: multimodal analgesia used between 6 hours prior to anesthesia start to PACU discharge  Patient location during evaluation: PACU  Patient participation: complete - patient participated  Level of consciousness: awake  Pain score: 0  Pain management: adequate  Airway patency: patent  Anesthetic complications: no  Cardiovascular status: acceptable  Respiratory status: acceptable  Hydration status: acceptable  Post anesthesia nausea and vomiting:  controlled  Final Post Anesthesia Temperature Assessment:  Normothermia (36.0-37.5 degrees C)      INITIAL Post-op Vital signs:   Vitals Value Taken Time   /73 05/06/22 1229   Temp 36.2 °C (97.2 °F) 05/06/22 1158   Pulse 110 05/06/22 1229   Resp 16 05/06/22 1229   SpO2 97 % 05/06/22 1229

## 2022-09-14 ENCOUNTER — OFFICE VISIT (OUTPATIENT)
Dept: PODIATRY | Age: 52
End: 2022-09-14
Payer: COMMERCIAL

## 2022-09-14 VITALS
BODY MASS INDEX: 27.64 KG/M2 | TEMPERATURE: 97.8 F | WEIGHT: 227 LBS | HEIGHT: 76 IN | HEART RATE: 104 BPM | SYSTOLIC BLOOD PRESSURE: 177 MMHG | DIASTOLIC BLOOD PRESSURE: 112 MMHG

## 2022-09-14 DIAGNOSIS — Z79.4 TYPE 2 DIABETES MELLITUS WITH DIABETIC NEUROPATHY, WITH LONG-TERM CURRENT USE OF INSULIN (HCC): Primary | ICD-10-CM

## 2022-09-14 DIAGNOSIS — E11.40 TYPE 2 DIABETES MELLITUS WITH DIABETIC NEUROPATHY, WITH LONG-TERM CURRENT USE OF INSULIN (HCC): Primary | ICD-10-CM

## 2022-09-14 PROCEDURE — 3051F HG A1C>EQUAL 7.0%<8.0%: CPT | Performed by: PODIATRIST

## 2022-09-14 PROCEDURE — 99213 OFFICE O/P EST LOW 20 MIN: CPT | Performed by: PODIATRIST

## 2022-09-14 NOTE — PROGRESS NOTES
Chief Complaint   Patient presents with    Wound Check     1. \"Have you been to the ER, urgent care clinic since your last visit? Hospitalized since your last visit? \" No    2. \"Have you seen or consulted any other health care providers outside of the [address] [place] [address] since your last visit? \" No     3. For patients aged 39-70: Has the patient had a colonoscopy / FIT/ Cologuard?  No
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
## 2022-09-21 ENCOUNTER — OFFICE VISIT (OUTPATIENT)
Dept: ENDOCRINOLOGY | Age: 52
End: 2022-09-21
Payer: COMMERCIAL

## 2022-09-21 VITALS
WEIGHT: 228 LBS | HEIGHT: 76 IN | OXYGEN SATURATION: 98 % | SYSTOLIC BLOOD PRESSURE: 133 MMHG | TEMPERATURE: 97 F | HEART RATE: 103 BPM | DIASTOLIC BLOOD PRESSURE: 76 MMHG | BODY MASS INDEX: 27.76 KG/M2

## 2022-09-21 DIAGNOSIS — G62.9 NEUROPATHY: ICD-10-CM

## 2022-09-21 DIAGNOSIS — N18.4 CKD (CHRONIC KIDNEY DISEASE) STAGE 4, GFR 15-29 ML/MIN (HCC): ICD-10-CM

## 2022-09-21 DIAGNOSIS — E78.2 MIXED HYPERLIPIDEMIA: ICD-10-CM

## 2022-09-21 DIAGNOSIS — E10.65 HYPERGLYCEMIA DUE TO TYPE 1 DIABETES MELLITUS (HCC): Primary | ICD-10-CM

## 2022-09-21 DIAGNOSIS — I10 ESSENTIAL HYPERTENSION: ICD-10-CM

## 2022-09-21 DIAGNOSIS — M86.171 ACUTE OSTEOMYELITIS OF RIGHT FOOT (HCC): ICD-10-CM

## 2022-09-21 DIAGNOSIS — Z96.41 INSULIN PUMP STATUS: ICD-10-CM

## 2022-09-21 DIAGNOSIS — Z79.4 ENCOUNTER FOR LONG-TERM (CURRENT) USE OF INSULIN (HCC): ICD-10-CM

## 2022-09-21 PROCEDURE — 3051F HG A1C>EQUAL 7.0%<8.0%: CPT | Performed by: INTERNAL MEDICINE

## 2022-09-21 PROCEDURE — 99214 OFFICE O/P EST MOD 30 MIN: CPT | Performed by: INTERNAL MEDICINE

## 2022-09-21 PROCEDURE — 95251 CONT GLUC MNTR ANALYSIS I&R: CPT | Performed by: INTERNAL MEDICINE

## 2022-09-21 RX ORDER — ATORVASTATIN CALCIUM 20 MG/1
20 TABLET, FILM COATED ORAL
Qty: 90 TABLET | Refills: 3 | Status: SHIPPED | OUTPATIENT
Start: 2022-09-21

## 2022-09-21 RX ORDER — DICLOFENAC SODIUM 10 MG/G
GEL TOPICAL
COMMUNITY
Start: 2022-09-19

## 2022-09-21 RX ORDER — DULOXETIN HYDROCHLORIDE 60 MG/1
CAPSULE, DELAYED RELEASE ORAL
COMMUNITY
Start: 2022-08-30

## 2022-09-21 NOTE — PATIENT INSTRUCTIONS
SPECIFIC INSTRUCTIONS BELOW       DRINK water   Do not skip meals  Do not eat in between meals    Reduce carbs- pasta, rice, potatoes, bread   Try to avoid processed bread products like BISCUITS, CROISSANTS, MUFFINS    Do not drink juices or sodas, even if they are calorie zero or diet drinks and especially avoid using powders like crystalloids , ALEX-AIDS     Do not eat peanut butter     Do not eat sugar free cookies and cakes   Do not eat peaches, oranges, pineapples, raisins, grapes , canteloupe , honey dew, mangoes , watermelon  and fruit medleys    ---------------------------------------------------------------------------------------------------------------    stay  on  Lisinopril     Stay   On atorvostatin 20 mg at night     Stay  on lexapro 10 mg at night         -----------------------------------------------------------------------------------------------------------------      Gets the pump and [employer] supplies   - [employer]        humalog to 4 vials in the pump   ( nearly 100 units a day )      Back up regimen       Check blood sugars immediately before each meal and at bedtime      Take  lantus  insulin  30  units  at bed time    Take humalog  Insulin at carb to insulin ratio of 10 gm : 1 unit       Also, add additional humalog  as follows with meals  If blood sugars are[de-identified]    150-200 mg 1 units    201-250 mg 2 units    251-300 mg 3 units    301-350 mg 4 units    351-400 mg 5 units    401-450 mg 6 units    451-500 mg 7 units     Less than 70 mg NO INSULIN                      -------------PAY ATTENTION TO THESE GENERAL INSTRUCTIONS -----------------      - The medications prescribed at this visit will not be available at pharmacy until 6 pm       - YOUR MED LIST IS NOT UP TO DATE AS SOME CHANGES ARE BEING MADE AFTER THE VISIT - FOLLOW SPECIFIC INSTRUCTIONS  ABOVE     -ANY tests other than blood work, which you opt to do  outside the  [facility] imaging facilities, you are responsible for prior authorizations if  required    - HEALTH MAINTENANCE IS NOT GOING TO BE UP TO DATE ON YOUR AVS- PLEASE IGNORE     Results     *Normal results will not be notified by a phone call starting January 1 2021   *If you have an upcoming visit, the results will be discussed at the visit   *Please sign up for MY CHART if you want access to your lab and test results  *Abnormal results which require immediate attention will be notified by phone call   *Abnormal results which do not require immediate assistance will be notified in 1-2 weeks       Refills    -    have your pharmacy send us a refill request . Refills are done max for one year and a visit is a must before refills are extended    Follow up appointments -  highly encourage you to make it when you are checking out. We can accommodate you into the schedule based on your clinical situation, but not for extending refills beyond a year. Labs are important to give refills and is important to get labs before the visit     Phone calls  -  Allow  24 hrs.  for non-urgent calls to be returned  Prior authorization - It may take 2-4 weeks to process  Forms  -  FMLA, DMV etc., will take up to 2 weeks to process  Cancellations - please notify the office 2 days in advance   Samples  - will only be dispensed at visits       If not showing for the appointments and cancelling appointments within 24 hours are kept track of and three  of such situations in  two consecutive years will likely be considered for termination from the practice    -------------------------------------------------------------------------------------------------------------------

## 2022-09-21 NOTE — PROGRESS NOTES
HISTORY OF PRESENT ILLNESS    Shanna Kramer is a 50  y.o. male. HPI  Patient here for    FOLLOW UP  AFTER  Last  visit  For  Type 1 diabetes mellitus  FROM  June 2022     He got pain management doctor   Right foot osteomyelitis  present and he is to get  Picc line and antibiotics next Friday June 2022    He is now being followed by  Wound care nurse  And he has a picture  On phone from this morning   The ulcer base has yellowish discharge    He is asking for second opinion , but he changed mind and wanted to see Dr. Julio Bowman again       May 2022   He had toe amputated on right hallux    On prednisone   - because of DJD    Sugars are pretty high - over 300 mg       March 2022     He is seeing Dr. Julio Bowman for podiatry care   Patient felt worse taking the antibiotics. He saw Dr. Sukhi Negrete again this morning. Using tandem pump with Dexcom  Accompanied by his daughter who is around 11years old      OLD HISTORY       Current A1C is 13.2 % and symptoms/problems include polyuria, polydipsia and visual disturbances   H/o DM type 1 for 30 years    He moved from [address] [place] [address], goyo blair was his pcp   Many years ago , he was seeing Dr. Escobar Guardian   Current diabetic medications include insulin pump            Review of Systems   C/o pain after amputation , says his left foot is broken   C/o depression, requesting help       Physical Exam   Constitutional :   oriented to person, place, and time. appears well-developed and well-nourished.    Right metatarsel area   - hallux amputated         Lab Results   Component Value Date/Time    Hemoglobin A1c 8.0 (H) 07/07/2022 02:48 PM    Hemoglobin A1c 7.7 (H) 06/14/2022 02:25 PM    Hemoglobin A1c 7.8 (H) 05/05/2022 02:36 AM    Glucose 97 07/07/2022 02:48 PM    Glucose (POC) 192 (H) 05/08/2022 10:59 AM    Microalbumin/Creat ratio (mg/g creat) 42 (H) 06/14/2022 02:25 PM    Microalb/Creat ratio (ug/mg creat.) 105 (H) 07/07/2022 02:48 PM    Microalbumin,urine random 2.98 06/14/2022 02:25 PM    LDL, calculated 50 07/07/2022 02:48 PM    LDL, calculated 31 06/14/2022 02:25 PM    Creatinine 1.11 07/07/2022 02:48 PM      Lab Results   Component Value Date/Time    Cholesterol, total 110 07/07/2022 02:48 PM    HDL Cholesterol 47 07/07/2022 02:48 PM    LDL, calculated 50 07/07/2022 02:48 PM    LDL, calculated 31 06/14/2022 02:25 PM    Triglyceride 58 07/07/2022 02:48 PM    CHOL/HDL Ratio 2.7 06/14/2022 02:25 PM     Lab Results   Component Value Date/Time    ALT (SGPT) 8 07/07/2022 02:48 PM    Alk. phosphatase 91 07/07/2022 02:48 PM    Bilirubin, total 0.2 07/07/2022 02:48 PM    Albumin 4.1 07/07/2022 02:48 PM    Protein, total 7.4 07/07/2022 02:48 PM    PLATELET 420 (H) 58/38/6445 07:01 AM       Lab Results   Component Value Date/Time    GFR est non-AA 53 (L) 06/14/2022 02:25 PM    GFR est AA >60 06/14/2022 02:25 PM    Creatinine 1.11 07/07/2022 02:48 PM    BUN 13 07/07/2022 02:48 PM    Sodium 141 07/07/2022 02:48 PM    Potassium 4.9 07/07/2022 02:48 PM    Chloride 106 07/07/2022 02:48 PM    CO2 21 07/07/2022 02:48 PM    Phosphorus 3.0 05/08/2022 07:01 AM       ASSESSMENT and PLAN        1. Type 1DM, UNcontrolled , on insulin pump.:  a1c is 8%   FROM   July 2022   COMPARED TO   7. 5  %   From  May  2022   Compared to       Nov 2021    Compared to       6.6 %      From    Today by  [facility]    July 2021     Compared to     6.6 %    From    March 2021  Compared to     7.5  %   By labs from nov 2020    Compared to    12.9 %      By  POC     Compared   To    12 %   From nov 2019, compared to  A1C IS OVER 10 %   FROM PT FIRST   A long gap in follow up visit from 2016 to 2019 - almost 3 years         Sept 2022     Reviewed the pump download and sugars   Improved control  definitely ( with fear of losing foot  ? )   No more questions regarding the diet   He stopped sodas I hope     He also saw Pain management which made him more relaxed         TDD  63  units a day   BASAL :   39 units  ;   Bolus   15 units    Daily carbs 115  gm a day   He Is  NOT bolusing at the meal times     14 day average glucose 155   - 27% for high and  2  % low sugars and % in Target  Range 70 %    - percent of utiization 90  % 2.5 days     Settings   :    Basal - MN : 1.5 unit/hr  ;  7 am-  2 units/hr,  8 pm - 1.5 units/hr  42 units a day  ;   CF  1 : 35 mg   :  Carb ratio  Is  1:6 gm                June 2022       Noted that he is on medicaid plan   Tandem pump and dexcom could be a challenge     Reviewed the pump download and sugars   TDD  70 units a day   BASAL :   42 units  ; Bolus   25 units    Daily carbs 180  gm a day   He Is  NOT bolusing at the meal times       Reviewed  Tandem pump downloaded report for 14  days and discussed with pt     Adjusted the basal settings     - 14 day average glucose 215  - 53% for high and  1  % low sugars and % in Target  Range 46 %    - percent of utiization 46 % 2.5 days     Settings   :    Basal - MN : 1.5 unit/hr  ;  7 am-  2 units/hr,  8 pm - 1.5 units/hr  42 units a day  ;   CF  1 : 35 mg   :  Carb ratio  Is  1:6 gm            May 2022     Reviewed the pump download and sugars   TDD  46 units a day   Daily carbs 90 gm a day     Currently on  Prednisone for DJD and is being tapered   Reviewed  Tandem pump downloaded report for 14  days and discussed with pt     Adjusted the basal settings     - 14 day average glucose 216  - 61% for high and  2  %low sugars and % in Target  Range 38 %    - percent of utiization 46 % 2.5 days     Settings   :  @  1.5 units /hr  = 36 units a day  ; CF  1 : 35 mg   :  Carb ratio  Is  1:6 gm        2. Hypoglycemia :  Educated on treating the hypoglycemia. Discussed g-voke and prescribed      3. HTN :  On  lisinopril 20 mg      4. Dyslipidemia : on statin        5. Anxiety disorder/ depression  - see below       6. Right foot osteomyelitis / Unhealed  right foot ulcer - followed by Dr. Yarely Pandey , saw ID  and getting picc line begin           7.  NEUROPATHY  - severe,  on Cymbalta and lexapro for depression    And I write     neurontin to 600 mg am , 900 mg pm  Finally he got referred to pain management  Dr Susan Espinosa       8.  CKD -   nephropathy present - proteinuria present           Reviewed results with patient and discussed the labs being ordered today/bnv  Patient voiced understanding of plan of care

## 2022-09-22 LAB
ALBUMIN SERPL-MCNC: 3.9 G/DL (ref 3.5–5)
ALBUMIN/GLOB SERPL: 1.1 {RATIO} (ref 1.1–2.2)
ALP SERPL-CCNC: 81 U/L (ref 45–117)
ALT SERPL-CCNC: 26 U/L (ref 12–78)
ANION GAP SERPL CALC-SCNC: 5 MMOL/L (ref 5–15)
AST SERPL-CCNC: 23 U/L (ref 15–37)
BILIRUB SERPL-MCNC: 0.3 MG/DL (ref 0.2–1)
BUN SERPL-MCNC: 23 MG/DL (ref 6–20)
BUN/CREAT SERPL: 15 (ref 12–20)
CALCIUM SERPL-MCNC: 8.8 MG/DL (ref 8.5–10.1)
CHLORIDE SERPL-SCNC: 110 MMOL/L (ref 97–108)
CHOLEST SERPL-MCNC: 109 MG/DL
CO2 SERPL-SCNC: 27 MMOL/L (ref 21–32)
CREAT SERPL-MCNC: 1.52 MG/DL (ref 0.7–1.3)
CREAT UR-MCNC: 150 MG/DL
EST. AVERAGE GLUCOSE BLD GHB EST-MCNC: 160 MG/DL
GLOBULIN SER CALC-MCNC: 3.4 G/DL (ref 2–4)
GLUCOSE SERPL-MCNC: 135 MG/DL (ref 65–100)
HBA1C MFR BLD: 7.2 % (ref 4–5.6)
HDLC SERPL-MCNC: 59 MG/DL
HDLC SERPL: 1.8 {RATIO} (ref 0–5)
LDLC SERPL CALC-MCNC: 28.8 MG/DL (ref 0–100)
MICROALBUMIN UR-MCNC: 6.66 MG/DL
MICROALBUMIN/CREAT UR-RTO: 44 MG/G (ref 0–30)
POTASSIUM SERPL-SCNC: 4.4 MMOL/L (ref 3.5–5.1)
PROT SERPL-MCNC: 7.3 G/DL (ref 6.4–8.2)
SODIUM SERPL-SCNC: 142 MMOL/L (ref 136–145)
TRIGL SERPL-MCNC: 106 MG/DL (ref ?–150)
VLDLC SERPL CALC-MCNC: 21.2 MG/DL

## 2022-09-23 ENCOUNTER — ANESTHESIA (OUTPATIENT)
Dept: SURGERY | Age: 52
End: 2022-09-23
Payer: COMMERCIAL

## 2022-09-23 ENCOUNTER — HOSPITAL ENCOUNTER (OUTPATIENT)
Age: 52
Setting detail: OUTPATIENT SURGERY
Discharge: HOME | End: 2022-09-23
Attending: PODIATRIST | Admitting: INTERNAL MEDICINE
Payer: COMMERCIAL

## 2022-09-23 ENCOUNTER — ANESTHESIA EVENT (OUTPATIENT)
Dept: SURGERY | Age: 52
End: 2022-09-23
Payer: COMMERCIAL

## 2022-09-23 VITALS
BODY MASS INDEX: 27.4 KG/M2 | RESPIRATION RATE: 16 BRPM | TEMPERATURE: 97.5 F | OXYGEN SATURATION: 99 % | SYSTOLIC BLOOD PRESSURE: 184 MMHG | WEIGHT: 225 LBS | HEIGHT: 76 IN | HEART RATE: 92 BPM | DIASTOLIC BLOOD PRESSURE: 107 MMHG

## 2022-09-23 LAB
GLUCOSE BLD STRIP.AUTO-MCNC: 160 MG/DL (ref 65–100)
PERFORMED BY, TECHID: ABNORMAL

## 2022-09-23 PROCEDURE — 74011250637 HC RX REV CODE- 250/637: Performed by: PODIATRIST

## 2022-09-23 PROCEDURE — 76210000026 HC REC RM PH II 1 TO 1.5 HR: Performed by: PODIATRIST

## 2022-09-23 PROCEDURE — 77030039464 HC TU IRR ENDO DISP MSNX -B: Performed by: PODIATRIST

## 2022-09-23 PROCEDURE — 2709999900 HC NON-CHARGEABLE SUPPLY: Performed by: PODIATRIST

## 2022-09-23 PROCEDURE — 13160 SEC CLSR SURG WND/DEHSN XTN: CPT | Performed by: PODIATRIST

## 2022-09-23 PROCEDURE — 76060000032 HC ANESTHESIA 0.5 TO 1 HR: Performed by: PODIATRIST

## 2022-09-23 PROCEDURE — 87186 SC STD MICRODIL/AGAR DIL: CPT

## 2022-09-23 PROCEDURE — 77030002933 HC SUT MCRYL J&J -A: Performed by: PODIATRIST

## 2022-09-23 PROCEDURE — 77030002986 HC SUT PROL J&J -A: Performed by: PODIATRIST

## 2022-09-23 PROCEDURE — 82962 GLUCOSE BLOOD TEST: CPT

## 2022-09-23 PROCEDURE — 87205 SMEAR GRAM STAIN: CPT

## 2022-09-23 PROCEDURE — 15275 SKIN SUB GRAFT FACE/NK/HF/G: CPT | Performed by: PODIATRIST

## 2022-09-23 PROCEDURE — 74011250636 HC RX REV CODE- 250/636: Performed by: NURSE ANESTHETIST, CERTIFIED REGISTERED

## 2022-09-23 PROCEDURE — 76210000006 HC OR PH I REC 0.5 TO 1 HR: Performed by: PODIATRIST

## 2022-09-23 PROCEDURE — 87176 TISSUE HOMOGENIZATION CULTR: CPT

## 2022-09-23 PROCEDURE — 74011000250 HC RX REV CODE- 250: Performed by: NURSE ANESTHETIST, CERTIFIED REGISTERED

## 2022-09-23 PROCEDURE — 74011000250 HC RX REV CODE- 250: Performed by: PODIATRIST

## 2022-09-23 PROCEDURE — 74011250636 HC RX REV CODE- 250/636: Performed by: PODIATRIST

## 2022-09-23 PROCEDURE — 87077 CULTURE AEROBIC IDENTIFY: CPT

## 2022-09-23 PROCEDURE — 76010000138 HC OR TIME 0.5 TO 1 HR: Performed by: PODIATRIST

## 2022-09-23 DEVICE — GRAFT HUM TISS W2XL4CM CRYOPRESERVED PLCNTA TISS UMB AMNION: Type: IMPLANTABLE DEVICE | Site: FOOT | Status: FUNCTIONAL

## 2022-09-23 RX ORDER — SODIUM CHLORIDE 0.9 % (FLUSH) 0.9 %
5-40 SYRINGE (ML) INJECTION EVERY 8 HOURS
Status: DISCONTINUED | OUTPATIENT
Start: 2022-09-23 | End: 2022-09-23 | Stop reason: HOSPADM

## 2022-09-23 RX ORDER — ONDANSETRON 2 MG/ML
4 INJECTION INTRAMUSCULAR; INTRAVENOUS AS NEEDED
Status: DISCONTINUED | OUTPATIENT
Start: 2022-09-23 | End: 2022-09-23 | Stop reason: HOSPADM

## 2022-09-23 RX ORDER — MIDAZOLAM HYDROCHLORIDE 1 MG/ML
INJECTION, SOLUTION INTRAMUSCULAR; INTRAVENOUS AS NEEDED
Status: DISCONTINUED | OUTPATIENT
Start: 2022-09-23 | End: 2022-09-23 | Stop reason: HOSPADM

## 2022-09-23 RX ORDER — CLINDAMYCIN HYDROCHLORIDE 300 MG/1
300 CAPSULE ORAL 3 TIMES DAILY
Qty: 30 CAPSULE | Refills: 0 | Status: SHIPPED | OUTPATIENT
Start: 2022-09-23 | End: 2022-10-03

## 2022-09-23 RX ORDER — CLINDAMYCIN PHOSPHATE 900 MG/50ML
900 INJECTION INTRAVENOUS ONCE
Status: COMPLETED | OUTPATIENT
Start: 2022-09-23 | End: 2022-09-23

## 2022-09-23 RX ORDER — FENTANYL CITRATE 50 UG/ML
25 INJECTION, SOLUTION INTRAMUSCULAR; INTRAVENOUS
Status: DISCONTINUED | OUTPATIENT
Start: 2022-09-23 | End: 2022-09-23 | Stop reason: HOSPADM

## 2022-09-23 RX ORDER — SODIUM CHLORIDE 0.9 % (FLUSH) 0.9 %
5-40 SYRINGE (ML) INJECTION AS NEEDED
Status: DISCONTINUED | OUTPATIENT
Start: 2022-09-23 | End: 2022-09-23 | Stop reason: HOSPADM

## 2022-09-23 RX ORDER — SODIUM CHLORIDE, SODIUM LACTATE, POTASSIUM CHLORIDE, CALCIUM CHLORIDE 600; 310; 30; 20 MG/100ML; MG/100ML; MG/100ML; MG/100ML
20 INJECTION, SOLUTION INTRAVENOUS CONTINUOUS
Status: DISCONTINUED | OUTPATIENT
Start: 2022-09-23 | End: 2022-09-23 | Stop reason: HOSPADM

## 2022-09-23 RX ORDER — SODIUM CHLORIDE, SODIUM LACTATE, POTASSIUM CHLORIDE, CALCIUM CHLORIDE 600; 310; 30; 20 MG/100ML; MG/100ML; MG/100ML; MG/100ML
INJECTION, SOLUTION INTRAVENOUS
Status: DISCONTINUED | OUTPATIENT
Start: 2022-09-23 | End: 2022-09-23 | Stop reason: HOSPADM

## 2022-09-23 RX ORDER — PROPOFOL 10 MG/ML
INJECTION, EMULSION INTRAVENOUS AS NEEDED
Status: DISCONTINUED | OUTPATIENT
Start: 2022-09-23 | End: 2022-09-23 | Stop reason: HOSPADM

## 2022-09-23 RX ORDER — HYDROMORPHONE HYDROCHLORIDE 1 MG/ML
0.5 INJECTION, SOLUTION INTRAMUSCULAR; INTRAVENOUS; SUBCUTANEOUS
Status: DISCONTINUED | OUTPATIENT
Start: 2022-09-23 | End: 2022-09-23 | Stop reason: HOSPADM

## 2022-09-23 RX ORDER — FENTANYL CITRATE 50 UG/ML
INJECTION, SOLUTION INTRAMUSCULAR; INTRAVENOUS AS NEEDED
Status: DISCONTINUED | OUTPATIENT
Start: 2022-09-23 | End: 2022-09-23 | Stop reason: HOSPADM

## 2022-09-23 RX ORDER — HYDROCODONE BITARTRATE AND ACETAMINOPHEN 5; 325 MG/1; MG/1
1 TABLET ORAL ONCE
Status: COMPLETED | OUTPATIENT
Start: 2022-09-23 | End: 2022-09-23

## 2022-09-23 RX ORDER — POVIDONE-IODINE 10 MG/G
OINTMENT TOPICAL AS NEEDED
Status: DISCONTINUED | OUTPATIENT
Start: 2022-09-23 | End: 2022-09-23 | Stop reason: HOSPADM

## 2022-09-23 RX ORDER — LIDOCAINE HYDROCHLORIDE 20 MG/ML
INJECTION, SOLUTION INFILTRATION; PERINEURAL AS NEEDED
Status: DISCONTINUED | OUTPATIENT
Start: 2022-09-23 | End: 2022-09-23 | Stop reason: HOSPADM

## 2022-09-23 RX ORDER — LIDOCAINE HYDROCHLORIDE 20 MG/ML
INJECTION, SOLUTION EPIDURAL; INFILTRATION; INTRACAUDAL; PERINEURAL AS NEEDED
Status: DISCONTINUED | OUTPATIENT
Start: 2022-09-23 | End: 2022-09-23 | Stop reason: HOSPADM

## 2022-09-23 RX ADMIN — PROPOFOL 10 MG: 10 INJECTION, EMULSION INTRAVENOUS at 11:34

## 2022-09-23 RX ADMIN — FENTANYL CITRATE 50 MCG: 50 INJECTION, SOLUTION INTRAMUSCULAR; INTRAVENOUS at 11:22

## 2022-09-23 RX ADMIN — FENTANYL CITRATE 25 MCG: 50 INJECTION, SOLUTION INTRAMUSCULAR; INTRAVENOUS at 11:35

## 2022-09-23 RX ADMIN — CLINDAMYCIN PHOSPHATE 900 MG: 900 INJECTION, SOLUTION INTRAVENOUS at 11:30

## 2022-09-23 RX ADMIN — MIDAZOLAM HYDROCHLORIDE 2 MG: 2 INJECTION, SOLUTION INTRAMUSCULAR; INTRAVENOUS at 11:22

## 2022-09-23 RX ADMIN — FENTANYL CITRATE 25 MCG: 50 INJECTION, SOLUTION INTRAMUSCULAR; INTRAVENOUS at 11:30

## 2022-09-23 RX ADMIN — PROPOFOL 10 MG: 10 INJECTION, EMULSION INTRAVENOUS at 11:49

## 2022-09-23 RX ADMIN — PROPOFOL 10 MG: 10 INJECTION, EMULSION INTRAVENOUS at 11:42

## 2022-09-23 RX ADMIN — PROPOFOL 10 MG: 10 INJECTION, EMULSION INTRAVENOUS at 11:46

## 2022-09-23 RX ADMIN — SODIUM CHLORIDE, POTASSIUM CHLORIDE, SODIUM LACTATE AND CALCIUM CHLORIDE: 600; 310; 30; 20 INJECTION, SOLUTION INTRAVENOUS at 11:22

## 2022-09-23 RX ADMIN — HYDROCODONE BITARTRATE AND ACETAMINOPHEN 1 TABLET: 5; 325 TABLET ORAL at 13:18

## 2022-09-23 RX ADMIN — PROPOFOL 20 MG: 10 INJECTION, EMULSION INTRAVENOUS at 11:31

## 2022-09-23 RX ADMIN — LIDOCAINE HYDROCHLORIDE 60 MG: 20 INJECTION, SOLUTION EPIDURAL; INFILTRATION; INTRACAUDAL; PERINEURAL at 11:28

## 2022-09-23 RX ADMIN — PROPOFOL 20 MG: 10 INJECTION, EMULSION INTRAVENOUS at 11:28

## 2022-09-23 NOTE — PROGRESS NOTES
[facility]          Pt seen in pre op holding  - All questions answered and concerns addressed  - NPO since midnight  - Consent signed/witnessed  - Anc 2g order for surgical prophylaxis  - Pt cleared by his PCP and the anesthesia team here at [facility]  - I will follow closely during the post op period as outpatient    Thank you! Elmo Tao, [facility], [address] [place] [address] and [facility]  [address] [place] [address]  O: [phone]  F: [phone]  C: [phone]

## 2022-09-23 NOTE — OP NOTES
Operative Report    Patient: Leanna Hi MRN: [id number]  SSN: [id number]    YOB: 1970  Age: 50 y.o. Sex: male       Date of Surgery:   09/23/2022     Preoperative Diagnosis:   WOUND DEHISCENCE RIGHT FOOT    Postoperative Diagnosis:   Same    Surgeon(s) and Role:     * Aga Raman, DPM - Primary     Megan Black, DPM - Secondary    Assistant(s):  None    Anesthesia:   MAC with local, consisting of 10cc of 2% lidocaine plain    Procedure: Procedure(s):  SECONDARY CLOSURE OF WOUND DEHISCENCE RIGHT FOOT   APPLICATION OF ALLOGRAFT RIGHT FOOT    Procedure in Detail:   Pt was seen in the pre-operative holding area and all questions were answered and all concerns were addressed. The operative procedure was discussed in great detail, with all possible complications highlighted. Pt verbalized complete understanding and the consent was signed and witnessed. Pt was given 900mg of clindamycin for surgical prophylaxis. The operative limb was marked and the pt was transported to the operating room. The pt was transferred to the operating table and anesthesia was administered as indicated above. The right foot was scrubbed and draped in sterile fashion. A time out was performed to confirm the correct pt, correct procedure, correct limb, abx, allergies, fire risk and attendees within the operating room. Upon completion, procedure #1 commenced. Procedure #1: SECONDARY CLOSURE OF WOUND DEHISCENCE RIGHT FOOT   Attention was directed to the wound to the right foot and, with a scalpel, noted granulation tissue was excised. The skin margins were trimmed to bleeding edges. A washout was performed with the [employer] system to remove any remaining biofilm.      Procedure #2: APPLICATION OF ALLOGRAFT RIGHT FOOT  Due to the presence of multiple comorbidities and patient's established history of poor wound healing, was determined that allograft application would be needed to increase healing potential.  At this time, a 2 cm x 4 cm stavix placental allograft was inserted into the wound bed to aid in the healing process. The wound was sutured in several layers with 2.0 vicryl for the deep tissue and 2.0 prolene for the skin. The site was dressed with betadine ointment, 4x4s, abd pad, kerlex and light ace wrap. Pt tolerated the above procedures well and all post operative counts were correct. Pt was transferred to PACU without incident. A thorough neurovascular check was then performed. Upon meeting discharge criteria, pt was discharged home to self-care. He was instructed to keep his dressing clean/dry/intact until his next office visit. He is to elevate above the level of his heart for swelling/pain reduction. He is to continue with his current oral analgesia. He is to utilize a postoperative shoe for protected ambulation    Estimated Blood Loss:    30cc    Tourniquet Time:   None    Implants:   Implant Name Type Inv.  Item Serial No.  Lot No. LRB No. Used Action   GRAFT HUM TISS [id number] CRYOPRESERVED PLCNTA TISS [employer]MB AMNION - [id number]  GRAFT HUM TISS [id number] CRYOPRESERVED PLCNTA TISS [employer] AMNION [id number] [employer] [id number] Right 1 Implanted      Specimens:   ID Type Source Tests Collected by Time Destination   1 : right foot 1st metatarsal bone Bone Foot, Right CULTURE, AEROBIC AND ANAEROBIC Ellyn Vieyra DPM 9/23/2022 1137 Microbiology         Drains:   None                Complications:   None      Signed By:  Jules Alan DPM     September 23, 2022
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
## 2022-09-23 NOTE — ANESTHESIA POSTPROCEDURE EVALUATION
Procedure(s):  INCISION OF BONE CORTEX TO THE RIGHT FOOT WITH SECONDARY CLOSURE OF SURGICAL WOUND.    MAC, total IV anesthesia    Anesthesia Post Evaluation      Multimodal analgesia: multimodal analgesia used between 6 hours prior to anesthesia start to PACU discharge  Patient location during evaluation: PACU  Patient participation: complete - patient participated  Level of consciousness: awake  Pain score: 0  Pain management: adequate  Airway patency: patent  Anesthetic complications: no  Cardiovascular status: acceptable  Respiratory status: acceptable  Hydration status: acceptable  Post anesthesia nausea and vomiting:  controlled  Final Post Anesthesia Temperature Assessment:  Normothermia (36.0-37.5 degrees C)      INITIAL Post-op Vital signs:   Vitals Value Taken Time   /105 09/23/22 1216   Temp 37.1 °C (98.7 °F) 09/23/22 1204   Pulse 88 09/23/22 1219   Resp 12 09/23/22 1219   SpO2 99 % 09/23/22 1219   Vitals shown include unvalidated device data.

## 2022-09-23 NOTE — PROGRESS NOTES
DC instructions reviewed with pt. Pt stated no need to review with his mother he is aware of whats going on. Dr Jerad Mercado states pt to only have dressing changes once weekly. He also called in an antibiotic for pt get from his pharmacy. Pt aware of this and verb understanding of dc instructions.

## 2022-09-26 LAB
BACTERIA SPEC CULT: NORMAL
BACTERIA SPEC CULT: NORMAL
GRAM STN SPEC: NORMAL
GRAM STN SPEC: NORMAL
SPECIAL REQUESTS,SREQ: NORMAL

## 2022-09-28 ENCOUNTER — OFFICE VISIT (OUTPATIENT)
Dept: PODIATRY | Age: 52
End: 2022-09-28
Payer: COMMERCIAL

## 2022-09-28 VITALS
DIASTOLIC BLOOD PRESSURE: 93 MMHG | SYSTOLIC BLOOD PRESSURE: 151 MMHG | HEART RATE: 108 BPM | HEIGHT: 76 IN | BODY MASS INDEX: 27.4 KG/M2 | WEIGHT: 225 LBS | TEMPERATURE: 97.8 F

## 2022-09-28 DIAGNOSIS — E11.621 DIABETIC ULCER OF RIGHT MIDFOOT ASSOCIATED WITH TYPE 2 DIABETES MELLITUS, WITH FAT LAYER EXPOSED (HCC): Primary | ICD-10-CM

## 2022-09-28 DIAGNOSIS — L97.412 DIABETIC ULCER OF RIGHT MIDFOOT ASSOCIATED WITH TYPE 2 DIABETES MELLITUS, WITH FAT LAYER EXPOSED (HCC): Primary | ICD-10-CM

## 2022-09-28 PROCEDURE — 99024 POSTOP FOLLOW-UP VISIT: CPT | Performed by: PODIATRIST
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
## 2022-10-12 ENCOUNTER — OFFICE VISIT (OUTPATIENT)
Dept: PODIATRY | Age: 52
End: 2022-10-12
Payer: COMMERCIAL

## 2022-10-12 VITALS
HEART RATE: 96 BPM | WEIGHT: 225 LBS | SYSTOLIC BLOOD PRESSURE: 131 MMHG | HEIGHT: 76 IN | BODY MASS INDEX: 27.4 KG/M2 | DIASTOLIC BLOOD PRESSURE: 81 MMHG | TEMPERATURE: 96.8 F

## 2022-10-12 DIAGNOSIS — Z98.890 POST-OPERATIVE STATE: ICD-10-CM

## 2022-10-12 DIAGNOSIS — Z79.4 TYPE 2 DIABETES MELLITUS WITH DIABETIC NEUROPATHY, WITH LONG-TERM CURRENT USE OF INSULIN (HCC): ICD-10-CM

## 2022-10-12 DIAGNOSIS — M25.572 ACUTE LEFT ANKLE PAIN: ICD-10-CM

## 2022-10-12 DIAGNOSIS — E11.40 TYPE 2 DIABETES MELLITUS WITH DIABETIC NEUROPATHY, WITH LONG-TERM CURRENT USE OF INSULIN (HCC): ICD-10-CM

## 2022-10-12 DIAGNOSIS — T81.31XA DEHISCENCE OF OPERATIVE WOUND, INITIAL ENCOUNTER: Primary | ICD-10-CM

## 2022-10-12 PROCEDURE — 3078F DIAST BP <80 MM HG: CPT | Performed by: PODIATRIST

## 2022-10-12 PROCEDURE — 3074F SYST BP LT 130 MM HG: CPT | Performed by: PODIATRIST

## 2022-10-12 PROCEDURE — 3051F HG A1C>EQUAL 7.0%<8.0%: CPT | Performed by: PODIATRIST

## 2022-10-12 PROCEDURE — 99024 POSTOP FOLLOW-UP VISIT: CPT | Performed by: PODIATRIST

## 2022-10-12 PROCEDURE — 20606 DRAIN/INJ JOINT/BURSA W/US: CPT | Performed by: PODIATRIST

## 2022-10-21 ENCOUNTER — OFFICE VISIT (OUTPATIENT)
Dept: PODIATRY | Age: 52
End: 2022-10-21
Payer: COMMERCIAL

## 2022-10-21 VITALS
HEART RATE: 106 BPM | HEIGHT: 76 IN | BODY MASS INDEX: 29.01 KG/M2 | SYSTOLIC BLOOD PRESSURE: 142 MMHG | TEMPERATURE: 97.8 F | OXYGEN SATURATION: 97 % | WEIGHT: 238.2 LBS | DIASTOLIC BLOOD PRESSURE: 81 MMHG

## 2022-10-21 DIAGNOSIS — E11.621 DIABETIC ULCER OF RIGHT MIDFOOT ASSOCIATED WITH TYPE 2 DIABETES MELLITUS, WITH FAT LAYER EXPOSED (HCC): ICD-10-CM

## 2022-10-21 DIAGNOSIS — E11.40 TYPE 2 DIABETES MELLITUS WITH DIABETIC NEUROPATHY, WITH LONG-TERM CURRENT USE OF INSULIN (HCC): ICD-10-CM

## 2022-10-21 DIAGNOSIS — G89.4 CHRONIC PAIN SYNDROME: ICD-10-CM

## 2022-10-21 DIAGNOSIS — I73.9 PAD (PERIPHERAL ARTERY DISEASE) (HCC): Primary | ICD-10-CM

## 2022-10-21 DIAGNOSIS — L97.412 DIABETIC ULCER OF RIGHT MIDFOOT ASSOCIATED WITH TYPE 2 DIABETES MELLITUS, WITH FAT LAYER EXPOSED (HCC): ICD-10-CM

## 2022-10-21 DIAGNOSIS — T81.31XA DEHISCENCE OF OPERATIVE WOUND, INITIAL ENCOUNTER: ICD-10-CM

## 2022-10-21 DIAGNOSIS — Z79.4 TYPE 2 DIABETES MELLITUS WITH DIABETIC NEUROPATHY, WITH LONG-TERM CURRENT USE OF INSULIN (HCC): ICD-10-CM

## 2022-10-21 LAB
BACTERIA SPEC AEROBE CULT: NORMAL
BACTERIA SPEC ANAEROBE CULT: NORMAL

## 2022-10-21 PROCEDURE — 3074F SYST BP LT 130 MM HG: CPT | Performed by: PODIATRIST

## 2022-10-21 PROCEDURE — 3051F HG A1C>EQUAL 7.0%<8.0%: CPT | Performed by: PODIATRIST

## 2022-10-21 PROCEDURE — 3078F DIAST BP <80 MM HG: CPT | Performed by: PODIATRIST

## 2022-10-21 PROCEDURE — 99024 POSTOP FOLLOW-UP VISIT: CPT | Performed by: PODIATRIST

## 2022-10-21 NOTE — PROGRESS NOTES
1. Have you been to the ER, urgent care clinic since your last visit? Hospitalized since your last visit? No    2. Have you seen or consulted any other health care providers outside of the [address] [place] [address] since your last visit? Include any pap smears or colon screening.  No    Chief Complaint   Patient presents with    Wound Check

## 2022-10-27 NOTE — PROGRESS NOTES
[facility]      HPI:  Patient is being seen for first postoperative visit status post secondary closure of surgical wound dehiscence to the right foot with allograft application. Patient states he has had intense pain since the procedure, rising to the level of 10/10. He states he has started the antibiotics he was prescribed the day of his surgery. He states he has elevated his right lower extremity is much as possible. He is applying ice behind the knee. He has kept his dressing/splint clean, dry, intact. He states he has only ambulated with the protection of his post shoe to the right foot. He denies any local/systemic signs infection. He denies any other pedal complaints       ROS:  A complete review of systems is unremarkable outside of HPI       PE:  Upon removing the surgical dressing, surgical site noted to be dehisced with loosened sutures and exposed allograft. Mild periwound erythema noted. No purulence or malodor noted. No proximal lymphatic streaking noted. ASSESSMENT:  S/p secondary closure of surgical wound dehiscence with allograft application (DOS: 76/62/3833)       PLAN:  Surgical dressing removed to the right lower extremity. Local wound care performed. Patient instructed to limit all weightbearing and be strict nonweightbearing to the right lower extremity for wound healing purposes. He is to elevate the right foot above the level of his heart for swelling reduction. Updated orders given for [facility]  Instructed patient to complete the previously prescribed antibiotics  He is pending consultation with pain management regarding his chronic pain syndrome. He was prescribed narcotics for acute postoperative oral analgesia but was instructed no more narcotic prescriptions would be given for this issue. Patient verbalized understanding  Lastly, patient to monitor for worsening signs infection call the office immediately if needed        Maurice Holliday. Say Miller, [facility], [facility]  [address] [place] [address], [address] [place] [address]  O: [phone]  F: [phone]  C: [phone]

## 2022-10-28 ENCOUNTER — HOSPITAL ENCOUNTER (OUTPATIENT)
Dept: INFUSION THERAPY | Age: 52
Discharge: HOME | End: 2022-10-28
Payer: COMMERCIAL

## 2022-10-28 VITALS
RESPIRATION RATE: 18 BRPM | DIASTOLIC BLOOD PRESSURE: 90 MMHG | OXYGEN SATURATION: 100 % | HEART RATE: 85 BPM | SYSTOLIC BLOOD PRESSURE: 179 MMHG | TEMPERATURE: 97.7 F

## 2022-10-28 LAB — CREAT SERPL-MCNC: 1.55 MG/DL (ref 0.7–1.3)

## 2022-10-28 PROCEDURE — 96365 THER/PROPH/DIAG IV INF INIT: CPT

## 2022-10-28 PROCEDURE — 36415 COLL VENOUS BLD VENIPUNCTURE: CPT

## 2022-10-28 PROCEDURE — 74011250636 HC RX REV CODE- 250/636: Performed by: PODIATRIST

## 2022-10-28 PROCEDURE — 82565 ASSAY OF CREATININE: CPT

## 2022-10-28 RX ADMIN — DALBAVANCIN 1500 MG: 500 INJECTION, POWDER, FOR SOLUTION INTRAVENOUS at 13:46

## 2022-10-31 NOTE — PROGRESS NOTES
[facility]      HPI:  Patient is being seen for second postoperative visit status post secondary closure of surgical wound dehiscence to the right foot with allograft application. Patient states he has had moderate pain since his last office visit, rising to the level of 6/10. He states he has completed the antibiotics he was prescribed the day of his surgery. He states he has elevated his right lower extremity is much as possible. He is applying ice behind the knee. He has kept his dressing/splint clean, dry, intact. He states he has only ambulated with the protection of his post shoe to the right foot. He denies any local/systemic signs infection. He denies any other pedal complaints       ROS:  A complete review of systems is unremarkable outside of HPI       PE:  Upon removing the surgical dressing, surgical site noted to be dehisced with loosened sutures and exposed allograft. Mild periwound maceration noted. No purulence or malodor noted. No proximal lymphatic streaking noted. ASSESSMENT:  S/p secondary closure of surgical wound dehiscence with allograft application (DOS: 33/47/4648)  Surgical wound dehiscence, right foot  PAD       PLAN:  Dressing removed to the right lower extremity. Sutures removed. Local wound care performed. Patient instructed to limit all weightbearing and be strict nonweightbearing to the right lower extremity for wound healing purposes. He is to elevate the right foot above the level of his heart for swelling reduction. Updated orders given for [facility]  Order given for IV abx to be infused at [facility]. Lab work ordered to be performed prior to the infusion  New referral given for pain management. He was prescribed narcotics for acute postoperative oral analgesia but was instructed no more narcotic prescriptions would be given for this issue.     Order given for noninvasive vascular studies to evaluate healing potential  Patient verbalized understanding  Lastly, patient to monitor for worsening signs infection call the office immediately if needed        Kaelyn Orta.  Elisabeth Fregoso, [address] [facility], [address] [place] [address] and [facility]  [address] [facility] [address], [address]  O: [phone]  F: [phone]  C: [phone]

## 2022-11-03 ENCOUNTER — OFFICE VISIT (OUTPATIENT)
Dept: PODIATRY | Age: 52
End: 2022-11-03
Payer: COMMERCIAL

## 2022-11-03 VITALS
HEART RATE: 98 BPM | BODY MASS INDEX: 28.98 KG/M2 | SYSTOLIC BLOOD PRESSURE: 159 MMHG | WEIGHT: 238 LBS | TEMPERATURE: 96 F | HEIGHT: 76 IN | DIASTOLIC BLOOD PRESSURE: 84 MMHG

## 2022-11-03 DIAGNOSIS — E11.40 TYPE 2 DIABETES MELLITUS WITH DIABETIC NEUROPATHY, WITH LONG-TERM CURRENT USE OF INSULIN (HCC): Primary | ICD-10-CM

## 2022-11-03 DIAGNOSIS — Z79.4 TYPE 2 DIABETES MELLITUS WITH DIABETIC NEUROPATHY, WITH LONG-TERM CURRENT USE OF INSULIN (HCC): Primary | ICD-10-CM

## 2022-11-03 DIAGNOSIS — G89.4 CHRONIC PAIN SYNDROME: ICD-10-CM

## 2022-11-03 DIAGNOSIS — L97.416 DIABETIC ULCER OF RIGHT MIDFOOT ASSOCIATED WITH TYPE 2 DIABETES MELLITUS, WITH BONE INVOLVEMENT WITHOUT EVIDENCE OF NECROSIS (HCC): ICD-10-CM

## 2022-11-03 DIAGNOSIS — T81.31XD DEHISCENCE OF OPERATIVE WOUND, SUBSEQUENT ENCOUNTER: ICD-10-CM

## 2022-11-03 DIAGNOSIS — E11.621 DIABETIC ULCER OF RIGHT MIDFOOT ASSOCIATED WITH TYPE 2 DIABETES MELLITUS, WITH BONE INVOLVEMENT WITHOUT EVIDENCE OF NECROSIS (HCC): ICD-10-CM

## 2022-11-03 PROCEDURE — 3051F HG A1C>EQUAL 7.0%<8.0%: CPT | Performed by: PODIATRIST

## 2022-11-03 PROCEDURE — 3078F DIAST BP <80 MM HG: CPT | Performed by: PODIATRIST

## 2022-11-03 PROCEDURE — 99024 POSTOP FOLLOW-UP VISIT: CPT | Performed by: PODIATRIST

## 2022-11-03 PROCEDURE — 3074F SYST BP LT 130 MM HG: CPT | Performed by: PODIATRIST

## 2022-11-03 NOTE — PROGRESS NOTES
[facility]      HPI:  Patient is being seen for third postoperative visit status post secondary closure of surgical wound dehiscence to the right foot with allograft application. Patient states he has had intense pain since his last office visit, rising to the level of 8/10. He states he is pending his initial consult with the referred pain management physician. He states he used his first dose of Dalvance last week and is pending his next dose. He states he has elevated his right lower extremity is much as possible. He has kept his dressing/splint clean, dry, intact. He states he has only ambulated with the protection of his post shoe to the right foot. He denies any local/systemic signs infection. He denies any other pedal complaints       ROS:  A complete review of systems is unremarkable outside of HPI       PE:  Upon removing the dressing, surgical site noted to be dehisced with mild periwound erythema noted. No purulence or malodor noted. No proximal lymphatic streaking noted. ASSESSMENT:  S/p secondary closure of surgical wound dehiscence with allograft application (DOS: 59/18/3338)  Surgical wound dehiscence, right foot  DM T2 with Neuropathy  Chronic Pain Syndrome       PLAN:  Dressing removed to the right lower extremity. Local wound care performed. Patient instructed to limit all weightbearing and be strict nonweightbearing to the right lower extremity for wound healing purposes. He is to elevate the right foot above the level of his heart for swelling reduction. Updated orders given for [facility]  Instructed patient to present for his second dose of Dalvance for the bone coverage  He is pending consultation with pain management regarding his chronic pain syndrome. He was prescribed narcotics for acute postoperative oral analgesia but was instructed no more narcotic prescriptions would be given for this issue.   I discussed his case today personally with the pain management physician   Lastly, patient to monitor for worsening signs infection call the office immediately if needed        Elmo Fernando, 1901 [facility], [address] [facility] and [facility]  [address] [facility] [address], [address] [place] [address]  O: [phone]  F: [phone]  C: [phone]

## 2022-11-04 ENCOUNTER — HOSPITAL ENCOUNTER (OUTPATIENT)
Dept: INFUSION THERAPY | Age: 52
Discharge: HOME | End: 2022-11-04
Payer: COMMERCIAL

## 2022-11-04 VITALS
SYSTOLIC BLOOD PRESSURE: 142 MMHG | RESPIRATION RATE: 16 BRPM | TEMPERATURE: 97.5 F | OXYGEN SATURATION: 98 % | DIASTOLIC BLOOD PRESSURE: 78 MMHG | HEART RATE: 95 BPM

## 2022-11-04 PROCEDURE — 96365 THER/PROPH/DIAG IV INF INIT: CPT

## 2022-11-04 PROCEDURE — 74011250636 HC RX REV CODE- 250/636: Performed by: PODIATRIST

## 2022-11-04 RX ADMIN — DALBAVANCIN 1500 MG: 500 INJECTION, POWDER, FOR SOLUTION INTRAVENOUS at 13:48

## 2022-11-04 NOTE — PROGRESS NOTES
Patient alert and oriented x4, VS stable, no complaints of pain at this time. Patient tolerated IV infusion well. Discharge instructions/education provided to patient, he verbalized understanding and had no questions. Patient ambulated self out with cane to home via private vehicle.

## 2022-11-09 ENCOUNTER — OFFICE VISIT (OUTPATIENT)
Dept: PODIATRY | Age: 52
End: 2022-11-09
Payer: COMMERCIAL

## 2022-11-09 VITALS
DIASTOLIC BLOOD PRESSURE: 79 MMHG | TEMPERATURE: 96.9 F | WEIGHT: 225 LBS | HEART RATE: 116 BPM | HEIGHT: 76 IN | SYSTOLIC BLOOD PRESSURE: 136 MMHG | BODY MASS INDEX: 27.4 KG/M2

## 2022-11-09 DIAGNOSIS — Z79.4 TYPE 2 DIABETES MELLITUS WITH DIABETIC NEUROPATHY, WITH LONG-TERM CURRENT USE OF INSULIN (HCC): Primary | ICD-10-CM

## 2022-11-09 DIAGNOSIS — L97.416 DIABETIC ULCER OF RIGHT MIDFOOT ASSOCIATED WITH TYPE 2 DIABETES MELLITUS, WITH BONE INVOLVEMENT WITHOUT EVIDENCE OF NECROSIS (HCC): ICD-10-CM

## 2022-11-09 DIAGNOSIS — G89.4 CHRONIC PAIN SYNDROME: ICD-10-CM

## 2022-11-09 DIAGNOSIS — E11.40 TYPE 2 DIABETES MELLITUS WITH DIABETIC NEUROPATHY, WITH LONG-TERM CURRENT USE OF INSULIN (HCC): Primary | ICD-10-CM

## 2022-11-09 DIAGNOSIS — E11.621 DIABETIC ULCER OF RIGHT MIDFOOT ASSOCIATED WITH TYPE 2 DIABETES MELLITUS, WITH BONE INVOLVEMENT WITHOUT EVIDENCE OF NECROSIS (HCC): ICD-10-CM

## 2022-11-09 PROCEDURE — 99024 POSTOP FOLLOW-UP VISIT: CPT | Performed by: PODIATRIST

## 2022-11-10 NOTE — PROGRESS NOTES
[facility]      HPI:  Patient is being seen for second postoperative visit status post secondary closure of surgical wound dehiscence to the right foot with allograft application. Patient states he has had intense pain since the procedure, rising to the level of 10/10. He states he has completed the antibiotics he was prescribed the day of his surgery. He states he has elevated his right lower extremity is much as possible. He is applying ice behind the knee. He has kept his dressing/splint clean, dry, intact. He states he has only ambulated with the protection of his post shoe to the right foot. He denies any local/systemic signs infection. He states he is now having acute onset pain to the left ankle. He believes this may be compensatory due to relieving pressure to the right foot. He states pain rest level of 10 out of 10. He denies any trauma. He denies any breaks in skin to this area. He denies any local/systemic signs of infection to this area. He denies any other pedal complaints       ROS:  A complete review of systems is unremarkable outside of HPI       PE:  Upon removing the surgical dressing, surgical site noted to be dehisced with loosened sutures and exposed allograft. Mild periwound erythema noted. No purulence or malodor noted. No proximal lymphatic streaking noted. Decreased range of motion noted to the left ankle. No wound formation noted. Pain on palpation to the left ankle            DIAGNOSTIC IMAGING:    Ultrasound guided steroid injection intra-articular to the left ankle -->          ASSESSMENT:  S/p secondary closure of surgical wound dehiscence with allograft application (DOS: 17/81/4736)  Left ankle pain, acute       PLAN:  Surgical dressing removed to the right lower extremity. Local wound care performed. Patient instructed to limit all weightbearing and be strict nonweightbearing to the right lower extremity for wound healing purposes. He is to elevate the right foot above the level of his heart for swelling reduction. Updated orders given for [facility]. Wound culture was taken and will tailor antibiotics as needed  He is pending consultation with pain management regarding his chronic pain syndrome. He was prescribed narcotics for acute postoperative oral analgesia but was instructed no more narcotic prescriptions would be given for this issue. Patient verbalized understanding  Options reviewed regarding his left ankle pain, conservative procedural.  Possible complications of each treatment option discussed in great detail. Patient elected for a steroid injection today in the office for symptomatic relief. Written verbal consent obtained. The steroid injection was performed intra-articularly with ultrasound guidance using 1cc of 1% plain Lidocaine and 1cc of decadron. This was well tolerated, and followed by immediate relief of pain. Instructed patient he would need to allow a few days for the injection to take full effect  Lastly, patient to monitor for worsening signs infection call the office immediately if needed          Zeny Morales.  Brenda Rosales, 1901 [facility], [address] [place] [address] and [facility]  [address] [place] [address]  O: [phone]  F: [phone]  C: [phone]

## 2022-11-15 NOTE — PROGRESS NOTES
[facility]      HPI:  Patient is being seen for fourth postoperative visit status post secondary closure of surgical wound dehiscence to the right foot with allograft application. Patient states he has had less pain since his last office visit, rising to the level of 4/10. He has noticed increase closure and appearance of the wound. He states he has elevated his right lower extremity is much as possible. He has kept his dressing/splint clean, dry, intact. He states he has only ambulated with the protection of his post shoe to the right foot. He denies any local/systemic signs infection. He denies any other pedal complaints       ROS:  A complete review of systems is unremarkable outside of HPI       PE:  Upon removing the dressing, surgical site noted to be very mildly dehisced with mild periwound erythema noted. No purulence or malodor noted. No proximal lymphatic streaking noted. ASSESSMENT:  S/p secondary closure of surgical wound dehiscence with allograft application (DOS: 13/73/5906)  Surgical wound dehiscence, right foot  DM T2 with Neuropathy  Chronic Pain Syndrome       PLAN:  Dressing removed to the right lower extremity. Local wound care performed. Patient instructed to limit all weightbearing and be strict nonweightbearing to the right lower extremity for wound healing purposes. He is to elevate the right foot above the level of his heart for swelling reduction. Updated orders given for [facility]  He is to be compliant with his pain management providers recommendations regarding his chronic pain syndrome. Lastly, patient to monitor for worsening signs infection call the office immediately if needed          Yeimy Caldwell.  [facility], 1901 [facility], [address] [place] [address] and [place] [id number] Surgery  [address] [place] [address] [address]  O: [phone]  F: [phone]  C: [phone]

## 2022-11-18 RX ORDER — DEXAMETHASONE SODIUM PHOSPHATE 4 MG/ML
4 INJECTION, SOLUTION INTRA-ARTICULAR; INTRALESIONAL; INTRAMUSCULAR; INTRAVENOUS; SOFT TISSUE ONCE
Status: COMPLETED | OUTPATIENT
Start: 2022-11-18 | End: 2022-11-18

## 2022-11-18 RX ADMIN — DEXAMETHASONE SODIUM PHOSPHATE 4 MG: 4 INJECTION, SOLUTION INTRA-ARTICULAR; INTRALESIONAL; INTRAMUSCULAR; INTRAVENOUS; SOFT TISSUE at 17:00

## 2022-11-28 ENCOUNTER — OFFICE VISIT (OUTPATIENT)
Dept: PODIATRY | Age: 52
End: 2022-11-28
Payer: COMMERCIAL

## 2022-11-28 DIAGNOSIS — Z79.4 TYPE 2 DIABETES MELLITUS WITH DIABETIC NEUROPATHY, WITH LONG-TERM CURRENT USE OF INSULIN (HCC): Primary | ICD-10-CM

## 2022-11-28 DIAGNOSIS — L97.416 DIABETIC ULCER OF RIGHT MIDFOOT ASSOCIATED WITH TYPE 2 DIABETES MELLITUS, WITH BONE INVOLVEMENT WITHOUT EVIDENCE OF NECROSIS (HCC): ICD-10-CM

## 2022-11-28 DIAGNOSIS — E11.621 DIABETIC ULCER OF RIGHT MIDFOOT ASSOCIATED WITH TYPE 2 DIABETES MELLITUS, WITH BONE INVOLVEMENT WITHOUT EVIDENCE OF NECROSIS (HCC): ICD-10-CM

## 2022-11-28 DIAGNOSIS — E11.40 TYPE 2 DIABETES MELLITUS WITH DIABETIC NEUROPATHY, WITH LONG-TERM CURRENT USE OF INSULIN (HCC): Primary | ICD-10-CM

## 2022-11-28 PROCEDURE — 99024 POSTOP FOLLOW-UP VISIT: CPT | Performed by: PODIATRIST

## 2022-11-30 NOTE — PROGRESS NOTES
[facility]      HPI:  Patient is being seen for fifth postoperative visit status post secondary closure of surgical wound dehiscence to the right foot with allograft application. Patient states he has had similar pain since his last office visit, rising to the level of 4/10. He has noticed cont closure and improved appearance of the wound. He states he has elevated his right lower extremity is much as possible. He has kept his dressing/splint clean, dry, intact. He states he has only ambulated with the protection of his post shoe to the right foot. He denies any local/systemic signs infection. He denies any other pedal complaints       ROS:  A complete review of systems is unremarkable outside of HPI       PE:  Upon removing the dressing, surgical site noted to be very mildly dehisced with mild periwound erythema noted. No purulence or malodor noted. No proximal lymphatic streaking noted. ASSESSMENT:  S/p secondary closure of surgical wound dehiscence with allograft application (DOS: 41/40/3479)  Surgical wound dehiscence, right foot  DM T2 with Neuropathy  Chronic Pain Syndrome       PLAN:  Dressing removed to the right lower extremity. Local wound care performed. Patient instructed to limit all weightbearing and be strict nonweightbearing to the right lower extremity for wound healing purposes. He is to elevate the right foot above the level of his heart for swelling reduction. Updated orders given for [facility]  He is to be compliant with his pain management providers recommendations regarding his chronic pain syndrome. Lastly, patient to monitor for worsening signs infection call the office immediately if needed          Gato Chavez.  Jaime Storey, 1901 [facility], [address] [place] [address] and [facility]  [address] [place] [address]  O: [phone]  F: [phone]  C: [phone]

## 2022-12-07 ENCOUNTER — OFFICE VISIT (OUTPATIENT)
Dept: PODIATRY | Age: 52
End: 2022-12-07
Payer: COMMERCIAL

## 2022-12-07 VITALS
BODY MASS INDEX: 27.4 KG/M2 | DIASTOLIC BLOOD PRESSURE: 79 MMHG | SYSTOLIC BLOOD PRESSURE: 126 MMHG | HEIGHT: 76 IN | TEMPERATURE: 96.4 F | HEART RATE: 91 BPM | WEIGHT: 225 LBS

## 2022-12-07 DIAGNOSIS — Z98.890 POST-OPERATIVE STATE: ICD-10-CM

## 2022-12-07 DIAGNOSIS — Z79.4 TYPE 2 DIABETES MELLITUS WITH DIABETIC NEUROPATHY, WITH LONG-TERM CURRENT USE OF INSULIN (HCC): Primary | ICD-10-CM

## 2022-12-07 DIAGNOSIS — E11.40 TYPE 2 DIABETES MELLITUS WITH DIABETIC NEUROPATHY, WITH LONG-TERM CURRENT USE OF INSULIN (HCC): Primary | ICD-10-CM

## 2022-12-07 PROCEDURE — 99024 POSTOP FOLLOW-UP VISIT: CPT | Performed by: PODIATRIST

## 2022-12-13 ENCOUNTER — OFFICE VISIT (OUTPATIENT)
Dept: ENDOCRINOLOGY | Age: 52
End: 2022-12-13
Payer: COMMERCIAL

## 2022-12-13 VITALS
TEMPERATURE: 97.4 F | OXYGEN SATURATION: 99 % | HEART RATE: 91 BPM | BODY MASS INDEX: 29.45 KG/M2 | DIASTOLIC BLOOD PRESSURE: 90 MMHG | RESPIRATION RATE: 18 BRPM | SYSTOLIC BLOOD PRESSURE: 157 MMHG | HEIGHT: 76 IN | WEIGHT: 241.8 LBS

## 2022-12-13 DIAGNOSIS — Z96.41 INSULIN PUMP STATUS: ICD-10-CM

## 2022-12-13 DIAGNOSIS — I10 ESSENTIAL HYPERTENSION: ICD-10-CM

## 2022-12-13 DIAGNOSIS — G62.9 NEUROPATHY: ICD-10-CM

## 2022-12-13 DIAGNOSIS — E10.65 HYPERGLYCEMIA DUE TO TYPE 1 DIABETES MELLITUS (HCC): Primary | ICD-10-CM

## 2022-12-13 DIAGNOSIS — Z79.4 ENCOUNTER FOR LONG-TERM (CURRENT) USE OF INSULIN (HCC): ICD-10-CM

## 2022-12-13 DIAGNOSIS — N18.4 CKD (CHRONIC KIDNEY DISEASE) STAGE 4, GFR 15-29 ML/MIN (HCC): ICD-10-CM

## 2022-12-13 PROCEDURE — 3078F DIAST BP <80 MM HG: CPT | Performed by: INTERNAL MEDICINE

## 2022-12-13 PROCEDURE — 99214 OFFICE O/P EST MOD 30 MIN: CPT | Performed by: INTERNAL MEDICINE

## 2022-12-13 PROCEDURE — 3051F HG A1C>EQUAL 7.0%<8.0%: CPT | Performed by: INTERNAL MEDICINE

## 2022-12-13 PROCEDURE — 3074F SYST BP LT 130 MM HG: CPT | Performed by: INTERNAL MEDICINE

## 2022-12-13 RX ORDER — GABAPENTIN 300 MG/1
CAPSULE ORAL
Qty: 150 CAPSULE | Refills: 5 | Status: SHIPPED | OUTPATIENT
Start: 2022-12-13

## 2022-12-13 NOTE — PROGRESS NOTES
[facility]      HPI:  Patient is being seen for sixth postoperative visit status post secondary closure of surgical wound dehiscence to the right foot with allograft application. Patient states he has had less pain since his last office visit, rising to the level of 2/10. He has noticed cont closure and improved appearance of the wound. He states he has elevated his right lower extremity is much as possible. He has kept his dressing/splint clean, dry, intact. He states he has only ambulated with the protection of his post shoe to the right foot. He denies any local/systemic signs infection. He denies any other pedal complaints       ROS:  A complete review of systems is unremarkable outside of HPI       PE:  Upon removing the dressing, surgical site noted to be very very mildly dehisced with mild periwound erythema noted. No purulence or malodor noted. No proximal lymphatic streaking noted. ASSESSMENT:  S/p secondary closure of surgical wound dehiscence with allograft application (DOS: 84/87/0961)  Surgical wound dehiscence, right foot  DM T2 with Neuropathy  Chronic Pain Syndrome       PLAN:  Dressing removed to the right lower extremity. Local wound care performed. Patient instructed to limit all weightbearing and be strict nonweightbearing to the right lower extremity for wound healing purposes. He is to elevate the right foot above the level of his heart for swelling reduction. Updated orders given for [facility]  He is to be compliant with his pain management providers recommendations regarding his chronic pain syndrome. Lastly, patient to monitor for worsening signs infection call the office immediately if needed          Moiz Talavera.  Ashleigh Downey, 1901 [facility], [address] [facility] and [facility]  [address] [place] [address]  O: [phone]  F: [phone]  C: [phone]

## 2022-12-13 NOTE — PROGRESS NOTES
HISTORY OF PRESENT ILLNESS    Noreen Solomon is a 50  y.o. male. HPI  Patient here for    FOLLOW UP  AFTER  Last  visit  For  Type 1 diabetes mellitus   with neuropathy, nephropathy and retinopathy ,   PAD - s/p right hallux amputation, still working with wound care center   for right foot diabetic ulcer ,   last seen  in  sept 2022         He looks much better   No naga boots , ulcer got better   Requesting lyrica  Stopped cymbalta and lexapro   He is on compression stocking on right leg         Sept 2022     He got pain management doctor   Right foot osteomyelitis  present and he is to get  Picc line and antibiotics next Friday June 2022    He is now being followed by  Wound care nurse  And he has a picture  On phone from this morning   The ulcer base has yellowish discharge    He is asking for second opinion , but he changed mind and wanted to see Dr. Raz Clarke again       May 2022   He had toe amputated on right hallux    On prednisone   - because of DJD    Sugars are pretty high - over 300 mg       March 2022     He is seeing Dr. Raz Clarke for podiatry care   Patient felt worse taking the antibiotics. He saw Dr. Ochoa Harley again this morning. Using tandem pump with Dexcom  Accompanied by his daughter who is around 11years old      OLD HISTORY       Current A1C is 13.2 % and symptoms/problems include polyuria, polydipsia and visual disturbances   H/o DM type 1 for 30 years    He moved from [address] [place] [address], goyo blair was his pcp   Many years ago , he was seeing Dr. Promise Hernandez   Current diabetic medications include insulin pump            Review of Systems   C/o pain after amputation , says his left foot is broken   C/o depression, requesting help       Physical Exam   Constitutional :   oriented to person, place, and time. appears well-developed and well-nourished.    Right metatarsel area   - hallux amputated   Has  compression stocking           No new labs after sept 2022         ASSESSMENT and PLAN        1. Type 1DM, UNcontrolled , on insulin pump.:  a1c is   ?   7.2 %   from sept 2022   compared to   8%   FROM   July 2022   COMPARED TO   7. 5  %   From  May  2022   Compared to       Nov 2021    Compared to       6.6 %      From    Today by  [facility]    July 2021     Compared to     6.6 %    From    March 2021  Compared to     7.5  %   By labs from nov 2020    Compared to    12.9 %      By  POC     Compared   To    12 %   From nov 2019, compared to  A1C IS OVER 10 %   FROM PT FIRST   A long gap in follow up visit from 2016 to 2019 - almost 3 years         Dec  2022   Could not download the tandem pump       Improved control  by last a1c      Last visit info  :     TDD  63  units a day   BASAL :   39 units  ; Bolus   15 units    Daily carbs 115  gm a day   He Is  NOT bolusing at the meal times     14 day average glucose 155   - 27% for high and  2  % low sugars and % in [employer]  Range 70 %    - percent of utiization 90  % 2.5 days     Settings   :    Basal - MN : 1.5 unit/hr  ;  7 am-  2 units/hr,  8 pm - 1.5 units/hr  42 units a day  ;   CF  1 : 35 mg   :  Carb ratio  Is  1:6 gm              Sept 2022     Reviewed the pump download and sugars   Improved control  definitely ( with fear of losing foot  ? )   No more questions regarding the diet   He stopped sodas I hope     He also saw Pain management which made him more relaxed     TDD  63  units a day   BASAL :   39 units  ;   Bolus   15 units    Daily carbs 115  gm a day   He Is  NOT bolusing at the meal times     14 day average glucose 155   - 27% for high and  2  % low sugars and % in [employer]  Range 70 %    - percent of utiization 90  % 2.5 days     Settings   :    Basal - [place] : 1.5 unit/hr  ;  7 am-  2 units/hr,  8 pm - 1.5 units/hr  42 units a day  ;   CF  1 : 35 mg   :  Carb ratio  Is  1:6 gm          June 2022       Noted that he is on medicaid plan   Tandem pump and dexcom could be a challenge     Reviewed the pump download and sugars   TDD  70 units a day   BASAL :   42 units  ; Bolus   25 units    Daily carbs 180  gm a day   He Is  NOT bolusing at the meal times       Reviewed  Tandem pump downloaded report for 14  days and discussed with pt     Adjusted the basal settings     - 14 day average glucose 215  - 53% for high and  1  % low sugars and % in [employer]  Range 46 %    - percent of utiization 46 % 2.5 days     Settings   :    Basal - [place] : 1.5 unit/hr  ;  7 am-  2 units/hr,  8 pm - 1.5 units/hr  42 units a day  ;   CF  1 : 35 mg   :  Carb ratio  Is  1:6 gm          2. Hypoglycemia :  Educated on treating the hypoglycemia. Discussed g-voke and prescribed      3. HTN :  On  lisinopril 20 mg      4. Dyslipidemia : on statin        5. Anxiety disorder/ depression  - he is off  lexapro for depression      6. Right foot osteomyelitis / Unhealed  right foot ulcer / amputated right hallux - followed by Dr. Pamela Diaz     7. NEUROPATHY  - severe,  OFF  Cymbalta     On gabapentin    600 mg am , 900 mg pm.  I deferred Lyrica for now   Finally he got referred to pain management  Dr Francia Henry       8.  CKD -   nephropathy present - proteinuria present               Reviewed results with patient and discussed the labs being ordered today/bnv  Patient voiced understanding of plan of care

## 2022-12-13 NOTE — PATIENT INSTRUCTIONS
SPECIFIC INSTRUCTIONS BELOW       DRINK water   Do not skip meals  Do not eat in between meals    Reduce carbs- pasta, rice, potatoes, bread   Try to avoid processed bread products like BISCUITS, CROISSANTS, MUFFINS    Do not drink juices or sodas, even if they are calorie zero or diet drinks and especially avoid using powders like crystalloids , ALEX-AIDS     Do not eat peanut butter     Do not eat sugar free cookies and cakes   Do not eat peaches, oranges, pineapples, raisins, grapes , canteloupe , honey dew, mangoes , watermelon  and fruit medleys    ---------------------------------------------------------------------------------------------------------------    stay  on  Lisinopril     Stay   On atorvostatin 20 mg at night     Resume   lexapro 10 mg at night         -----------------------------------------------------------------------------------------------------------------      Gets the pump and [employer] supplies   - [employer]        humalog to 4 vials in the pump   ( nearly 100 units a day )      Back up regimen       Check blood sugars immediately before each meal and at bedtime      Take  lantus  insulin  30  units  at bed time    Take humalog  Insulin at carb to insulin ratio of 10 gm : 1 unit       Also, add additional humalog  as follows with meals  If blood sugars are[de-identified]    150-200 mg 1 units    201-250 mg 2 units    251-300 mg 3 units    301-350 mg 4 units    351-400 mg 5 units    401-450 mg 6 units    451-500 mg 7 units     Less than 70 mg NO INSULIN                  -------------PAY ATTENTION TO THESE GENERAL INSTRUCTIONS -----------------      - The medications prescribed at this visit will not be available at pharmacy until 6 pm       - YOUR MED LIST IS NOT UP TO DATE AS SOME CHANGES ARE BEING MADE AFTER THE VISIT - FOLLOW SPECIFIC INSTRUCTIONS  ABOVE     -ANY tests other than blood work, which you opt to do  outside the  [facility] imaging facilities, you are responsible for prior authorizations if  required    - HEALTH MAINTENANCE IS NOT GOING TO BE UP TO DATE ON YOUR AVS- PLEASE IGNORE     Results     *Normal results will not be notified by a phone call starting January 1 2021   *If you have an upcoming visit, the results will be discussed at the visit   *Please sign up for MY CHART if you want access to your lab and test results  *Abnormal results which require immediate attention will be notified by phone call   *Abnormal results which do not require immediate assistance will be notified in 1-2 weeks       Refills    -    have your pharmacy send us a refill request . Refills are done max for one year and a visit is a must before refills are extended    Follow up appointments -  highly encourage you to make it when you are checking out. We can accommodate you into the schedule based on your clinical situation, but not for extending refills beyond a year. Labs are important to give refills and is important to get labs before the visit     Phone calls  -  Allow  24 hrs.  for non-urgent calls to be returned  Prior authorization - It may take 2-4 weeks to process  Forms  -  FMLA, DMV etc., will take up to 2 weeks to process  Cancellations - please notify the office 2 days in advance   Samples  - will only be dispensed at visits       If not showing for the appointments and cancelling appointments within 24 hours are kept track of and three  of such situations in  two consecutive years will likely be considered for termination from the practice    -------------------------------------------------------------------------------------------------------------------

## 2022-12-13 NOTE — PROGRESS NOTES
Noreen Solomon is a 50 y.o. male here for   Chief Complaint   Patient presents with    Diabetes       1. Have you been to the ER or an urgent care clinic since your last visit?  - no    2. Have you been hospitalized since your last visit? - no    3. Have you seen or consulted any other health care providers outside of the [address] [place] [address] since your last visit?   Include any pap smears or colon screening.-  PCP

## 2022-12-13 NOTE — LETTER
12/13/2022    Patient: Ori Pop   YOB: 1970   Date of Visit: 12/13/2022     Zenaida Brooks MD  [address]  [address] [address]  Via Fax: [phone]    Dear Zenaida Brooks MD,      Thank you for referring Mr. Ori Pop to [address] [place] [address] for evaluation. My notes for this consultation are attached. If you have questions, please do not hesitate to call me. I look forward to following your patient along with you.       Sincerely,    Abner Gonzalez MD

## 2022-12-14 LAB
ALBUMIN SERPL-MCNC: 4.1 G/DL (ref 3.5–5)
ALBUMIN/GLOB SERPL: 1.1 {RATIO} (ref 1.1–2.2)
ALP SERPL-CCNC: 91 U/L (ref 45–117)
ALT SERPL-CCNC: 31 U/L (ref 12–78)
ANION GAP SERPL CALC-SCNC: 3 MMOL/L (ref 5–15)
AST SERPL-CCNC: 20 U/L (ref 15–37)
BASOPHILS # BLD: 0.1 K/UL (ref 0–0.1)
BASOPHILS NFR BLD: 2 % (ref 0–1)
BILIRUB SERPL-MCNC: 0.2 MG/DL (ref 0.2–1)
BUN SERPL-MCNC: 28 MG/DL (ref 6–20)
BUN/CREAT SERPL: 19 (ref 12–20)
CALCIUM SERPL-MCNC: 9 MG/DL (ref 8.5–10.1)
CHLORIDE SERPL-SCNC: 111 MMOL/L (ref 97–108)
CHOLEST SERPL-MCNC: 157 MG/DL
CO2 SERPL-SCNC: 26 MMOL/L (ref 21–32)
CREAT SERPL-MCNC: 1.51 MG/DL (ref 0.7–1.3)
CREAT UR-MCNC: 47.1 MG/DL
DIFFERENTIAL METHOD BLD: ABNORMAL
EOSINOPHIL # BLD: 0.4 K/UL (ref 0–0.4)
EOSINOPHIL NFR BLD: 7 % (ref 0–7)
ERYTHROCYTE [DISTWIDTH] IN BLOOD BY AUTOMATED COUNT: 16 % (ref 11.5–14.5)
EST. AVERAGE GLUCOSE BLD GHB EST-MCNC: 143 MG/DL
GLOBULIN SER CALC-MCNC: 3.7 G/DL (ref 2–4)
GLUCOSE SERPL-MCNC: 100 MG/DL (ref 65–100)
HBA1C MFR BLD: 6.6 % (ref 4–5.6)
HCT VFR BLD AUTO: 39.3 % (ref 36.6–50.3)
HDLC SERPL-MCNC: 69 MG/DL
HDLC SERPL: 2.3 {RATIO} (ref 0–5)
HGB BLD-MCNC: 12.4 G/DL (ref 12.1–17)
IMM GRANULOCYTES # BLD AUTO: 0 K/UL (ref 0–0.04)
IMM GRANULOCYTES NFR BLD AUTO: 0 % (ref 0–0.5)
LDLC SERPL CALC-MCNC: 70 MG/DL (ref 0–100)
LYMPHOCYTES # BLD: 1.7 K/UL (ref 0.8–3.5)
LYMPHOCYTES NFR BLD: 31 % (ref 12–49)
MCH RBC QN AUTO: 29 PG (ref 26–34)
MCHC RBC AUTO-ENTMCNC: 31.6 G/DL (ref 30–36.5)
MCV RBC AUTO: 91.8 FL (ref 80–99)
MICROALBUMIN UR-MCNC: 1.1 MG/DL
MICROALBUMIN/CREAT UR-RTO: 23 MG/G (ref 0–30)
MONOCYTES # BLD: 0.4 K/UL (ref 0–1)
MONOCYTES NFR BLD: 7 % (ref 5–13)
NEUTS SEG # BLD: 2.9 K/UL (ref 1.8–8)
NEUTS SEG NFR BLD: 53 % (ref 32–75)
NRBC # BLD: 0 K/UL (ref 0–0.01)
NRBC BLD-RTO: 0 PER 100 WBC
PLATELET # BLD AUTO: 301 K/UL (ref 150–400)
PMV BLD AUTO: 9.9 FL (ref 8.9–12.9)
POTASSIUM SERPL-SCNC: 5.5 MMOL/L (ref 3.5–5.1)
PROT SERPL-MCNC: 7.8 G/DL (ref 6.4–8.2)
RBC # BLD AUTO: 4.28 M/UL (ref 4.1–5.7)
SODIUM SERPL-SCNC: 140 MMOL/L (ref 136–145)
TRIGL SERPL-MCNC: 90 MG/DL (ref ?–150)
VLDLC SERPL CALC-MCNC: 18 MG/DL
WBC # BLD AUTO: 5.4 K/UL (ref 4.1–11.1)

## 2022-12-15 ENCOUNTER — TELEPHONE (OUTPATIENT)
Dept: ENDOCRINOLOGY | Age: 52
End: 2022-12-15

## 2022-12-15 NOTE — TELEPHONE ENCOUNTER
Pharmacist has to make sure when prescribing Gabapentin 300mg that she is aware he is also using medical mariajuana. Please call and confirm if she is still ok to prescribe.

## 2022-12-16 NOTE — TELEPHONE ENCOUNTER
Per Dr. Jacquie Russell. I spoke with the pharmacist at [facility] and informed her that it was okay to fill the Rx for gabapentin 300mg

## 2022-12-21 ENCOUNTER — OFFICE VISIT (OUTPATIENT)
Dept: PODIATRY | Age: 52
End: 2022-12-21
Payer: COMMERCIAL

## 2022-12-21 VITALS
DIASTOLIC BLOOD PRESSURE: 78 MMHG | HEIGHT: 76 IN | WEIGHT: 241 LBS | HEART RATE: 101 BPM | TEMPERATURE: 97.7 F | BODY MASS INDEX: 29.35 KG/M2 | SYSTOLIC BLOOD PRESSURE: 131 MMHG

## 2022-12-21 DIAGNOSIS — Z98.890 POST-OPERATIVE STATE: Primary | ICD-10-CM

## 2022-12-21 PROCEDURE — 99024 POSTOP FOLLOW-UP VISIT: CPT | Performed by: PODIATRIST

## 2022-12-22 NOTE — PROGRESS NOTES
[place] [facility]      HPI:  Patient is being seen for seventh postoperative visit status post secondary closure of surgical wound dehiscence to the right foot with allograft application. Patient states he has had less pain since his last office visit, rising to the level of 1/10. He has noticed cont closure and improved appearance of the wound. He states he has elevated his right lower extremity is much as possible. He has kept his dressing/splint clean, dry, intact. He states he has only ambulated with the protection of his post shoe to the right foot. He denies any local/systemic signs infection. He denies any other pedal complaints       ROS:  A complete review of systems is unremarkable outside of HPI       PE:  Upon removing the dressing, surgical site noted to be closed. No purulence or malodor noted. No proximal lymphatic streaking noted. ASSESSMENT:  S/p secondary closure of surgical wound dehiscence with allograft application (DOS: 13/96/9616)  Surgical wound dehiscence, right foot (resolved)  DM T2 with Neuropathy  Chronic Pain Syndrome       PLAN:  Dressing removed to the right lower extremity. Area healed, pt released from post op protocol. WBAT  He is to be compliant with his pain management providers recommendations regarding his chronic pain syndrome. Lastly, patient to monitor for worsening signs infection call the office immediately if needed          Yancey Gilford.  MISTY Cutler, CW, [facility]  [address] [facility], [address] [place] [address]  O: [phone]  F: [phone]    * PerfectServe available 24/7

## 2023-01-04 ENCOUNTER — OFFICE VISIT (OUTPATIENT)
Dept: PODIATRY | Age: 53
End: 2023-01-04
Payer: COMMERCIAL

## 2023-01-04 VITALS
HEIGHT: 76 IN | RESPIRATION RATE: 16 BRPM | SYSTOLIC BLOOD PRESSURE: 137 MMHG | HEART RATE: 100 BPM | TEMPERATURE: 97.5 F | BODY MASS INDEX: 27.93 KG/M2 | DIASTOLIC BLOOD PRESSURE: 82 MMHG | OXYGEN SATURATION: 100 % | WEIGHT: 229.38 LBS

## 2023-01-04 DIAGNOSIS — M79.672 LEFT FOOT PAIN: Primary | ICD-10-CM

## 2023-01-04 DIAGNOSIS — M25.572 LEFT ANKLE PAIN, UNSPECIFIED CHRONICITY: ICD-10-CM

## 2023-01-04 PROCEDURE — 3079F DIAST BP 80-89 MM HG: CPT | Performed by: PODIATRIST

## 2023-01-04 PROCEDURE — 3075F SYST BP GE 130 - 139MM HG: CPT | Performed by: PODIATRIST

## 2023-01-04 PROCEDURE — 99213 OFFICE O/P EST LOW 20 MIN: CPT | Performed by: PODIATRIST
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
## 2023-01-04 NOTE — PROGRESS NOTES
Visit Vitals  /82 (BP 1 Location: Left upper arm, BP Patient Position: Sitting, BP Cuff Size: Adult)   Pulse 100   Temp 97.5 °F (36.4 °C)   Resp 16   Ht 6' 4\" (1.93 m)   Wt 229 lb 6 oz (104 kg)   SpO2 100%   BMI 27.92 kg/m²       Chief Complaint   Patient presents with    Follow-up     Wound check

## 2023-02-15 ENCOUNTER — OFFICE VISIT (OUTPATIENT)
Dept: PODIATRY | Age: 53
End: 2023-02-15
Payer: COMMERCIAL

## 2023-02-15 VITALS
SYSTOLIC BLOOD PRESSURE: 166 MMHG | HEIGHT: 76 IN | RESPIRATION RATE: 16 BRPM | TEMPERATURE: 97.8 F | WEIGHT: 233.5 LBS | DIASTOLIC BLOOD PRESSURE: 90 MMHG | HEART RATE: 91 BPM | BODY MASS INDEX: 28.43 KG/M2

## 2023-02-15 DIAGNOSIS — Z79.4 TYPE 2 DIABETES MELLITUS WITH DIABETIC NEUROPATHY, WITH LONG-TERM CURRENT USE OF INSULIN (HCC): ICD-10-CM

## 2023-02-15 DIAGNOSIS — M25.572 CHRONIC PAIN OF LEFT ANKLE: ICD-10-CM

## 2023-02-15 DIAGNOSIS — E11.40 TYPE 2 DIABETES MELLITUS WITH DIABETIC NEUROPATHY, WITH LONG-TERM CURRENT USE OF INSULIN (HCC): ICD-10-CM

## 2023-02-15 DIAGNOSIS — G89.29 CHRONIC PAIN OF LEFT ANKLE: ICD-10-CM

## 2023-02-15 DIAGNOSIS — Z89.411 STATUS POST AMPUTATION OF RIGHT GREAT TOE (HCC): Primary | ICD-10-CM

## 2023-02-15 PROCEDURE — 3080F DIAST BP >= 90 MM HG: CPT | Performed by: PODIATRIST

## 2023-02-15 PROCEDURE — 3077F SYST BP >= 140 MM HG: CPT | Performed by: PODIATRIST

## 2023-02-15 PROCEDURE — 99213 OFFICE O/P EST LOW 20 MIN: CPT | Performed by: PODIATRIST

## 2023-02-15 NOTE — PROGRESS NOTES
1. Have you been to the ER, urgent care clinic since your last visit? Hospitalized since your last visit? No    2. Have you seen or consulted any other health care providers outside of the [address] [place] [address] since your last visit? Include any pap smears or colon screening.  No      Visit Vitals  BP (!) 166/90 (BP 1 Location: Left upper arm, BP Patient Position: Sitting, BP Cuff Size: Adult)   Pulse 91   Temp 97.8 °F (36.6 °C)   Resp 16   Ht 6' 4\" (1.93 m)   Wt 233 lb 8 oz (105.9 kg)   BMI 28.42 kg/m²       Chief Complaint   Patient presents with    Follow-up     Pain in both feet
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
## 2023-02-22 ENCOUNTER — HOSPITAL ENCOUNTER (OUTPATIENT)
Dept: CT IMAGING | Age: 53
Discharge: HOME | End: 2023-02-22
Attending: PODIATRIST
Payer: COMMERCIAL

## 2023-02-22 DIAGNOSIS — M25.572 CHRONIC PAIN OF LEFT ANKLE: ICD-10-CM

## 2023-02-22 DIAGNOSIS — G89.29 CHRONIC PAIN OF LEFT ANKLE: ICD-10-CM

## 2023-02-22 PROCEDURE — 73700 CT LOWER EXTREMITY W/O DYE: CPT

## 2023-02-23 ENCOUNTER — TELEPHONE (OUTPATIENT)
Dept: ENDOCRINOLOGY | Age: 53
End: 2023-02-23

## 2023-02-23 NOTE — TELEPHONE ENCOUNTER
[facility] left VM stating they have faxed over a Medical Certificate form and have not received anything back.

## 2023-03-02 ENCOUNTER — OFFICE VISIT (OUTPATIENT)
Dept: PODIATRY | Age: 53
End: 2023-03-02

## 2023-03-02 VITALS
SYSTOLIC BLOOD PRESSURE: 176 MMHG | WEIGHT: 233.8 LBS | DIASTOLIC BLOOD PRESSURE: 103 MMHG | HEART RATE: 83 BPM | TEMPERATURE: 97.3 F | BODY MASS INDEX: 28.47 KG/M2 | HEIGHT: 76 IN

## 2023-03-02 DIAGNOSIS — M25.572 CHRONIC PAIN OF LEFT ANKLE: ICD-10-CM

## 2023-03-02 DIAGNOSIS — M89.9 OSTEOCHONDRAL LESION OF TALAR DOME: Primary | ICD-10-CM

## 2023-03-02 DIAGNOSIS — G89.29 CHRONIC PAIN OF LEFT ANKLE: ICD-10-CM

## 2023-03-02 DIAGNOSIS — M94.9 OSTEOCHONDRAL LESION OF TALAR DOME: Primary | ICD-10-CM

## 2023-03-02 NOTE — PROGRESS NOTES
1. Have you been to the ER, urgent care clinic since your last visit? Hospitalized since your last visit? No    2. Have you seen or consulted any other health care providers outside of the [address] [place] [address] since your last visit? Include any pap smears or colon screening.  No    Chief Complaint   Patient presents with    Results     Discuss mri results

## 2023-03-03 DIAGNOSIS — E10.65 HYPERGLYCEMIA DUE TO TYPE 1 DIABETES MELLITUS (HCC): ICD-10-CM

## 2023-03-03 RX ORDER — INSULIN LISPRO 100 [IU]/ML
INJECTION, SOLUTION INTRAVENOUS; SUBCUTANEOUS
Qty: 4 EACH | Refills: 11 | Status: SHIPPED | OUTPATIENT
Start: 2023-03-03
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
## 2023-03-03 NOTE — TELEPHONE ENCOUNTER
Voicemail message received from patient advising that he is running out of insulin before it's time for him to get a refill. Called patient to verify how many units per day he is using in his pump. He states that it varies but it does not go over 100 units per day. Advised patient that the last prescription that was sent in for him was for 12 vials for 90 days which would equal out to 4 vials per month. He states that he has only been getting 3 vials per month. Advised patient will request that Dr Elly Lugo send a 30 day prescription as opposed to a 90 day prescription. He verbalized understanding.

## 2023-04-18 ENCOUNTER — OFFICE VISIT (OUTPATIENT)
Dept: ENDOCRINOLOGY | Age: 53
End: 2023-04-18
Payer: COMMERCIAL

## 2023-04-18 VITALS
DIASTOLIC BLOOD PRESSURE: 96 MMHG | OXYGEN SATURATION: 96 % | SYSTOLIC BLOOD PRESSURE: 162 MMHG | BODY MASS INDEX: 28.98 KG/M2 | WEIGHT: 238 LBS | HEIGHT: 76 IN | TEMPERATURE: 96.8 F | HEART RATE: 93 BPM

## 2023-04-18 DIAGNOSIS — E10.65 HYPERGLYCEMIA DUE TO TYPE 1 DIABETES MELLITUS (HCC): Primary | ICD-10-CM

## 2023-04-18 DIAGNOSIS — G62.9 NEUROPATHY: ICD-10-CM

## 2023-04-18 DIAGNOSIS — E78.2 MIXED HYPERLIPIDEMIA: ICD-10-CM

## 2023-04-18 DIAGNOSIS — Z96.41 INSULIN PUMP STATUS: ICD-10-CM

## 2023-04-18 DIAGNOSIS — I10 ESSENTIAL HYPERTENSION: ICD-10-CM

## 2023-04-18 DIAGNOSIS — N18.4 CKD (CHRONIC KIDNEY DISEASE) STAGE 4, GFR 15-29 ML/MIN (HCC): ICD-10-CM

## 2023-04-18 DIAGNOSIS — Z79.4 ENCOUNTER FOR LONG-TERM (CURRENT) USE OF INSULIN (HCC): ICD-10-CM

## 2023-04-18 PROCEDURE — 99215 OFFICE O/P EST HI 40 MIN: CPT | Performed by: INTERNAL MEDICINE

## 2023-04-18 PROCEDURE — 3077F SYST BP >= 140 MM HG: CPT | Performed by: INTERNAL MEDICINE

## 2023-04-18 PROCEDURE — 3080F DIAST BP >= 90 MM HG: CPT | Performed by: INTERNAL MEDICINE

## 2023-04-18 RX ORDER — BUPROPION HYDROCHLORIDE 150 MG/1
150 TABLET ORAL DAILY
COMMUNITY
Start: 2023-04-14

## 2023-04-18 RX ORDER — PREDNISONE 5 MG/1
5 TABLET ORAL DAILY
COMMUNITY

## 2023-04-18 NOTE — PROGRESS NOTES
Chris Han is a 50 y.o. male here for   Chief Complaint   Patient presents with    Diabetes       1. Have you been to the ER, urgent care clinic since your last visit? Hospitalized since your last visit? - No     2. Have you seen or consulted any other health care providers outside of the [address] [place] [address] since your last visit?   Include any pap smears or colon screening.- PCP

## 2023-04-18 NOTE — PROGRESS NOTES
Javier Rahman MD FACE       HISTORY OF PRESENT ILLNESS    Luca Macario is a 50  y.o. male. HPI  Patient here for    FOLLOW UP  AFTER  Last  visit  For  Type 1 diabetes mellitus   with neuropathy, nephropathy and retinopathy ,   PAD - s/p right hallux amputation, still working with wound care center   for right foot diabetic ulcer ,   last seen  in  Dec 2022       He is taking prednsione 5 mg a day  for DJD   It is keeping his sugars  higher at odd times     He is scheduled to get foot surgery  in 2 days  by Dr. Andrei Joel   He is requesting to go higher on dose of neurontin        Dec 2022     He looks much better   No naga boots , ulcer got better   Requesting lyrica  Stopped cymbalta and lexapro   He is on compression stocking on right leg         Sept 2022     He got pain management doctor   Right foot osteomyelitis  present and he is to get  Picc line and antibiotics next Friday       May 2022   He had toe amputated on right hallux    On prednisone   - because of DJD    Sugars are pretty high - over 300 mg       March 2022     He is seeing Dr. Lizbet House for podiatry care   Patient felt worse taking the antibiotics. He saw Dr. Raymundo Snow again this morning. Using tandem pump with Dexcom  Accompanied by his daughter who is around 11years old      OLD HISTORY       Current A1C is 13.2 % and symptoms/problems include polyuria, polydipsia and visual disturbances   H/o DM type 1 for 30 years    He moved from [address] [place] [address], goyo blair was his pcp   Many years ago , he was seeing Dr. Xiomara Morris   Current diabetic medications include insulin pump            Review of Systems   Better with mood       Physical Exam   Constitutional :   oriented to person, place, and time. appears well-developed and well-nourished.    Right metatarsel area   - hallux amputated   Has  compression stocking on  right leg         Labs none for this  visit         ASSESSMENT and PLAN        1. Type 1DM, UNcontrolled , on insulin pump.:  a1c is    7.1 %   from   pcp office by FS    compared to  6.6 %     from    dec 2022    compared to     7.2 %   from sept 2022   compared to   8%   FROM   July 2022   COMPARED TO   7. 5  %   From  May  2022   Compared to       Nov 2021    Compared to       6.6 %      From    Today by  [facility]    July 2021     Compared to     6.6 %    From    March 2021  Compared to     7.5  %   By labs from nov 2020    Compared to    12.9 %      By  POC     Compared   To    12 %   From nov 2019, compared to  A1C IS OVER 10 %   FROM PT FIRST   A long gap in follow up visit from 2016 to 2019 - almost 3 years       April 2023     On tandem pump   Slight loss of  control at   pcp by   finger stick     Above target   -  46 %   Target  - 52 %   Below 2 %       The CGM is indicating a slight blip as expected with steroid use   But the blood sugars are very high  from  9 pm onwards  to  3 AM ( late  night snacking )    Alerted him that he has to do better by maintaining good control especially he is  going for left foot surgery       TDD  93  units a day ( on steroid)   Prednisone at 5 mg DJD - it did nto help with arthritis -  and  he plans to go off off it     I advised him to  set up his basal insulin to match prednisone  - but he is not 100% sure of  getting this today     BASAL :   47  units  ; Bolus   31 units   : control IQ bolus   12   He Is  NOT bolusing at the meal times ( no carbs keyed in )            Dec  2022   Could not download the tandem pump   Improved control  by last a1c      Last visit info  :   TDD  63  units a day   BASAL :   39 units  ;   Bolus   15 units    Daily carbs 115  gm a day   He Is  NOT bolusing at the meal times     14 day average glucose 155   - 27% for high and  2  % low sugars and % in Target  Range 70 %    - percent of utiization 90  % 2.5 days     Settings   :    Basal - MN : 1.5 unit/hr  ;  7 am-  2 units/hr,  8 pm - 1.5 units/hr  42 units a day  ;   CF  1 : 35 mg   :  Carb ratio  Is  1:6 gm          2. Hypoglycemia :  Educated on treating the hypoglycemia. Discussed g-voke and prescribed      3. HTN :  On  lisinopril 20 mg      4. Dyslipidemia : on statin        5. Anxiety disorder/ depression  - he is off  lexapro for depression  and buspar   On wellbutrin  xl       6. Right foot osteomyelitis / healed  right foot ulcer / amputated right hallux - followed by Dr. Mayank Rivers on surgery by Dr. Deirdre Quintero on left foot ankle       7. NEUROPATHY  - severe,  OFF  Cymbalta       On gabapentin    600 mg am , 900 mg pm.  I deferred Lyrica for now   Finally he got referred to pain management  Dr Atul Cespedes   I am increasing gabapentin to  6 pills a day       8.  CKD -   nephropathy present - proteinuria present         As he is prone to lose DM control quickly ,   discussed med actions,   And  Discussed  changes and spent more than 25 min in interpretation and counseling     Total time  : 42 min         Reviewed results with patient and discussed the labs being ordered today/bnv  Patient voiced understanding of plan of care

## 2023-04-18 NOTE — PATIENT INSTRUCTIONS
SPECIFIC INSTRUCTIONS BELOW     DRINK water   Do not skip meals  Do not eat in between meals    Reduce carbs- pasta, rice, potatoes, bread   Try to avoid processed bread products like BISCUITS, CROISSANTS, MUFFINS    Do not drink juices or sodas, even if they are calorie zero or diet drinks and especially avoid using powders like crystalloids , ALEX-AIDS     Do not eat peanut butter     Do not eat sugar free cookies and cakes   Do not eat peaches, oranges, pineapples, raisins, grapes , canteloupe , honey dew, mangoes , watermelon  and fruit medleys    ---------------------------------------------------------------------------------------------------------------    stay  on  Lisinopril     Stay   On atorvostatin 20 mg at night     Resume   lexapro 10 mg at night         -----------------------------------------------------------------------------------------------------------------      Gets the pump and [employer] supplies   - [employer]        humalog to 4 vials in the pump   ( nearly 100 units a day )      Back up regimen       Check blood sugars immediately before each meal and at bedtime      Take  lantus  insulin  40   units  at bed time    Take humalog  Insulin at carb to insulin ratio of 6 gm : 1 unit       Also, add additional humalog  as follows with meals  If blood sugars are[de-identified]    150-200 mg 1 units    201-250 mg 3 units    251-300 mg 5 units    301-350 mg 7 units    351-400 mg 9 units    401-450 mg 11 units    451-500 mg 13 units     Less than 70 mg NO INSULIN        -------------PAY ATTENTION TO THESE GENERAL INSTRUCTIONS -----------------      - The medications prescribed at this visit will not be available at pharmacy until 6 pm       - YOUR MED LIST IS NOT UP TO DATE AS SOME CHANGES ARE BEING MADE AFTER THE VISIT - FOLLOW SPECIFIC INSTRUCTIONS  ABOVE     -ANY tests other than blood work, which you opt to do  outside the  [facility] imaging facilities, you are responsible for prior authorizations if required    - HEALTH MAINTENANCE IS NOT GOING TO BE UP TO DATE ON YOUR AVS- PLEASE IGNORE     Results     *Normal results will not be notified by a phone call starting January 1 2021   *If you have an upcoming visit, the results will be discussed at the visit   *Please sign up for MY CHART if you want access to your lab and test results  *Abnormal results which require immediate attention will be notified by phone call   *Abnormal results which do not require immediate assistance will be notified in 1-2 weeks       Refills    -    have your pharmacy send us a refill request . Refills are done max for one year and a visit is a must before refills are extended    Follow up appointments -  highly encourage you to make it when you are checking out. We can accommodate you into the schedule based on your clinical situation, but not for extending refills beyond a year. Labs are important to give refills and is important to get labs before the visit     Phone calls  -  Allow  24 hrs.  for non-urgent calls to be returned  Prior authorization - It may take 2-4 weeks to process  Forms  -  FMLA, DMV etc., will take up to 2 weeks to process  Cancellations - please notify the office 2 days in advance   Samples  - will only be dispensed at visits       If not showing for the appointments and cancelling appointments within 24 hours are kept track of and three  of such situations in  two consecutive years will likely be considered for termination from the practice    -------------------------------------------------------------------------------------------------------------------

## 2023-04-19 RX ORDER — GABAPENTIN 300 MG/1
CAPSULE ORAL
Qty: 180 CAPSULE | Refills: 5 | Status: SHIPPED | OUTPATIENT
Start: 2023-04-19

## 2023-04-21 ENCOUNTER — ANESTHESIA (OUTPATIENT)
Dept: SURGERY | Age: 53
End: 2023-04-21
Payer: COMMERCIAL

## 2023-04-21 ENCOUNTER — ANESTHESIA EVENT (OUTPATIENT)
Dept: SURGERY | Age: 53
End: 2023-04-21
Payer: COMMERCIAL

## 2023-04-21 ENCOUNTER — HOSPITAL ENCOUNTER (OUTPATIENT)
Age: 53
Setting detail: OUTPATIENT SURGERY
Discharge: HOME | End: 2023-04-21
Attending: PODIATRIST | Admitting: INTERNAL MEDICINE
Payer: COMMERCIAL

## 2023-04-21 VITALS
RESPIRATION RATE: 16 BRPM | SYSTOLIC BLOOD PRESSURE: 158 MMHG | OXYGEN SATURATION: 98 % | HEIGHT: 76 IN | DIASTOLIC BLOOD PRESSURE: 99 MMHG | TEMPERATURE: 97.5 F | HEART RATE: 104 BPM | WEIGHT: 240 LBS | BODY MASS INDEX: 29.22 KG/M2

## 2023-04-21 DIAGNOSIS — M19.072 PRIMARY OSTEOARTHRITIS OF LEFT ANKLE: ICD-10-CM

## 2023-04-21 DIAGNOSIS — Z98.890 STATUS POST FOOT SURGERY: Primary | ICD-10-CM

## 2023-04-21 DIAGNOSIS — M95.8 OSTEOCHONDRAL DEFECT OF TALUS: ICD-10-CM

## 2023-04-21 LAB
GLUCOSE BLD STRIP.AUTO-MCNC: 258 MG/DL (ref 65–100)
GLUCOSE BLD STRIP.AUTO-MCNC: 89 MG/DL (ref 65–100)
PERFORMED BY, TECHID: ABNORMAL
PERFORMED BY, TECHID: NORMAL

## 2023-04-21 PROCEDURE — 77030034478 HC TU IRR ARTHRO PT ARTH -B: Performed by: PODIATRIST

## 2023-04-21 PROCEDURE — 77030000032 HC CUF TRNQT ZIMM -B: Performed by: PODIATRIST

## 2023-04-21 PROCEDURE — 74011250636 HC RX REV CODE- 250/636: Performed by: PODIATRIST

## 2023-04-21 PROCEDURE — 29891 ARTHR ANK OSTCHN DF TAL&/TIB: CPT | Performed by: PODIATRIST

## 2023-04-21 PROCEDURE — 2709999900 HC NON-CHARGEABLE SUPPLY: Performed by: PODIATRIST

## 2023-04-21 PROCEDURE — 77030022877 HC TU IRR ARTHRO PMP ARTH -B: Performed by: PODIATRIST

## 2023-04-21 PROCEDURE — 77030038966 HC KT PLATELET BLD PRP ARTH -D: Performed by: PODIATRIST

## 2023-04-21 PROCEDURE — 77030041523 HC SEALNT FIBRN VITASEAL J&J -E: Performed by: PODIATRIST

## 2023-04-21 PROCEDURE — 76010000153 HC OR TIME 1.5 TO 2 HR: Performed by: PODIATRIST

## 2023-04-21 PROCEDURE — C1762 CONN TISS, HUMAN(INC FASCIA): HCPCS | Performed by: PODIATRIST

## 2023-04-21 PROCEDURE — 74011000250 HC RX REV CODE- 250: Performed by: PODIATRIST

## 2023-04-21 PROCEDURE — 77030040361 HC SLV COMPR DVT MDII -B: Performed by: PODIATRIST

## 2023-04-21 PROCEDURE — 76210000063 HC OR PH I REC FIRST 0.5 HR: Performed by: PODIATRIST

## 2023-04-21 PROCEDURE — 76060000034 HC ANESTHESIA 1.5 TO 2 HR: Performed by: PODIATRIST

## 2023-04-21 PROCEDURE — 82962 GLUCOSE BLOOD TEST: CPT

## 2023-04-21 PROCEDURE — 77030033667 HC DEL SYS BN GRFT ARTH -C: Performed by: PODIATRIST

## 2023-04-21 PROCEDURE — 77030031140 HC SUT VCRL3 J&J -A: Performed by: PODIATRIST

## 2023-04-21 PROCEDURE — 77030038066 HC BLD SHVR ARTH -B: Performed by: PODIATRIST

## 2023-04-21 PROCEDURE — 74011000250 HC RX REV CODE- 250: Performed by: NURSE ANESTHETIST, CERTIFIED REGISTERED

## 2023-04-21 PROCEDURE — 74011250636 HC RX REV CODE- 250/636: Performed by: ANESTHESIOLOGY

## 2023-04-21 PROCEDURE — 74011250636 HC RX REV CODE- 250/636: Performed by: NURSE ANESTHETIST, CERTIFIED REGISTERED

## 2023-04-21 PROCEDURE — 77030031139 HC SUT VCRL2 J&J -A: Performed by: PODIATRIST

## 2023-04-21 PROCEDURE — 76210000021 HC REC RM PH II 0.5 TO 1 HR: Performed by: PODIATRIST

## 2023-04-21 DEVICE — GRAFT HUM TISS 1CC CART EXTRACELLULAR MTRX INJ BIOCART: Type: IMPLANTABLE DEVICE | Site: ANKLE | Status: FUNCTIONAL

## 2023-04-21 RX ORDER — METOPROLOL TARTRATE 5 MG/5ML
2.5 INJECTION INTRAVENOUS
Status: CANCELLED | OUTPATIENT
Start: 2023-04-21

## 2023-04-21 RX ORDER — HYDRALAZINE HYDROCHLORIDE 20 MG/ML
10 INJECTION INTRAMUSCULAR; INTRAVENOUS
Status: CANCELLED | OUTPATIENT
Start: 2023-04-21

## 2023-04-21 RX ORDER — DIPHENHYDRAMINE HYDROCHLORIDE 50 MG/ML
12.5 INJECTION, SOLUTION INTRAMUSCULAR; INTRAVENOUS AS NEEDED
Status: CANCELLED | OUTPATIENT
Start: 2023-04-21 | End: 2023-04-21

## 2023-04-21 RX ORDER — EPHEDRINE SULFATE/0.9% NACL/PF 50 MG/5 ML
5 SYRINGE (ML) INTRAVENOUS AS NEEDED
Status: CANCELLED | OUTPATIENT
Start: 2023-04-21

## 2023-04-21 RX ORDER — HYDROMORPHONE HYDROCHLORIDE 2 MG/1
1 TABLET ORAL
Status: CANCELLED | OUTPATIENT
Start: 2023-04-21 | End: 2023-04-22

## 2023-04-21 RX ORDER — LIDOCAINE HYDROCHLORIDE 10 MG/ML
INJECTION INFILTRATION; PERINEURAL AS NEEDED
Status: DISCONTINUED | OUTPATIENT
Start: 2023-04-21 | End: 2023-04-21 | Stop reason: HOSPADM

## 2023-04-21 RX ORDER — CEFAZOLIN SODIUM 1 G/3ML
INJECTION, POWDER, FOR SOLUTION INTRAMUSCULAR; INTRAVENOUS
Status: DISCONTINUED
Start: 2023-04-21 | End: 2023-04-21 | Stop reason: HOSPADM

## 2023-04-21 RX ORDER — MORPHINE SULFATE 15 MG/1
15 TABLET ORAL
Status: CANCELLED | OUTPATIENT
Start: 2023-04-21 | End: 2023-04-22

## 2023-04-21 RX ORDER — ONDANSETRON 2 MG/ML
4 INJECTION INTRAMUSCULAR; INTRAVENOUS AS NEEDED
Status: CANCELLED | OUTPATIENT
Start: 2023-04-21

## 2023-04-21 RX ORDER — PROPOFOL 10 MG/ML
INJECTION, EMULSION INTRAVENOUS
Status: DISCONTINUED | OUTPATIENT
Start: 2023-04-21 | End: 2023-04-21 | Stop reason: HOSPADM

## 2023-04-21 RX ORDER — HYDROMORPHONE HYDROCHLORIDE 1 MG/ML
0.5 INJECTION, SOLUTION INTRAMUSCULAR; INTRAVENOUS; SUBCUTANEOUS
Status: CANCELLED | OUTPATIENT
Start: 2023-04-21

## 2023-04-21 RX ORDER — MIDAZOLAM HYDROCHLORIDE 1 MG/ML
1 INJECTION, SOLUTION INTRAMUSCULAR; INTRAVENOUS AS NEEDED
Status: DISCONTINUED | OUTPATIENT
Start: 2023-04-21 | End: 2023-04-21 | Stop reason: HOSPADM

## 2023-04-21 RX ORDER — FENTANYL CITRATE 50 UG/ML
50 INJECTION, SOLUTION INTRAMUSCULAR; INTRAVENOUS
Status: CANCELLED | OUTPATIENT
Start: 2023-04-21

## 2023-04-21 RX ORDER — POVIDONE-IODINE 10 MG/G
OINTMENT TOPICAL AS NEEDED
Status: DISCONTINUED | OUTPATIENT
Start: 2023-04-21 | End: 2023-04-21 | Stop reason: HOSPADM

## 2023-04-21 RX ORDER — ROPIVACAINE HYDROCHLORIDE 5 MG/ML
INJECTION, SOLUTION EPIDURAL; INFILTRATION; PERINEURAL
Status: COMPLETED
Start: 2023-04-21 | End: 2023-04-21

## 2023-04-21 RX ORDER — SODIUM CHLORIDE 0.9 % (FLUSH) 0.9 %
5-40 SYRINGE (ML) INJECTION AS NEEDED
Status: CANCELLED | OUTPATIENT
Start: 2023-04-21

## 2023-04-21 RX ORDER — MIDAZOLAM HYDROCHLORIDE 1 MG/ML
0.5 INJECTION, SOLUTION INTRAMUSCULAR; INTRAVENOUS
Status: CANCELLED | OUTPATIENT
Start: 2023-04-21

## 2023-04-21 RX ORDER — HYDROCODONE BITARTRATE AND ACETAMINOPHEN 5; 325 MG/1; MG/1
1 TABLET ORAL
Status: CANCELLED | OUTPATIENT
Start: 2023-04-21 | End: 2023-04-22

## 2023-04-21 RX ORDER — ROPIVACAINE HYDROCHLORIDE 5 MG/ML
INJECTION, SOLUTION EPIDURAL; INFILTRATION; PERINEURAL
Status: COMPLETED | OUTPATIENT
Start: 2023-04-21 | End: 2023-04-21

## 2023-04-21 RX ORDER — OXYCODONE HYDROCHLORIDE 5 MG/1
5 TABLET ORAL
Status: CANCELLED | OUTPATIENT
Start: 2023-04-21 | End: 2023-04-22

## 2023-04-21 RX ORDER — SODIUM CHLORIDE 0.9 % (FLUSH) 0.9 %
5-40 SYRINGE (ML) INJECTION AS NEEDED
Status: DISCONTINUED | OUTPATIENT
Start: 2023-04-21 | End: 2023-04-21 | Stop reason: HOSPADM

## 2023-04-21 RX ORDER — SODIUM CHLORIDE 0.9 % (FLUSH) 0.9 %
5-40 SYRINGE (ML) INJECTION EVERY 8 HOURS
Status: DISCONTINUED | OUTPATIENT
Start: 2023-04-21 | End: 2023-04-21 | Stop reason: HOSPADM

## 2023-04-21 RX ORDER — MORPHINE SULFATE 2 MG/ML
2 INJECTION, SOLUTION INTRAMUSCULAR; INTRAVENOUS
Status: CANCELLED | OUTPATIENT
Start: 2023-04-21

## 2023-04-21 RX ORDER — OXYCODONE AND ACETAMINOPHEN 5; 325 MG/1; MG/1
1 TABLET ORAL
Status: CANCELLED | OUTPATIENT
Start: 2023-04-21 | End: 2023-04-22

## 2023-04-21 RX ORDER — ONDANSETRON 2 MG/ML
INJECTION INTRAMUSCULAR; INTRAVENOUS AS NEEDED
Status: DISCONTINUED | OUTPATIENT
Start: 2023-04-21 | End: 2023-04-21 | Stop reason: HOSPADM

## 2023-04-21 RX ORDER — DEXAMETHASONE SODIUM PHOSPHATE 10 MG/ML
INJECTION INTRAMUSCULAR; INTRAVENOUS
Status: DISCONTINUED
Start: 2023-04-21 | End: 2023-04-21 | Stop reason: HOSPADM

## 2023-04-21 RX ORDER — MIDAZOLAM HYDROCHLORIDE 1 MG/ML
INJECTION, SOLUTION INTRAMUSCULAR; INTRAVENOUS AS NEEDED
Status: DISCONTINUED | OUTPATIENT
Start: 2023-04-21 | End: 2023-04-21 | Stop reason: HOSPADM

## 2023-04-21 RX ORDER — LIDOCAINE HYDROCHLORIDE 20 MG/ML
INJECTION, SOLUTION EPIDURAL; INFILTRATION; INTRACAUDAL; PERINEURAL AS NEEDED
Status: DISCONTINUED | OUTPATIENT
Start: 2023-04-21 | End: 2023-04-21 | Stop reason: HOSPADM

## 2023-04-21 RX ORDER — FENTANYL CITRATE 50 UG/ML
50 INJECTION, SOLUTION INTRAMUSCULAR; INTRAVENOUS AS NEEDED
Status: DISCONTINUED | OUTPATIENT
Start: 2023-04-21 | End: 2023-04-21 | Stop reason: HOSPADM

## 2023-04-21 RX ORDER — OXYCODONE AND ACETAMINOPHEN 5; 325 MG/1; MG/1
1 TABLET ORAL AS NEEDED
Status: CANCELLED | OUTPATIENT
Start: 2023-04-21

## 2023-04-21 RX ORDER — LIDOCAINE HYDROCHLORIDE 10 MG/ML
0.1 INJECTION, SOLUTION EPIDURAL; INFILTRATION; INTRACAUDAL; PERINEURAL AS NEEDED
Status: DISCONTINUED | OUTPATIENT
Start: 2023-04-21 | End: 2023-04-21 | Stop reason: HOSPADM

## 2023-04-21 RX ORDER — LIDOCAINE HYDROCHLORIDE 10 MG/ML
INJECTION, SOLUTION EPIDURAL; INFILTRATION; INTRACAUDAL; PERINEURAL
Status: DISCONTINUED
Start: 2023-04-21 | End: 2023-04-21 | Stop reason: HOSPADM

## 2023-04-21 RX ORDER — DEXAMETHASONE SODIUM PHOSPHATE 4 MG/ML
INJECTION, SOLUTION INTRA-ARTICULAR; INTRALESIONAL; INTRAMUSCULAR; INTRAVENOUS; SOFT TISSUE
Status: COMPLETED | OUTPATIENT
Start: 2023-04-21 | End: 2023-04-21

## 2023-04-21 RX ORDER — SODIUM CHLORIDE 0.9 % (FLUSH) 0.9 %
5-40 SYRINGE (ML) INJECTION EVERY 8 HOURS
Status: CANCELLED | OUTPATIENT
Start: 2023-04-21

## 2023-04-21 RX ORDER — SODIUM CHLORIDE, SODIUM LACTATE, POTASSIUM CHLORIDE, CALCIUM CHLORIDE 600; 310; 30; 20 MG/100ML; MG/100ML; MG/100ML; MG/100ML
20 INJECTION, SOLUTION INTRAVENOUS CONTINUOUS
Status: DISCONTINUED | OUTPATIENT
Start: 2023-04-21 | End: 2023-04-21 | Stop reason: HOSPADM

## 2023-04-21 RX ORDER — SODIUM CHLORIDE, SODIUM LACTATE, POTASSIUM CHLORIDE, CALCIUM CHLORIDE 600; 310; 30; 20 MG/100ML; MG/100ML; MG/100ML; MG/100ML
INJECTION, SOLUTION INTRAVENOUS
Status: DISCONTINUED | OUTPATIENT
Start: 2023-04-21 | End: 2023-04-21 | Stop reason: HOSPADM

## 2023-04-21 RX ORDER — LABETALOL HCL 20 MG/4 ML
5 SYRINGE (ML) INTRAVENOUS
Status: CANCELLED | OUTPATIENT
Start: 2023-04-21

## 2023-04-21 RX ORDER — OXYCODONE AND ACETAMINOPHEN 5; 325 MG/1; MG/1
1 TABLET ORAL
Qty: 12 TABLET | Refills: 0 | Status: SHIPPED | OUTPATIENT
Start: 2023-04-21 | End: 2023-04-24

## 2023-04-21 RX ADMIN — ONDANSETRON 4 MG: 2 INJECTION INTRAMUSCULAR; INTRAVENOUS at 10:02

## 2023-04-21 RX ADMIN — DEXAMETHASONE SODIUM PHOSPHATE 4 MG: 4 INJECTION, SOLUTION INTRA-ARTICULAR; INTRALESIONAL; INTRAMUSCULAR; INTRAVENOUS; SOFT TISSUE at 09:15

## 2023-04-21 RX ADMIN — PROPOFOL 75 MCG/KG/MIN: 10 INJECTION, EMULSION INTRAVENOUS at 10:02

## 2023-04-21 RX ADMIN — SODIUM CHLORIDE, POTASSIUM CHLORIDE, SODIUM LACTATE AND CALCIUM CHLORIDE 20 ML/HR: 600; 310; 30; 20 INJECTION, SOLUTION INTRAVENOUS at 08:41

## 2023-04-21 RX ADMIN — CEFAZOLIN SODIUM 2 G: 1 INJECTION, POWDER, FOR SOLUTION INTRAMUSCULAR; INTRAVENOUS at 10:12

## 2023-04-21 RX ADMIN — MIDAZOLAM HYDROCHLORIDE 2 MG: 2 INJECTION, SOLUTION INTRAMUSCULAR; INTRAVENOUS at 09:54

## 2023-04-21 RX ADMIN — SODIUM CHLORIDE, POTASSIUM CHLORIDE, SODIUM LACTATE AND CALCIUM CHLORIDE: 600; 310; 30; 20 INJECTION, SOLUTION INTRAVENOUS at 09:53

## 2023-04-21 RX ADMIN — LIDOCAINE HYDROCHLORIDE 40 MG: 20 INJECTION, SOLUTION EPIDURAL; INFILTRATION; INTRACAUDAL; PERINEURAL at 10:02

## 2023-04-21 RX ADMIN — ROPIVACAINE HYDROCHLORIDE 30 ML: 5 INJECTION, SOLUTION EPIDURAL; INFILTRATION; PERINEURAL at 09:15

## 2023-04-21 NOTE — PERIOP NOTES
Patient alert and oriented x 4 with respirations even and unlabored on RA. Patient discharged home per physician's order. Patient and patient's mother verbalize understanding of patient's discharge instructions including no weight bearing to left leg/foot per Dr. Marielena Willard order. Patient provided set of crutches per physician's order and states he knows how to ambulate with crutches and he has used crutches in the past. Patient transported via wheelchair to front hospital entrance with patient's mother present to transport patient via private vehicle.
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
## 2023-04-21 NOTE — PROGRESS NOTES
1207 report given to joana in same day. Upon leaving pacu noticed patient as insulin monitor attached. Asked to hook to his insulin box and monitor to see what sugar level is as opposed to being stuck. Patient stable. Left leg numb from knee and below from popliteal block prior to surgery. Family updated and at bedside in same day.
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
## 2023-04-21 NOTE — ANESTHESIA PROCEDURE NOTES
Peripheral Block    Start time: 4/21/2023 9:00 AM  End time: 4/21/2023 9:15 AM  Performed by: Shahrzad Estrella MD  Authorized by: Shahrzad Estrella MD       Pre-procedure:    Indications: at surgeon's request, post-op pain management and procedure for pain    Preanesthetic Checklist: patient identified, risks and benefits discussed, site marked, timeout performed, anesthesia consent given, patient being monitored and fire risk safety assessment completed and verbalized    Timeout Time: 09:05 EDT      Block Type:   Block Type:  Saphenous and popliteal  Laterality:  Left  Monitoring:  Standard ASA monitoring, responsive to questions, frequent vital sign checks, heart rate, continuous pulse ox and oxygen  Injection Technique:  Single shot  Procedures: ultrasound guided    Patient Position: supine  Prep: chlorhexidine    Needle Type:  Pajunk  Needle Gauge:  20 G  Needle Localization:  Anatomical landmarks and ultrasound guidance  Medication Injected:  Dexamethasone (DECADRON) 4 mg/mL injection - Peripheral Nerve Block   4 mg - 4/21/2023 9:15:00 AM  ropivacaine (PF) (NAROPIN)(0.5%) 5 mg/mL injection - Peripheral Nerve Block   30 mL - 4/21/2023 9:15:00 AM  Med Admin Time: 4/21/2023 9:15 AM    Assessment:  Number of attempts:  1  Injection Assessment:  Incremental injection every 5 mL, negative aspiration for CSF, no paresthesia, no intravascular symptoms, negative aspiration for blood and local visualized surrounding nerve on ultrasound  Patient tolerance:  Patient tolerated the procedure well with no immediate complications

## 2023-04-21 NOTE — OP NOTES
[facility]       Operative Report    Patient: Natan Milian MRN: [id number]  SSN: [id number]    YOB: 1970  Age: 50 y.o. Sex: male       Date of Surgery:   2023     Preoperative Diagnosis:   Osteochondral lesion of talar dome left ankle [M89.9, M94.9]     Postoperative Diagnosis:   Osteochondral lesion of talar dome left ankle [M89.9, M94.9]     Surgeon(s) and Role:     Di Lawton DPM - Primary    Assistant:  Omi July    Anesthesia:   MAC with popliteal block     Procedure:   ANKLE ARTHROSCOPY WITH REPAIR OF OSTEOCHONDRAL LESION TO THE TALOR DOME LEFT ANKLE    Procedure in Detail:   Patient was seen in the pre-operative holding area and all questions were answered and all concerns were addressed. The operative procedure was discussed in great detail, with all possible complications highlighted. Patient verbalized complete understanding and the consent was signed and witnessed. The operative limb was marked and the pt was transported to the operating room. The patient was transferred to the operating table and anesthesia was administered as indicated above. The lower extremity was scrubbed and draped in sterile fashion. A time out was performed to confirm the correct patient, correct procedure, correct limb, abx, allergies, fire risk and attendees within the operating room. Upon completion, procedure #1 commenced. Patient tolerated the above procedures well and all post operative counts were correct. Patient was transferred to PACU without incident. A thorough neurovascular check was then performed. Upon meeting transfer criteria, patient was transferred back to the medical floor.      Estimated Blood Loss:    Less then 20cc    Tourniquet Time:   * Missing tourniquet times found for documented tourniquets in lo *  Total Tourniquet Time Documented:  Thigh (Left) - 77 minutes  Total: Thigh (Left) - 77 minutes     Implants:   Implant Name Type Inv. Item Serial No.  Lot No. LRB No. Used Action   GRAFT HUM TISS 1CC CART EXTRACELLULAR MTRX INJ [employer] - [id number]  GRAFT HUM TISS 1CC CART EXTRACELLULAR MTRX INJ [facility] [id number] dali Parada [facility]_ N/A Left 1 Implanted      Specimens:   None    Drains:   None                Complications:   None    Counts:   Sponge and needle counts were correct times two.     Signed By:  Mirela Hussein DPM     April 21, 2023

## 2023-04-21 NOTE — DISCHARGE INSTRUCTIONS
Keep dressing clean dry and intact until post-op follow-up appointment    Non-weightbearing left leg/foot

## 2023-04-21 NOTE — PERIOP NOTES
Dr. Nitesh Siddiqui did a peripheral block in the patient's Left Leg. The patient was identified correctly, the site was marked, the procedure was explained, surgical consent was signed, the patient was remained monitored throughout the anesthesia act, and all vital signs were recorded in the electronic system. Procedure was performed without immediate complications.   Time out: 0900  Start time in: 0902  End time: 0905    Time out: 0905  Start time in: 0906  End time: Bess Keene RN
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
## 2023-04-21 NOTE — ANESTHESIA PROCEDURE NOTES
Peripheral Block    Start time: 4/21/2023 9:00 AM  End time: 4/21/2023 9:15 AM  Performed by: Renny Tripathi MD  Authorized by: Renny Tripathi MD       Pre-procedure:    Indications: at surgeon's request, post-op pain management and procedure for pain    Preanesthetic Checklist: patient identified, risks and benefits discussed, site marked, timeout performed, anesthesia consent given, patient being monitored and fire risk safety assessment completed and verbalized    Timeout Time: 09:05 EDT      Block Type:   Block Type:  Saphenous and popliteal  Laterality:  Left  Monitoring:  Standard ASA monitoring, responsive to questions, frequent vital sign checks, heart rate, continuous pulse ox and oxygen  Injection Technique:  Single shot  Procedures: ultrasound guided    Patient Position: supine  Prep: chlorhexidine    Needle Type:  Pajunk  Needle Gauge:  20 G  Needle Localization:  Anatomical landmarks and ultrasound guidance  Medication Injected:  Dexamethasone (DECADRON) 4 mg/mL injection - Peripheral Nerve Block   4 mg - 4/21/2023 9:15:00 AM  ropivacaine (PF) (NAROPIN)(0.5%) 5 mg/mL injection - Peripheral Nerve Block   30 mL - 4/21/2023 9:15:00 AM  Med Admin Time: 4/21/2023 9:15 AM    Assessment:  Number of attempts:  1  Injection Assessment:  Incremental injection every 5 mL, negative aspiration for CSF, no paresthesia, no intravascular symptoms, negative aspiration for blood and local visualized surrounding nerve on ultrasound  Patient tolerance:  Patient tolerated the procedure well with no immediate complications Denies

## 2023-05-01 ENCOUNTER — OFFICE VISIT (OUTPATIENT)
Dept: PODIATRY | Age: 53
End: 2023-05-01

## 2023-05-01 DIAGNOSIS — M89.9 OSTEOCHONDRAL LESION OF TALAR DOME: Primary | ICD-10-CM

## 2023-05-01 DIAGNOSIS — M94.9 OSTEOCHONDRAL LESION OF TALAR DOME: Primary | ICD-10-CM

## 2023-05-03 ENCOUNTER — OFFICE VISIT (OUTPATIENT)
Dept: PODIATRY | Age: 53
End: 2023-05-03
Payer: COMMERCIAL

## 2023-05-03 VITALS
HEIGHT: 76 IN | SYSTOLIC BLOOD PRESSURE: 159 MMHG | TEMPERATURE: 96.7 F | HEART RATE: 96 BPM | BODY MASS INDEX: 28.97 KG/M2 | DIASTOLIC BLOOD PRESSURE: 92 MMHG

## 2023-05-03 DIAGNOSIS — Z98.890 STATUS POST SURGERY: Primary | ICD-10-CM

## 2023-05-03 PROCEDURE — 99024 POSTOP FOLLOW-UP VISIT: CPT | Performed by: PODIATRIST

## 2023-05-12 ENCOUNTER — TELEPHONE (OUTPATIENT)
Age: 53
End: 2023-05-12

## 2023-05-15 DIAGNOSIS — Z98.890 POST-OPERATIVE STATE: Primary | ICD-10-CM

## 2023-05-22 ENCOUNTER — OFFICE VISIT (OUTPATIENT)
Age: 53
End: 2023-05-22
Payer: COMMERCIAL

## 2023-05-22 ENCOUNTER — HOSPITAL ENCOUNTER (OUTPATIENT)
Facility: HOSPITAL | Age: 53
Discharge: HOME | End: 2023-05-25
Payer: COMMERCIAL

## 2023-05-22 VITALS
HEART RATE: 100 BPM | SYSTOLIC BLOOD PRESSURE: 125 MMHG | DIASTOLIC BLOOD PRESSURE: 89 MMHG | HEIGHT: 76 IN | RESPIRATION RATE: 16 BRPM | BODY MASS INDEX: 28.98 KG/M2 | WEIGHT: 238 LBS

## 2023-05-22 DIAGNOSIS — M79.672 LEFT FOOT PAIN: ICD-10-CM

## 2023-05-22 DIAGNOSIS — M79.672 LEFT FOOT PAIN: Primary | ICD-10-CM

## 2023-05-22 PROCEDURE — 3078F DIAST BP <80 MM HG: CPT | Performed by: PODIATRIST

## 2023-05-22 PROCEDURE — 3074F SYST BP LT 130 MM HG: CPT | Performed by: PODIATRIST

## 2023-05-22 PROCEDURE — 73630 X-RAY EXAM OF FOOT: CPT

## 2023-05-22 PROCEDURE — 99213 OFFICE O/P EST LOW 20 MIN: CPT | Performed by: PODIATRIST

## 2023-06-01 ENCOUNTER — OFFICE VISIT (OUTPATIENT)
Age: 53
End: 2023-06-01
Payer: COMMERCIAL

## 2023-06-01 VITALS
TEMPERATURE: 97.8 F | BODY MASS INDEX: 28.98 KG/M2 | WEIGHT: 238 LBS | HEIGHT: 76 IN | SYSTOLIC BLOOD PRESSURE: 165 MMHG | HEART RATE: 111 BPM | DIASTOLIC BLOOD PRESSURE: 100 MMHG

## 2023-06-01 DIAGNOSIS — E11.621 ULCER OF LEFT FIFTH TOE DUE TO DIABETES MELLITUS (HCC): ICD-10-CM

## 2023-06-01 DIAGNOSIS — Z98.890 STATUS POST SURGICAL MANIPULATION OF ANKLE JOINT: Primary | ICD-10-CM

## 2023-06-01 DIAGNOSIS — L97.529 ULCER OF LEFT FIFTH TOE DUE TO DIABETES MELLITUS (HCC): ICD-10-CM

## 2023-06-01 DIAGNOSIS — S92.352K: ICD-10-CM

## 2023-06-01 PROCEDURE — 3079F DIAST BP 80-89 MM HG: CPT | Performed by: PODIATRIST

## 2023-06-01 PROCEDURE — 99213 OFFICE O/P EST LOW 20 MIN: CPT | Performed by: PODIATRIST

## 2023-06-01 PROCEDURE — 73630 X-RAY EXAM OF FOOT: CPT | Performed by: PODIATRIST

## 2023-06-01 PROCEDURE — 3075F SYST BP GE 130 - 139MM HG: CPT | Performed by: PODIATRIST

## 2023-06-01 RX ORDER — OXYCODONE HYDROCHLORIDE AND ACETAMINOPHEN 5; 325 MG/1; MG/1
1 TABLET ORAL EVERY 6 HOURS PRN
Qty: 12 TABLET | Refills: 0 | Status: SHIPPED | OUTPATIENT
Start: 2023-06-01 | End: 2023-06-02

## 2023-06-02 RX ORDER — OXYCODONE HYDROCHLORIDE AND ACETAMINOPHEN 5; 325 MG/1; MG/1
1 TABLET ORAL EVERY 6 HOURS PRN
Qty: 12 TABLET | Refills: 0 | Status: SHIPPED | OUTPATIENT
Start: 2023-06-02 | End: 2023-06-05

## 2023-06-07 ENCOUNTER — OFFICE VISIT (OUTPATIENT)
Age: 53
End: 2023-06-07
Payer: COMMERCIAL

## 2023-06-07 VITALS
RESPIRATION RATE: 16 BRPM | DIASTOLIC BLOOD PRESSURE: 89 MMHG | SYSTOLIC BLOOD PRESSURE: 135 MMHG | BODY MASS INDEX: 28.98 KG/M2 | WEIGHT: 238 LBS | HEIGHT: 76 IN | HEART RATE: 80 BPM

## 2023-06-07 DIAGNOSIS — L97.512 DIABETIC ULCER OF TOE OF RIGHT FOOT ASSOCIATED WITH TYPE 2 DIABETES MELLITUS, WITH FAT LAYER EXPOSED (HCC): Primary | ICD-10-CM

## 2023-06-07 DIAGNOSIS — S92.352K: ICD-10-CM

## 2023-06-07 DIAGNOSIS — E11.621 DIABETIC ULCER OF TOE OF RIGHT FOOT ASSOCIATED WITH TYPE 2 DIABETES MELLITUS, WITH FAT LAYER EXPOSED (HCC): Primary | ICD-10-CM

## 2023-06-07 PROCEDURE — 3079F DIAST BP 80-89 MM HG: CPT | Performed by: PODIATRIST

## 2023-06-07 PROCEDURE — 3077F SYST BP >= 140 MM HG: CPT | Performed by: PODIATRIST

## 2023-06-07 PROCEDURE — 99213 OFFICE O/P EST LOW 20 MIN: CPT | Performed by: PODIATRIST

## 2023-06-07 ASSESSMENT — ENCOUNTER SYMPTOMS
VOMITING: 0
DIARRHEA: 0
ABDOMINAL PAIN: 0
SHORTNESS OF BREATH: 0

## 2023-06-12 ENCOUNTER — OFFICE VISIT (OUTPATIENT)
Age: 53
End: 2023-06-12
Payer: COMMERCIAL

## 2023-06-12 VITALS
DIASTOLIC BLOOD PRESSURE: 80 MMHG | BODY MASS INDEX: 28.98 KG/M2 | WEIGHT: 238 LBS | HEIGHT: 76 IN | SYSTOLIC BLOOD PRESSURE: 123 MMHG | RESPIRATION RATE: 16 BRPM | HEART RATE: 65 BPM

## 2023-06-12 DIAGNOSIS — L08.9 DIABETIC FOOT INFECTION (HCC): ICD-10-CM

## 2023-06-12 DIAGNOSIS — E11.628 DIABETIC FOOT INFECTION (HCC): ICD-10-CM

## 2023-06-12 DIAGNOSIS — E11.40 TYPE 2 DIABETES MELLITUS WITH DIABETIC NEUROPATHY, UNSPECIFIED WHETHER LONG TERM INSULIN USE (HCC): Primary | ICD-10-CM

## 2023-06-12 PROCEDURE — 3074F SYST BP LT 130 MM HG: CPT | Performed by: PODIATRIST

## 2023-06-12 PROCEDURE — 3051F HG A1C>EQUAL 7.0%<8.0%: CPT | Performed by: PODIATRIST

## 2023-06-12 PROCEDURE — 99213 OFFICE O/P EST LOW 20 MIN: CPT | Performed by: PODIATRIST

## 2023-06-12 PROCEDURE — 3078F DIAST BP <80 MM HG: CPT | Performed by: PODIATRIST

## 2023-06-15 PROBLEM — E11.621 DIABETIC ULCER OF TOE OF RIGHT FOOT ASSOCIATED WITH TYPE 2 DIABETES MELLITUS, WITH FAT LAYER EXPOSED (HCC): Status: ACTIVE | Noted: 2023-06-15

## 2023-06-15 PROBLEM — L97.512 DIABETIC ULCER OF TOE OF RIGHT FOOT ASSOCIATED WITH TYPE 2 DIABETES MELLITUS, WITH FAT LAYER EXPOSED (HCC): Status: ACTIVE | Noted: 2023-06-15

## 2023-06-22 ENCOUNTER — HOSPITAL ENCOUNTER (OUTPATIENT)
Facility: HOSPITAL | Age: 53
Discharge: HOME | End: 2023-06-22
Payer: COMMERCIAL

## 2023-06-22 VITALS
SYSTOLIC BLOOD PRESSURE: 168 MMHG | DIASTOLIC BLOOD PRESSURE: 87 MMHG | TEMPERATURE: 98.2 F | RESPIRATION RATE: 18 BRPM | HEART RATE: 105 BPM

## 2023-06-22 PROBLEM — L97.515 DIABETIC ULCER OF TOE OF RIGHT FOOT ASSOCIATED WITH TYPE 2 DIABETES MELLITUS, WITH MUSCLE INVOLVEMENT WITHOUT EVIDENCE OF NECROSIS (HCC): Status: ACTIVE | Noted: 2023-06-15

## 2023-06-22 PROCEDURE — 11042 DBRDMT SUBQ TIS 1ST 20SQCM/<: CPT

## 2023-06-22 PROCEDURE — 11042 DBRDMT SUBQ TIS 1ST 20SQCM/<: CPT | Performed by: PODIATRIST

## 2023-06-22 RX ORDER — LIDOCAINE HYDROCHLORIDE 20 MG/ML
15 SOLUTION OROPHARYNGEAL PRN
Status: DISCONTINUED | OUTPATIENT
Start: 2023-06-22 | End: 2023-06-23 | Stop reason: HOSPADM

## 2023-06-22 ASSESSMENT — PAIN SCALES - GENERAL: PAINLEVEL_OUTOF10: 4

## 2023-06-22 NOTE — DISCHARGE INSTRUCTIONS
Wound Clinic Physician Orders and Discharge Instructions  [address], [address] [facility], [address], [address] [facility]  Telephone: [phone]    NAME:  Nithin Rivers OF BIRTH:  1970  MEDICAL RECORD NUMBER:  [id number]  DATE:  6/22/2023      Return Appointment:  [] Dressing Supply Provider: Jhoana  [] Home Healthcare:  [x] Return Appointment: 1 Week(s)  [] Nurse Visit:     [] Discharge from [facility]: [] Healed        [] Refer to Provider:         [] Consult    Follow-up Information:  [] Ordered Tests:   [] Referrals:   [] Rx:   [] Culture:  [] Other:       Wound Cleansing:   Do not scrub or use excessive force. Cleanse wound prior to applying a clean dressing with:  [x] Normal Saline   [] Keep Wound Dry in Shower     [] Wound Cleanser   [x] Cleanse wound with Mild Soap & Water    [] Other:       Topical Treatments:  Do not apply lotions, creams, or ointments to wound bed unless directed. [] Apply moisturizing lotion to skin surrounding the wound prior to dressing change.  [] Apply antifungal ointment to skin surrounding the wound prior to dressing change.  [] Apply thin film of moisture barrier ointment to skin immediately around wound.   [] Apply Betadine to skin immediately around wound   [] Other:      Dressings:           Wound Location: Right 5th toe (both wounds)   [x] Apply Primary Dressing:       [] MediHoney Gel [] MediHoney Alginate  [] Calcium Alginate with Silver   [] Calcium Alginate without silver   [] Collagen with silver [] Collagen without Silver    [] Santyl with moist saline gauze     [] Hydrofera Blue (cut to size and moistened with normal saline)  [] Hydrofera Blue Ready (cut to size)      [] Normal Saline wet to dry  [] Betadine wet to dry    [] Hydrogel  [] Mepitel     [] Bactroban/Mupirocin   [] Iodoform Packing Strip [] Plain Packing Strip   [] Skin Sub:   [x] Other: Mupirocin ointment    [x] Cover and Secure with:     [x]

## 2023-06-22 NOTE — PROGRESS NOTES
[address] [facility]   Medical Staff Progress Note     Ruma Nichole  MEDICAL RECORD NUMBER:  [id number]  AGE: 50 y.o. GENDER: male  : 1970  EPISODE DATE:  2023    Chief complaint and reason for visit:     Chief Complaint   Patient presents with    Wound Check     Right foot      Patient presenting for follow up evaluation of wound(s) per chief complaint. Subjective: Patient is a 50 y.o.  male who is being seen for Diabetic toe ulcer right foot fifth toe. Patient has a hx of diabetes mellitus. Patient has a history of hallux amputation. Patient state he developed ulcer    Wound 06/15/23 Toe (Comment  which one) Right;Plantar #1 5th (Active)   Wound Image   23 1315   Wound Etiology Diabetic 23 1315   Dressing Status Dry; Intact; Clean 23 131   Wound Cleansed Cleansed with saline 23 1315   Dressing/Treatment Antibacterial ointment;Gauze dressing/dressing sponge 06/15/23 1343   Offloading for Diabetic Foot Ulcers Back offloading shoe 23 1320   Wound Length (cm) 1.2 cm 23 1315   Wound Width (cm) 1.1 cm 23 1315   Wound Depth (cm) 0.1 cm 23 1315   Wound Surface Area (cm^2) 1.32 cm^2 23 1315   Change in Wound Size % (l*w) 41.33 23 1315   Wound Volume (cm^3) 0.132 cm^3 23 1315   Wound Healing % 41 23 1315   Post-Procedure Length (cm) 1.2 cm 23 1328   Post-Procedure Width (cm) 1.1 cm 23 1328   Post-Procedure Depth (cm) 0.1 cm 23 1328   Post-Procedure Surface Area (cm^2) 1.32 cm^2 23 1328   Post-Procedure Volume (cm^3) 0.132 cm^3 23 1328   Wound Assessment Granulation tissue 23 1315   Drainage Amount Small 23 131   Drainage Description Serosanguinous 23 1315   Odor None 23 131   Adilene-wound Assessment Intact 23 1315   Margins Attached edges 23 1315   Wound Thickness Description not for Pressure Injury

## 2023-06-22 NOTE — WOUND CARE
Wound Clinic Physician Orders and Discharge Instructions  [address], 30 [facility], [address] [facility]  Telephone: [phone]    NAME:  David Min OF BIRTH:  1970  MEDICAL RECORD NUMBER:  [id number]  DATE:  6/22/2023      Return Appointment:  [] Dressing Supply Provider: Jhoana  [] Home Healthcare:  [x] Return Appointment: 1 Week(s)  [] Nurse Visit:     [] Discharge from [facility]: [] Healed        [] Refer to Provider:         [] Consult    Follow-up Information:  [] Ordered Tests:   [] Referrals:   [] Rx:   [] Culture:  [] Other:       Wound Cleansing:   Do not scrub or use excessive force. Cleanse wound prior to applying a clean dressing with:  [x] Normal Saline   [] Keep Wound Dry in Shower     [] Wound Cleanser   [x] Cleanse wound with Mild Soap & Water    [] Other:       Topical Treatments:  Do not apply lotions, creams, or ointments to wound bed unless directed. [] Apply moisturizing lotion to skin surrounding the wound prior to dressing change.  [] Apply antifungal ointment to skin surrounding the wound prior to dressing change.  [] Apply thin film of moisture barrier ointment to skin immediately around wound.   [] Apply Betadine to skin immediately around wound   [] Other:      Dressings:           Wound Location: Right 5th toe (both wounds)   [x] Apply Primary Dressing:       [] MediHoney Gel [] MediHoney Alginate  [] Calcium Alginate with Silver   [] Calcium Alginate without silver   [] Collagen with silver [] Collagen without Silver    [] Santyl with moist saline gauze     [] Hydrofera Blue (cut to size and moistened with normal saline)  [] Hydrofera Blue Ready (cut to size)      [] Normal Saline wet to dry  [] Betadine wet to dry    [] Hydrogel  [] Mepitel     [] Bactroban/Mupirocin   [] Iodoform Packing Strip [] Plain Packing Strip   [] Skin Sub:   [x] Other: Mupirocin ointment    [x] Cover and Secure with:     [x]

## 2023-06-29 ENCOUNTER — HOSPITAL ENCOUNTER (OUTPATIENT)
Facility: HOSPITAL | Age: 53
Discharge: HOME | End: 2023-06-29
Payer: COMMERCIAL

## 2023-06-29 VITALS
HEART RATE: 95 BPM | RESPIRATION RATE: 16 BRPM | TEMPERATURE: 97.8 F | DIASTOLIC BLOOD PRESSURE: 92 MMHG | SYSTOLIC BLOOD PRESSURE: 150 MMHG

## 2023-06-29 PROCEDURE — 11042 DBRDMT SUBQ TIS 1ST 20SQCM/<: CPT | Performed by: PODIATRIST

## 2023-06-29 PROCEDURE — 99213 OFFICE O/P EST LOW 20 MIN: CPT

## 2023-06-29 PROCEDURE — 11042 DBRDMT SUBQ TIS 1ST 20SQCM/<: CPT

## 2023-06-29 ASSESSMENT — PAIN SCALES - GENERAL: PAINLEVEL_OUTOF10: 4

## 2023-07-06 ENCOUNTER — HOSPITAL ENCOUNTER (OUTPATIENT)
Facility: HOSPITAL | Age: 53
Discharge: HOME | End: 2023-07-06
Payer: COMMERCIAL

## 2023-07-06 VITALS
HEART RATE: 87 BPM | SYSTOLIC BLOOD PRESSURE: 171 MMHG | RESPIRATION RATE: 18 BRPM | TEMPERATURE: 99 F | DIASTOLIC BLOOD PRESSURE: 89 MMHG

## 2023-07-06 PROCEDURE — 11042 DBRDMT SUBQ TIS 1ST 20SQCM/<: CPT | Performed by: PODIATRIST

## 2023-07-06 PROCEDURE — 11042 DBRDMT SUBQ TIS 1ST 20SQCM/<: CPT

## 2023-07-06 NOTE — WOUND CARE
Wound Clinic Physician Orders and Discharge Instructions  [address] [place] [address] [address]  Telephone: [phone]    NAME:  Lionel Wilson OF BIRTH:  1970  MEDICAL RECORD NUMBER:  [id number]  DATE:  7/6/2023      Return Appointment:  [] Dressing Supply Provider: Ancelmo Contreras  [] Home Healthcare:  [x] Return Appointment: 1 Week(s)  [] Nurse Visit:     [] Discharge from [facility]: [] Healed        [] Refer to Provider:         [] Consult    Follow-up Information:  [] Ordered Tests:   [] Referrals:   [] Rx:   [] Culture:  [] Other:       Wound Cleansing:   Do not scrub or use excessive force. Cleanse wound prior to applying a clean dressing with:  [x] Normal Saline   [] Keep Wound Dry in Shower     [] Wound Cleanser   [x] Cleanse wound with Mild Soap & Water    [] Other:       Topical Treatments:  Do not apply lotions, creams, or ointments to wound bed unless directed. [] Apply moisturizing lotion to skin surrounding the wound prior to dressing change.  [] Apply antifungal ointment to skin surrounding the wound prior to dressing change.  [] Apply thin film of moisture barrier ointment to skin immediately around wound.   [] Apply Betadine to skin immediately around wound   [] Other:      Dressings:           Wound Location: Right 5th toe   [x] Apply Primary Dressing:       [] MediHoney Gel [] MediHoney Alginate  [] Calcium Alginate with Silver   [] Calcium Alginate without silver   [] Collagen with silver [] Collagen without Silver    [] Santyl with moist saline gauze     [] Hydrofera Blue (cut to size and moistened with normal saline)  [] Hydrofera Blue Ready (cut to size)      [] Normal Saline wet to dry  [] Betadine wet to dry    [] Hydrogel  [] Mepitel     [] Bactroban/Mupirocin   [] Iodoform Packing Strip [] Plain Packing Strip   [] Skin Sub:   [x] Other: Mupirocin ointment    [x] Cover and Secure with:     [x] Gauze []
patient dismissal instructions given to patient and signed by patient or POA. Discharge Instructions              Wound Clinic Physician Orders and Discharge Instructions  [address] [place] [address] [address]  Telephone: [phone]    NAME:  Harleen Cao OF BIRTH:  1970  MEDICAL RECORD NUMBER:  [id number]  DATE:  7/6/2023      Return Appointment:  [] Dressing Supply Provider: Jalyn Espitia  [] Home Healthcare:  [x] Return Appointment: 1 Week(s)  [] Nurse Visit:     [] Discharge from [facility]: [] Healed        [] Refer to Provider:         [] Consult    Follow-up Information:  [] Ordered Tests:   [] Referrals:   [] Rx:   [] Culture:  [] Other:       Wound Cleansing:   Do not scrub or use excessive force. Cleanse wound prior to applying a clean dressing with:  [x] Normal Saline   [] Keep Wound Dry in Shower     [] Wound Cleanser   [x] Cleanse wound with Mild Soap & Water    [] Other:       Topical Treatments:  Do not apply lotions, creams, or ointments to wound bed unless directed. [] Apply moisturizing lotion to skin surrounding the wound prior to dressing change.  [] Apply antifungal ointment to skin surrounding the wound prior to dressing change.  [] Apply thin film of moisture barrier ointment to skin immediately around wound.   [] Apply Betadine to skin immediately around wound   [] Other:      Dressings:           Wound Location: Right 5th toe   [x] Apply Primary Dressing:       [] MediHoney Gel [] MediHoney Alginate  [] Calcium Alginate with Silver   [] Calcium Alginate without silver   [] Collagen with silver [] Collagen without Silver    [] Santyl with moist saline gauze     [] Hydrofera Blue (cut to size and moistened with normal saline)  [] Hydrofera Blue Ready (cut to size)      [] Normal Saline wet to dry  [] Betadine wet to dry    [] Hydrogel  [] Mepitel     [] Bactroban/Mupirocin   [] Iodoform Packing Strip []

## 2023-07-06 NOTE — DISCHARGE INSTRUCTIONS
Wound Clinic Physician Orders and Discharge Instructions  [address] [place] [address] [address]  Telephone: [phone]    NAME:  Fabby Pham OF BIRTH:  1970  MEDICAL RECORD NUMBER:  [id number]  DATE:  7/6/2023      Return Appointment:  [] Dressing Supply Provider: Chelsea Payne  [] Home Healthcare:  [x] Return Appointment: 1 Week(s)  [] Nurse Visit:     [] Discharge from [facility]: [] Healed        [] Refer to Provider:         [] Consult    Follow-up Information:  [] Ordered Tests:   [] Referrals:   [] Rx:   [] Culture:  [] Other:       Wound Cleansing:   Do not scrub or use excessive force. Cleanse wound prior to applying a clean dressing with:  [x] Normal Saline   [] Keep Wound Dry in Shower     [] Wound Cleanser   [x] Cleanse wound with Mild Soap & Water    [] Other:       Topical Treatments:  Do not apply lotions, creams, or ointments to wound bed unless directed. [] Apply moisturizing lotion to skin surrounding the wound prior to dressing change.  [] Apply antifungal ointment to skin surrounding the wound prior to dressing change.  [] Apply thin film of moisture barrier ointment to skin immediately around wound.   [] Apply Betadine to skin immediately around wound   [] Other:      Dressings:           Wound Location: Right 5th toe   [x] Apply Primary Dressing:       [] MediHoney Gel [] MediHoney Alginate  [] Calcium Alginate with Silver   [] Calcium Alginate without silver   [] Collagen with silver [] Collagen without Silver    [] Santyl with moist saline gauze     [] Hydrofera Blue (cut to size and moistened with normal saline)  [] Hydrofera Blue Ready (cut to size)      [] Normal Saline wet to dry  [] Betadine wet to dry    [] Hydrogel  [] Mepitel     [] Bactroban/Mupirocin   [] Iodoform Packing Strip [] Plain Packing Strip   [] Skin Sub:   [x] Other: Mupirocin ointment    [x] Cover and Secure with:     [x] Gauze []

## 2023-07-13 ENCOUNTER — TELEPHONE (OUTPATIENT)
Age: 53
End: 2023-07-13

## 2023-07-13 ENCOUNTER — HOSPITAL ENCOUNTER (OUTPATIENT)
Facility: HOSPITAL | Age: 53
Discharge: HOME | End: 2023-07-13
Payer: COMMERCIAL

## 2023-07-13 VITALS
TEMPERATURE: 98.3 F | RESPIRATION RATE: 18 BRPM | HEART RATE: 92 BPM | DIASTOLIC BLOOD PRESSURE: 81 MMHG | SYSTOLIC BLOOD PRESSURE: 146 MMHG

## 2023-07-13 DIAGNOSIS — E10.65 TYPE 1 DIABETES MELLITUS WITH HYPERGLYCEMIA (HCC): Primary | ICD-10-CM

## 2023-07-13 PROCEDURE — 11042 DBRDMT SUBQ TIS 1ST 20SQCM/<: CPT | Performed by: PODIATRIST

## 2023-07-13 PROCEDURE — 11042 DBRDMT SUBQ TIS 1ST 20SQCM/<: CPT

## 2023-07-13 RX ORDER — BLOOD SUGAR DIAGNOSTIC
STRIP MISCELLANEOUS
Qty: 300 EACH | Refills: 0 | Status: SHIPPED | OUTPATIENT
Start: 2023-07-13

## 2023-07-13 ASSESSMENT — PAIN DESCRIPTION - ORIENTATION: ORIENTATION: RIGHT

## 2023-07-13 ASSESSMENT — PAIN DESCRIPTION - LOCATION: LOCATION: FOOT

## 2023-07-13 ASSESSMENT — PAIN SCALES - GENERAL: PAINLEVEL_OUTOF10: 3

## 2023-07-13 NOTE — TELEPHONE ENCOUNTER
Called pt and informed his to stick his fingers manually to obtain BS reading and type that reading into his insulin pump to receive bolus. Pt does not have a meter. Pt will come and  sample meter, test strips for meter sent to pharmacy.

## 2023-07-13 NOTE — TELEPHONE ENCOUNTER
Pt left VM stating he needs a dexcom sensor sample because his last one ripped off and his new refill won't be ready until the 17th

## 2023-07-13 NOTE — DISCHARGE INSTRUCTIONS
Wound Clinic Physician Orders and Discharge Instructions  [address] [place] [address] [address]  Telephone: [phone]    NAME:  Lina Bach OF BIRTH:  1970  MEDICAL RECORD NUMBER:  [id number]  DATE:  7/13/2023      Return Appointment:  [x] Dressing Supply Provider: Jhoana  [] Home Healthcare:  [x] Return Appointment: 1 Week(s)  [] Nurse Visit:     [] Discharge from [facility]: [] Healed        [] Refer to Provider:         [] Consult    Follow-up Information:  [] Ordered Tests:   [] Referrals:   [] Rx:   [] Culture:  [] Other:       Wound Cleansing:   Do not scrub or use excessive force. Cleanse wound prior to applying a clean dressing with:  [x] Normal Saline   [] Keep Wound Dry in Shower     [] Wound Cleanser   [x] Cleanse wound with Mild Soap & Water    [] Other:       Topical Treatments:  Do not apply lotions, creams, or ointments to wound bed unless directed. [] Apply moisturizing lotion to skin surrounding the wound prior to dressing change.  [] Apply antifungal ointment to skin surrounding the wound prior to dressing change.  [] Apply thin film of moisture barrier ointment to skin immediately around wound.   [] Apply Betadine to skin immediately around wound   [] Other:      Dressings:           Wound Location: Right 5th toe   [x] Apply Primary Dressing:       [] MediHoney Gel [] MediHoney Alginate  [] Calcium Alginate with Silver   [] Calcium Alginate without silver   [] Collagen with silver [] Collagen without Silver    [] Santyl with moist saline gauze     [] Hydrofera Blue (cut to size and moistened with normal saline)  [] Hydrofera Blue Ready (cut to size)      [] Normal Saline wet to dry  [] Betadine wet to dry    [] Hydrogel  [] Mepitel     [] Bactroban/Mupirocin   [] Iodoform Packing Strip [] Plain Packing Strip   [] Skin Sub:   [x] Other: Mupirocin ointment    [x] Cover and Secure with:     [x] Gauze []

## 2023-07-13 NOTE — WOUND CARE
[facility]   Medical Staff Progress Note     Chepe Engle  MEDICAL RECORD NUMBER:  [id number]  AGE: 50 y.o. GENDER: male  : 1970  EPISODE DATE:  2023    Chief complaint and reason for visit:     Chief Complaint   Patient presents with    Wound Check     Right plantar 5th toe       Patient presenting for follow up evaluation of wound(s) per chief complaint. Subjective: Symptoms, wound related issues, or other pertinent wound history since last visit: No new complaints at this time.     Wound 06/15/23 Toe (Comment  which one) Right;Plantar #1 5th (Active)   Wound Image   23 1451   Wound Etiology Diabetic Lim 2 23 1451   Dressing Status Old drainage noted 23 145   Wound Cleansed Cleansed with saline 23   Dressing/Treatment Other (comment) 23 1529   Offloading for Diabetic Foot Ulcers Back offloading shoe 23 1451   Wound Length (cm) 1.3 cm 23 145   Wound Width (cm) 1.5 cm 23 145   Wound Depth (cm) 0.1 cm 23 145   Wound Surface Area (cm^2) 1.95 cm^2 23 145   Change in Wound Size % (l*w) 13.33 23 145   Wound Volume (cm^3) 0.195 cm^3 23 1451   Wound Healing % 13 23 1451   Post-Procedure Length (cm) 1.3 cm 23 1523   Post-Procedure Width (cm) 1.5 cm 23 1523   Post-Procedure Depth (cm) 0.1 cm 23 1523   Post-Procedure Surface Area (cm^2) 1.95 cm^2 23 1523   Post-Procedure Volume (cm^3) 0.195 cm^3 23 1523   Wound Assessment Granulation tissue;Slough 23 145   Drainage Amount Moderate 23 145   Drainage Description Serosanguinous 23 1451   Odor None 23   Adilene-wound Assessment Hyperkeratosis (callous) 23 145   Margins Attached edges 07/13/23 1451   Wound Thickness Description not for Pressure Injury Full thickness 23 1456   Number of days: 28            HISTORY of PRESENT

## 2023-07-13 NOTE — WOUND CARE
Wound Clinic Physician Orders and Discharge Instructions  [address] [place] [address] S. [address]  Telephone: [phone]    NAME:  Lina Bach OF BIRTH:  1970  MEDICAL RECORD NUMBER:  [id number]  DATE:  7/13/2023      Return Appointment:  [x] Dressing Supply Provider: Jhoana  [] Home Healthcare:  [x] Return Appointment: 1 Week(s)  [] Nurse Visit:     [] Discharge from [facility]: [] Healed        [] Refer to Provider:         [] Consult    Follow-up Information:  [] Ordered Tests:   [] Referrals:   [] Rx:   [] Culture:  [] Other:       Wound Cleansing:   Do not scrub or use excessive force. Cleanse wound prior to applying a clean dressing with:  [x] Normal Saline   [] Keep Wound Dry in Shower     [] Wound Cleanser   [x] Cleanse wound with Mild Soap & Water    [] Other:       Topical Treatments:  Do not apply lotions, creams, or ointments to wound bed unless directed. [] Apply moisturizing lotion to skin surrounding the wound prior to dressing change.  [] Apply antifungal ointment to skin surrounding the wound prior to dressing change.  [] Apply thin film of moisture barrier ointment to skin immediately around wound.   [] Apply Betadine to skin immediately around wound   [] Other:      Dressings:           Wound Location: Right 5th toe   [x] Apply Primary Dressing:       [] MediHoney Gel [] MediHoney Alginate  [] Calcium Alginate with Silver   [] Calcium Alginate without silver   [] Collagen with silver [] Collagen without Silver    [] Santyl with moist saline gauze     [] Hydrofera Blue (cut to size and moistened with normal saline)  [] Hydrofera Blue Ready (cut to size)      [] Normal Saline wet to dry  [] Betadine wet to dry    [] Hydrogel  [] Mepitel     [] Bactroban/Mupirocin   [] Iodoform Packing Strip [] Plain Packing Strip   [] Skin Sub:   [x] Other: Mupirocin ointment    [x] Cover and Secure with:     [x] Gauze []

## 2023-07-20 ENCOUNTER — HOSPITAL ENCOUNTER (OUTPATIENT)
Facility: HOSPITAL | Age: 53
Discharge: HOME | End: 2023-07-20
Payer: COMMERCIAL

## 2023-07-20 VITALS
RESPIRATION RATE: 18 BRPM | HEART RATE: 92 BPM | DIASTOLIC BLOOD PRESSURE: 79 MMHG | TEMPERATURE: 96.9 F | SYSTOLIC BLOOD PRESSURE: 135 MMHG

## 2023-07-20 PROCEDURE — 11042 DBRDMT SUBQ TIS 1ST 20SQCM/<: CPT

## 2023-07-20 ASSESSMENT — PAIN DESCRIPTION - DESCRIPTORS: DESCRIPTORS: ACHING

## 2023-07-20 ASSESSMENT — PAIN DESCRIPTION - ORIENTATION: ORIENTATION: RIGHT;LEFT

## 2023-07-20 ASSESSMENT — PAIN SCALES - GENERAL: PAINLEVEL_OUTOF10: 4

## 2023-07-20 ASSESSMENT — PAIN DESCRIPTION - LOCATION: LOCATION: FOOT

## 2023-07-20 NOTE — DISCHARGE INSTRUCTIONS
Wound Clinic Physician Orders and Discharge Instructions  [address] [place] [address] S. [address]  Telephone: [phone]    NAME:  Ernie Altamirano OF BIRTH:  1970  MEDICAL RECORD NUMBER:  [id number]  DATE:  7/20/2023      Return Appointment:  [x] Dressing Supply Provider: Jhoana  [] Home Healthcare:  [x] Return Appointment: 1 Week(s)  [] Nurse Visit:     [] Discharge from [facility]: [] Healed        [] Refer to Provider:         [] Consult    Follow-up Information:  [] Ordered Tests:   [] Referrals:   [] Rx:   [] Culture:  [] Other:       Wound Cleansing:   Do not scrub or use excessive force. Cleanse wound prior to applying a clean dressing with:  [x] Normal Saline   [] Keep Wound Dry in Shower     [] Wound Cleanser   [x] Cleanse wound with Mild Soap & Water    [] Other:       Topical Treatments:  Do not apply lotions, creams, or ointments to wound bed unless directed. [] Apply moisturizing lotion to skin surrounding the wound prior to dressing change.  [] Apply antifungal ointment to skin surrounding the wound prior to dressing change.  [] Apply thin film of moisture barrier ointment to skin immediately around wound.   [] Apply Betadine to skin immediately around wound   [] Other:      Dressings:           Wound Location: Right 5th toe   [x] Apply Primary Dressing:       [] MediHoney Gel [] MediHoney Alginate  [x] Calcium Alginate with Silver - Silvercel   [] Calcium Alginate without silver   [] Collagen with silver [] Collagen without Silver    [] Santyl with moist saline gauze     [] Hydrofera Blue (cut to size and moistened with normal saline)  [] Hydrofera Blue Ready (cut to size)      [] Normal Saline wet to dry  [] Betadine wet to dry    [] Hydrogel  [] Mepitel     [] Bactroban/Mupirocin   [] Iodoform Packing Strip [] Plain Packing Strip   [] Skin Sub:   [] Other: Mupirocin ointment    [x] Cover and Secure with:     [x]

## 2023-07-20 NOTE — WOUND CARE
[address] [facility] [address] f: [phone] f: [phone] p: [phone] [email or web address]      Ordering Center:     10 [facility]  4 [facility] [address] [address] [address]  [address] [place] [address] Dept: [phone]   FAX NUMBER     Patient Information:      Lara Davis  [address]  [place] [id number] [phone]   : 1970  AGE: 48 y.o. GENDER: male   EPISODE DATE: 2023    Insurance:      PRIMARY INSURANCE:  Plan: [employer] BETTER HEALTH [facility]  Coverage: [facility]  Effective Date: 2023  Group Number: [unfilled]  Subscriber Number: [id number] - (Medicaid Managed)    Payer/Plan Subscr  Sex Relation Sub. Ins. ID Effective Group Num   1.  [employer] BETTER * BARRY ALVAREZ 1970 Male Self [id number]                                    [address] [id number]       Patient Wound Information:      Problem List Items Addressed This Visit    None      WOUNDS REQUIRING DRESSING SUPPLIES:     Wound 06/15/23 Toe (Comment  which one) Right;Plantar #1 5th (Active)   Wound Image   23 1444   Wound Etiology Diabetic Lim 2 23 1444   Dressing Status Old drainage noted 23 1444   Wound Cleansed Cleansed with saline 23 1444   Dressing/Treatment Other (comment) 23 1529   Offloading for Diabetic Foot Ulcers Back offloading shoe 23 1444   Wound Length (cm) 1 cm 23 1444   Wound Width (cm) 1.3 cm 23 1444   Wound Depth (cm) 0.2 cm 23 1444   Wound Surface Area (cm^2) 1.3 cm^2 23 1444   Change in Wound Size % (l*w) 42.22 23 1444   Wound Volume (cm^3) 0.26 cm^3 23 1444   Wound Healing % -16 23 1444   Post-Procedure Length (cm) 1 cm 23 1528   Post-Procedure Width (cm) 1.3 cm 23 1528   Post-Procedure Depth (cm) 0.2 cm 23 1528   Post-Procedure Surface Area (cm^2) 1.3 cm^2 23

## 2023-07-20 NOTE — WOUND CARE
Wound Clinic Physician Orders and Discharge Instructions  [address] [place] [address] S[address]  Telephone: [phone]    NAME:  Nicola Keane OF BIRTH:  1970  MEDICAL RECORD NUMBER:  [id number]  DATE:  7/20/2023      Return Appointment:  [x] Dressing Supply Provider: Jhoana  [] Home Healthcare:  [x] Return Appointment: 1 Week(s)  [] Nurse Visit:     [] Discharge from [facility]: [] Healed        [] Refer to Provider:         [] Consult    Follow-up Information:  [] Ordered Tests:   [] Referrals:   [] Rx:   [] Culture:  [] Other:       Wound Cleansing:   Do not scrub or use excessive force. Cleanse wound prior to applying a clean dressing with:  [x] Normal Saline   [] Keep Wound Dry in Shower     [] Wound Cleanser   [x] Cleanse wound with Mild Soap & Water    [] Other:       Topical Treatments:  Do not apply lotions, creams, or ointments to wound bed unless directed. [] Apply moisturizing lotion to skin surrounding the wound prior to dressing change.  [] Apply antifungal ointment to skin surrounding the wound prior to dressing change.  [] Apply thin film of moisture barrier ointment to skin immediately around wound.   [] Apply Betadine to skin immediately around wound   [] Other:      Dressings:           Wound Location: Right 5th toe   [x] Apply Primary Dressing:       [] MediHoney Gel [] MediHoney Alginate  [x] Calcium Alginate with Silver - Silvercel   [] Calcium Alginate without silver   [] Collagen with silver [] Collagen without Silver    [] Santyl with moist saline gauze     [] Hydrofera Blue (cut to size and moistened with normal saline)  [] Hydrofera Blue Ready (cut to size)      [] Normal Saline wet to dry  [] Betadine wet to dry    [] Hydrogel  [] Mepitel     [] Bactroban/Mupirocin   [] Iodoform Packing Strip [] Plain Packing Strip   [] Skin Sub:   [] Other: Mupirocin ointment    [x] Cover and Secure with:     [x]

## 2023-07-24 ENCOUNTER — OFFICE VISIT (OUTPATIENT)
Age: 53
End: 2023-07-24
Payer: COMMERCIAL

## 2023-07-24 VITALS
WEIGHT: 238 LBS | OXYGEN SATURATION: 98 % | HEIGHT: 76 IN | HEART RATE: 63 BPM | BODY MASS INDEX: 28.98 KG/M2 | DIASTOLIC BLOOD PRESSURE: 86 MMHG | TEMPERATURE: 97.3 F | SYSTOLIC BLOOD PRESSURE: 137 MMHG

## 2023-07-24 DIAGNOSIS — Z96.41 PRESENCE OF INSULIN PUMP (EXTERNAL) (INTERNAL): ICD-10-CM

## 2023-07-24 DIAGNOSIS — Z79.4 LONG TERM (CURRENT) USE OF INSULIN (HCC): Primary | ICD-10-CM

## 2023-07-24 DIAGNOSIS — E78.2 MIXED HYPERLIPIDEMIA: ICD-10-CM

## 2023-07-24 DIAGNOSIS — I10 ESSENTIAL (PRIMARY) HYPERTENSION: ICD-10-CM

## 2023-07-24 DIAGNOSIS — G62.9 POLYNEUROPATHY, UNSPECIFIED: ICD-10-CM

## 2023-07-24 DIAGNOSIS — L97.515 NON-PRESSURE CHRONIC ULCER OF OTHER PART OF RIGHT FOOT WITH MUSCLE INVOLVEMENT WITHOUT EVIDENCE OF NECROSIS (HCC): ICD-10-CM

## 2023-07-24 DIAGNOSIS — E10.65 TYPE 1 DIABETES MELLITUS WITH HYPERGLYCEMIA (HCC): ICD-10-CM

## 2023-07-24 PROCEDURE — 99214 OFFICE O/P EST MOD 30 MIN: CPT | Performed by: INTERNAL MEDICINE

## 2023-07-24 PROCEDURE — 3075F SYST BP GE 130 - 139MM HG: CPT | Performed by: INTERNAL MEDICINE

## 2023-07-24 PROCEDURE — 3079F DIAST BP 80-89 MM HG: CPT | Performed by: INTERNAL MEDICINE

## 2023-07-24 PROCEDURE — 95251 CONT GLUC MNTR ANALYSIS I&R: CPT | Performed by: INTERNAL MEDICINE

## 2023-07-24 RX ORDER — GABAPENTIN 300 MG/1
CAPSULE ORAL
Qty: 150 CAPSULE | Refills: 5 | Status: SHIPPED | OUTPATIENT
Start: 2023-07-24 | End: 2024-08-07

## 2023-07-24 RX ORDER — INSULIN LISPRO 100 [IU]/ML
INJECTION, SOLUTION INTRAVENOUS; SUBCUTANEOUS
Qty: 100 ML | Refills: 3 | Status: SHIPPED | OUTPATIENT
Start: 2023-07-24

## 2023-07-24 RX ORDER — ATORVASTATIN CALCIUM 20 MG/1
TABLET, FILM COATED ORAL
Qty: 90 TABLET | Refills: 3 | Status: SHIPPED | OUTPATIENT
Start: 2023-07-24

## 2023-07-24 RX ORDER — HYDROCHLOROTHIAZIDE 25 MG/1
25 TABLET ORAL DAILY
COMMUNITY
Start: 2023-06-13

## 2023-07-24 RX ORDER — INSULIN GLARGINE 100 [IU]/ML
35 INJECTION, SOLUTION SUBCUTANEOUS DAILY
COMMUNITY
Start: 2022-05-09

## 2023-07-24 RX ORDER — LISINOPRIL 20 MG/1
TABLET ORAL
Qty: 90 TABLET | Refills: 3 | Status: SHIPPED | OUTPATIENT
Start: 2023-07-24

## 2023-07-24 RX ORDER — GLUCAGON INJECTION, SOLUTION 0.5 MG/.1ML
INJECTION, SOLUTION SUBCUTANEOUS
Qty: 2 EACH | Refills: 1 | Status: SHIPPED | OUTPATIENT
Start: 2023-07-24

## 2023-07-24 RX ORDER — BUPROPION HYDROCHLORIDE 300 MG/1
300 TABLET ORAL DAILY
COMMUNITY
Start: 2023-07-13

## 2023-07-24 ASSESSMENT — PATIENT HEALTH QUESTIONNAIRE - PHQ9
7. TROUBLE CONCENTRATING ON THINGS, SUCH AS READING THE NEWSPAPER OR WATCHING TELEVISION: 3
SUM OF ALL RESPONSES TO PHQ QUESTIONS 1-9: 22
2. FEELING DOWN, DEPRESSED OR HOPELESS: 3
3. TROUBLE FALLING OR STAYING ASLEEP: 3
6. FEELING BAD ABOUT YOURSELF - OR THAT YOU ARE A FAILURE OR HAVE LET YOURSELF OR YOUR FAMILY DOWN: 3
SUM OF ALL RESPONSES TO PHQ QUESTIONS 1-9: 22
1. LITTLE INTEREST OR PLEASURE IN DOING THINGS: 3
SUM OF ALL RESPONSES TO PHQ9 QUESTIONS 1 & 2: 6
8. MOVING OR SPEAKING SO SLOWLY THAT OTHER PEOPLE COULD HAVE NOTICED. OR THE OPPOSITE, BEING SO FIGETY OR RESTLESS THAT YOU HAVE BEEN MOVING AROUND A LOT MORE THAN USUAL: 1
9. THOUGHTS THAT YOU WOULD BE BETTER OFF DEAD, OR OF HURTING YOURSELF: 0
4. FEELING TIRED OR HAVING LITTLE ENERGY: 3
SUM OF ALL RESPONSES TO PHQ QUESTIONS 1-9: 22
5. POOR APPETITE OR OVEREATING: 3
SUM OF ALL RESPONSES TO PHQ QUESTIONS 1-9: 22
10. IF YOU CHECKED OFF ANY PROBLEMS, HOW DIFFICULT HAVE THESE PROBLEMS MADE IT FOR YOU TO DO YOUR WORK, TAKE CARE OF THINGS AT HOME, OR GET ALONG WITH OTHER PEOPLE: 3

## 2023-07-24 ASSESSMENT — COLUMBIA-SUICIDE SEVERITY RATING SCALE - C-SSRS
1. WITHIN THE PAST MONTH, HAVE YOU WISHED YOU WERE DEAD OR WISHED YOU COULD GO TO SLEEP AND NOT WAKE UP?: NO
2. HAVE YOU ACTUALLY HAD ANY THOUGHTS OF KILLING YOURSELF?: NO
6. HAVE YOU EVER DONE ANYTHING, STARTED TO DO ANYTHING, OR PREPARED TO DO ANYTHING TO END YOUR LIFE?: NO

## 2023-07-24 NOTE — PATIENT INSTRUCTIONS
SPECIFIC INSTRUCTIONS BELOW     DRINK water   Do not skip meals  Do not eat in between meals     Reduce carbs- pasta, rice, potatoes, bread   Try to avoid processed bread products like BISCUITS, CROISSANTS, MUFFINS     Do not drink juices or sodas, even if they are calorie zero or diet drinks and especially avoid using powders like crystalloids , MEGHAN-AIDS      Do not eat peanut butter      Do not eat sugar free cookies and cakes   Do not eat peaches, oranges, pineapples, raisins, grapes , canteloupe , honey dew, mangoes , watermelon  and fruit medleys     ---------------------------------------------------------------------------------------------------------------     stay  on  Lisinopril      Stay   On atorvostatin 20 mg at night              -----------------------------------------------------------------------------------------------------------------        Gets the pump and [email or web address] supplies   - [employer]          humalog to 4 vials in the pump   ( nearly 100 units a day )        Back up regimen         Check blood sugars immediately before each meal and at bedtime        Take  lantus  insulin  40   units  at bed time     Take humalog  Insulin at carb to insulin ratio of 6 gm : 1 unit         Also, add additional humalog  as follows with meals  If blood sugars are[de-identified]     150-200 mg 1 units     201-250 mg 3 units     251-300 mg 5 units     301-350 mg 7 units     351-400 mg 9 units     401-450 mg 11 units     451-500 mg 13 units     Less than 70 mg NO INSULIN        -------------PAY ATTENTION TO THESE GENERAL INSTRUCTIONS -----------------      - The medications prescribed at this visit will not be available at pharmacy until 6 pm       - YOUR MED LIST IS NOT UP TO DATE AS SOME CHANGES ARE BEING MADE AFTER THE VISIT - FOLLOW SPECIFIC INSTRUCTIONS  ABOVE     -ANY tests other than blood work, which you opt to do  outside the  [facility] imaging facilities, you are responsible for prior authorizations if

## 2023-07-25 LAB
ALBUMIN SERPL-MCNC: 3.9 G/DL (ref 3.5–5)
ALBUMIN/GLOB SERPL: 1.1 (ref 1.1–2.2)
ALP SERPL-CCNC: 79 U/L (ref 45–117)
ALT SERPL-CCNC: 33 U/L (ref 12–78)
ANION GAP SERPL CALC-SCNC: 8 MMOL/L (ref 5–15)
AST SERPL-CCNC: 34 U/L (ref 15–37)
BILIRUB SERPL-MCNC: 0.5 MG/DL (ref 0.2–1)
BUN SERPL-MCNC: 23 MG/DL (ref 6–20)
BUN/CREAT SERPL: 14 (ref 12–20)
CALCIUM SERPL-MCNC: 9.3 MG/DL (ref 8.5–10.1)
CHLORIDE SERPL-SCNC: 102 MMOL/L (ref 97–108)
CHOLEST SERPL-MCNC: 127 MG/DL
CO2 SERPL-SCNC: 27 MMOL/L (ref 21–32)
CREAT SERPL-MCNC: 1.63 MG/DL (ref 0.7–1.3)
CREAT UR-MCNC: 84.3 MG/DL
EST. AVERAGE GLUCOSE BLD GHB EST-MCNC: 148 MG/DL
GLOBULIN SER CALC-MCNC: 3.4 G/DL (ref 2–4)
GLUCOSE SERPL-MCNC: 168 MG/DL (ref 65–100)
HBA1C MFR BLD: 6.8 % (ref 4–5.6)
HDLC SERPL-MCNC: 59 MG/DL
HDLC SERPL: 2.2 (ref 0–5)
LDLC SERPL CALC-MCNC: 57 MG/DL (ref 0–100)
MICROALBUMIN UR-MCNC: 1.12 MG/DL
MICROALBUMIN/CREAT UR-RTO: 13 MG/G (ref 0–30)
POTASSIUM SERPL-SCNC: 4.6 MMOL/L (ref 3.5–5.1)
PROT SERPL-MCNC: 7.3 G/DL (ref 6.4–8.2)
SODIUM SERPL-SCNC: 137 MMOL/L (ref 136–145)
TRIGL SERPL-MCNC: 55 MG/DL
VLDLC SERPL CALC-MCNC: 11 MG/DL

## 2023-07-27 ENCOUNTER — HOSPITAL ENCOUNTER (OUTPATIENT)
Facility: HOSPITAL | Age: 53
Discharge: HOME | End: 2023-07-27
Payer: COMMERCIAL

## 2023-07-27 VITALS
RESPIRATION RATE: 18 BRPM | HEART RATE: 91 BPM | SYSTOLIC BLOOD PRESSURE: 148 MMHG | DIASTOLIC BLOOD PRESSURE: 85 MMHG | TEMPERATURE: 98.6 F

## 2023-07-27 PROCEDURE — 11042 DBRDMT SUBQ TIS 1ST 20SQCM/<: CPT

## 2023-07-27 PROCEDURE — 11042 DBRDMT SUBQ TIS 1ST 20SQCM/<: CPT | Performed by: PODIATRIST

## 2023-07-27 ASSESSMENT — PAIN DESCRIPTION - LOCATION: LOCATION: FOOT

## 2023-07-27 ASSESSMENT — ENCOUNTER SYMPTOMS
SHORTNESS OF BREATH: 0
VOMITING: 0
DIARRHEA: 0
ABDOMINAL PAIN: 0

## 2023-07-27 ASSESSMENT — PAIN DESCRIPTION - ORIENTATION: ORIENTATION: RIGHT

## 2023-07-27 ASSESSMENT — PAIN DESCRIPTION - DESCRIPTORS: DESCRIPTORS: ACHING

## 2023-07-27 ASSESSMENT — PAIN SCALES - GENERAL: PAINLEVEL_OUTOF10: 4

## 2023-07-27 NOTE — DISCHARGE INSTRUCTIONS
Wound Clinic Physician Orders and Discharge Instructions  [address] [place] [address] S. [address]  Telephone: [phone]    NAME:  Linda Núñez OF BIRTH:  1970  MEDICAL RECORD NUMBER:  [id number]  DATE:  7/27/2023      Return Appointment:  [] Dressing Supply Provider: Scot Jackson  [] Home Healthcare:  [x] Return Appointment: 1 Week(s)  [] Nurse Visit:     [] Discharge from [facility]: [] Healed        [] Refer to Provider:         [] Consult    Follow-up Information:  [] Ordered Tests:   [] Referrals:   [] Rx:   [] Culture:  [] Other:       Wound Cleansing:   Do not scrub or use excessive force. Cleanse wound prior to applying a clean dressing with:  [x] Normal Saline   [] Keep Wound Dry in Shower     [] Wound Cleanser   [x] Cleanse wound with Mild Soap & Water    [] Other:       Topical Treatments:  Do not apply lotions, creams, or ointments to wound bed unless directed. [] Apply moisturizing lotion to skin surrounding the wound prior to dressing change.  [] Apply antifungal ointment to skin surrounding the wound prior to dressing change.  [] Apply thin film of moisture barrier ointment to skin immediately around wound.   [] Apply Betadine to skin immediately around wound   [] Other:      Dressings:           Wound Location: Right 5th toe   [x] Apply Primary Dressing:       [] MediHoney Gel [] MediHoney Alginate  [x] Calcium Alginate with Silver - Silvercel   [] Calcium Alginate without silver   [] Collagen with silver [] Collagen without Silver    [] Santyl with moist saline gauze     [] Hydrofera Blue (cut to size and moistened with normal saline)  [] Hydrofera Blue Ready (cut to size)      [] Normal Saline wet to dry  [] Betadine wet to dry    [] Hydrogel  [] Mepitel     [] Bactroban/Mupirocin   [] Iodoform Packing Strip [] Plain Packing Strip   [] Skin Sub:   [] Other: Mupirocin ointment    [x] Cover and Secure with:     [x]

## 2023-07-27 NOTE — WOUND CARE
[facility]   Medical Staff Progress Note     Elan Davis  MEDICAL RECORD NUMBER:  [id number]  AGE: 48 y.o. GENDER: male  : 1970  EPISODE DATE:  2023    Chief complaint and reason for visit:     Chief Complaint   Patient presents with    Wound Check     Right plantar      Patient presenting for follow up evaluation of wound(s) per chief complaint. Subjective: Symptoms, wound related issues, or other pertinent wound history since last visit:  No new complaints at this time.      Wound 06/15/23 Toe (Comment  which one) Right;Plantar #1 5th (Active)   Wound Image   23 1411   Wound Etiology Diabetic Lim 2 23 1411   Dressing Status Old drainage noted 23 141   Wound Cleansed Cleansed with saline 23   Dressing/Treatment Other (comment) 23 1529   Offloading for Diabetic Foot Ulcers Back offloading shoe 23 1411   Wound Length (cm) 1 cm 23 141   Wound Width (cm) 1 cm 23 1411   Wound Depth (cm) 0.1 cm 23 1411   Wound Surface Area (cm^2) 1 cm^2 23 141   Change in Wound Size % (l*w) 55.56 23 141   Wound Volume (cm^3) 0.1 cm^3 23 1411   Wound Healing % 56 23 1411   Post-Procedure Length (cm) 1 cm 23 1434   Post-Procedure Width (cm) 1 cm 23 143   Post-Procedure Depth (cm) 0.1 cm 23 1434   Post-Procedure Surface Area (cm^2) 1 cm^2 23 143   Post-Procedure Volume (cm^3) 0.1 cm^3 23 1434   Wound Assessment Granulation tissue;Slough 23 141   Drainage Amount Moderate 23 141   Drainage Description Serosanguinous 23 1411   Odor None 23   Adilene-wound Assessment Hyperkeratosis (callous) 23 141   Margins Attached edges 23 141   Wound Thickness Description not for Pressure Injury Full thickness 23 1444   Number of days: 42              HISTORY of PRESENT ILLNESS NAVDEEP BOO
Gauze [] Nuria [] Kerlix   [] Foam  [] Super Absorbant [] ABD     [] Ace Wrap [] Other:    Limit contact of tape with skin. [x] Change dressing: [x] Daily    [] Every Other Day   [] Twice per week   [] Three times per week   [] Once a week [] Do Not Change Dressing   [] Other:       Off-Loading:   [x] Off-loading when: [x] walking  [] in bed [] sitting  [] Total non-weight bearing  [] Right Leg  [] Left Leg   [x] Assistive Device [] Walker [] Cane  [x] Wheelchair  [] Crutches   [] Surgical shoe    [] Wedge Shoe  [] Foam Boot(s)  [] Knee Scooter    [] Cast Boot [] Edward Mocha     [] Diabetic Shoes    [] Offloading heel boot    [x] Offloading shoe   [] Other:       Dietary:  [] Diet as tolerated: [x] Calorie Diabetic Diet: [] No Added Salt:  [x] Increase Protein: [] Other:     Activity:  [x] Activity as tolerated:    [] Patient has no activity restrictions      [] Strict Bedrest   [] Remain off Work      [] May return to full duty work                                     [] Return to work with restrictions     Physician:  [] Dr. Geetha Ibrahim  [] Dr. Isael Echevarria  [x] Dr. Jan Ramirez  [] Dr. Cecy Spaulding      Nurse Case Manger:  [facility] Information: Should you experience any significant changes in your wound(s) or have questions about your wound care, please contact the [employer] at [phone]. Our hours are Monday - Friday 8am - 4:30pm, closed all major holidays. If you need help with your wound outside these hours and cannot wait until we are again available, contact your PCP or go to the hospital emergency room. PLEASE NOTE: IF YOU ARE UNABLE TO OBTAIN WOUND SUPPLIES, CONTINUE TO USE THE SUPPLIES YOU HAVE AVAILABLE UNTIL YOU ARE ABLE TO REACH US. IT IS MOST IMPORTANT TO KEEP THE WOUND COVERED AT ALL TIMES.

## 2023-08-03 ENCOUNTER — HOSPITAL ENCOUNTER (OUTPATIENT)
Facility: HOSPITAL | Age: 53
Discharge: HOME | End: 2023-08-03
Payer: COMMERCIAL

## 2023-08-03 VITALS
DIASTOLIC BLOOD PRESSURE: 78 MMHG | RESPIRATION RATE: 18 BRPM | HEART RATE: 97 BPM | TEMPERATURE: 98.8 F | SYSTOLIC BLOOD PRESSURE: 137 MMHG

## 2023-08-03 PROCEDURE — 11042 DBRDMT SUBQ TIS 1ST 20SQCM/<: CPT

## 2023-08-03 NOTE — WOUND CARE
Gauze [] Nuria [] Kerlix   [] Foam  [] Super Absorbant [] ABD     [] Ace Wrap [] Other:    Limit contact of tape with skin. [x] Change dressing: [x] Daily    [] Every Other Day   [] Twice per week   [] Three times per week   [] Once a week [] Do Not Change Dressing   [] Other:       Off-Loading:   [x] Off-loading when: [x] walking  [] in bed [] sitting  [] Total non-weight bearing  [] Right Leg  [] Left Leg   [x] Assistive Device [] Walker [] Cane  [x] Wheelchair  [] Crutches   [] Surgical shoe    [] Wedge Shoe  [] Foam Boot(s)  [] Knee Scooter    [] Cast Boot [] Jillene Gobble     [] Diabetic Shoes    [] Offloading heel boot    [x] Offloading shoe   [] Other:       Dietary:  [] Diet as tolerated: [x] Calorie Diabetic Diet: [] No Added Salt:  [x] Increase Protein: [] Other:     Activity:  [x] Activity as tolerated:    [] Patient has no activity restrictions      [] Strict Bedrest   [] Remain off Work      [] May return to full duty work                                     [] Return to work with restrictions     Physician:  [] Dr. Carol Henderson  [] Dr. Ralf Benavides  [x] Dr. Rachel Sunshine  [] Dr. Song East      Nurse Case Manger:  [facility] Information: Should you experience any significant changes in your wound(s) or have questions about your wound care, please contact the [employer] at [phone]. Our hours are Monday - Friday 8am - 4:30pm, closed all major holidays. If you need help with your wound outside these hours and cannot wait until we are again available, contact your PCP or go to the hospital emergency room. PLEASE NOTE: IF YOU ARE UNABLE TO OBTAIN WOUND SUPPLIES, CONTINUE TO USE THE SUPPLIES YOU HAVE AVAILABLE UNTIL YOU ARE ABLE TO REACH US. IT IS MOST IMPORTANT TO KEEP THE WOUND COVERED AT ALL TIMES.

## 2023-08-03 NOTE — DISCHARGE INSTRUCTIONS
Wound Clinic Physician Orders and Discharge Instructions  2 [address] [place] [address] S. [address]  Telephone: [phone]    NAME:  Trisha Balderas OF BIRTH:  1970  MEDICAL RECORD NUMBER:  [id number]  DATE:  8/3/2023      Return Appointment:  [] Dressing Supply Provider: Llewellyn Hodgkins  [] Home Healthcare:  [x] Return Appointment: 2 Week(s)  [] Nurse Visit:     [] Discharge from [facility]: [] Healed        [] Refer to Provider:         [] Consult    Follow-up Information:  [] Ordered Tests:   [] Referrals:   [] Rx:   [] Culture:  [] Other:       Wound Cleansing:   Do not scrub or use excessive force. Cleanse wound prior to applying a clean dressing with:  [x] Normal Saline   [] Keep Wound Dry in Shower     [] Wound Cleanser   [x] Cleanse wound with Mild Soap & Water    [] Other:       Topical Treatments:  Do not apply lotions, creams, or ointments to wound bed unless directed. [] Apply moisturizing lotion to skin surrounding the wound prior to dressing change.  [] Apply antifungal ointment to skin surrounding the wound prior to dressing change.  [] Apply thin film of moisture barrier ointment to skin immediately around wound.   [] Apply Betadine to skin immediately around wound   [] Other:      Dressings:           Wound Location: Right 5th toe   [x] Apply Primary Dressing:       [] MediHoney Gel [] MediHoney Alginate  [x] Calcium Alginate with Silver - Silvercel   [] Calcium Alginate without silver   [] Collagen with silver [] Collagen without Silver    [] Santyl with moist saline gauze     [] Hydrofera Blue (cut to size and moistened with normal saline)  [] Hydrofera Blue Ready (cut to size)      [] Normal Saline wet to dry  [] Betadine wet to dry    [] Hydrogel  [] Mepitel     [] Bactroban/Mupirocin   [] Iodoform Packing Strip [] Plain Packing Strip   [] Skin Sub:   [] Other: Mupirocin ointment    [x] Cover and Secure with:     [x]

## 2023-08-14 ENCOUNTER — OFFICE VISIT (OUTPATIENT)
Age: 53
End: 2023-08-14

## 2023-08-14 ENCOUNTER — HOSPITAL ENCOUNTER (INPATIENT)
Facility: HOSPITAL | Age: 53
LOS: 4 days | Discharge: HOME HEALTH | End: 2023-08-18
Attending: STUDENT IN AN ORGANIZED HEALTH CARE EDUCATION/TRAINING PROGRAM | Admitting: HOSPITALIST
Payer: COMMERCIAL

## 2023-08-14 ENCOUNTER — TELEPHONE (OUTPATIENT)
Age: 53
End: 2023-08-14

## 2023-08-14 ENCOUNTER — APPOINTMENT (OUTPATIENT)
Facility: HOSPITAL | Age: 53
End: 2023-08-14
Payer: COMMERCIAL

## 2023-08-14 VITALS
DIASTOLIC BLOOD PRESSURE: 73 MMHG | WEIGHT: 238 LBS | BODY MASS INDEX: 28.98 KG/M2 | SYSTOLIC BLOOD PRESSURE: 132 MMHG | TEMPERATURE: 97.6 F | HEIGHT: 76 IN | HEART RATE: 90 BPM

## 2023-08-14 DIAGNOSIS — M86.9 OSTEOMYELITIS OF RIGHT FOOT, UNSPECIFIED TYPE (HCC): Primary | ICD-10-CM

## 2023-08-14 DIAGNOSIS — E11.621 DIABETIC ULCER OF TOE OF RIGHT FOOT ASSOCIATED WITH TYPE 2 DIABETES MELLITUS, WITH FAT LAYER EXPOSED (HCC): Primary | ICD-10-CM

## 2023-08-14 DIAGNOSIS — M86.9 OSTEOMYELITIS OF ANKLE AND FOOT (HCC): ICD-10-CM

## 2023-08-14 DIAGNOSIS — M86.071 ACUTE HEMATOGENOUS OSTEOMYELITIS OF RIGHT FOOT (HCC): ICD-10-CM

## 2023-08-14 DIAGNOSIS — L97.512 DIABETIC ULCER OF TOE OF RIGHT FOOT ASSOCIATED WITH TYPE 2 DIABETES MELLITUS, WITH FAT LAYER EXPOSED (HCC): Primary | ICD-10-CM

## 2023-08-14 PROBLEM — E13.621 FOOT ULCER DUE TO SECONDARY DM (HCC): Status: ACTIVE | Noted: 2023-08-14

## 2023-08-14 PROBLEM — L97.509 FOOT ULCER DUE TO SECONDARY DM (HCC): Status: ACTIVE | Noted: 2023-08-14

## 2023-08-14 LAB
ALBUMIN SERPL-MCNC: 2.8 G/DL (ref 3.5–5)
ALBUMIN/GLOB SERPL: 0.7 (ref 1.1–2.2)
ALP SERPL-CCNC: 74 U/L (ref 45–117)
ALT SERPL-CCNC: 14 U/L (ref 12–78)
ANION GAP SERPL CALC-SCNC: 5 MMOL/L (ref 5–15)
ANION GAP SERPL CALC-SCNC: 5 MMOL/L (ref 5–15)
AST SERPL W P-5'-P-CCNC: 28 U/L (ref 15–37)
BASOPHILS # BLD: 0.1 K/UL (ref 0–0.1)
BASOPHILS NFR BLD: 1 % (ref 0–1)
BILIRUB SERPL-MCNC: 0.4 MG/DL (ref 0.2–1)
BUN SERPL-MCNC: 23 MG/DL (ref 6–20)
BUN SERPL-MCNC: 23 MG/DL (ref 6–20)
BUN/CREAT SERPL: 14 (ref 12–20)
BUN/CREAT SERPL: 15 (ref 12–20)
CA-I BLD-MCNC: 8 MG/DL (ref 8.5–10.1)
CA-I BLD-MCNC: 9.1 MG/DL (ref 8.5–10.1)
CHLORIDE SERPL-SCNC: 105 MMOL/L (ref 97–108)
CHLORIDE SERPL-SCNC: 110 MMOL/L (ref 97–108)
CO2 SERPL-SCNC: 22 MMOL/L (ref 21–32)
CO2 SERPL-SCNC: 28 MMOL/L (ref 21–32)
CREAT SERPL-MCNC: 1.55 MG/DL (ref 0.7–1.3)
CREAT SERPL-MCNC: 1.67 MG/DL (ref 0.7–1.3)
CRP SERPL-MCNC: 10.9 MG/DL (ref 0–0.6)
DIFFERENTIAL METHOD BLD: ABNORMAL
EOSINOPHIL # BLD: 0.2 K/UL (ref 0–0.4)
EOSINOPHIL NFR BLD: 2 % (ref 0–7)
ERYTHROCYTE [DISTWIDTH] IN BLOOD BY AUTOMATED COUNT: 15.5 % (ref 11.5–14.5)
ERYTHROCYTE [SEDIMENTATION RATE] IN BLOOD: 49 MM/HR (ref 0–20)
GLOBULIN SER CALC-MCNC: 4.3 G/DL (ref 2–4)
GLUCOSE BLD STRIP.AUTO-MCNC: 155 MG/DL (ref 65–100)
GLUCOSE SERPL-MCNC: 149 MG/DL (ref 65–100)
GLUCOSE SERPL-MCNC: 204 MG/DL (ref 65–100)
HCT VFR BLD AUTO: 34.6 % (ref 36.6–50.3)
HGB BLD-MCNC: 11.6 G/DL (ref 12.1–17)
IMM GRANULOCYTES # BLD AUTO: 0 K/UL (ref 0–0.04)
IMM GRANULOCYTES NFR BLD AUTO: 0 % (ref 0–0.5)
LYMPHOCYTES # BLD: 1.5 K/UL (ref 0.8–3.5)
LYMPHOCYTES NFR BLD: 15 % (ref 12–49)
MCH RBC QN AUTO: 29.4 PG (ref 26–34)
MCHC RBC AUTO-ENTMCNC: 33.5 G/DL (ref 30–36.5)
MCV RBC AUTO: 87.6 FL (ref 80–99)
MONOCYTES # BLD: 0.6 K/UL (ref 0–1)
MONOCYTES NFR BLD: 6 % (ref 5–13)
NEUTS SEG # BLD: 7.4 K/UL (ref 1.8–8)
NEUTS SEG NFR BLD: 76 % (ref 32–75)
NRBC # BLD: 0 K/UL (ref 0–0.01)
NRBC BLD-RTO: 0 PER 100 WBC
PERFORMED BY:: ABNORMAL
PLATELET # BLD AUTO: 452 K/UL (ref 150–400)
PMV BLD AUTO: 10.3 FL (ref 8.9–12.9)
POTASSIUM SERPL-SCNC: 4 MMOL/L (ref 3.5–5.1)
POTASSIUM SERPL-SCNC: 5.7 MMOL/L (ref 3.5–5.1)
PROCALCITONIN SERPL-MCNC: 0.28 NG/ML
PROT SERPL-MCNC: 7.1 G/DL (ref 6.4–8.2)
RBC # BLD AUTO: 3.95 M/UL (ref 4.1–5.7)
SODIUM SERPL-SCNC: 137 MMOL/L (ref 136–145)
SODIUM SERPL-SCNC: 138 MMOL/L (ref 136–145)
WBC # BLD AUTO: 9.7 K/UL (ref 4.1–11.1)

## 2023-08-14 PROCEDURE — 86140 C-REACTIVE PROTEIN: CPT

## 2023-08-14 PROCEDURE — 2580000003 HC RX 258: Performed by: HOSPITALIST

## 2023-08-14 PROCEDURE — 99285 EMERGENCY DEPT VISIT HI MDM: CPT

## 2023-08-14 PROCEDURE — 1100000000 HC RM PRIVATE

## 2023-08-14 PROCEDURE — 85025 COMPLETE CBC W/AUTO DIFF WBC: CPT

## 2023-08-14 PROCEDURE — 80048 BASIC METABOLIC PNL TOTAL CA: CPT

## 2023-08-14 PROCEDURE — 87070 CULTURE OTHR SPECIMN AEROBIC: CPT

## 2023-08-14 PROCEDURE — 96374 THER/PROPH/DIAG INJ IV PUSH: CPT

## 2023-08-14 PROCEDURE — 87040 BLOOD CULTURE FOR BACTERIA: CPT

## 2023-08-14 PROCEDURE — 73630 X-RAY EXAM OF FOOT: CPT

## 2023-08-14 PROCEDURE — 87147 CULTURE TYPE IMMUNOLOGIC: CPT

## 2023-08-14 PROCEDURE — 87205 SMEAR GRAM STAIN: CPT

## 2023-08-14 PROCEDURE — 6360000002 HC RX W HCPCS: Performed by: STUDENT IN AN ORGANIZED HEALTH CARE EDUCATION/TRAINING PROGRAM

## 2023-08-14 PROCEDURE — 80053 COMPREHEN METABOLIC PANEL: CPT

## 2023-08-14 PROCEDURE — 87077 CULTURE AEROBIC IDENTIFY: CPT

## 2023-08-14 PROCEDURE — 6370000000 HC RX 637 (ALT 250 FOR IP): Performed by: HOSPITALIST

## 2023-08-14 PROCEDURE — 2580000003 HC RX 258: Performed by: STUDENT IN AN ORGANIZED HEALTH CARE EDUCATION/TRAINING PROGRAM

## 2023-08-14 PROCEDURE — 87186 SC STD MICRODIL/AGAR DIL: CPT

## 2023-08-14 PROCEDURE — 85652 RBC SED RATE AUTOMATED: CPT

## 2023-08-14 PROCEDURE — 82962 GLUCOSE BLOOD TEST: CPT

## 2023-08-14 PROCEDURE — 6360000002 HC RX W HCPCS: Performed by: HOSPITALIST

## 2023-08-14 PROCEDURE — 36415 COLL VENOUS BLD VENIPUNCTURE: CPT

## 2023-08-14 PROCEDURE — 84145 PROCALCITONIN (PCT): CPT

## 2023-08-14 RX ORDER — BUPROPION HYDROCHLORIDE 300 MG/1
300 TABLET ORAL DAILY
Status: DISCONTINUED | OUTPATIENT
Start: 2023-08-15 | End: 2023-08-18 | Stop reason: HOSPADM

## 2023-08-14 RX ORDER — FOLIC ACID 1 MG/1
1 TABLET ORAL DAILY
Status: DISCONTINUED | OUTPATIENT
Start: 2023-08-14 | End: 2023-08-18 | Stop reason: HOSPADM

## 2023-08-14 RX ORDER — MAGNESIUM HYDROXIDE/ALUMINUM HYDROXICE/SIMETHICONE 120; 1200; 1200 MG/30ML; MG/30ML; MG/30ML
30 SUSPENSION ORAL EVERY 6 HOURS PRN
Status: DISCONTINUED | OUTPATIENT
Start: 2023-08-14 | End: 2023-08-18 | Stop reason: HOSPADM

## 2023-08-14 RX ORDER — ONDANSETRON 2 MG/ML
4 INJECTION INTRAMUSCULAR; INTRAVENOUS EVERY 6 HOURS PRN
Status: DISCONTINUED | OUTPATIENT
Start: 2023-08-14 | End: 2023-08-18 | Stop reason: HOSPADM

## 2023-08-14 RX ORDER — ONDANSETRON 4 MG/1
4 TABLET, ORALLY DISINTEGRATING ORAL EVERY 8 HOURS PRN
Status: DISCONTINUED | OUTPATIENT
Start: 2023-08-14 | End: 2023-08-18 | Stop reason: HOSPADM

## 2023-08-14 RX ORDER — ACETAMINOPHEN 650 MG/1
650 SUPPOSITORY RECTAL EVERY 6 HOURS PRN
Status: DISCONTINUED | OUTPATIENT
Start: 2023-08-14 | End: 2023-08-18 | Stop reason: HOSPADM

## 2023-08-14 RX ORDER — ENOXAPARIN SODIUM 100 MG/ML
30 INJECTION SUBCUTANEOUS 2 TIMES DAILY
Status: DISCONTINUED | OUTPATIENT
Start: 2023-08-14 | End: 2023-08-18 | Stop reason: HOSPADM

## 2023-08-14 RX ORDER — SODIUM CHLORIDE 9 MG/ML
INJECTION, SOLUTION INTRAVENOUS PRN
Status: DISCONTINUED | OUTPATIENT
Start: 2023-08-14 | End: 2023-08-18 | Stop reason: HOSPADM

## 2023-08-14 RX ORDER — BUSPIRONE HYDROCHLORIDE 10 MG/1
15 TABLET ORAL 2 TIMES DAILY
Status: DISCONTINUED | OUTPATIENT
Start: 2023-08-14 | End: 2023-08-18 | Stop reason: HOSPADM

## 2023-08-14 RX ORDER — GABAPENTIN 300 MG/1
900 CAPSULE ORAL 2 TIMES DAILY
Status: DISCONTINUED | OUTPATIENT
Start: 2023-08-14 | End: 2023-08-18 | Stop reason: HOSPADM

## 2023-08-14 RX ORDER — LISINOPRIL 20 MG/1
20 TABLET ORAL DAILY
Status: DISCONTINUED | OUTPATIENT
Start: 2023-08-15 | End: 2023-08-18 | Stop reason: HOSPADM

## 2023-08-14 RX ORDER — POLYETHYLENE GLYCOL 3350 17 G/17G
17 POWDER, FOR SOLUTION ORAL DAILY PRN
Status: DISCONTINUED | OUTPATIENT
Start: 2023-08-14 | End: 2023-08-18 | Stop reason: HOSPADM

## 2023-08-14 RX ORDER — ATORVASTATIN CALCIUM 20 MG/1
20 TABLET, FILM COATED ORAL NIGHTLY
Status: DISCONTINUED | OUTPATIENT
Start: 2023-08-14 | End: 2023-08-18 | Stop reason: HOSPADM

## 2023-08-14 RX ORDER — HYDROCHLOROTHIAZIDE 25 MG/1
25 TABLET ORAL DAILY
Status: DISCONTINUED | OUTPATIENT
Start: 2023-08-15 | End: 2023-08-18 | Stop reason: HOSPADM

## 2023-08-14 RX ORDER — SODIUM CHLORIDE 0.9 % (FLUSH) 0.9 %
5-40 SYRINGE (ML) INJECTION PRN
Status: DISCONTINUED | OUTPATIENT
Start: 2023-08-14 | End: 2023-08-18 | Stop reason: HOSPADM

## 2023-08-14 RX ORDER — ACETAMINOPHEN 325 MG/1
650 TABLET ORAL EVERY 6 HOURS PRN
Status: DISCONTINUED | OUTPATIENT
Start: 2023-08-14 | End: 2023-08-18 | Stop reason: HOSPADM

## 2023-08-14 RX ORDER — SODIUM CHLORIDE 0.9 % (FLUSH) 0.9 %
5-40 SYRINGE (ML) INJECTION EVERY 12 HOURS SCHEDULED
Status: DISCONTINUED | OUTPATIENT
Start: 2023-08-14 | End: 2023-08-18 | Stop reason: HOSPADM

## 2023-08-14 RX ORDER — METRONIDAZOLE 500 MG/100ML
500 INJECTION, SOLUTION INTRAVENOUS EVERY 8 HOURS
Status: DISCONTINUED | OUTPATIENT
Start: 2023-08-14 | End: 2023-08-15

## 2023-08-14 RX ORDER — SODIUM CHLORIDE 9 MG/ML
INJECTION, SOLUTION INTRAVENOUS CONTINUOUS
Status: DISPENSED | OUTPATIENT
Start: 2023-08-14 | End: 2023-08-16

## 2023-08-14 RX ADMIN — CEFEPIME 2000 MG: 2 INJECTION, POWDER, FOR SOLUTION INTRAVENOUS at 17:58

## 2023-08-14 RX ADMIN — VANCOMYCIN HYDROCHLORIDE 2500 MG: 1 INJECTION, POWDER, LYOPHILIZED, FOR SOLUTION INTRAVENOUS at 19:15

## 2023-08-14 RX ADMIN — ATORVASTATIN CALCIUM 20 MG: 20 TABLET, FILM COATED ORAL at 22:53

## 2023-08-14 RX ADMIN — METRONIDAZOLE 500 MG: 500 INJECTION, SOLUTION INTRAVENOUS at 22:53

## 2023-08-14 RX ADMIN — FOLIC ACID 1 MG: 1 TABLET ORAL at 22:53

## 2023-08-14 RX ADMIN — GABAPENTIN 900 MG: 300 CAPSULE ORAL at 22:52

## 2023-08-14 RX ADMIN — BUSPIRONE HYDROCHLORIDE 15 MG: 10 TABLET ORAL at 22:52

## 2023-08-14 RX ADMIN — SODIUM CHLORIDE: 9 INJECTION, SOLUTION INTRAVENOUS at 20:29

## 2023-08-14 RX ADMIN — CEFEPIME 2000 MG: 2 INJECTION, POWDER, FOR SOLUTION INTRAVENOUS at 20:26

## 2023-08-14 RX ADMIN — ENOXAPARIN SODIUM 30 MG: 100 INJECTION SUBCUTANEOUS at 20:27

## 2023-08-14 RX ADMIN — ACETAMINOPHEN 650 MG: 325 TABLET ORAL at 20:27

## 2023-08-14 RX ADMIN — SODIUM CHLORIDE, PRESERVATIVE FREE 10 ML: 5 INJECTION INTRAVENOUS at 20:28

## 2023-08-14 ASSESSMENT — PAIN - FUNCTIONAL ASSESSMENT: PAIN_FUNCTIONAL_ASSESSMENT: 0-10

## 2023-08-14 ASSESSMENT — PAIN SCALES - GENERAL
PAINLEVEL_OUTOF10: 4
PAINLEVEL_OUTOF10: 5

## 2023-08-14 ASSESSMENT — PAIN DESCRIPTION - LOCATION
LOCATION: FOOT
LOCATION: TOE (COMMENT WHICH ONE)

## 2023-08-14 ASSESSMENT — PAIN DESCRIPTION - DESCRIPTORS: DESCRIPTORS: ACHING

## 2023-08-14 ASSESSMENT — LIFESTYLE VARIABLES
HOW MANY STANDARD DRINKS CONTAINING ALCOHOL DO YOU HAVE ON A TYPICAL DAY: PATIENT DOES NOT DRINK
HOW OFTEN DO YOU HAVE A DRINK CONTAINING ALCOHOL: NEVER

## 2023-08-14 ASSESSMENT — PAIN SCALES - WONG BAKER: WONGBAKER_NUMERICALRESPONSE: 0

## 2023-08-14 ASSESSMENT — PAIN DESCRIPTION - ORIENTATION: ORIENTATION: RIGHT

## 2023-08-14 NOTE — PROGRESS NOTES
Patient was seen and evaluated. Ulcer to the right toe as noted to be deteriorating with bone exposed. Xray taken and osteomyelitis was seen. Discussed with patient I recommend going to [facility] "[employer] [facility] for evaluation of right foot ulcer.

## 2023-08-14 NOTE — PROGRESS NOTES
Extended Infusion of Cefepime/ Renal Dosing    Medication: Cefepime   Current regimen:  2000mg every 12 hours (over 30 min)    Recent Labs     08/14/23  1708   BUN 23*     Estimated CrCl:  76 mL/min    Plan: Change to Cefepime 2000mg q 8 hours (over 240 min) for indication of osteomyelitis/ DM foot infection  per [facility] P&T Committee Protocol with respect to renal function. Pharmacy will continue to monitor patient daily and will make dosage adjustments based upon changing renal function.

## 2023-08-14 NOTE — ED PROVIDER NOTES
Former   Vaping Use    Vaping Use: Never used   Substance Use Topics    Alcohol use: Not Currently    Drug use: Not Currently     Types: Marijuana Humberto Anna)       Allergies: Allergies   Allergen Reactions    Erythromycin Hives, Nausea And Vomiting and Nausea Only       PCP: Inge Gosselin    Current Meds:   Current Facility-Administered Medications   Medication Dose Route Frequency Provider Last Rate Last Admin    vancomycin (VANCOCIN) 2,500 mg in sodium chloride 0.9 % 500 mL IVPB  25 mg/kg IntraVENous Once Homar Stoll MD         Current Outpatient Medications   Medication Sig Dispense Refill    buPROPion (WELLBUTRIN XL) 300 MG extended release tablet Take 1 tablet by mouth daily      hydroCHLOROthiazide (HYDRODIURIL) 25 MG tablet Take 1 tablet by mouth daily      insulin glargine (LANTUS) 100 UNIT/ML injection vial Inject 35 Units into the skin daily      methotrexate (RHEUMATREX) 2.5 MG chemo tablet take 8 tablets by mouth ONCE A WEEK ON FRIDAY      diclofenac sodium (VOLTAREN) 1 % GEL APPLY 4 grams TO AFFECTED AREA 2 TO 4 times a day as needed for 15 days      gabapentin (NEURONTIN) 300 MG capsule INCREASE TO 2 CAPSULES BY MOUTH EVERY MORNING AND THEN 3 CAPSULES BY MOUTH EVERY NIGHT 150 capsule 5    atorvastatin (LIPITOR) 20 MG tablet One pill at night 90 tablet 3    lisinopril (PRINIVIL;ZESTRIL) 20 MG tablet take 1 tablet by mouth daily STOP MICARDIS HCTZ 90 tablet 3    insulin lispro (HUMALOG) 100 UNIT/ML SOLN injection vial To use via insulin pump, up to 100 units daily 100 mL 3    Glucagon (GVOKE HYPOPEN 2-PACK) 0.5 MG/0.1ML SOAJ Use as needed for hypoglycemia (one 2-pack) 2 each 1    blood glucose test strips (ONETOUCH VERIO) strip Use to check BG 3 times daily.  Dx code E11.65 Use as a temporary regimen until dexcom sensors are delivered 300 each 0    busPIRone (BUSPAR) 10 MG tablet daily      folic acid (FOLVITE) 1 MG tablet Take 1 tablet by mouth daily         Social Determinants of Health: nRBC 0.00 0.00 - 0.01 K/uL    Neutrophils % 76 (H) 32 - 75 %    Lymphocytes % 15 12 - 49 %    Monocytes % 6 5 - 13 %    Eosinophils % 2 0 - 7 %    Basophils % 1 0 - 1 %    Immature Granulocytes 0 0 - 0.5 %    Neutrophils Absolute 7.4 1.8 - 8.0 K/UL    Lymphocytes Absolute 1.5 0.8 - 3.5 K/UL    Monocytes Absolute 0.6 0.0 - 1.0 K/UL    Eosinophils Absolute 0.2 0.0 - 0.4 K/UL    Basophils Absolute 0.1 0.0 - 0.1 K/UL    Absolute Immature Granulocyte 0.0 0.00 - 0.04 K/UL    Differential Type AUTOMATED     Comprehensive Metabolic Panel    Collection Time: 08/14/23  5:08 PM   Result Value Ref Range    Sodium 137 136 - 145 mmol/L    Potassium 5.7 (H) 3.5 - 5.1 mmol/L    Chloride 110 (H) 97 - 108 mmol/L    CO2 22 21 - 32 mmol/L    Anion Gap 5 5 - 15 mmol/L    Glucose 204 (H) 65 - 100 mg/dL    BUN 23 (H) 6 - 20 mg/dL    Creatinine 1.55 (H) 0.70 - 1.30 mg/dL    Bun/Cre Ratio 15 12 - 20      Est, Glom Filt Rate 54 (L) >60 ml/min/1.73m2    Calcium 8.0 (L) 8.5 - 10.1 mg/dL    Total Bilirubin 0.4 0.2 - 1.0 mg/dL    AST 28 15 - 37 U/L    ALT 14 12 - 78 U/L    Alk Phosphatase 74 45 - 117 U/L    Total Protein 7.1 6.4 - 8.2 g/dL    Albumin 2.8 (L) 3.5 - 5.0 g/dL    Globulin 4.3 (H) 2.0 - 4.0 g/dL    Albumin/Globulin Ratio 0.7 (L) 1.1 - 2.2         EKG: Initial EKG interpreted by me if performed. See ED course below    Radiologic Studies:  Non-plain film images such as CT, Ultrasound and MRI are read by the radiologist. Plain radiographic images are visualized and preliminarily interpreted by the ED Provider with findings available in ED course below. Interpretation per the Radiologist below, if available at the time of this note:  XR FOOT RIGHT (MIN 3 VIEWS)   Final Result      Probable osteomyelitis of the fifth digit   Nonunited or malunited second third and fourth metatarsal neck fractures   Incomplete cortication of first mid metatarsal amputation site.            EMERGENCY DEPARTMENT COURSE and DIFFERENTIAL DIAGNOSIS/MDM
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
## 2023-08-14 NOTE — TELEPHONE ENCOUNTER
Was told to admit himself to the hospital, was he supposed to have an order? Also needs to know if he is able to wear insulin pump while in hospital. He would need an order for that. Please advise, thank you.      I transferred msg to [facility]

## 2023-08-15 PROBLEM — E11.628 CELLULITIS IN DIABETIC FOOT (HCC): Status: ACTIVE | Noted: 2023-08-15

## 2023-08-15 PROBLEM — L03.119 CELLULITIS IN DIABETIC FOOT (HCC): Status: ACTIVE | Noted: 2023-08-15

## 2023-08-15 PROBLEM — E10.10 DM (DIABETES MELLITUS) TYPE 1, UNCONTROLLED, WITH KETOACIDOSIS (HCC): Status: ACTIVE | Noted: 2023-08-15

## 2023-08-15 PROBLEM — M86.9 OSTEOMYELITIS OF RIGHT FOOT (HCC): Status: ACTIVE | Noted: 2023-08-15

## 2023-08-15 LAB
ANION GAP SERPL CALC-SCNC: 6 MMOL/L (ref 5–15)
BASOPHILS # BLD: 0.1 K/UL (ref 0–0.1)
BASOPHILS NFR BLD: 1 % (ref 0–1)
BUN SERPL-MCNC: 19 MG/DL (ref 6–20)
BUN/CREAT SERPL: 14 (ref 12–20)
CA-I BLD-MCNC: 9 MG/DL (ref 8.5–10.1)
CHLORIDE SERPL-SCNC: 110 MMOL/L (ref 97–108)
CO2 SERPL-SCNC: 24 MMOL/L (ref 21–32)
CREAT SERPL-MCNC: 1.38 MG/DL (ref 0.7–1.3)
DIFFERENTIAL METHOD BLD: ABNORMAL
EOSINOPHIL # BLD: 0.3 K/UL (ref 0–0.4)
EOSINOPHIL NFR BLD: 4 % (ref 0–7)
ERYTHROCYTE [DISTWIDTH] IN BLOOD BY AUTOMATED COUNT: 14.9 % (ref 11.5–14.5)
GLUCOSE BLD STRIP.AUTO-MCNC: 129 MG/DL (ref 65–100)
GLUCOSE BLD STRIP.AUTO-MCNC: 137 MG/DL (ref 65–100)
GLUCOSE BLD STRIP.AUTO-MCNC: 165 MG/DL (ref 65–100)
GLUCOSE BLD STRIP.AUTO-MCNC: 167 MG/DL (ref 65–100)
GLUCOSE SERPL-MCNC: 203 MG/DL (ref 65–100)
HCT VFR BLD AUTO: 38.6 % (ref 36.6–50.3)
HGB BLD-MCNC: 12.4 G/DL (ref 12.1–17)
IMM GRANULOCYTES # BLD AUTO: 0 K/UL (ref 0–0.04)
IMM GRANULOCYTES NFR BLD AUTO: 0 % (ref 0–0.5)
LACTATE SERPL-SCNC: 0.9 MMOL/L (ref 0.4–2)
LYMPHOCYTES # BLD: 1.8 K/UL (ref 0.8–3.5)
LYMPHOCYTES NFR BLD: 23 % (ref 12–49)
MCH RBC QN AUTO: 29.5 PG (ref 26–34)
MCHC RBC AUTO-ENTMCNC: 32.1 G/DL (ref 30–36.5)
MCV RBC AUTO: 91.7 FL (ref 80–99)
MONOCYTES # BLD: 0.5 K/UL (ref 0–1)
MONOCYTES NFR BLD: 6 % (ref 5–13)
NEUTS SEG # BLD: 5.1 K/UL (ref 1.8–8)
NEUTS SEG NFR BLD: 66 % (ref 32–75)
NRBC # BLD: 0 K/UL (ref 0–0.01)
NRBC BLD-RTO: 0 PER 100 WBC
PERFORMED BY:: ABNORMAL
PLATELET # BLD AUTO: 381 K/UL (ref 150–400)
PMV BLD AUTO: 9.7 FL (ref 8.9–12.9)
POTASSIUM SERPL-SCNC: 4.4 MMOL/L (ref 3.5–5.1)
RBC # BLD AUTO: 4.21 M/UL (ref 4.1–5.7)
SODIUM SERPL-SCNC: 140 MMOL/L (ref 136–145)
WBC # BLD AUTO: 7.8 K/UL (ref 4.1–11.1)

## 2023-08-15 PROCEDURE — 6370000000 HC RX 637 (ALT 250 FOR IP): Performed by: HOSPITALIST

## 2023-08-15 PROCEDURE — 6360000002 HC RX W HCPCS: Performed by: HOSPITALIST

## 2023-08-15 PROCEDURE — 36415 COLL VENOUS BLD VENIPUNCTURE: CPT

## 2023-08-15 PROCEDURE — 82962 GLUCOSE BLOOD TEST: CPT

## 2023-08-15 PROCEDURE — 1100000000 HC RM PRIVATE

## 2023-08-15 PROCEDURE — 80048 BASIC METABOLIC PNL TOTAL CA: CPT

## 2023-08-15 PROCEDURE — 99223 1ST HOSP IP/OBS HIGH 75: CPT | Performed by: PODIATRIST

## 2023-08-15 PROCEDURE — 85025 COMPLETE CBC W/AUTO DIFF WBC: CPT

## 2023-08-15 PROCEDURE — 83605 ASSAY OF LACTIC ACID: CPT

## 2023-08-15 PROCEDURE — 2580000003 HC RX 258: Performed by: HOSPITALIST

## 2023-08-15 PROCEDURE — 6360000002 HC RX W HCPCS: Performed by: PHYSICIAN ASSISTANT

## 2023-08-15 PROCEDURE — 99222 1ST HOSP IP/OBS MODERATE 55: CPT | Performed by: INTERNAL MEDICINE

## 2023-08-15 RX ORDER — OXYCODONE HYDROCHLORIDE AND ACETAMINOPHEN 5; 325 MG/1; MG/1
1 TABLET ORAL EVERY 6 HOURS PRN
Status: DISCONTINUED | OUTPATIENT
Start: 2023-08-15 | End: 2023-08-15

## 2023-08-15 RX ADMIN — FOLIC ACID 1 MG: 1 TABLET ORAL at 10:09

## 2023-08-15 RX ADMIN — BUSPIRONE HYDROCHLORIDE 15 MG: 10 TABLET ORAL at 21:50

## 2023-08-15 RX ADMIN — METRONIDAZOLE 500 MG: 500 INJECTION, SOLUTION INTRAVENOUS at 10:11

## 2023-08-15 RX ADMIN — METRONIDAZOLE 500 MG: 500 INJECTION, SOLUTION INTRAVENOUS at 02:37

## 2023-08-15 RX ADMIN — SODIUM CHLORIDE, PRESERVATIVE FREE 10 ML: 5 INJECTION INTRAVENOUS at 10:11

## 2023-08-15 RX ADMIN — GABAPENTIN 900 MG: 300 CAPSULE ORAL at 10:10

## 2023-08-15 RX ADMIN — CEFEPIME 2000 MG: 2 INJECTION, POWDER, FOR SOLUTION INTRAVENOUS at 04:48

## 2023-08-15 RX ADMIN — HYDROMORPHONE HYDROCHLORIDE 0.5 MG: 1 INJECTION, SOLUTION INTRAMUSCULAR; INTRAVENOUS; SUBCUTANEOUS at 23:08

## 2023-08-15 RX ADMIN — BUPROPION HYDROCHLORIDE 300 MG: 300 TABLET, FILM COATED, EXTENDED RELEASE ORAL at 10:09

## 2023-08-15 RX ADMIN — ATORVASTATIN CALCIUM 20 MG: 20 TABLET, FILM COATED ORAL at 21:50

## 2023-08-15 RX ADMIN — ENOXAPARIN SODIUM 30 MG: 100 INJECTION SUBCUTANEOUS at 10:10

## 2023-08-15 RX ADMIN — SODIUM CHLORIDE, PRESERVATIVE FREE 10 ML: 5 INJECTION INTRAVENOUS at 21:52

## 2023-08-15 RX ADMIN — CEFEPIME 2000 MG: 2 INJECTION, POWDER, FOR SOLUTION INTRAVENOUS at 15:58

## 2023-08-15 RX ADMIN — HYDROMORPHONE HYDROCHLORIDE 0.5 MG: 1 INJECTION, SOLUTION INTRAMUSCULAR; INTRAVENOUS; SUBCUTANEOUS at 18:31

## 2023-08-15 RX ADMIN — VANCOMYCIN HYDROCHLORIDE 1500 MG: 750 INJECTION, POWDER, LYOPHILIZED, FOR SOLUTION INTRAVENOUS at 21:50

## 2023-08-15 RX ADMIN — BUSPIRONE HYDROCHLORIDE 15 MG: 10 TABLET ORAL at 10:09

## 2023-08-15 RX ADMIN — GABAPENTIN 900 MG: 300 CAPSULE ORAL at 21:50

## 2023-08-15 ASSESSMENT — PAIN SCALES - GENERAL
PAINLEVEL_OUTOF10: 2
PAINLEVEL_OUTOF10: 0
PAINLEVEL_OUTOF10: 8
PAINLEVEL_OUTOF10: 6

## 2023-08-15 ASSESSMENT — PAIN DESCRIPTION - LOCATION
LOCATION: FOOT

## 2023-08-15 ASSESSMENT — PAIN DESCRIPTION - ORIENTATION
ORIENTATION: RIGHT
ORIENTATION: RIGHT
ORIENTATION: RIGHT;LOWER

## 2023-08-15 ASSESSMENT — PAIN DESCRIPTION - DESCRIPTORS
DESCRIPTORS: DULL;THROBBING
DESCRIPTORS: ACHING
DESCRIPTORS: ACHING

## 2023-08-15 ASSESSMENT — ENCOUNTER SYMPTOMS
DIARRHEA: 0
VOMITING: 0
SHORTNESS OF BREATH: 0
ABDOMINAL PAIN: 0
NAUSEA: 0

## 2023-08-15 ASSESSMENT — PAIN DESCRIPTION - PAIN TYPE: TYPE: ACUTE PAIN

## 2023-08-15 ASSESSMENT — PAIN DESCRIPTION - ONSET: ONSET: GRADUAL

## 2023-08-15 ASSESSMENT — PAIN - FUNCTIONAL ASSESSMENT
PAIN_FUNCTIONAL_ASSESSMENT: ACTIVITIES ARE NOT PREVENTED
PAIN_FUNCTIONAL_ASSESSMENT: ACTIVITIES ARE NOT PREVENTED

## 2023-08-15 ASSESSMENT — PAIN DESCRIPTION - FREQUENCY: FREQUENCY: INTERMITTENT

## 2023-08-15 NOTE — PLAN OF CARE
Problem: Pain  Goal: Verbalizes/displays adequate comfort level or baseline comfort level  8/15/2023 1203 by Markell Cervantes LPN  Outcome: Progressing  8/14/2023 2342 by Vladimir Mariee RN  Outcome: Progressing     Problem: Safety - Adult  Goal: Free from fall injury  8/15/2023 1203 by Markell Cervantes LPN  Outcome: Progressing  8/14/2023 2342 by Vladimir Mariee RN  Outcome: Progressing

## 2023-08-15 NOTE — CARE COORDINATION
0646: CM has reviewed pt chart    Pt NPO, MRI R foot pending.  Podiatry and ID have been consulted     DCP: home self care (by default)

## 2023-08-15 NOTE — PROGRESS NOTES
Hospitalist Progress Note    NAME:   Erna Roman   : 1970   MRN: [id number]     Date/Time: 8/15/2023 9:43 AM  Patient PCP: Chey Beebe    Estimated discharge date:2023  Barriers: Osteomyelitis, cleared by podiatry      Assessment / Plan:  Chronic diabetic foot ulcer with suspected osteomyelitis - right 5th digit   Afebrile, hemodynamically stable, normal WBC count  Procalcitonin - 0.28   ID following:  - Vancomycin, cefepime per previous cultures  - Flagyl d/c per   Blood and wound cultures pending   MRI of right foot pending   Podiatry consult    CKD stage III with increased Cr  Cr improved today: 1.55 --> 1.67 --> 1.36  Continue to monitor Cr  Gentle fluids     Elevated potassium - resolved   Potassium on  was 5.7, sample hemolyzed  Repeat potassium 4.0 and 4.4    H/o essential HTN  HCTZ and lisinopril held  Hydralazine if SBP >170     H/o type 1 DM with insulin pump  Continue insulin pump    6. H/o HLD  Atorvastatin     7. H/o Neuropathy   Gabapentin         Medical Decision Making:   I personally reviewed labs:CBC, CMP, cultures, glucose  I personally reviewed imaging: XR right foot  Toxic drug monitoring: None   Discussed case with:         Code Status: FULL  DVT Prophylaxis: Lovenox   GI Prophylaxis:Maalox     Subjective:     Chief Complaint / Reason for Physician Visit  Patient here for right foot 5th digit osteomyelitis. He was admitted from ED  after being sent by podiatry. He states he first noticed his chronic ulcer looked suspicious for infection on . He has minimal pain in the area and states he is otherwise feeling well. He has chronic right LE edema, no change in his baseline. Denies any fever, chills, chest pain, headaches. Discussed with RN events overnight. Objective:     VITALS:   Last 24hrs VS reviewed since prior progress note.  Most recent are:  Patient Vitals for the past 24 hrs:   BP Temp Temp src Pulse Resp SpO2 Height Weight   08/15/23 0737 (!)

## 2023-08-15 NOTE — CARE COORDINATION
08/15/23 1123   Service Assessment   Patient Orientation Alert and Oriented   Cognition Alert   History Provided By Patient   Primary Caregiver Self   Accompanied By/Relationship pt alone   Support Systems Parent; Children;Friends/Neighbors   Patient's Healthcare Decision Maker is: Legal Next of Kin   PCP Verified by CM Yes   Last Visit to PCP Within last 3 months   Prior Functional Level Independent in ADLs/IADLs   Current Functional Level Independent in ADLs/IADLs   Can patient return to prior living arrangement Yes   Ability to make needs known: Good   Family able to assist with home care needs: Yes   Would you like for me to discuss the discharge plan with any other family members/significant others, and if so, who? Yes  (Lucretia Hernandez to discuss with mother Jorge Souza)   Financial Resources Medicaid   Community Resources None   CM/SW Referral   (n/a)   Social/Functional History   Lives With Daughter; Other (comment)  (minor daughter and roommate)   Type of [address] [place] [address] Two level   [address] to enter with rails   Entrance Stairs - Number of Steps 4   Entrance Stairs - [address] [facility]. Needs assistance  (Uses cane, walker, wc)   Transfer Assistance Independent   Active  Yes   Occupation Unemployed  (Waiting to get disability)   Discharge Planning   Type of Residence House   Living Arrangements Children;Friends   Current Services Prior To Admission C-pap   Potential Assistance Needed N/A   DME Ordered? No   Potential Assistance Purchasing Medications No   Patient expects to be discharged to: House   One/Two Story Residence Two story   Lift Chair Available No   History of falls? 0   Services At/After Discharge   Transition of Care Consult (CM Consult) N/A   Services At/After Discharge None   [employer] Information Provided?  No   Confirm Follow Up Transport Family     CM met pt at bedside to verify

## 2023-08-16 ENCOUNTER — ANESTHESIA EVENT (OUTPATIENT)
Facility: HOSPITAL | Age: 53
DRG: 305 | End: 2023-08-16
Payer: COMMERCIAL

## 2023-08-16 ENCOUNTER — HOSPITAL ENCOUNTER (INPATIENT)
Facility: HOSPITAL | Age: 53
Discharge: HOME | End: 2023-08-19
Payer: COMMERCIAL

## 2023-08-16 ENCOUNTER — ANESTHESIA (OUTPATIENT)
Facility: HOSPITAL | Age: 53
DRG: 305 | End: 2023-08-16
Payer: COMMERCIAL

## 2023-08-16 PROBLEM — L97.509 FOOT ULCER DUE TO SECONDARY DM (HCC): Status: ACTIVE | Noted: 2023-08-16

## 2023-08-16 PROBLEM — L97.514 DIABETIC ULCER OF TOE OF RIGHT FOOT ASSOCIATED WITH DIABETES MELLITUS DUE TO UNDERLYING CONDITION, WITH NECROSIS OF BONE (HCC): Status: ACTIVE | Noted: 2023-08-14

## 2023-08-16 PROBLEM — L03.119 CELLULITIS IN DIABETIC FOOT (HCC): Status: ACTIVE | Noted: 2023-08-16

## 2023-08-16 PROBLEM — E11.628 CELLULITIS IN DIABETIC FOOT (HCC): Status: ACTIVE | Noted: 2023-08-16

## 2023-08-16 PROBLEM — E08.621 DIABETIC ULCER OF TOE OF RIGHT FOOT ASSOCIATED WITH DIABETES MELLITUS DUE TO UNDERLYING CONDITION, WITH NECROSIS OF BONE (HCC): Status: ACTIVE | Noted: 2023-08-14

## 2023-08-16 PROBLEM — L03.115 CELLULITIS OF RIGHT FOOT: Status: ACTIVE | Noted: 2023-08-15

## 2023-08-16 PROBLEM — E13.621 FOOT ULCER DUE TO SECONDARY DM (HCC): Status: ACTIVE | Noted: 2023-08-16

## 2023-08-16 LAB
EST. AVERAGE GLUCOSE BLD GHB EST-MCNC: 171 MG/DL
GLUCOSE BLD STRIP.AUTO-MCNC: 190 MG/DL (ref 65–100)
GLUCOSE BLD STRIP.AUTO-MCNC: 249 MG/DL (ref 65–100)
GLUCOSE BLD STRIP.AUTO-MCNC: 310 MG/DL (ref 65–100)
GLUCOSE BLD STRIP.AUTO-MCNC: 92 MG/DL (ref 65–100)
HBA1C MFR BLD: 7.6 % (ref 4–5.6)
PERFORMED BY:: ABNORMAL
PERFORMED BY:: NORMAL
VANCOMYCIN SERPL-MCNC: 21.4 UG/ML

## 2023-08-16 PROCEDURE — 2580000003 HC RX 258: Performed by: INTERNAL MEDICINE

## 2023-08-16 PROCEDURE — 2580000003 HC RX 258: Performed by: HOSPITALIST

## 2023-08-16 PROCEDURE — 7100000001 HC PACU RECOVERY - ADDTL 15 MIN: Performed by: PODIATRIST

## 2023-08-16 PROCEDURE — 6360000002 HC RX W HCPCS: Performed by: PHYSICIAN ASSISTANT

## 2023-08-16 PROCEDURE — 27606 INCISION OF ACHILLES TENDON: CPT | Performed by: PODIATRIST

## 2023-08-16 PROCEDURE — 36415 COLL VENOUS BLD VENIPUNCTURE: CPT

## 2023-08-16 PROCEDURE — 80202 ASSAY OF VANCOMYCIN: CPT

## 2023-08-16 PROCEDURE — 7100000000 HC PACU RECOVERY - FIRST 15 MIN: Performed by: PODIATRIST

## 2023-08-16 PROCEDURE — 3700000001 HC ADD 15 MINUTES (ANESTHESIA): Performed by: PODIATRIST

## 2023-08-16 PROCEDURE — 28805 AMPUTATION THRU METATARSAL: CPT | Performed by: PODIATRIST

## 2023-08-16 PROCEDURE — 2500000003 HC RX 250 WO HCPCS: Performed by: NURSE ANESTHETIST, CERTIFIED REGISTERED

## 2023-08-16 PROCEDURE — 73718 MRI LOWER EXTREMITY W/O DYE: CPT

## 2023-08-16 PROCEDURE — 6370000000 HC RX 637 (ALT 250 FOR IP): Performed by: HOSPITALIST

## 2023-08-16 PROCEDURE — 3600000002 HC SURGERY LEVEL 2 BASE: Performed by: PODIATRIST

## 2023-08-16 PROCEDURE — 0Y6M0ZC DETACHMENT AT RIGHT FOOT, PARTIAL 3RD RAY, OPEN APPROACH: ICD-10-PCS | Performed by: PODIATRIST

## 2023-08-16 PROCEDURE — 0L8N0ZZ DIVISION OF RIGHT LOWER LEG TENDON, OPEN APPROACH: ICD-10-PCS | Performed by: PODIATRIST

## 2023-08-16 PROCEDURE — 0Y6M0ZB DETACHMENT AT RIGHT FOOT, PARTIAL 2ND RAY, OPEN APPROACH: ICD-10-PCS | Performed by: PODIATRIST

## 2023-08-16 PROCEDURE — 2500000003 HC RX 250 WO HCPCS: Performed by: ANESTHESIOLOGY

## 2023-08-16 PROCEDURE — 3700000000 HC ANESTHESIA ATTENDED CARE: Performed by: PODIATRIST

## 2023-08-16 PROCEDURE — 99232 SBSQ HOSP IP/OBS MODERATE 35: CPT | Performed by: INTERNAL MEDICINE

## 2023-08-16 PROCEDURE — 1100000000 HC RM PRIVATE

## 2023-08-16 PROCEDURE — 0Y6M0ZF DETACHMENT AT RIGHT FOOT, PARTIAL 5TH RAY, OPEN APPROACH: ICD-10-PCS | Performed by: PODIATRIST

## 2023-08-16 PROCEDURE — 6360000002 HC RX W HCPCS: Performed by: NURSE ANESTHETIST, CERTIFIED REGISTERED

## 2023-08-16 PROCEDURE — 2709999900 HC NON-CHARGEABLE SUPPLY: Performed by: PODIATRIST

## 2023-08-16 PROCEDURE — 0Y6M0ZD DETACHMENT AT RIGHT FOOT, PARTIAL 4TH RAY, OPEN APPROACH: ICD-10-PCS | Performed by: PODIATRIST

## 2023-08-16 PROCEDURE — 82962 GLUCOSE BLOOD TEST: CPT

## 2023-08-16 PROCEDURE — 3600000012 HC SURGERY LEVEL 2 ADDTL 15MIN: Performed by: PODIATRIST

## 2023-08-16 PROCEDURE — 6360000002 HC RX W HCPCS: Performed by: HOSPITALIST

## 2023-08-16 PROCEDURE — 88311 DECALCIFY TISSUE: CPT

## 2023-08-16 PROCEDURE — 88305 TISSUE EXAM BY PATHOLOGIST: CPT

## 2023-08-16 PROCEDURE — 6370000000 HC RX 637 (ALT 250 FOR IP): Performed by: PHYSICIAN ASSISTANT

## 2023-08-16 PROCEDURE — 6360000002 HC RX W HCPCS: Performed by: INTERNAL MEDICINE

## 2023-08-16 PROCEDURE — 83036 HEMOGLOBIN GLYCOSYLATED A1C: CPT

## 2023-08-16 RX ORDER — OXYCODONE HYDROCHLORIDE 5 MG/1
10 TABLET ORAL PRN
Status: DISCONTINUED | OUTPATIENT
Start: 2023-08-16 | End: 2023-08-16 | Stop reason: HOSPADM

## 2023-08-16 RX ORDER — HYDROMORPHONE HYDROCHLORIDE 1 MG/ML
INJECTION, SOLUTION INTRAMUSCULAR; INTRAVENOUS; SUBCUTANEOUS PRN
Status: DISCONTINUED | OUTPATIENT
Start: 2023-08-16 | End: 2023-08-16 | Stop reason: SDUPTHER

## 2023-08-16 RX ORDER — FENTANYL CITRATE 50 UG/ML
50 INJECTION, SOLUTION INTRAMUSCULAR; INTRAVENOUS EVERY 5 MIN PRN
Status: DISCONTINUED | OUTPATIENT
Start: 2023-08-16 | End: 2023-08-16 | Stop reason: HOSPADM

## 2023-08-16 RX ORDER — MORPHINE SULFATE 15 MG/1
15 TABLET ORAL ONCE AS NEEDED
Status: DISCONTINUED | OUTPATIENT
Start: 2023-08-16 | End: 2023-08-18 | Stop reason: HOSPADM

## 2023-08-16 RX ORDER — HYDROMORPHONE HYDROCHLORIDE 1 MG/ML
1 INJECTION, SOLUTION INTRAMUSCULAR; INTRAVENOUS; SUBCUTANEOUS ONCE
Status: COMPLETED | OUTPATIENT
Start: 2023-08-16 | End: 2023-08-16

## 2023-08-16 RX ORDER — LORAZEPAM 2 MG/ML
0.5 INJECTION INTRAMUSCULAR
Status: DISCONTINUED | OUTPATIENT
Start: 2023-08-16 | End: 2023-08-16 | Stop reason: HOSPADM

## 2023-08-16 RX ORDER — INSULIN GLARGINE 100 [IU]/ML
10 INJECTION, SOLUTION SUBCUTANEOUS ONCE
Status: COMPLETED | OUTPATIENT
Start: 2023-08-16 | End: 2023-08-16

## 2023-08-16 RX ORDER — IPRATROPIUM BROMIDE AND ALBUTEROL SULFATE 2.5; .5 MG/3ML; MG/3ML
1 SOLUTION RESPIRATORY (INHALATION)
Status: DISCONTINUED | OUTPATIENT
Start: 2023-08-16 | End: 2023-08-16 | Stop reason: HOSPADM

## 2023-08-16 RX ORDER — LABETALOL HYDROCHLORIDE 5 MG/ML
10 INJECTION, SOLUTION INTRAVENOUS EVERY 10 MIN PRN
Status: DISCONTINUED | OUTPATIENT
Start: 2023-08-16 | End: 2023-08-18 | Stop reason: HOSPADM

## 2023-08-16 RX ORDER — HYDROMORPHONE HYDROCHLORIDE 2 MG/1
1 TABLET ORAL ONCE AS NEEDED
Status: COMPLETED | OUTPATIENT
Start: 2023-08-16 | End: 2023-08-17

## 2023-08-16 RX ORDER — INSULIN LISPRO 100 [IU]/ML
0-4 INJECTION, SOLUTION INTRAVENOUS; SUBCUTANEOUS NIGHTLY
Status: DISCONTINUED | OUTPATIENT
Start: 2023-08-16 | End: 2023-08-18 | Stop reason: HOSPADM

## 2023-08-16 RX ORDER — HYDRALAZINE HYDROCHLORIDE 20 MG/ML
10 INJECTION INTRAMUSCULAR; INTRAVENOUS
Status: DISCONTINUED | OUTPATIENT
Start: 2023-08-16 | End: 2023-08-16 | Stop reason: HOSPADM

## 2023-08-16 RX ORDER — METOCLOPRAMIDE HYDROCHLORIDE 5 MG/ML
10 INJECTION INTRAMUSCULAR; INTRAVENOUS
Status: DISCONTINUED | OUTPATIENT
Start: 2023-08-16 | End: 2023-08-16 | Stop reason: HOSPADM

## 2023-08-16 RX ORDER — OXYCODONE HYDROCHLORIDE 5 MG/1
5 TABLET ORAL ONCE AS NEEDED
Status: DISCONTINUED | OUTPATIENT
Start: 2023-08-16 | End: 2023-08-18

## 2023-08-16 RX ORDER — HYDROMORPHONE HYDROCHLORIDE 1 MG/ML
0.5 INJECTION, SOLUTION INTRAMUSCULAR; INTRAVENOUS; SUBCUTANEOUS EVERY 5 MIN PRN
Status: DISCONTINUED | OUTPATIENT
Start: 2023-08-16 | End: 2023-08-16 | Stop reason: HOSPADM

## 2023-08-16 RX ORDER — PROPOFOL 10 MG/ML
INJECTION, EMULSION INTRAVENOUS PRN
Status: DISCONTINUED | OUTPATIENT
Start: 2023-08-16 | End: 2023-08-16 | Stop reason: SDUPTHER

## 2023-08-16 RX ORDER — HYDRALAZINE HYDROCHLORIDE 20 MG/ML
10 INJECTION INTRAMUSCULAR; INTRAVENOUS EVERY 10 MIN PRN
Status: DISCONTINUED | OUTPATIENT
Start: 2023-08-16 | End: 2023-08-18 | Stop reason: HOSPADM

## 2023-08-16 RX ORDER — ONDANSETRON 2 MG/ML
4 INJECTION INTRAMUSCULAR; INTRAVENOUS
Status: DISCONTINUED | OUTPATIENT
Start: 2023-08-16 | End: 2023-08-16 | Stop reason: HOSPADM

## 2023-08-16 RX ORDER — MIDAZOLAM HYDROCHLORIDE 1 MG/ML
INJECTION INTRAMUSCULAR; INTRAVENOUS PRN
Status: DISCONTINUED | OUTPATIENT
Start: 2023-08-16 | End: 2023-08-16 | Stop reason: SDUPTHER

## 2023-08-16 RX ORDER — METOPROLOL TARTRATE 5 MG/5ML
5 INJECTION INTRAVENOUS EVERY 10 MIN PRN
Status: DISCONTINUED | OUTPATIENT
Start: 2023-08-16 | End: 2023-08-18 | Stop reason: HOSPADM

## 2023-08-16 RX ORDER — SODIUM CHLORIDE, SODIUM LACTATE, POTASSIUM CHLORIDE, CALCIUM CHLORIDE 600; 310; 30; 20 MG/100ML; MG/100ML; MG/100ML; MG/100ML
INJECTION, SOLUTION INTRAVENOUS ONCE
Status: DISCONTINUED | OUTPATIENT
Start: 2023-08-16 | End: 2023-08-16 | Stop reason: HOSPADM

## 2023-08-16 RX ORDER — DIPHENHYDRAMINE HYDROCHLORIDE 50 MG/ML
12.5 INJECTION INTRAMUSCULAR; INTRAVENOUS
Status: DISCONTINUED | OUTPATIENT
Start: 2023-08-16 | End: 2023-08-16 | Stop reason: HOSPADM

## 2023-08-16 RX ORDER — OXYCODONE HYDROCHLORIDE 5 MG/1
5 TABLET ORAL PRN
Status: DISCONTINUED | OUTPATIENT
Start: 2023-08-16 | End: 2023-08-16 | Stop reason: HOSPADM

## 2023-08-16 RX ORDER — MEPERIDINE HYDROCHLORIDE 25 MG/ML
12.5 INJECTION INTRAMUSCULAR; INTRAVENOUS; SUBCUTANEOUS EVERY 5 MIN PRN
Status: DISCONTINUED | OUTPATIENT
Start: 2023-08-16 | End: 2023-08-16 | Stop reason: HOSPADM

## 2023-08-16 RX ORDER — LABETALOL HYDROCHLORIDE 5 MG/ML
10 INJECTION, SOLUTION INTRAVENOUS
Status: DISCONTINUED | OUTPATIENT
Start: 2023-08-16 | End: 2023-08-16 | Stop reason: HOSPADM

## 2023-08-16 RX ORDER — PHENYLEPHRINE HYDROCHLORIDE 10 MG/ML
INJECTION INTRAVENOUS PRN
Status: DISCONTINUED | OUTPATIENT
Start: 2023-08-16 | End: 2023-08-16 | Stop reason: SDUPTHER

## 2023-08-16 RX ORDER — SODIUM CHLORIDE 0.9 % (FLUSH) 0.9 %
5-40 SYRINGE (ML) INJECTION EVERY 12 HOURS SCHEDULED
Status: DISCONTINUED | OUTPATIENT
Start: 2023-08-16 | End: 2023-08-16 | Stop reason: HOSPADM

## 2023-08-16 RX ORDER — SODIUM CHLORIDE 9 MG/ML
INJECTION, SOLUTION INTRAVENOUS PRN
Status: DISCONTINUED | OUTPATIENT
Start: 2023-08-16 | End: 2023-08-16 | Stop reason: HOSPADM

## 2023-08-16 RX ORDER — ACETAMINOPHEN 500 MG
1000 TABLET ORAL ONCE AS NEEDED
Status: COMPLETED | OUTPATIENT
Start: 2023-08-16 | End: 2023-08-17

## 2023-08-16 RX ORDER — INSULIN LISPRO 100 [IU]/ML
0-4 INJECTION, SOLUTION INTRAVENOUS; SUBCUTANEOUS EVERY 4 HOURS
Status: DISCONTINUED | OUTPATIENT
Start: 2023-08-16 | End: 2023-08-18 | Stop reason: HOSPADM

## 2023-08-16 RX ORDER — SODIUM CHLORIDE 0.9 % (FLUSH) 0.9 %
5-40 SYRINGE (ML) INJECTION PRN
Status: DISCONTINUED | OUTPATIENT
Start: 2023-08-16 | End: 2023-08-16 | Stop reason: HOSPADM

## 2023-08-16 RX ORDER — DEXTROSE MONOHYDRATE 100 MG/ML
INJECTION, SOLUTION INTRAVENOUS CONTINUOUS PRN
Status: DISCONTINUED | OUTPATIENT
Start: 2023-08-16 | End: 2023-08-18 | Stop reason: HOSPADM

## 2023-08-16 RX ADMIN — FOLIC ACID 1 MG: 1 TABLET ORAL at 09:35

## 2023-08-16 RX ADMIN — GABAPENTIN 900 MG: 300 CAPSULE ORAL at 09:34

## 2023-08-16 RX ADMIN — SODIUM CHLORIDE, PRESERVATIVE FREE 10 ML: 5 INJECTION INTRAVENOUS at 10:04

## 2023-08-16 RX ADMIN — CEFEPIME 2000 MG: 2 INJECTION, POWDER, FOR SOLUTION INTRAVENOUS at 10:04

## 2023-08-16 RX ADMIN — PHENYLEPHRINE HYDROCHLORIDE 200 MCG: 10 INJECTION INTRAVENOUS at 20:28

## 2023-08-16 RX ADMIN — ONDANSETRON 4 MG: 2 INJECTION INTRAMUSCULAR; INTRAVENOUS at 20:00

## 2023-08-16 RX ADMIN — BUSPIRONE HYDROCHLORIDE 15 MG: 10 TABLET ORAL at 09:35

## 2023-08-16 RX ADMIN — HYDROMORPHONE HYDROCHLORIDE 0.5 MG: 1 INJECTION, SOLUTION INTRAMUSCULAR; INTRAVENOUS; SUBCUTANEOUS at 13:49

## 2023-08-16 RX ADMIN — MIDAZOLAM HYDROCHLORIDE 2 MG: 2 INJECTION, SOLUTION INTRAMUSCULAR; INTRAVENOUS at 20:00

## 2023-08-16 RX ADMIN — BUPROPION HYDROCHLORIDE 300 MG: 300 TABLET, FILM COATED, EXTENDED RELEASE ORAL at 09:35

## 2023-08-16 RX ADMIN — PHENYLEPHRINE HYDROCHLORIDE 200 MCG: 10 INJECTION INTRAVENOUS at 20:18

## 2023-08-16 RX ADMIN — CEFEPIME 2000 MG: 2 INJECTION, POWDER, FOR SOLUTION INTRAVENOUS at 00:17

## 2023-08-16 RX ADMIN — HYDROMORPHONE HYDROCHLORIDE 0.5 MG: 1 INJECTION, SOLUTION INTRAMUSCULAR; INTRAVENOUS; SUBCUTANEOUS at 03:08

## 2023-08-16 RX ADMIN — HYDROMORPHONE HYDROCHLORIDE 1 MG: 1 INJECTION, SOLUTION INTRAMUSCULAR; INTRAVENOUS; SUBCUTANEOUS at 18:54

## 2023-08-16 RX ADMIN — PHENYLEPHRINE HYDROCHLORIDE 200 MCG: 10 INJECTION INTRAVENOUS at 20:46

## 2023-08-16 RX ADMIN — HYDROMORPHONE HYDROCHLORIDE 0.5 MG: 1 INJECTION, SOLUTION INTRAMUSCULAR; INTRAVENOUS; SUBCUTANEOUS at 09:17

## 2023-08-16 RX ADMIN — SODIUM CHLORIDE, PRESERVATIVE FREE 10 ML: 5 INJECTION INTRAVENOUS at 22:05

## 2023-08-16 RX ADMIN — VANCOMYCIN HYDROCHLORIDE 1000 MG: 1 INJECTION, POWDER, LYOPHILIZED, FOR SOLUTION INTRAVENOUS at 11:34

## 2023-08-16 RX ADMIN — HYDROMORPHONE HYDROCHLORIDE 1 MG: 1 INJECTION, SOLUTION INTRAMUSCULAR; INTRAVENOUS; SUBCUTANEOUS at 21:07

## 2023-08-16 RX ADMIN — PROPOFOL 200 MG: 10 INJECTION, EMULSION INTRAVENOUS at 20:03

## 2023-08-16 RX ADMIN — PHENYLEPHRINE HYDROCHLORIDE 100 MCG: 10 INJECTION INTRAVENOUS at 20:21

## 2023-08-16 RX ADMIN — ATORVASTATIN CALCIUM 20 MG: 20 TABLET, FILM COATED ORAL at 22:02

## 2023-08-16 RX ADMIN — LIDOCAINE HYDROCHLORIDE 100 MG: 20 INJECTION, SOLUTION EPIDURAL; INFILTRATION; INTRACAUDAL; PERINEURAL at 20:03

## 2023-08-16 RX ADMIN — INSULIN LISPRO 3 UNITS: 100 INJECTION, SOLUTION INTRAVENOUS; SUBCUTANEOUS at 14:06

## 2023-08-16 RX ADMIN — BUSPIRONE HYDROCHLORIDE 15 MG: 10 TABLET ORAL at 22:02

## 2023-08-16 RX ADMIN — GABAPENTIN 900 MG: 300 CAPSULE ORAL at 22:01

## 2023-08-16 RX ADMIN — VANCOMYCIN HYDROCHLORIDE 1000 MG: 1 INJECTION, POWDER, LYOPHILIZED, FOR SOLUTION INTRAVENOUS at 23:19

## 2023-08-16 RX ADMIN — PHENYLEPHRINE HYDROCHLORIDE 200 MCG: 10 INJECTION INTRAVENOUS at 21:07

## 2023-08-16 RX ADMIN — INSULIN GLARGINE 10 UNITS: 100 INJECTION, SOLUTION SUBCUTANEOUS at 09:20

## 2023-08-16 RX ADMIN — CEFEPIME 2000 MG: 2 INJECTION, POWDER, FOR SOLUTION INTRAVENOUS at 20:09

## 2023-08-16 ASSESSMENT — ENCOUNTER SYMPTOMS
VOMITING: 0
ABDOMINAL PAIN: 0
SHORTNESS OF BREATH: 0
NAUSEA: 0
DIARRHEA: 0

## 2023-08-16 ASSESSMENT — PAIN SCALES - GENERAL
PAINLEVEL_OUTOF10: 7
PAINLEVEL_OUTOF10: 8
PAINLEVEL_OUTOF10: 2
PAINLEVEL_OUTOF10: 3
PAINLEVEL_OUTOF10: 0
PAINLEVEL_OUTOF10: 8

## 2023-08-16 ASSESSMENT — PAIN DESCRIPTION - LOCATION
LOCATION: FOOT

## 2023-08-16 ASSESSMENT — PAIN DESCRIPTION - DESCRIPTORS
DESCRIPTORS: ACHING

## 2023-08-16 ASSESSMENT — PAIN DESCRIPTION - ORIENTATION
ORIENTATION: RIGHT

## 2023-08-16 ASSESSMENT — PAIN - FUNCTIONAL ASSESSMENT: PAIN_FUNCTIONAL_ASSESSMENT: ACTIVITIES ARE NOT PREVENTED

## 2023-08-16 ASSESSMENT — PAIN SCALES - WONG BAKER: WONGBAKER_NUMERICALRESPONSE: 0

## 2023-08-16 NOTE — PROGRESS NOTES
Hospitalist Progress Note    NAME:   Major Narvaez   : 1970   MRN: [id number]     Date/Time: 2023 8:32 AM  Patient PCP: Ramona Odom    Estimated discharge date:2023  Barriers: Osteomyelitis, cleared by podiatry      Assessment / Plan:  Chronic diabetic foot ulcer with suspected osteomyelitis - right 5th digit   Afebrile, hemodynamically stable, normal WBC count  Procalcitonin - 0.28   ID following:  - Vancomycin, cefepime per previous cultures  - Flagyl d/c per   Blood and wound cultures pending   MRI of right foot pending   Podiatry consult    CKD stage III with increased Cr  Cr improved today: 1.55 --> 1.67 --> 1.36  Continue to monitor Cr  Gentle fluids     Elevated potassium - resolved   Potassium on  was 5.7, sample hemolyzed  Repeat potassium 4.0 and 4.4    H/o essential HTN  HCTZ and lisinopril held  Hydralazine if SBP >170     H/o type 1 DM with insulin pump  Continue insulin pump    6. H/o HLD  Atorvastatin     7. H/o Neuropathy   Gabapentin         Medical Decision Making:   I personally reviewed labs:CBC, CMP, cultures, glucose  I personally reviewed imaging: XR right foot  Toxic drug monitoring: None   Discussed case with:         Code Status: FULL  DVT Prophylaxis: Lovenox   GI Prophylaxis:none       Subjective:     Chief Complaint / Reason for Physician Visit  Patient here for right foot 5th digit osteomyelitis. He was admitted from ED  after being sent by podiatry. He states he first noticed his chronic ulcer looked suspicious for infection on . He has minimal pain in the area and states he is otherwise feeling well. He has chronic right LE edema, no change in his baseline. Denies any fever, chills, chest pain, headaches. Discussed with RN events overnight. Objective:     VITALS:   Last 24hrs VS reviewed since prior progress note.  Most recent are:  Patient Vitals for the past 24 hrs:   BP Temp Temp src Pulse Resp SpO2   23 0338 -- -- -- -- 17 --

## 2023-08-16 NOTE — PROGRESS NOTES
Reviewed most current lab test results and cultures  YES  Reviewed most current radiology test results   YES  Review and summation of old records today    NO  Reviewed patient's current orders and MAR    YES  PMH/SH reviewed - no change compared to H&P  ________________________________________________________________________  Care Plan discussed with:    Comments   Patient x    Family      RN x    Care Manager     Consultant                        Multidiciplinary team rounds were held today with , nursing, pharmacist and clinical coordinator. Patient's plan of care was discussed; medications were reviewed and discharge planning was addressed. ________________________________________________________________________  Total NON critical care TIME:  35  Minutes    Total CRITICAL CARE TIME Spent:   Minutes non procedure based      Comments   >50% of visit spent in counseling and coordination of care     ________________________________________________________________________  Ajay Erazo PA-C     Procedures: see electronic medical records for all procedures/Xrays and details which were not copied into this note but were reviewed prior to creation of Plan. LABS:  I reviewed today's most current labs and imaging studies.   Pertinent labs include:  Recent Labs     08/14/23  1537 08/15/23  0700   WBC 9.7 7.8   HGB 11.6* 12.4   HCT 34.6* 38.6   * 381       Recent Labs     08/14/23  1708 08/14/23  2005 08/15/23  0700    138 140   K 5.7* 4.0 4.4   * 105 110*   CO2 22 28 24   BUN 23* 23* 19   ALT 14  --   --          Signed: Ajay Erazo PA-C

## 2023-08-16 NOTE — CARE COORDINATION
5364: CM has reviewed pt chart    OR today with podiatry R toe ampuation. IV abx, IVF.  Will need podiatry clearance    DCP: home self care

## 2023-08-17 LAB
ALBUMIN SERPL-MCNC: 2.8 G/DL (ref 3.5–5)
ALBUMIN/GLOB SERPL: 0.7 (ref 1.1–2.2)
ALP SERPL-CCNC: 63 U/L (ref 45–117)
ALT SERPL-CCNC: 13 U/L (ref 12–78)
ANION GAP SERPL CALC-SCNC: 3 MMOL/L (ref 5–15)
AST SERPL W P-5'-P-CCNC: 14 U/L (ref 15–37)
BASOPHILS # BLD: 0.1 K/UL (ref 0–0.1)
BASOPHILS NFR BLD: 1 % (ref 0–1)
BILIRUB SERPL-MCNC: 0.2 MG/DL (ref 0.2–1)
BUN SERPL-MCNC: 12 MG/DL (ref 6–20)
BUN/CREAT SERPL: 8 (ref 12–20)
CA-I BLD-MCNC: 8.8 MG/DL (ref 8.5–10.1)
CHLORIDE SERPL-SCNC: 113 MMOL/L (ref 97–108)
CO2 SERPL-SCNC: 26 MMOL/L (ref 21–32)
CREAT SERPL-MCNC: 1.49 MG/DL (ref 0.7–1.3)
DIFFERENTIAL METHOD BLD: ABNORMAL
EOSINOPHIL # BLD: 0.3 K/UL (ref 0–0.4)
EOSINOPHIL NFR BLD: 4 % (ref 0–7)
ERYTHROCYTE [DISTWIDTH] IN BLOOD BY AUTOMATED COUNT: 14.6 % (ref 11.5–14.5)
GLOBULIN SER CALC-MCNC: 3.9 G/DL (ref 2–4)
GLUCOSE BLD STRIP.AUTO-MCNC: 109 MG/DL (ref 65–100)
GLUCOSE BLD STRIP.AUTO-MCNC: 117 MG/DL (ref 65–100)
GLUCOSE BLD STRIP.AUTO-MCNC: 151 MG/DL (ref 65–100)
GLUCOSE BLD STRIP.AUTO-MCNC: 163 MG/DL (ref 65–100)
GLUCOSE BLD STRIP.AUTO-MCNC: 203 MG/DL (ref 65–100)
GLUCOSE SERPL-MCNC: 158 MG/DL (ref 65–100)
HCT VFR BLD AUTO: 35.9 % (ref 36.6–50.3)
HGB BLD-MCNC: 11.5 G/DL (ref 12.1–17)
IMM GRANULOCYTES # BLD AUTO: 0 K/UL (ref 0–0.04)
IMM GRANULOCYTES NFR BLD AUTO: 0 % (ref 0–0.5)
LYMPHOCYTES # BLD: 1.3 K/UL (ref 0.8–3.5)
LYMPHOCYTES NFR BLD: 18 % (ref 12–49)
MCH RBC QN AUTO: 29.1 PG (ref 26–34)
MCHC RBC AUTO-ENTMCNC: 32 G/DL (ref 30–36.5)
MCV RBC AUTO: 90.9 FL (ref 80–99)
MONOCYTES # BLD: 0.8 K/UL (ref 0–1)
MONOCYTES NFR BLD: 11 % (ref 5–13)
NEUTS SEG # BLD: 4.7 K/UL (ref 1.8–8)
NEUTS SEG NFR BLD: 66 % (ref 32–75)
NRBC # BLD: 0 K/UL (ref 0–0.01)
NRBC BLD-RTO: 0 PER 100 WBC
PERFORMED BY:: ABNORMAL
PLATELET # BLD AUTO: 374 K/UL (ref 150–400)
PMV BLD AUTO: 9.5 FL (ref 8.9–12.9)
POTASSIUM SERPL-SCNC: 4.4 MMOL/L (ref 3.5–5.1)
PROT SERPL-MCNC: 6.7 G/DL (ref 6.4–8.2)
RBC # BLD AUTO: 3.95 M/UL (ref 4.1–5.7)
SODIUM SERPL-SCNC: 142 MMOL/L (ref 136–145)
WBC # BLD AUTO: 7.2 K/UL (ref 4.1–11.1)

## 2023-08-17 PROCEDURE — 80053 COMPREHEN METABOLIC PANEL: CPT

## 2023-08-17 PROCEDURE — 6370000000 HC RX 637 (ALT 250 FOR IP): Performed by: ANESTHESIOLOGY

## 2023-08-17 PROCEDURE — 6370000000 HC RX 637 (ALT 250 FOR IP): Performed by: PHYSICIAN ASSISTANT

## 2023-08-17 PROCEDURE — 6360000002 HC RX W HCPCS: Performed by: HOSPITALIST

## 2023-08-17 PROCEDURE — 6360000002 HC RX W HCPCS: Performed by: PHYSICIAN ASSISTANT

## 2023-08-17 PROCEDURE — 36415 COLL VENOUS BLD VENIPUNCTURE: CPT

## 2023-08-17 PROCEDURE — 82962 GLUCOSE BLOOD TEST: CPT

## 2023-08-17 PROCEDURE — 1100000000 HC RM PRIVATE

## 2023-08-17 PROCEDURE — 99232 SBSQ HOSP IP/OBS MODERATE 35: CPT | Performed by: INTERNAL MEDICINE

## 2023-08-17 PROCEDURE — 6370000000 HC RX 637 (ALT 250 FOR IP): Performed by: HOSPITALIST

## 2023-08-17 PROCEDURE — 85025 COMPLETE CBC W/AUTO DIFF WBC: CPT

## 2023-08-17 PROCEDURE — 2580000003 HC RX 258: Performed by: HOSPITALIST

## 2023-08-17 RX ORDER — HYDROMORPHONE HYDROCHLORIDE 1 MG/ML
1 INJECTION, SOLUTION INTRAMUSCULAR; INTRAVENOUS; SUBCUTANEOUS EVERY 4 HOURS PRN
Status: DISCONTINUED | OUTPATIENT
Start: 2023-08-17 | End: 2023-08-18

## 2023-08-17 RX ADMIN — MORPHINE SULFATE 15 MG: 15 TABLET ORAL at 01:38

## 2023-08-17 RX ADMIN — BUPROPION HYDROCHLORIDE 300 MG: 300 TABLET, FILM COATED, EXTENDED RELEASE ORAL at 08:39

## 2023-08-17 RX ADMIN — HYDROCHLOROTHIAZIDE 25 MG: 25 TABLET ORAL at 08:40

## 2023-08-17 RX ADMIN — CEFEPIME 2000 MG: 2 INJECTION, POWDER, FOR SOLUTION INTRAVENOUS at 20:59

## 2023-08-17 RX ADMIN — LISINOPRIL 20 MG: 20 TABLET ORAL at 08:40

## 2023-08-17 RX ADMIN — GABAPENTIN 900 MG: 300 CAPSULE ORAL at 08:39

## 2023-08-17 RX ADMIN — GABAPENTIN 900 MG: 300 CAPSULE ORAL at 20:57

## 2023-08-17 RX ADMIN — SODIUM CHLORIDE, PRESERVATIVE FREE 10 ML: 5 INJECTION INTRAVENOUS at 10:07

## 2023-08-17 RX ADMIN — ACETAMINOPHEN 650 MG: 325 TABLET ORAL at 15:51

## 2023-08-17 RX ADMIN — BUSPIRONE HYDROCHLORIDE 15 MG: 10 TABLET ORAL at 08:40

## 2023-08-17 RX ADMIN — FOLIC ACID 1 MG: 1 TABLET ORAL at 08:41

## 2023-08-17 RX ADMIN — HYDROMORPHONE HYDROCHLORIDE 1 MG: 1 INJECTION, SOLUTION INTRAMUSCULAR; INTRAVENOUS; SUBCUTANEOUS at 08:41

## 2023-08-17 RX ADMIN — BUSPIRONE HYDROCHLORIDE 15 MG: 10 TABLET ORAL at 20:57

## 2023-08-17 RX ADMIN — HYDROMORPHONE HYDROCHLORIDE 1 MG: 2 TABLET ORAL at 05:35

## 2023-08-17 RX ADMIN — HYDROMORPHONE HYDROCHLORIDE 1 MG: 2 TABLET ORAL at 21:05

## 2023-08-17 RX ADMIN — INSULIN LISPRO 1 UNITS: 100 INJECTION, SOLUTION INTRAVENOUS; SUBCUTANEOUS at 01:42

## 2023-08-17 RX ADMIN — CEFEPIME 2000 MG: 2 INJECTION, POWDER, FOR SOLUTION INTRAVENOUS at 05:30

## 2023-08-17 RX ADMIN — HYDROMORPHONE HYDROCHLORIDE 1 MG: 1 INJECTION, SOLUTION INTRAMUSCULAR; INTRAVENOUS; SUBCUTANEOUS at 16:47

## 2023-08-17 RX ADMIN — ATORVASTATIN CALCIUM 20 MG: 20 TABLET, FILM COATED ORAL at 20:58

## 2023-08-17 RX ADMIN — CEFEPIME 2000 MG: 2 INJECTION, POWDER, FOR SOLUTION INTRAVENOUS at 12:44

## 2023-08-17 RX ADMIN — HYDROMORPHONE HYDROCHLORIDE 1 MG: 1 INJECTION, SOLUTION INTRAMUSCULAR; INTRAVENOUS; SUBCUTANEOUS at 12:41

## 2023-08-17 RX ADMIN — ACETAMINOPHEN 1000 MG: 500 TABLET ORAL at 10:10

## 2023-08-17 ASSESSMENT — PAIN DESCRIPTION - ORIENTATION
ORIENTATION: RIGHT
ORIENTATION: RIGHT;LOWER
ORIENTATION: RIGHT

## 2023-08-17 ASSESSMENT — PAIN DESCRIPTION - LOCATION
LOCATION: FOOT

## 2023-08-17 ASSESSMENT — PAIN SCALES - GENERAL
PAINLEVEL_OUTOF10: 3
PAINLEVEL_OUTOF10: 6
PAINLEVEL_OUTOF10: 8
PAINLEVEL_OUTOF10: 10
PAINLEVEL_OUTOF10: 5
PAINLEVEL_OUTOF10: 7
PAINLEVEL_OUTOF10: 8
PAINLEVEL_OUTOF10: 6
PAINLEVEL_OUTOF10: 7
PAINLEVEL_OUTOF10: 6
PAINLEVEL_OUTOF10: 7
PAINLEVEL_OUTOF10: 3

## 2023-08-17 ASSESSMENT — PAIN DESCRIPTION - DESCRIPTORS
DESCRIPTORS: ACHING
DESCRIPTORS: SHARP;SHOOTING
DESCRIPTORS: ACHING

## 2023-08-17 ASSESSMENT — ENCOUNTER SYMPTOMS
SHORTNESS OF BREATH: 0
ABDOMINAL PAIN: 0
NAUSEA: 0
DIARRHEA: 0
VOMITING: 0

## 2023-08-17 ASSESSMENT — PAIN SCALES - WONG BAKER
WONGBAKER_NUMERICALRESPONSE: 2
WONGBAKER_NUMERICALRESPONSE: 4

## 2023-08-17 ASSESSMENT — PAIN DESCRIPTION - PAIN TYPE: TYPE: SURGICAL PAIN

## 2023-08-17 NOTE — OP NOTE
Operative Note      Patient: Fidelia Yi  YOB: 1970  MRN: [id number]    Date of Procedure: 8/16/2023    Pre-Op Diagnosis Codes:     * Osteomyelitis of ankle and foot ([address]) [M86.9]    Post-Op Diagnosis: Same       Procedure(s):  TRANSMETARASAL AMPUTATION WITH ACHILLES TENDON LENGTHENING RIGHT LOWER EXTREMITY    Surgeon(s):  Matt Varma DPM    Assistant:   * No surgical staff found *    Anesthesia: General    Estimated Blood Loss (mL): Minimal    Complications: None    Specimens:   ID Type Source Tests Collected by Time Destination   A : toes number two to  five right foot  Tissue Foot SURGICAL PATHOLOGY Matt Varma DPM 8/16/2023 2019        Implants:  * No implants in log *      Drains: * No LDAs found *    Findings: As expected    Detailed Description of Procedure:     Patient was seen in the pre-operative holding area and all questions were answered and all concerns were addressed. The operative procedure was discussed in great detail, with all possible complications highlighted. Patient verbalized complete understanding and the consent was signed and witnessed. The operative limb was marked and the pt was transported to the operating room. The patient was transferred to the operating table and anesthesia was administered as indicated above. The lower extremity was scrubbed and draped in sterile fashion. A time out was performed to confirm the correct patient, correct procedure, correct limb, abx, allergies, fire risk and attendees within the operating room. Upon completion, procedure #1 commenced. Procedure 1. Transmetatarsal amputation right foot    An incision was made around the base of the previously open wound and healthy tissue and continued out laterally on the dorsal surface of the foot just proximal to the web spaces and underneath the plantar aspect of the foot in a similar fashion. The incision was continued through the soft tissue down to the shafts of the metatarsal bones.

## 2023-08-17 NOTE — PROGRESS NOTES
Hospitalist Progress Note    NAME:   Steffen Macdonald   : 1970   MRN: [id number]     Date/Time: 2023 8:29 AM  Patient PCP: Frances Pfeiffer course: This is a 61-year-old male admitted with osteomyelitis of the right forefoot with a history of diabetes mellitus, type I and on a insulin pump. Patient is status post right fifth digit amputation in the past.  Patient was evaluated by his podiatrist and at that point bone was exposed. MRI of the right foot revealed acute osteomyelitis of the fifth digit with acute on chronic osteomyelitis of the second and fourth metatarsal heads with subsequent fractures noted. Patient underwent transmetatarsal amputation on 2023. Infectious disease consulted and patient remains on vancomycin and cefepime. Estimated discharge date:23  Barriers: transmetatarsal amputation, clearance by podiatry: Tissue cultures pending    Assessment / Plan:  Chronic diabetic foot ulcer with suspected osteomyelitis - right 5th digit   - Vancomycin, cefepime per previous cultures  Blood cultures preliminary results no growth   Wound culture preliminary result shows gram negative rods   MRI of right foot   - acute osteomyelitis of 5th digit  - acute on chronic osteomyelitis of 2nd-4th metatarsal heads  Podiatry evaluated  Transmetatarsal amputation on 2023     CKD stage III   Stable  Continue to monitor Cr       Hyperkalemia- resolved      H/o essential HTN  HCTZ and lisinopril   Hydralazine if SBP >170      H/o type 1 DM with insulin pump  Continue insulin pump  Blood glucose well controlled       6. H/o HLD  Atorvastatin      7.  H/o Neuropathy   Gabapentin         Medical Decision Making:   I personally reviewed labs:CMP, blood cultures, wound cultures  I personally reviewed imaging:None   Toxic drug monitoring: None  Discussed case with: Podiatry        Code Status: FULL   DVT Prophylaxis: Lovenox on hold  GI Prophylaxis: Not

## 2023-08-17 NOTE — ANESTHESIA POSTPROCEDURE EVALUATION
Department of Anesthesiology  Postprocedure Note    Patient: Oh Clark  MRN: [id number]  YOB: 1970  Date of evaluation: 8/16/2023      Procedure Summary     Date: 08/16/23 Room / Location: [facility] MAIN OR 03 / [facility] MAIN OR    Anesthesia Start: 1959 Anesthesia Stop: 2122    Procedure: TRANSMETARASAL AMPUTATION WITH ACHILLES TENDON LENGTHENING RIGHT LOWER EXTREMITY (Right: Foot) Diagnosis:       Osteomyelitis of ankle and foot ([address])      (Osteomyelitis of ankle and foot ([address]) [M86.9])    Surgeons: Leni Marks DPM Responsible Provider: Cielo Barron MD    Anesthesia Type: General ASA Status: 3          Anesthesia Type: General    Nilda Phase I: Nilda Score: 8    Nilda Phase II:        Anesthesia Post Evaluation    Patient location during evaluation: bedside  Patient participation: complete - patient participated  Level of consciousness: awake and alert  Pain score: 0  Airway patency: patent  Nausea & Vomiting: no nausea and no vomiting  Complications: no  Cardiovascular status: blood pressure returned to baseline and hemodynamically stable  Respiratory status: acceptable  Hydration status: euvolemic  Pain management: adequate

## 2023-08-17 NOTE — PROGRESS NOTES
[address]      Patient was seen and evaluated. Patient minimal pain at this time. Patient is okay to be discharged from podiatry standpoint follow-up in office 1 week. Patient to keep dressing clean dry intact until first postoperative visit. Patient to take oral Levaquin as directed by infectious disease Dr.  Patient to be nonweightbearing to the right lower extremity.      Pattie Gomez DPM, CWSP, [address] [facility] and [address] [place]: [phone]  F: [phone]

## 2023-08-17 NOTE — CARE COORDINATION
0809: CM has reviewed pt chart. MRI showed osteo. Podiatry recs/clearance, IV abx, NWB RLE    DCP: home self care; likely will need HH with home IV abx    1122: CM met with pt at bedside to discuss dispo. CM informed pt likely to dc home with [address] and IV abx needs. Pt reported he had no preferences. Signed choice form in pt chart. CM sent blanket referrals awaiting accepting [address]. Will need final ID recs for abx for [employer]    1344:  Only accepting [address] agency at this time is [facility]

## 2023-08-18 ENCOUNTER — TELEPHONE (OUTPATIENT)
Age: 53
End: 2023-08-18

## 2023-08-18 VITALS
DIASTOLIC BLOOD PRESSURE: 82 MMHG | BODY MASS INDEX: 28.85 KG/M2 | HEART RATE: 88 BPM | HEIGHT: 76 IN | OXYGEN SATURATION: 100 % | RESPIRATION RATE: 16 BRPM | WEIGHT: 236.88 LBS | TEMPERATURE: 98.2 F | SYSTOLIC BLOOD PRESSURE: 161 MMHG

## 2023-08-18 LAB
ALBUMIN SERPL-MCNC: 2.6 G/DL (ref 3.5–5)
ALBUMIN/GLOB SERPL: 0.7 (ref 1.1–2.2)
ALP SERPL-CCNC: 68 U/L (ref 45–117)
ALT SERPL-CCNC: 16 U/L (ref 12–78)
ANION GAP SERPL CALC-SCNC: 4 MMOL/L (ref 5–15)
AST SERPL W P-5'-P-CCNC: 18 U/L (ref 15–37)
BACTERIA SPEC CULT: ABNORMAL
BASOPHILS # BLD: 0.1 K/UL (ref 0–0.1)
BASOPHILS NFR BLD: 1 % (ref 0–1)
BILIRUB SERPL-MCNC: 0.2 MG/DL (ref 0.2–1)
BUN SERPL-MCNC: 16 MG/DL (ref 6–20)
BUN/CREAT SERPL: 10 (ref 12–20)
CA-I BLD-MCNC: 8.3 MG/DL (ref 8.5–10.1)
CHLORIDE SERPL-SCNC: 108 MMOL/L (ref 97–108)
CO2 SERPL-SCNC: 27 MMOL/L (ref 21–32)
CREAT SERPL-MCNC: 1.6 MG/DL (ref 0.7–1.3)
DIFFERENTIAL METHOD BLD: ABNORMAL
EOSINOPHIL # BLD: 0.4 K/UL (ref 0–0.4)
EOSINOPHIL NFR BLD: 5 % (ref 0–7)
ERYTHROCYTE [DISTWIDTH] IN BLOOD BY AUTOMATED COUNT: 14.8 % (ref 11.5–14.5)
GLOBULIN SER CALC-MCNC: 4 G/DL (ref 2–4)
GLUCOSE BLD STRIP.AUTO-MCNC: 105 MG/DL (ref 65–100)
GLUCOSE BLD STRIP.AUTO-MCNC: 70 MG/DL (ref 65–100)
GLUCOSE BLD STRIP.AUTO-MCNC: 93 MG/DL (ref 65–100)
GLUCOSE SERPL-MCNC: 160 MG/DL (ref 65–100)
GRAM STN SPEC: ABNORMAL
HCT VFR BLD AUTO: 32.7 % (ref 36.6–50.3)
HGB BLD-MCNC: 10.6 G/DL (ref 12.1–17)
IMM GRANULOCYTES # BLD AUTO: 0 K/UL (ref 0–0.04)
IMM GRANULOCYTES NFR BLD AUTO: 0 % (ref 0–0.5)
LYMPHOCYTES # BLD: 1.7 K/UL (ref 0.8–3.5)
LYMPHOCYTES NFR BLD: 23 % (ref 12–49)
Lab: ABNORMAL
MCH RBC QN AUTO: 29 PG (ref 26–34)
MCHC RBC AUTO-ENTMCNC: 32.4 G/DL (ref 30–36.5)
MCV RBC AUTO: 89.6 FL (ref 80–99)
MONOCYTES # BLD: 1.1 K/UL (ref 0–1)
MONOCYTES NFR BLD: 14 % (ref 5–13)
NEUTS SEG # BLD: 4.1 K/UL (ref 1.8–8)
NEUTS SEG NFR BLD: 57 % (ref 32–75)
NRBC # BLD: 0 K/UL (ref 0–0.01)
NRBC BLD-RTO: 0 PER 100 WBC
PERFORMED BY:: ABNORMAL
PERFORMED BY:: NORMAL
PERFORMED BY:: NORMAL
PLATELET # BLD AUTO: 366 K/UL (ref 150–400)
PMV BLD AUTO: 9.8 FL (ref 8.9–12.9)
POTASSIUM SERPL-SCNC: 4.1 MMOL/L (ref 3.5–5.1)
PROT SERPL-MCNC: 6.6 G/DL (ref 6.4–8.2)
RBC # BLD AUTO: 3.65 M/UL (ref 4.1–5.7)
SODIUM SERPL-SCNC: 139 MMOL/L (ref 136–145)
WBC # BLD AUTO: 7.4 K/UL (ref 4.1–11.1)

## 2023-08-18 PROCEDURE — 82962 GLUCOSE BLOOD TEST: CPT

## 2023-08-18 PROCEDURE — 6370000000 HC RX 637 (ALT 250 FOR IP): Performed by: PHYSICIAN ASSISTANT

## 2023-08-18 PROCEDURE — 80053 COMPREHEN METABOLIC PANEL: CPT

## 2023-08-18 PROCEDURE — 99232 SBSQ HOSP IP/OBS MODERATE 35: CPT | Performed by: INTERNAL MEDICINE

## 2023-08-18 PROCEDURE — 2580000003 HC RX 258: Performed by: HOSPITALIST

## 2023-08-18 PROCEDURE — 6370000000 HC RX 637 (ALT 250 FOR IP): Performed by: ANESTHESIOLOGY

## 2023-08-18 PROCEDURE — 97161 PT EVAL LOW COMPLEX 20 MIN: CPT

## 2023-08-18 PROCEDURE — 97530 THERAPEUTIC ACTIVITIES: CPT

## 2023-08-18 PROCEDURE — 85025 COMPLETE CBC W/AUTO DIFF WBC: CPT

## 2023-08-18 PROCEDURE — 6360000002 HC RX W HCPCS: Performed by: HOSPITALIST

## 2023-08-18 PROCEDURE — 97165 OT EVAL LOW COMPLEX 30 MIN: CPT

## 2023-08-18 PROCEDURE — 6370000000 HC RX 637 (ALT 250 FOR IP): Performed by: HOSPITALIST

## 2023-08-18 PROCEDURE — 36415 COLL VENOUS BLD VENIPUNCTURE: CPT

## 2023-08-18 PROCEDURE — 6360000002 HC RX W HCPCS: Performed by: PHYSICIAN ASSISTANT

## 2023-08-18 RX ORDER — LEVOFLOXACIN 750 MG/1
750 TABLET ORAL DAILY
Qty: 14 TABLET | Refills: 0 | Status: SHIPPED | OUTPATIENT
Start: 2023-08-18 | End: 2023-08-28

## 2023-08-18 RX ORDER — TRAMADOL HYDROCHLORIDE 50 MG/1
50 TABLET ORAL EVERY 6 HOURS PRN
Status: DISCONTINUED | OUTPATIENT
Start: 2023-08-18 | End: 2023-08-18 | Stop reason: HOSPADM

## 2023-08-18 RX ORDER — OXYCODONE HYDROCHLORIDE 10 MG/1
10 TABLET ORAL EVERY 4 HOURS PRN
Qty: 18 TABLET | Refills: 0 | Status: SHIPPED | OUTPATIENT
Start: 2023-08-18 | End: 2023-08-21

## 2023-08-18 RX ORDER — OXYCODONE HYDROCHLORIDE 10 MG/1
10 TABLET ORAL EVERY 4 HOURS PRN
Status: DISCONTINUED | OUTPATIENT
Start: 2023-08-18 | End: 2023-08-18 | Stop reason: HOSPADM

## 2023-08-18 RX ORDER — TRAMADOL HYDROCHLORIDE 50 MG/1
50 TABLET ORAL EVERY 6 HOURS PRN
Qty: 18 TABLET | Refills: 0 | Status: SHIPPED | OUTPATIENT
Start: 2023-08-18 | End: 2023-08-21

## 2023-08-18 RX ADMIN — HYDROCHLOROTHIAZIDE 25 MG: 25 TABLET ORAL at 09:54

## 2023-08-18 RX ADMIN — GABAPENTIN 900 MG: 300 CAPSULE ORAL at 09:54

## 2023-08-18 RX ADMIN — OXYCODONE HYDROCHLORIDE 5 MG: 5 TABLET ORAL at 05:04

## 2023-08-18 RX ADMIN — BUSPIRONE HYDROCHLORIDE 15 MG: 10 TABLET ORAL at 09:52

## 2023-08-18 RX ADMIN — FOLIC ACID 1 MG: 1 TABLET ORAL at 09:53

## 2023-08-18 RX ADMIN — OXYCODONE HYDROCHLORIDE 10 MG: 10 TABLET ORAL at 10:44

## 2023-08-18 RX ADMIN — SODIUM CHLORIDE, PRESERVATIVE FREE 10 ML: 5 INJECTION INTRAVENOUS at 10:41

## 2023-08-18 RX ADMIN — OXYCODONE HYDROCHLORIDE 10 MG: 10 TABLET ORAL at 13:59

## 2023-08-18 RX ADMIN — CEFEPIME 2000 MG: 2 INJECTION, POWDER, FOR SOLUTION INTRAVENOUS at 04:58

## 2023-08-18 RX ADMIN — LISINOPRIL 20 MG: 20 TABLET ORAL at 09:53

## 2023-08-18 RX ADMIN — HYDROMORPHONE HYDROCHLORIDE 1 MG: 1 INJECTION, SOLUTION INTRAMUSCULAR; INTRAVENOUS; SUBCUTANEOUS at 01:19

## 2023-08-18 RX ADMIN — TRAMADOL HYDROCHLORIDE 50 MG: 50 TABLET ORAL at 09:54

## 2023-08-18 RX ADMIN — BUPROPION HYDROCHLORIDE 300 MG: 300 TABLET, FILM COATED, EXTENDED RELEASE ORAL at 09:52

## 2023-08-18 ASSESSMENT — PAIN DESCRIPTION - ORIENTATION
ORIENTATION: RIGHT

## 2023-08-18 ASSESSMENT — PAIN DESCRIPTION - DESCRIPTORS
DESCRIPTORS: ACHING
DESCRIPTORS: ACHING;DULL
DESCRIPTORS: ACHING
DESCRIPTORS: SHARP;SHOOTING
DESCRIPTORS: ACHING

## 2023-08-18 ASSESSMENT — PAIN SCALES - GENERAL
PAINLEVEL_OUTOF10: 6
PAINLEVEL_OUTOF10: 0
PAINLEVEL_OUTOF10: 8
PAINLEVEL_OUTOF10: 7
PAINLEVEL_OUTOF10: 7
PAINLEVEL_OUTOF10: 5
PAINLEVEL_OUTOF10: 7

## 2023-08-18 ASSESSMENT — PAIN DESCRIPTION - LOCATION
LOCATION: FOOT

## 2023-08-18 ASSESSMENT — PAIN SCALES - WONG BAKER: WONGBAKER_NUMERICALRESPONSE: 2

## 2023-08-18 ASSESSMENT — PAIN DESCRIPTION - PAIN TYPE: TYPE: SURGICAL PAIN

## 2023-08-18 NOTE — PROGRESS NOTES
Discharge plan of care/case management plan validated with provider discharge order. Discussed after visit summary with patient to include medications and follow up appointments. IV access removed with no complications. Patient understands education, have no questions at this time.

## 2023-08-18 NOTE — DISCHARGE INSTR - COC
Bear[place]}  Other Medical Equipment (for information only, NOT a DME order):  {EQUIPMENT:[id number]}  Other Treatments: ***    Patient's personal belongings (please select all that are sent with patient):  {CHP DME Belongings:638195973}    RN SIGNATURE:  {Esignature:[id number]}    CASE MANAGEMENT/SOCIAL WORK SECTION    Inpatient Status Date: ***    Readmission Risk Assessment Score:  Readmission Risk              Risk of Unplanned Readmission:  15           Discharging to Facility/ Agency   Name:   Address:  Phone:  Fax:    Dialysis Facility (if applicable)   Name:  Address:  Dialysis Schedule:  Phone:  Fax:    / signature: {Esignature:[id number]}    PHYSICIAN SECTION    Prognosis: {Prognosis:9395923774}    Condition at Discharge: [address] Patient Condition:956008885}    Rehab Potential (if transferring to Rehab): {Prognosis:2822343371}    Recommended Labs or Other Treatments After Discharge: ***    Physician Certification: I certify the above information and transfer of Steffen Macdonald  is necessary for the continuing treatment of the diagnosis listed and that he requires {Admit to Appropriate Level of Care:54253} for {GREATER/LESS:839958979} 30 days.      Update Admission H&P: {CHP DME Changes in RZOHC:067119622}    PHYSICIAN SIGNATURE:  {Esignature:[id number]}

## 2023-08-18 NOTE — CARE COORDINATION
Pt will dc home with [facility]. CM has spoken with pt at bedside    Transition of Care Plan:    RUR: 13%  Prior Level of Functioning: home self  Disposition: HH with [facility]  If SNF or IPR: Date FOC offered: n/a  Date FOC received:   Accepting facility:   Date authorization started with reference number:   Date authorization received and expires: Follow up appointments: unit secretary will arrange as needed  DME needed: none  Transportation at discharge: pt reports his mom  IM/IMM Medicare/ letter given: n/a  Is patient a [place] and connected with [facility]? no   If yes, was [employer] transfer form completed and [facility] notified? Caregiver Contact: pt to notify  Discharge Caregiver contacted prior to discharge?  Yes pt notified  Care Conference needed? no  Barriers to discharge: none

## 2023-08-18 NOTE — DISCHARGE SUMMARY
Discharge Summary    Name: Chepe Engle  [id number]  YOB: 1970 (Age: 48 y.o.)   Date of Admission: 8/14/2023  Date of Discharge: 8/18/2023  Attending Physician: Uriel Quigley MD    Discharge Diagnosis:   Principal Problem:    Diabetic ulcer of toe of right foot associated with diabetes mellitus due to underlying condition, with necrosis of bone ([address])  Active Problems:    Osteomyelitis of right foot ([address])    DM (diabetes mellitus) type 1, uncontrolled, with ketoacidosis ([address])    Cellulitis of right foot    Cellulitis in diabetic foot ([address])    Foot ulcer due to secondary DM ([address])  Resolved Problems:    * No resolved hospital problems. *       Consultations:  IP CONSULT TO INFECTIOUS DISEASES  IP CONSULT TO PODIATRY      Brief Admission History/Reason for Admission Per Hellen Carvalho MD:       [address] Course by Main Problems: This is a 68-year-old male admitted with osteomyelitis of the right forefoot with a history of diabetes mellitus, type I and on a insulin pump. Patient is status post right fifth digit amputation in the past.  Patient was evaluated by his podiatrist and at that point bone was exposed. MRI of the right foot revealed acute osteomyelitis of the fifth digit with acute on chronic osteomyelitis of the second and fourth metatarsal heads with subsequent fractures noted. Patient underwent transmetatarsal amputation on 8/16/2023. Infectious disease consulted and patient remained on vancomycin and cefepime. Wound culture was moderate growth of Serratia. Sensitive to Levaquin. Since osteomyelitis was resected with a transmetatarsal amputation patient will continue on Levaquin 700 mg for 14 days per ID. Patient will follow-up with Dr. Avinash Martínez and leave bandage and dressing in place until first visit 1 week from now. Discharge Exam:  Patient seen and examined by me on discharge day.   Pertinent Findings:  Patient Vitals for the past 24

## 2023-08-18 NOTE — CARE COORDINATION
9632: SENTHIL has reviewed pt chart    Pt can dc on PO Levaquin. Podiatry cleared for dc. Pt is NWB to RLE. DCP: [facility] [address] [place] [address],[address] accepted    7270: SENTHIL updated [facility] that pt anticipated to dc today if tolerating PO pain medication.

## 2023-08-18 NOTE — PROGRESS NOTES
OCCUPATIONAL THERAPY EVALUATION AND DISCHARGE  Patient: Wagner Black (2 y.o. male)  Date: 8/18/2023  Primary Diagnosis: Foot ulcer due to secondary DM ([address]) [E13.621, L97.509]  Osteomyelitis of right foot, unspecified type ([address]) [M86.9]  Procedure(s) (LRB):  TRANSMETARASAL AMPUTATION WITH ACHILLES TENDON LENGTHENING RIGHT LOWER EXTREMITY (Right) 2 Days Post-Op   Precautions: Fall Risk                In place during session:Peripheral IV and EKG/telemetry     ASSESSMENT  Pt is a 48 y.o. male presenting to [facility] with c/o toe infection/osteomyelitis, admitted 8/14 and currently s/p transmetatarsal amputation w/ achilles tendon lengthening RLE; POD #2. Pt received semi-supine in bed upon arrival, AXO x4, and agreeable to OT evaluation. Based on the objective data described below pt demo'd good understanding of new weight status and req's CGA using RW for ADLs and functional mobility/transfers at this time. (See below for objective details and assist levels). Overall pt tolerated session well today with c/o 6-7/10 pain in RLE. He completed bed mobility w/ mod I and CGA using RW for OOB mobility; pt able to hop w/out concerns. No LOB or unsteadiness noted. He donned R shoe w/ set up at EOB. Able to complete commode transfer w/  SBA/CGA and demo'd good understanding of RLE placement during transfers. Pt has no skilled acute OT needs at this time either noted by OT or reported by pt, will DC skilled OT following evaluation; pt verbalized understanding and agreement. Current OT DC recommendation home w/ family care once medically appropriate. Pt would benefit from 3-in-1 commode for increased safety w/ functional mobility/ADLs upon discharge. Current Level of Function Impacting Discharge (mobility/balance): SBA-CGA    Other factors to consider for discharge: no additional factors      PLAN :  Recommendations and Planned Interventions: DC from skilled OT services following evaluation.      Recommend for

## 2023-08-18 NOTE — PLAN OF CARE
Problem: Discharge Planning  Goal: Discharge to home or other facility with appropriate resources  8/18/2023 1105 by Tex Lyles RN  Outcome: Progressing  8/18/2023 0143 by Ritchie Hermosillo RN  Outcome: Progressing     Problem: Pain  Goal: Verbalizes/displays adequate comfort level or baseline comfort level  8/18/2023 1105 by Tex Lyles RN  Outcome: Progressing  8/18/2023 0143 by Ritchie Hermosillo RN  Outcome: Progressing     Problem: Safety - Adult  Goal: Free from fall injury  8/18/2023 1105 by Tex Lyles RN  Outcome: Progressing  8/18/2023 0143 by Ritchie Hermosillo RN  Outcome: Progressing     Problem: ABCDS Injury Assessment  Goal: Absence of physical injury  8/18/2023 1105 by Tex Lyles RN  Outcome: Progressing  8/18/2023 0143 by Ritchie Hermosillo RN  Outcome: Progressing     Problem: Chronic Conditions and Co-morbidities  Goal: Patient's chronic conditions and co-morbidity symptoms are monitored and maintained or improved  Outcome: Progressing

## 2023-08-18 NOTE — TELEPHONE ENCOUNTER
Patient called and is in desperate need of medication to be filled , the pharmicist needs confirmation ASAP

## 2023-08-19 PROBLEM — M24.573 EQUINUS CONTRACTURE OF ANKLE: Status: ACTIVE | Noted: 2023-08-19

## 2023-08-20 PROBLEM — L97.519 ULCER OF RIGHT FIFTH TOE DUE TO DIABETES MELLITUS (HCC): Status: ACTIVE | Noted: 2023-08-16

## 2023-08-20 PROBLEM — E11.621 ULCER OF RIGHT FIFTH TOE DUE TO DIABETES MELLITUS (HCC): Status: ACTIVE | Noted: 2023-08-16

## 2023-08-20 LAB
BACTERIA SPEC CULT: NORMAL
BACTERIA SPEC CULT: NORMAL
Lab: NORMAL
Lab: NORMAL

## 2023-08-23 ENCOUNTER — OFFICE VISIT (OUTPATIENT)
Age: 53
End: 2023-08-23

## 2023-08-23 VITALS
BODY MASS INDEX: 28.74 KG/M2 | SYSTOLIC BLOOD PRESSURE: 146 MMHG | HEART RATE: 100 BPM | WEIGHT: 236 LBS | TEMPERATURE: 98 F | HEIGHT: 76 IN | DIASTOLIC BLOOD PRESSURE: 84 MMHG

## 2023-08-23 DIAGNOSIS — Z89.431 HISTORY OF TRANSMETATARSAL AMPUTATION OF RIGHT FOOT (HCC): Primary | ICD-10-CM

## 2023-08-23 PROCEDURE — 99024 POSTOP FOLLOW-UP VISIT: CPT | Performed by: PODIATRIST

## 2023-08-25 ENCOUNTER — TELEPHONE (OUTPATIENT)
Age: 53
End: 2023-08-25

## 2023-08-25 RX ORDER — OXYCODONE HYDROCHLORIDE AND ACETAMINOPHEN 5; 325 MG/1; MG/1
1 TABLET ORAL EVERY 6 HOURS PRN
Qty: 12 TABLET | Refills: 0 | Status: SHIPPED | OUTPATIENT
Start: 2023-08-25 | End: 2023-08-28

## 2023-08-25 NOTE — PROGRESS NOTES
[facility]         Patient Name: Koko Mo    : 1970    Visit Date: 2023    Office Visit Note        Assessment:      Diagnosis Orders   1. History of transmetatarsal amputation of right foot ([address])  oxyCODONE-acetaminophen (PERCOCET) 5-325 MG per tablet      Patient was seen and postoperative dressing were removed. No signs of infection noted to incision site. The foot was then dressed with Betadine soaked Adaptic, 4 x 4 gauze, Kerlix, posterior splint and Ace bandage. Patient tolerated dressing change well. Patient to follow-up in 2 weeks.             Neva Stewart DPM, CWSP, [address] [facility] and [address]  Naveed Gipsons: [phone]  F: [phone]

## 2023-09-06 ENCOUNTER — OFFICE VISIT (OUTPATIENT)
Age: 53
End: 2023-09-06

## 2023-09-06 VITALS
RESPIRATION RATE: 16 BRPM | OXYGEN SATURATION: 98 % | TEMPERATURE: 97.8 F | HEART RATE: 84 BPM | DIASTOLIC BLOOD PRESSURE: 75 MMHG | SYSTOLIC BLOOD PRESSURE: 109 MMHG

## 2023-09-06 DIAGNOSIS — Z89.431 HISTORY OF TRANSMETATARSAL AMPUTATION OF RIGHT FOOT (HCC): Primary | ICD-10-CM

## 2023-09-06 PROCEDURE — 99024 POSTOP FOLLOW-UP VISIT: CPT | Performed by: PODIATRIST

## 2023-09-06 ASSESSMENT — ENCOUNTER SYMPTOMS
DIARRHEA: 0
VOMITING: 0
ABDOMINAL PAIN: 0
SHORTNESS OF BREATH: 0

## 2023-09-08 ENCOUNTER — TELEPHONE (OUTPATIENT)
Age: 53
End: 2023-09-08

## 2023-09-08 NOTE — TELEPHONE ENCOUNTER
Estefania from [address] called this morning, she would like a call back regarding patient's Home care Instruction's    She can be reached at   [address]

## 2023-09-27 ENCOUNTER — OFFICE VISIT (OUTPATIENT)
Age: 53
End: 2023-09-27

## 2023-09-27 VITALS
BODY MASS INDEX: 28.74 KG/M2 | RESPIRATION RATE: 18 BRPM | HEIGHT: 76 IN | SYSTOLIC BLOOD PRESSURE: 119 MMHG | DIASTOLIC BLOOD PRESSURE: 95 MMHG | WEIGHT: 236 LBS

## 2023-09-27 DIAGNOSIS — G89.29 CHRONIC PAIN OF LEFT ANKLE: ICD-10-CM

## 2023-09-27 DIAGNOSIS — Z89.431 HISTORY OF TRANSMETATARSAL AMPUTATION OF RIGHT FOOT (HCC): Primary | ICD-10-CM

## 2023-09-27 DIAGNOSIS — M79.671 PAIN IN RIGHT FOOT: ICD-10-CM

## 2023-09-27 DIAGNOSIS — S92.352K: ICD-10-CM

## 2023-09-27 DIAGNOSIS — M25.572 CHRONIC PAIN OF LEFT ANKLE: ICD-10-CM

## 2023-09-27 RX ORDER — OXYCODONE HYDROCHLORIDE AND ACETAMINOPHEN 5; 325 MG/1; MG/1
1 TABLET ORAL EVERY 6 HOURS PRN
Qty: 12 TABLET | Refills: 0 | Status: SHIPPED | OUTPATIENT
Start: 2023-09-27 | End: 2023-09-30

## 2023-10-03 ASSESSMENT — ENCOUNTER SYMPTOMS
ABDOMINAL PAIN: 0
SHORTNESS OF BREATH: 0
VOMITING: 0
DIARRHEA: 0
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
## 2023-10-05 NOTE — PROGRESS NOTES
[facility]         Patient Name: Ivone Power    : 1970    Visit Date: 2023    Office Visit Note        Incision noted to be healed at this time. Patient can discontinue wearing cam boot and transition to regular shoe at this time. Patient will benefit from a partial toe filler. Patient states that he will try regular shoes at this time and determine next visit if he needs toe filler.     Ismael Rasheed DPM, CWSP, [facility] and [address] [place] [address] Way  Harvey Garg: [phone]  F: [phone]

## 2023-10-10 ENCOUNTER — TELEPHONE (OUTPATIENT)
Age: 53
End: 2023-10-10

## 2023-10-10 NOTE — TELEPHONE ENCOUNTER
Wilner Rumps with [employer] left voicemail. States patient needs wound care orders signed. Placed on providers desk for signature.

## 2023-10-20 ENCOUNTER — TELEPHONE (OUTPATIENT)
Age: 53
End: 2023-10-20

## 2023-10-20 ENCOUNTER — TELEPHONE (OUTPATIENT)
Facility: CLINIC | Age: 53
End: 2023-10-20

## 2023-10-20 NOTE — TELEPHONE ENCOUNTER
Pt called says that his social security benefit is sending disability paperwork. He wants it completed asap. I told him we will do our best. Pt has appointment on 10/25/2023. Pt reminded of his appointment.

## 2023-10-20 NOTE — TELEPHONE ENCOUNTER
Disability paperwork placed on Dr. Yesenia Flaherty desk to sign. Patient asked that Dr. Yesenia Flaherty complete this. He has an appointment on 10-25-23.

## 2023-10-25 ENCOUNTER — OFFICE VISIT (OUTPATIENT)
Age: 53
End: 2023-10-25

## 2023-10-25 VITALS
HEIGHT: 76 IN | BODY MASS INDEX: 28.74 KG/M2 | WEIGHT: 236 LBS | DIASTOLIC BLOOD PRESSURE: 90 MMHG | SYSTOLIC BLOOD PRESSURE: 152 MMHG | RESPIRATION RATE: 20 BRPM

## 2023-10-25 DIAGNOSIS — E11.40 TYPE 2 DIABETES MELLITUS WITH DIABETIC NEUROPATHY, UNSPECIFIED WHETHER LONG TERM INSULIN USE (HCC): Primary | ICD-10-CM

## 2023-10-25 DIAGNOSIS — M25.572 PAIN IN LEFT ANKLE AND JOINTS OF LEFT FOOT: ICD-10-CM

## 2023-10-25 DIAGNOSIS — M79.671 PAIN IN RIGHT FOOT: ICD-10-CM

## 2023-10-25 RX ORDER — LIDOCAINE HYDROCHLORIDE 10 MG/ML
1 INJECTION, SOLUTION INFILTRATION; PERINEURAL ONCE
Status: SHIPPED | OUTPATIENT
Start: 2023-10-25

## 2023-10-25 RX ORDER — ARIPIPRAZOLE 5 MG/1
TABLET ORAL
COMMUNITY
Start: 2023-10-10

## 2023-10-25 RX ORDER — DEXAMETHASONE SODIUM PHOSPHATE 10 MG/ML
10 INJECTION, EMULSION INTRAMUSCULAR; INTRAVENOUS ONCE
Status: SHIPPED | OUTPATIENT
Start: 2023-10-25

## 2023-10-25 RX ORDER — PREGABALIN 75 MG/1
75 CAPSULE ORAL DAILY
Qty: 30 CAPSULE | Refills: 2 | Status: SHIPPED | OUTPATIENT
Start: 2023-10-25 | End: 2024-01-23

## 2023-11-01 ASSESSMENT — ENCOUNTER SYMPTOMS
SHORTNESS OF BREATH: 0
DIARRHEA: 0
VOMITING: 0
ABDOMINAL PAIN: 0
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
## 2023-11-21 ENCOUNTER — NURSE ONLY (OUTPATIENT)
Age: 53
End: 2023-11-21

## 2023-11-21 DIAGNOSIS — Z96.41 PRESENCE OF INSULIN PUMP (EXTERNAL) (INTERNAL): ICD-10-CM

## 2023-11-21 DIAGNOSIS — Z79.4 LONG TERM (CURRENT) USE OF INSULIN (HCC): ICD-10-CM

## 2023-11-21 DIAGNOSIS — E10.65 TYPE 1 DIABETES MELLITUS WITH HYPERGLYCEMIA (HCC): ICD-10-CM

## 2023-11-21 LAB
EST. AVERAGE GLUCOSE BLD GHB EST-MCNC: 180 MG/DL
HBA1C MFR BLD: 7.9 % (ref 4–5.6)

## 2023-11-22 LAB
ALBUMIN SERPL-MCNC: 4.4 G/DL (ref 3.5–5)
ALBUMIN/GLOB SERPL: 1.2 (ref 1.1–2.2)
ALP SERPL-CCNC: 109 U/L (ref 45–117)
ALT SERPL-CCNC: 138 U/L (ref 12–78)
ANION GAP SERPL CALC-SCNC: 7 MMOL/L (ref 5–15)
AST SERPL-CCNC: 50 U/L (ref 15–37)
BILIRUB SERPL-MCNC: 0.9 MG/DL (ref 0.2–1)
BUN SERPL-MCNC: 42 MG/DL (ref 6–20)
BUN/CREAT SERPL: 20 (ref 12–20)
CALCIUM SERPL-MCNC: 9.6 MG/DL (ref 8.5–10.1)
CHLORIDE SERPL-SCNC: 102 MMOL/L (ref 97–108)
CHOLEST SERPL-MCNC: 154 MG/DL
CO2 SERPL-SCNC: 25 MMOL/L (ref 21–32)
CREAT SERPL-MCNC: 2.07 MG/DL (ref 0.7–1.3)
CREAT UR-MCNC: 71 MG/DL
GLOBULIN SER CALC-MCNC: 3.6 G/DL (ref 2–4)
GLUCOSE SERPL-MCNC: 261 MG/DL (ref 65–100)
HDLC SERPL-MCNC: 76 MG/DL
HDLC SERPL: 2 (ref 0–5)
LDLC SERPL CALC-MCNC: 57.4 MG/DL (ref 0–100)
MICROALBUMIN UR-MCNC: 1.83 MG/DL
MICROALBUMIN/CREAT UR-RTO: 26 MG/G (ref 0–30)
POTASSIUM SERPL-SCNC: 5.3 MMOL/L (ref 3.5–5.1)
PROT SERPL-MCNC: 8 G/DL (ref 6.4–8.2)
SODIUM SERPL-SCNC: 134 MMOL/L (ref 136–145)
TRIGL SERPL-MCNC: 103 MG/DL
VLDLC SERPL CALC-MCNC: 20.6 MG/DL

## 2023-11-27 ENCOUNTER — HOSPITAL ENCOUNTER (INPATIENT)
Facility: HOSPITAL | Age: 53
LOS: 1 days | Discharge: HOME | DRG: 420 | End: 2023-11-29
Attending: EMERGENCY MEDICINE | Admitting: INTERNAL MEDICINE
Payer: COMMERCIAL

## 2023-11-27 DIAGNOSIS — E11.65 HYPERGLYCEMIA DUE TO DIABETES MELLITUS (HCC): Primary | ICD-10-CM

## 2023-11-27 DIAGNOSIS — N17.9 AKI (ACUTE KIDNEY INJURY) (HCC): ICD-10-CM

## 2023-11-27 DIAGNOSIS — M79.671 PAIN IN RIGHT FOOT: ICD-10-CM

## 2023-11-27 DIAGNOSIS — M25.572 PAIN IN LEFT ANKLE AND JOINTS OF LEFT FOOT: ICD-10-CM

## 2023-11-27 LAB
ALBUMIN SERPL-MCNC: 4.1 G/DL (ref 3.5–5)
ALBUMIN/GLOB SERPL: 1 (ref 1.1–2.2)
ALP SERPL-CCNC: 224 U/L (ref 45–117)
ALT SERPL-CCNC: 103 U/L (ref 12–78)
ANION GAP SERPL CALC-SCNC: 3 MMOL/L (ref 5–15)
AST SERPL W P-5'-P-CCNC: ABNORMAL U/L (ref 15–37)
BASE DEFICIT BLD-SCNC: 0.1 MMOL/L
BASOPHILS # BLD: 0.1 K/UL (ref 0–0.1)
BASOPHILS NFR BLD: 1 % (ref 0–1)
BILIRUB SERPL-MCNC: 0.8 MG/DL (ref 0.2–1)
BUN SERPL-MCNC: 59 MG/DL (ref 6–20)
BUN/CREAT SERPL: 20 (ref 12–20)
CA-I BLD-MCNC: 1.08 MMOL/L (ref 1.12–1.32)
CA-I BLD-MCNC: 9.3 MG/DL (ref 8.5–10.1)
CHLORIDE BLD-SCNC: 99 MMOL/L (ref 98–107)
CHLORIDE SERPL-SCNC: 99 MMOL/L (ref 97–108)
CO2 BLD-SCNC: 23 MMOL/L
CO2 SERPL-SCNC: 22 MMOL/L (ref 21–32)
CREAT SERPL-MCNC: 3 MG/DL (ref 0.7–1.3)
CREAT UR-MCNC: 2.53 MG/DL (ref 0.6–1.3)
DIFFERENTIAL METHOD BLD: ABNORMAL
EOSINOPHIL # BLD: 0 K/UL (ref 0–0.4)
EOSINOPHIL NFR BLD: 0 % (ref 0–7)
ERYTHROCYTE [DISTWIDTH] IN BLOOD BY AUTOMATED COUNT: 15.1 % (ref 11.5–14.5)
GLOBULIN SER CALC-MCNC: 4.3 G/DL (ref 2–4)
GLUCOSE BLD STRIP.AUTO-MCNC: 537 MG/DL (ref 65–100)
GLUCOSE BLD STRIP.AUTO-MCNC: >600 MG/DL (ref 65–100)
GLUCOSE BLD STRIP.AUTO-MCNC: >700 MG/DL (ref 65–100)
GLUCOSE SERPL-MCNC: 679 MG/DL (ref 65–100)
HCO3 BLD-SCNC: 23.4 MMOL/L (ref 19–28)
HCT VFR BLD AUTO: 38.8 % (ref 36.6–50.3)
HGB BLD-MCNC: 13.3 G/DL (ref 12.1–17)
IMM GRANULOCYTES # BLD AUTO: 0 K/UL (ref 0–0.04)
IMM GRANULOCYTES NFR BLD AUTO: 0 % (ref 0–0.5)
LACTATE BLD-SCNC: 1.68 MMOL/L (ref 0.4–2)
LYMPHOCYTES # BLD: 1.8 K/UL (ref 0.8–3.5)
LYMPHOCYTES NFR BLD: 18 % (ref 12–49)
MCH RBC QN AUTO: 30.6 PG (ref 26–34)
MCHC RBC AUTO-ENTMCNC: 34.3 G/DL (ref 30–36.5)
MCV RBC AUTO: 89.4 FL (ref 80–99)
MONOCYTES # BLD: 0.8 K/UL (ref 0–1)
MONOCYTES NFR BLD: 8 % (ref 5–13)
NEUTS SEG # BLD: 7.4 K/UL (ref 1.8–8)
NEUTS SEG NFR BLD: 73 % (ref 32–75)
NRBC # BLD: 0 K/UL (ref 0–0.01)
NRBC BLD-RTO: 0 PER 100 WBC
PCO2 BLD: 33.9 MMHG (ref 35–45)
PERFORMED BY:: ABNORMAL
PH BLD: 7.45 (ref 7.35–7.45)
PLATELET # BLD AUTO: 261 K/UL (ref 150–400)
PMV BLD AUTO: 10.3 FL (ref 8.9–12.9)
PO2 BLD: <27 MMHG (ref 75–100)
POTASSIUM BLD-SCNC: 5.7 MMOL/L (ref 3.5–5.5)
POTASSIUM SERPL-SCNC: 5.8 MMOL/L (ref 3.5–5.1)
POTASSIUM SERPL-SCNC: ABNORMAL MMOL/L (ref 3.5–5.1)
POTASSIUM SERPL-SCNC: NORMAL MMOL/L (ref 3.5–5.1)
PROT SERPL-MCNC: 8.4 G/DL (ref 6.4–8.2)
RBC # BLD AUTO: 4.34 M/UL (ref 4.1–5.7)
SERVICE CMNT-IMP: ABNORMAL
SODIUM BLD-SCNC: 133 MMOL/L (ref 136–145)
SODIUM SERPL-SCNC: 124 MMOL/L (ref 136–145)
SPECIMEN SITE: ABNORMAL
WBC # BLD AUTO: 10.2 K/UL (ref 4.1–11.1)

## 2023-11-27 PROCEDURE — 82947 ASSAY GLUCOSE BLOOD QUANT: CPT

## 2023-11-27 PROCEDURE — 82803 BLOOD GASES ANY COMBINATION: CPT

## 2023-11-27 PROCEDURE — 96361 HYDRATE IV INFUSION ADD-ON: CPT

## 2023-11-27 PROCEDURE — 99285 EMERGENCY DEPT VISIT HI MDM: CPT

## 2023-11-27 PROCEDURE — 82962 GLUCOSE BLOOD TEST: CPT

## 2023-11-27 PROCEDURE — 96360 HYDRATION IV INFUSION INIT: CPT

## 2023-11-27 PROCEDURE — 94761 N-INVAS EAR/PLS OXIMETRY MLT: CPT

## 2023-11-27 PROCEDURE — 83605 ASSAY OF LACTIC ACID: CPT

## 2023-11-27 PROCEDURE — 82330 ASSAY OF CALCIUM: CPT

## 2023-11-27 PROCEDURE — 6370000000 HC RX 637 (ALT 250 FOR IP): Performed by: EMERGENCY MEDICINE

## 2023-11-27 PROCEDURE — 2580000003 HC RX 258: Performed by: EMERGENCY MEDICINE

## 2023-11-27 PROCEDURE — 80053 COMPREHEN METABOLIC PANEL: CPT

## 2023-11-27 PROCEDURE — 84132 ASSAY OF SERUM POTASSIUM: CPT

## 2023-11-27 PROCEDURE — 84295 ASSAY OF SERUM SODIUM: CPT

## 2023-11-27 PROCEDURE — 80048 BASIC METABOLIC PNL TOTAL CA: CPT

## 2023-11-27 PROCEDURE — 36415 COLL VENOUS BLD VENIPUNCTURE: CPT

## 2023-11-27 PROCEDURE — 85025 COMPLETE CBC W/AUTO DIFF WBC: CPT

## 2023-11-27 RX ORDER — EPHEDRINE SULFATE 50 MG/ML
INJECTION INTRAVENOUS
Status: DISCONTINUED
Start: 2023-11-27 | End: 2023-11-27

## 2023-11-27 RX ORDER — 0.9 % SODIUM CHLORIDE 0.9 %
1000 INTRAVENOUS SOLUTION INTRAVENOUS ONCE
Status: COMPLETED | OUTPATIENT
Start: 2023-11-27 | End: 2023-11-27

## 2023-11-27 RX ORDER — PHENYLEPHRINE HCL IN 0.9% NACL 1 MG/10 ML
SYRINGE (ML) INTRAVENOUS
Status: DISCONTINUED
Start: 2023-11-27 | End: 2023-11-27

## 2023-11-27 RX ORDER — 0.9 % SODIUM CHLORIDE 0.9 %
1000 INTRAVENOUS SOLUTION INTRAVENOUS ONCE
Status: COMPLETED | OUTPATIENT
Start: 2023-11-27 | End: 2023-11-28

## 2023-11-27 RX ORDER — PROPOFOL 10 MG/ML
INJECTION, EMULSION INTRAVENOUS
Status: DISCONTINUED
Start: 2023-11-27 | End: 2023-11-27

## 2023-11-27 RX ORDER — LIDOCAINE HYDROCHLORIDE 10 MG/ML
INJECTION, SOLUTION INFILTRATION; PERINEURAL
Status: DISCONTINUED
Start: 2023-11-27 | End: 2023-11-27

## 2023-11-27 RX ORDER — DEXMEDETOMIDINE HYDROCHLORIDE 100 UG/ML
INJECTION, SOLUTION INTRAVENOUS
Status: DISCONTINUED
Start: 2023-11-27 | End: 2023-11-27

## 2023-11-27 RX ADMIN — INSULIN HUMAN 15 UNITS: 100 INJECTION, SOLUTION PARENTERAL at 23:23

## 2023-11-27 RX ADMIN — SODIUM CHLORIDE 1000 ML: 9 INJECTION, SOLUTION INTRAVENOUS at 21:59

## 2023-11-27 RX ADMIN — SODIUM CHLORIDE 1000 ML: 9 INJECTION, SOLUTION INTRAVENOUS at 20:10

## 2023-11-27 ASSESSMENT — PAIN - FUNCTIONAL ASSESSMENT
PAIN_FUNCTIONAL_ASSESSMENT: NONE - DENIES PAIN
PAIN_FUNCTIONAL_ASSESSMENT: 0-10

## 2023-11-27 ASSESSMENT — PAIN SCALES - GENERAL: PAINLEVEL_OUTOF10: 0

## 2023-11-28 PROBLEM — R73.9 HYPERGLYCEMIA: Status: ACTIVE | Noted: 2023-11-28

## 2023-11-28 LAB
ANION GAP SERPL CALC-SCNC: 6 MMOL/L (ref 5–15)
BUN SERPL-MCNC: 54 MG/DL (ref 6–20)
BUN/CREAT SERPL: 20 (ref 12–20)
CA-I BLD-MCNC: 8.5 MG/DL (ref 8.5–10.1)
CHLORIDE SERPL-SCNC: 101 MMOL/L (ref 97–108)
CO2 SERPL-SCNC: 24 MMOL/L (ref 21–32)
CREAT SERPL-MCNC: 2.65 MG/DL (ref 0.7–1.3)
EST. AVERAGE GLUCOSE BLD GHB EST-MCNC: 200 MG/DL
GLUCOSE BLD STRIP.AUTO-MCNC: 149 MG/DL (ref 65–100)
GLUCOSE BLD STRIP.AUTO-MCNC: 172 MG/DL (ref 65–100)
GLUCOSE BLD STRIP.AUTO-MCNC: 181 MG/DL (ref 65–100)
GLUCOSE BLD STRIP.AUTO-MCNC: 207 MG/DL (ref 65–100)
GLUCOSE BLD STRIP.AUTO-MCNC: 224 MG/DL (ref 65–100)
GLUCOSE BLD STRIP.AUTO-MCNC: 271 MG/DL (ref 65–100)
GLUCOSE BLD STRIP.AUTO-MCNC: 363 MG/DL (ref 65–100)
GLUCOSE BLD STRIP.AUTO-MCNC: 492 MG/DL (ref 65–100)
GLUCOSE SERPL-MCNC: 517 MG/DL (ref 65–100)
HBA1C MFR BLD: 8.6 % (ref 4–5.6)
PERFORMED BY:: ABNORMAL
POTASSIUM SERPL-SCNC: 4.1 MMOL/L (ref 3.5–5.1)
POTASSIUM SERPL-SCNC: ABNORMAL MMOL/L (ref 3.5–5.1)
SODIUM SERPL-SCNC: 131 MMOL/L (ref 136–145)

## 2023-11-28 PROCEDURE — 2580000003 HC RX 258: Performed by: INTERNAL MEDICINE

## 2023-11-28 PROCEDURE — 6370000000 HC RX 637 (ALT 250 FOR IP): Performed by: INTERNAL MEDICINE

## 2023-11-28 PROCEDURE — 80048 BASIC METABOLIC PNL TOTAL CA: CPT

## 2023-11-28 PROCEDURE — 83036 HEMOGLOBIN GLYCOSYLATED A1C: CPT

## 2023-11-28 PROCEDURE — 94761 N-INVAS EAR/PLS OXIMETRY MLT: CPT

## 2023-11-28 PROCEDURE — 6370000000 HC RX 637 (ALT 250 FOR IP): Performed by: EMERGENCY MEDICINE

## 2023-11-28 PROCEDURE — 2580000003 HC RX 258: Performed by: EMERGENCY MEDICINE

## 2023-11-28 PROCEDURE — 36415 COLL VENOUS BLD VENIPUNCTURE: CPT

## 2023-11-28 PROCEDURE — 84132 ASSAY OF SERUM POTASSIUM: CPT

## 2023-11-28 PROCEDURE — 1100000000 HC RM PRIVATE

## 2023-11-28 PROCEDURE — 82962 GLUCOSE BLOOD TEST: CPT

## 2023-11-28 PROCEDURE — 6360000002 HC RX W HCPCS: Performed by: INTERNAL MEDICINE

## 2023-11-28 RX ORDER — INSULIN GLARGINE 100 [IU]/ML
0.25 INJECTION, SOLUTION SUBCUTANEOUS DAILY
Status: DISCONTINUED | OUTPATIENT
Start: 2023-11-28 | End: 2023-11-29 | Stop reason: HOSPADM

## 2023-11-28 RX ORDER — 0.9 % SODIUM CHLORIDE 0.9 %
1000 INTRAVENOUS SOLUTION INTRAVENOUS ONCE
Status: COMPLETED | OUTPATIENT
Start: 2023-11-28 | End: 2023-11-28

## 2023-11-28 RX ORDER — ACETAMINOPHEN 325 MG/1
650 TABLET ORAL EVERY 6 HOURS PRN
Status: DISCONTINUED | OUTPATIENT
Start: 2023-11-28 | End: 2023-11-29 | Stop reason: HOSPADM

## 2023-11-28 RX ORDER — POTASSIUM CHLORIDE 20 MEQ/1
40 TABLET, EXTENDED RELEASE ORAL PRN
Status: DISCONTINUED | OUTPATIENT
Start: 2023-11-28 | End: 2023-11-29 | Stop reason: HOSPADM

## 2023-11-28 RX ORDER — BUSPIRONE HYDROCHLORIDE 10 MG/1
10 TABLET ORAL DAILY
Status: DISCONTINUED | OUTPATIENT
Start: 2023-11-28 | End: 2023-11-29 | Stop reason: HOSPADM

## 2023-11-28 RX ORDER — SODIUM CHLORIDE 0.9 % (FLUSH) 0.9 %
5-40 SYRINGE (ML) INJECTION PRN
Status: DISCONTINUED | OUTPATIENT
Start: 2023-11-28 | End: 2023-11-29 | Stop reason: HOSPADM

## 2023-11-28 RX ORDER — POLYETHYLENE GLYCOL 3350 17 G/17G
17 POWDER, FOR SOLUTION ORAL DAILY PRN
Status: DISCONTINUED | OUTPATIENT
Start: 2023-11-28 | End: 2023-11-29 | Stop reason: HOSPADM

## 2023-11-28 RX ORDER — INSULIN LISPRO 100 [IU]/ML
0-4 INJECTION, SOLUTION INTRAVENOUS; SUBCUTANEOUS NIGHTLY
Status: DISCONTINUED | OUTPATIENT
Start: 2023-11-28 | End: 2023-11-29 | Stop reason: HOSPADM

## 2023-11-28 RX ORDER — ONDANSETRON 4 MG/1
4 TABLET, ORALLY DISINTEGRATING ORAL EVERY 8 HOURS PRN
Status: DISCONTINUED | OUTPATIENT
Start: 2023-11-28 | End: 2023-11-29 | Stop reason: HOSPADM

## 2023-11-28 RX ORDER — ENOXAPARIN SODIUM 100 MG/ML
30 INJECTION SUBCUTANEOUS 2 TIMES DAILY
Status: DISCONTINUED | OUTPATIENT
Start: 2023-11-28 | End: 2023-11-29 | Stop reason: HOSPADM

## 2023-11-28 RX ORDER — DEXTROSE MONOHYDRATE 100 MG/ML
INJECTION, SOLUTION INTRAVENOUS CONTINUOUS PRN
Status: DISCONTINUED | OUTPATIENT
Start: 2023-11-28 | End: 2023-11-29 | Stop reason: HOSPADM

## 2023-11-28 RX ORDER — FOLIC ACID 1 MG/1
1 TABLET ORAL DAILY
Status: DISCONTINUED | OUTPATIENT
Start: 2023-11-28 | End: 2023-11-29 | Stop reason: HOSPADM

## 2023-11-28 RX ORDER — ATORVASTATIN CALCIUM 20 MG/1
20 TABLET, FILM COATED ORAL DAILY
Status: DISCONTINUED | OUTPATIENT
Start: 2023-11-28 | End: 2023-11-29 | Stop reason: HOSPADM

## 2023-11-28 RX ORDER — INSULIN LISPRO 100 [IU]/ML
0-8 INJECTION, SOLUTION INTRAVENOUS; SUBCUTANEOUS
Status: DISCONTINUED | OUTPATIENT
Start: 2023-11-28 | End: 2023-11-29 | Stop reason: HOSPADM

## 2023-11-28 RX ORDER — SODIUM CHLORIDE 9 MG/ML
INJECTION, SOLUTION INTRAVENOUS CONTINUOUS
Status: DISCONTINUED | OUTPATIENT
Start: 2023-11-28 | End: 2023-11-28

## 2023-11-28 RX ORDER — LIDOCAINE HYDROCHLORIDE 10 MG/ML
1 INJECTION, SOLUTION EPIDURAL; INFILTRATION; INTRACAUDAL; PERINEURAL ONCE
Status: DISCONTINUED | OUTPATIENT
Start: 2023-11-28 | End: 2023-11-28

## 2023-11-28 RX ORDER — SODIUM CHLORIDE 9 MG/ML
INJECTION, SOLUTION INTRAVENOUS PRN
Status: DISCONTINUED | OUTPATIENT
Start: 2023-11-28 | End: 2023-11-29 | Stop reason: HOSPADM

## 2023-11-28 RX ORDER — POTASSIUM CHLORIDE 7.45 MG/ML
10 INJECTION INTRAVENOUS PRN
Status: DISCONTINUED | OUTPATIENT
Start: 2023-11-28 | End: 2023-11-29 | Stop reason: HOSPADM

## 2023-11-28 RX ORDER — MAGNESIUM SULFATE IN WATER 40 MG/ML
2000 INJECTION, SOLUTION INTRAVENOUS PRN
Status: DISCONTINUED | OUTPATIENT
Start: 2023-11-28 | End: 2023-11-29 | Stop reason: HOSPADM

## 2023-11-28 RX ORDER — ACETAMINOPHEN 650 MG/1
650 SUPPOSITORY RECTAL EVERY 6 HOURS PRN
Status: DISCONTINUED | OUTPATIENT
Start: 2023-11-28 | End: 2023-11-29 | Stop reason: HOSPADM

## 2023-11-28 RX ORDER — INSULIN LISPRO 100 [IU]/ML
0.08 INJECTION, SOLUTION INTRAVENOUS; SUBCUTANEOUS
Status: DISCONTINUED | OUTPATIENT
Start: 2023-11-28 | End: 2023-11-29 | Stop reason: HOSPADM

## 2023-11-28 RX ORDER — LIDOCAINE HYDROCHLORIDE 10 MG/ML
1 INJECTION, SOLUTION INFILTRATION; PERINEURAL ONCE
Status: DISCONTINUED | OUTPATIENT
Start: 2023-11-28 | End: 2023-11-28

## 2023-11-28 RX ORDER — SODIUM CHLORIDE 0.9 % (FLUSH) 0.9 %
5-40 SYRINGE (ML) INJECTION EVERY 12 HOURS SCHEDULED
Status: DISCONTINUED | OUTPATIENT
Start: 2023-11-28 | End: 2023-11-29 | Stop reason: HOSPADM

## 2023-11-28 RX ORDER — GABAPENTIN 300 MG/1
600 CAPSULE ORAL 2 TIMES DAILY
Status: DISCONTINUED | OUTPATIENT
Start: 2023-11-28 | End: 2023-11-29 | Stop reason: HOSPADM

## 2023-11-28 RX ORDER — SODIUM CHLORIDE 9 MG/ML
INJECTION, SOLUTION INTRAVENOUS CONTINUOUS
Status: DISCONTINUED | OUTPATIENT
Start: 2023-11-28 | End: 2023-11-29 | Stop reason: HOSPADM

## 2023-11-28 RX ORDER — ONDANSETRON 2 MG/ML
4 INJECTION INTRAMUSCULAR; INTRAVENOUS EVERY 6 HOURS PRN
Status: DISCONTINUED | OUTPATIENT
Start: 2023-11-28 | End: 2023-11-29 | Stop reason: HOSPADM

## 2023-11-28 RX ORDER — BUPROPION HYDROCHLORIDE 150 MG/1
150 TABLET ORAL DAILY
Status: DISCONTINUED | OUTPATIENT
Start: 2023-11-28 | End: 2023-11-29 | Stop reason: HOSPADM

## 2023-11-28 RX ORDER — ARIPIPRAZOLE 5 MG/1
5 TABLET ORAL NIGHTLY
Status: DISCONTINUED | OUTPATIENT
Start: 2023-11-28 | End: 2023-11-29 | Stop reason: HOSPADM

## 2023-11-28 RX ADMIN — GABAPENTIN 600 MG: 300 CAPSULE ORAL at 20:59

## 2023-11-28 RX ADMIN — INSULIN GLARGINE 26 UNITS: 100 INJECTION, SOLUTION SUBCUTANEOUS at 09:28

## 2023-11-28 RX ADMIN — FOLIC ACID 1 MG: 1 TABLET ORAL at 14:51

## 2023-11-28 RX ADMIN — GABAPENTIN 600 MG: 300 CAPSULE ORAL at 09:29

## 2023-11-28 RX ADMIN — INSULIN HUMAN 10 UNITS: 100 INJECTION, SOLUTION PARENTERAL at 02:05

## 2023-11-28 RX ADMIN — INSULIN LISPRO 8 UNITS: 100 INJECTION, SOLUTION INTRAVENOUS; SUBCUTANEOUS at 12:56

## 2023-11-28 RX ADMIN — ARIPIPRAZOLE 5 MG: 5 TABLET ORAL at 20:59

## 2023-11-28 RX ADMIN — ACETAMINOPHEN 650 MG: 325 TABLET ORAL at 20:59

## 2023-11-28 RX ADMIN — INSULIN LISPRO 8 UNITS: 100 INJECTION, SOLUTION INTRAVENOUS; SUBCUTANEOUS at 17:36

## 2023-11-28 RX ADMIN — ENOXAPARIN SODIUM 30 MG: 100 INJECTION SUBCUTANEOUS at 09:29

## 2023-11-28 RX ADMIN — SODIUM CHLORIDE 1000 ML: 9 INJECTION, SOLUTION INTRAVENOUS at 02:05

## 2023-11-28 RX ADMIN — ATORVASTATIN CALCIUM 20 MG: 20 TABLET, FILM COATED ORAL at 09:29

## 2023-11-28 RX ADMIN — INSULIN LISPRO 8 UNITS: 100 INJECTION, SOLUTION INTRAVENOUS; SUBCUTANEOUS at 09:28

## 2023-11-28 RX ADMIN — SODIUM CHLORIDE, PRESERVATIVE FREE 10 ML: 5 INJECTION INTRAVENOUS at 09:33

## 2023-11-28 RX ADMIN — INSULIN LISPRO 2 UNITS: 100 INJECTION, SOLUTION INTRAVENOUS; SUBCUTANEOUS at 12:56

## 2023-11-28 ASSESSMENT — PAIN SCALES - GENERAL
PAINLEVEL_OUTOF10: 6

## 2023-11-28 ASSESSMENT — PAIN DESCRIPTION - LOCATION: LOCATION: HEAD;FOOT

## 2023-11-28 NOTE — ED TRIAGE NOTES
Pt reports feeling sick yesterday and unsure of when he last received insulin and does not know where his pump is. Pt has type 1 DM. BG reading HI in triage.

## 2023-11-28 NOTE — CARE COORDINATION
11/28/23 1116   Service Assessment   Patient Orientation Alert and Oriented   Cognition Alert   History Provided By Patient   Primary 90Vinny Swain Parent   Patient's Healthcare Decision Maker is: Legal Next of Kin   PCP Verified by CM Yes   Last Visit to PCP Within last 3 months   Prior Functional Level Assistance with the following:;Mobility   Current Functional Level Assistance with the following:;Mobility   Can patient return to prior living arrangement Yes   Ability to make needs known: Good   Family able to assist with home care needs: No   Would you like for me to discuss the discharge plan with any other family members/significant others, and if so, who? No   Financial Resources  Resources None   Social/Functional History   Lives With Daughter  (10year old daughter)   Type of [address] [place] [address] One level   [address] to enter with rails   Entrance Stairs - Number of Steps 5 steps   Bathroom Shower/Tub Shower chair with back   [address] chair   [address] [facility] Help From Other (comment)  (n/a)   ADL Assistance Independent   Homemaking Assistance Independent   Ambulation Assistance Independent   Transfer Assistance Independent   Active  Yes   Mode of Transportation Car     CM met with patient to complete DCP assessment. Patient reports that he lives with his 10year old daughter in a one story house with 5 steps to the entrance, address on chart confirmed. Patient uses a cane at baseline and has shower chair. Patient is an active , drives to his own appts, utilizes [employer] in [facility] for his pharmacy, no reported issues obtaining his medications. Patient reports no CM needs at this time, CM will continue to follow and monitor for needs.     Advance Care Planning     General Advance Care Planning (ACP) Conversation    Date of Conversation: 11/27/2023  Conducted with: Patient with Decision Making
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
## 2023-11-29 VITALS
SYSTOLIC BLOOD PRESSURE: 122 MMHG | HEIGHT: 72 IN | RESPIRATION RATE: 18 BRPM | OXYGEN SATURATION: 96 % | BODY MASS INDEX: 31.15 KG/M2 | TEMPERATURE: 98.4 F | WEIGHT: 230 LBS | HEART RATE: 92 BPM | DIASTOLIC BLOOD PRESSURE: 77 MMHG

## 2023-11-29 LAB
ALBUMIN SERPL-MCNC: 3.2 G/DL (ref 3.5–5)
ALBUMIN/GLOB SERPL: 1 (ref 1.1–2.2)
ALP SERPL-CCNC: 84 U/L (ref 45–117)
ALT SERPL-CCNC: 93 U/L (ref 12–78)
ANION GAP SERPL CALC-SCNC: 4 MMOL/L (ref 5–15)
AST SERPL W P-5'-P-CCNC: 87 U/L (ref 15–37)
BASOPHILS # BLD: 0 K/UL (ref 0–0.1)
BASOPHILS NFR BLD: 0 % (ref 0–1)
BILIRUB SERPL-MCNC: 0.6 MG/DL (ref 0.2–1)
BUN SERPL-MCNC: 28 MG/DL (ref 6–20)
BUN/CREAT SERPL: 17 (ref 12–20)
CA-I BLD-MCNC: 8.8 MG/DL (ref 8.5–10.1)
CHLORIDE SERPL-SCNC: 110 MMOL/L (ref 97–108)
CO2 SERPL-SCNC: 26 MMOL/L (ref 21–32)
CREAT SERPL-MCNC: 1.69 MG/DL (ref 0.7–1.3)
DIFFERENTIAL METHOD BLD: ABNORMAL
EOSINOPHIL # BLD: 0.2 K/UL (ref 0–0.4)
EOSINOPHIL NFR BLD: 2 % (ref 0–7)
ERYTHROCYTE [DISTWIDTH] IN BLOOD BY AUTOMATED COUNT: 15.2 % (ref 11.5–14.5)
GLOBULIN SER CALC-MCNC: 3.1 G/DL (ref 2–4)
GLUCOSE BLD STRIP.AUTO-MCNC: 264 MG/DL (ref 65–100)
GLUCOSE BLD STRIP.AUTO-MCNC: 276 MG/DL (ref 65–100)
GLUCOSE SERPL-MCNC: 239 MG/DL (ref 65–100)
HCT VFR BLD AUTO: 32.8 % (ref 36.6–50.3)
HGB BLD-MCNC: 11.3 G/DL (ref 12.1–17)
IMM GRANULOCYTES # BLD AUTO: 0 K/UL (ref 0–0.04)
IMM GRANULOCYTES NFR BLD AUTO: 0 % (ref 0–0.5)
LYMPHOCYTES # BLD: 2.1 K/UL (ref 0.8–3.5)
LYMPHOCYTES NFR BLD: 30 % (ref 12–49)
MAGNESIUM SERPL-MCNC: 1.7 MG/DL (ref 1.6–2.4)
MCH RBC QN AUTO: 31.3 PG (ref 26–34)
MCHC RBC AUTO-ENTMCNC: 34.5 G/DL (ref 30–36.5)
MCV RBC AUTO: 90.9 FL (ref 80–99)
MONOCYTES # BLD: 0.4 K/UL (ref 0–1)
MONOCYTES NFR BLD: 6 % (ref 5–13)
NEUTS SEG # BLD: 4.4 K/UL (ref 1.8–8)
NEUTS SEG NFR BLD: 62 % (ref 32–75)
NRBC # BLD: 0 K/UL (ref 0–0.01)
NRBC BLD-RTO: 0 PER 100 WBC
PERFORMED BY:: ABNORMAL
PERFORMED BY:: ABNORMAL
PLATELET # BLD AUTO: 229 K/UL (ref 150–400)
PMV BLD AUTO: 10.6 FL (ref 8.9–12.9)
POTASSIUM SERPL-SCNC: 4 MMOL/L (ref 3.5–5.1)
PROT SERPL-MCNC: 6.3 G/DL (ref 6.4–8.2)
RBC # BLD AUTO: 3.61 M/UL (ref 4.1–5.7)
SODIUM SERPL-SCNC: 140 MMOL/L (ref 136–145)
WBC # BLD AUTO: 7.2 K/UL (ref 4.1–11.1)

## 2023-11-29 PROCEDURE — 82962 GLUCOSE BLOOD TEST: CPT

## 2023-11-29 PROCEDURE — 6370000000 HC RX 637 (ALT 250 FOR IP): Performed by: INTERNAL MEDICINE

## 2023-11-29 PROCEDURE — 85025 COMPLETE CBC W/AUTO DIFF WBC: CPT

## 2023-11-29 PROCEDURE — 36415 COLL VENOUS BLD VENIPUNCTURE: CPT

## 2023-11-29 PROCEDURE — 80053 COMPREHEN METABOLIC PANEL: CPT

## 2023-11-29 PROCEDURE — 83735 ASSAY OF MAGNESIUM: CPT

## 2023-11-29 RX ORDER — INSULIN GLARGINE 100 [IU]/ML
14 INJECTION, SOLUTION SUBCUTANEOUS 2 TIMES DAILY
Qty: 10 ML | Refills: 0 | Status: SHIPPED | OUTPATIENT
Start: 2023-11-29

## 2023-11-29 RX ADMIN — BUSPIRONE HYDROCHLORIDE 10 MG: 10 TABLET ORAL at 09:10

## 2023-11-29 RX ADMIN — INSULIN LISPRO 8 UNITS: 100 INJECTION, SOLUTION INTRAVENOUS; SUBCUTANEOUS at 09:09

## 2023-11-29 RX ADMIN — FOLIC ACID 1 MG: 1 TABLET ORAL at 09:10

## 2023-11-29 RX ADMIN — INSULIN LISPRO 4 UNITS: 100 INJECTION, SOLUTION INTRAVENOUS; SUBCUTANEOUS at 09:10

## 2023-11-29 RX ADMIN — INSULIN GLARGINE 26 UNITS: 100 INJECTION, SOLUTION SUBCUTANEOUS at 09:10

## 2023-11-29 RX ADMIN — BUPROPION HYDROCHLORIDE 150 MG: 150 TABLET, FILM COATED, EXTENDED RELEASE ORAL at 09:10

## 2023-11-29 RX ADMIN — INSULIN LISPRO 4 UNITS: 100 INJECTION, SOLUTION INTRAVENOUS; SUBCUTANEOUS at 11:21

## 2023-11-29 RX ADMIN — GABAPENTIN 600 MG: 300 CAPSULE ORAL at 09:10

## 2023-11-29 RX ADMIN — ATORVASTATIN CALCIUM 20 MG: 20 TABLET, FILM COATED ORAL at 09:11

## 2023-11-29 RX ADMIN — INSULIN LISPRO 8 UNITS: 100 INJECTION, SOLUTION INTRAVENOUS; SUBCUTANEOUS at 11:21

## 2023-11-29 NOTE — PLAN OF CARE
Problem: Discharge Planning  Goal: Discharge to home or other facility with appropriate resources  Outcome: Progressing  Flowsheets (Taken 11/29/2023 1118)  Discharge to home or other facility with appropriate resources: Identify barriers to discharge with patient and caregiver     Problem: Pain  Goal: Verbalizes/displays adequate comfort level or baseline comfort level  Outcome: Progressing     Problem: Safety - Adult  Goal: Free from fall injury  Outcome: Progressing     Problem: Chronic Conditions and Co-morbidities  Goal: Patient's chronic conditions and co-morbidity symptoms are monitored and maintained or improved  Outcome: Progressing  Flowsheets (Taken 11/29/2023 1118)  Care Plan - Patient's Chronic Conditions and Co-Morbidity Symptoms are Monitored and Maintained or Improved: Monitor and assess patient's chronic conditions and comorbid symptoms for stability, deterioration, or improvement

## 2023-11-29 NOTE — CARE COORDINATION
CM has reviewed pt chart    DCP: home self care    1055: SENTHIL met with pt at bedside to discuss any dc needs as dc order is in. Pt had questions about insulin pump as he lost his other one. CM instructed pt to contact his endocrinologist for f/u about getting another one.

## 2023-11-29 NOTE — PROGRESS NOTES
4 Eyes Skin Assessment     NAME:  Steffen Macdonald  YOB: 1970  MEDICAL RECORD NUMBER:  [id number]    The patient is being assessed for  Weekly    I agree that at least one RN has performed a thorough Head to Toe Skin Assessment on the patient. ALL assessment sites listed below have been assessed. Areas assessed by both nurses:    Head, Face, Ears, Shoulders, Back, Chest, Arms, Elbows, Hands, Sacrum. Buttock, Coccyx, Ischium, Legs. Feet and Heels, and Under Medical Devices         Does the Patient have a Wound?  No noted wound(s)  Right toe amputation       Juarez Prevention initiated by RN: No  Wound Care Orders initiated by RN: No    Pressure Injury (Stage 3,4, Unstageable, DTI, NWPT, and Complex wounds) if present, place Wound referral order by RN under : No    New Ostomies, if present place, Ostomy referral order under : No     Nurse 1 eSignature: Electronically signed by Verneita Peabody, RN on 11/29/23 at 11:19 AM EST    **SHARE this note so that the co-signing nurse can place an eSignature**    Nurse 2 eSignature: Electronically signed by Sofie Ashley RN on 11/29/23 at 11:19 AM EST
Patient being discharged home/self. Pt VS stable. PIVs removed. Discharge instructions reviewed with patient. Patient verbalized understanding and had no further questions at this time. Waiting for patients mother to transport patient home.
Physician Progress Note      PATIENT:               Nestor Garcia  CSN #:                  [id number]  :                       1970  ADMIT DATE:       2023 7:42 PM  [facility] DATE:  RESPONDING  PROVIDER #:        Bairon Funes MD        QUERY TEXT:    Stage of Chronic Kidney Disease: Please provide further specificity, if known. Clinical indicators include: chronic kidney disease, ckd, bun, creatinine,   probnp  Options provided:  -- Chronic kidney disease stage 1  -- Chronic kidney disease stage 2  -- Chronic kidney disease stage 3  -- Chronic kidney disease stage 3a  -- Chronic kidney disease stage 3b  -- Chronic kidney disease stage 4  -- Chronic kidney disease stage 5  -- Chronic kidney disease stage 5, requiring dialysis  -- End stage renal disease  -- Other - I will add my own diagnosis  -- Disagree - Not applicable / Not valid  -- Disagree - Clinically Unable to determine / Unknown        PROVIDER RESPONSE TEXT:    The patient has chronic kidney disease stage 3a. Electronically signed by:   Bairon Funes MD 2023 10:23 AM
Pt arrived on unit. VS and Glucose complete. Patient extremely tired and could not keep his eyes open. This RN assessed heart, lungs and abdominal sounds. All WDL.
0.25 Units/kg SubCUTAneous Daily    insulin lispro  0.08 Units/kg SubCUTAneous TID WC    insulin lispro  0-8 Units SubCUTAneous TID WC    insulin lispro  0-4 Units SubCUTAneous Nightly    atorvastatin  20 mg Oral Daily    ARIPiprazole  5 mg Oral Nightly    buPROPion  150 mg Oral Daily    busPIRone  10 mg Oral Daily    folic acid  1 mg Oral Daily    gabapentin  600 mg Oral BID    sodium chloride flush  5-40 mL IntraVENous 2 times per day    enoxaparin  30 mg SubCUTAneous BID     PRN Meds: glucose, dextrose bolus **OR** dextrose bolus, glucagon (rDNA), dextrose, sodium chloride flush, sodium chloride, potassium chloride **OR** potassium alternative oral replacement **OR** potassium chloride, magnesium sulfate, ondansetron **OR** ondansetron, polyethylene glycol, acetaminophen **OR** acetaminophen     Labs:  Personally reviewed and interpreted for clinical significance. Recent Labs     11/27/23 1948   WBC 10.2   HGB 13.3   HCT 38.8        Recent Labs     11/27/23 1948 11/27/23 1958 11/27/23  2137 11/27/23  2306 11/28/23  0042 11/28/23  0800   *  --   --   --  131*  --    K Hemolyzed, Recollection Recommended  --    < > 5.8* Hemolyzed, Recollection Recommended 4.1   CL 99  --   --   --  101  --    CO2 22  --   --   --  24  --    BUN 59*  --   --   --  54*  --    CREATININE 3.00* 2.53*  --   --  2.65*  --    CALCIUM 9.3  --   --   --  8.5  --     < > = values in this interval not displayed. No results for input(s): \"PROBNP\", \"TROPHS\" in the last 72 hours. Recent Labs     11/28/23  0800   LABA1C 8.6*     Recent Labs     11/27/23 1948   AST Hemolyzed, Recollection Recommended   *   BILITOT 0.8   ALKPHOS 224*     No results for input(s): \"INR\", \"LACTA\", \"TSH\" in the last 72 hours.     Urine Cultures: No results found for: \"LABURIN\"  Blood Cultures: No results found for: \"BC\"  No results found for: \"BLOODCULT2\"  Organism: No results found for: \"ORG\"      Chris Guzman MD

## 2023-11-29 NOTE — CARE COORDINATION
DCP: home self care    DC Transport: family      Transition of Care Plan:    RUR: 13%  Prior Level of Functioning: home self care  Disposition: home self care  Follow up appointments: unit secretary to setup as needed  DME needed: none  Transportation at discharge: family  IM/IMM Medicare/ letter given: n/a  Is patient a  and connected with VA? no   If yes, was [employer] transfer form completed and VA notified? N/a  Caregiver Contact: n/a  Discharge Caregiver contacted prior to discharge?  N/a  Care Conference needed? no  Barriers to discharge: none

## 2023-11-29 NOTE — DISCHARGE INSTRUCTIONS
Follow-up with your endocrinologist  Start taking insulin glargine 14 units twice a day as well as sliding scale insulin.

## 2024-01-01 ENCOUNTER — TELEPHONE (OUTPATIENT)
Age: 54
End: 2024-01-01

## 2024-01-17 ENCOUNTER — TELEPHONE (OUTPATIENT)
Age: 54
End: 2024-01-17

## 2024-01-17 DIAGNOSIS — G62.9 POLYNEUROPATHY, UNSPECIFIED: ICD-10-CM

## 2024-01-17 DIAGNOSIS — E10.65 TYPE 1 DIABETES MELLITUS WITH HYPERGLYCEMIA (HCC): ICD-10-CM

## 2024-01-17 DIAGNOSIS — Z96.41 PRESENCE OF INSULIN PUMP (EXTERNAL) (INTERNAL): ICD-10-CM

## 2024-01-17 DIAGNOSIS — L97.515 NON-PRESSURE CHRONIC ULCER OF OTHER PART OF RIGHT FOOT WITH MUSCLE INVOLVEMENT WITHOUT EVIDENCE OF NECROSIS (HCC): ICD-10-CM

## 2024-01-17 DIAGNOSIS — Z79.4 LONG TERM (CURRENT) USE OF INSULIN (HCC): ICD-10-CM

## 2024-01-17 RX ORDER — GABAPENTIN 300 MG/1
CAPSULE ORAL
Qty: 150 CAPSULE | Refills: 5 | Status: SHIPPED | OUTPATIENT
Start: 2024-01-17 | End: 2025-01-31

## 2024-01-17 RX ORDER — INSULIN LISPRO 100 [IU]/ML
INJECTION, SOLUTION INTRAVENOUS; SUBCUTANEOUS
Qty: 100 ML | Refills: 3 | Status: SHIPPED | OUTPATIENT
Start: 2024-01-17

## 2024-01-17 NOTE — TELEPHONE ENCOUNTER
Pt needs refill but changed pharmacy from [facility] to [facility] - needs Gabapnen and Humalog. I almost out.

## 2024-01-19 ENCOUNTER — TELEPHONE (OUTPATIENT)
Age: 54
End: 2024-01-19

## 2024-01-19 NOTE — TELEPHONE ENCOUNTER
I talk to the pharmacist and he going to fill it . He wanted to make sure the order was right.  I also called the pt to tell him that the medication is at the pharmacy .

## 2024-01-19 NOTE — TELEPHONE ENCOUNTER
Please call [facility]  [phone]    Humalog prescription does not give a number of vials.  Need an unpdated prescription

## 2024-01-19 NOTE — TELEPHONE ENCOUNTER
Call the pharmacy - I just printed it     It says 100 ml  which means   10 vials        Stephania Mcconnell MD

## 2024-03-06 ENCOUNTER — APPOINTMENT (OUTPATIENT)
Facility: HOSPITAL | Age: 54
DRG: 720 | End: 2024-03-06
Payer: COMMERCIAL

## 2024-03-06 ENCOUNTER — HOSPITAL ENCOUNTER (INPATIENT)
Facility: HOSPITAL | Age: 54
LOS: 3 days | Discharge: HOME | DRG: 720 | End: 2024-03-09
Attending: FAMILY MEDICINE | Admitting: HOSPITALIST
Payer: COMMERCIAL

## 2024-03-06 DIAGNOSIS — I21.4 NSTEMI (NON-ST ELEVATED MYOCARDIAL INFARCTION) (HCC): ICD-10-CM

## 2024-03-06 DIAGNOSIS — R19.7 NAUSEA VOMITING AND DIARRHEA: ICD-10-CM

## 2024-03-06 DIAGNOSIS — N28.9 RENAL INSUFFICIENCY: ICD-10-CM

## 2024-03-06 DIAGNOSIS — E86.0 SEVERE DEHYDRATION: Primary | ICD-10-CM

## 2024-03-06 DIAGNOSIS — K92.2 GASTROINTESTINAL HEMORRHAGE, UNSPECIFIED GASTROINTESTINAL HEMORRHAGE TYPE: ICD-10-CM

## 2024-03-06 DIAGNOSIS — R11.2 NAUSEA VOMITING AND DIARRHEA: ICD-10-CM

## 2024-03-06 PROBLEM — N17.9 ACUTE KIDNEY INJURY SUPERIMPOSED ON CHRONIC KIDNEY DISEASE (HCC): Status: ACTIVE | Noted: 2024-03-06

## 2024-03-06 PROBLEM — N18.9 ACUTE KIDNEY INJURY SUPERIMPOSED ON CHRONIC KIDNEY DISEASE (HCC): Status: ACTIVE | Noted: 2024-03-06

## 2024-03-06 PROBLEM — I95.9 HYPOTENSION: Status: ACTIVE | Noted: 2024-03-06

## 2024-03-06 PROBLEM — N18.31 ACUTE RENAL FAILURE SUPERIMPOSED ON STAGE 3A CHRONIC KIDNEY DISEASE (HCC): Status: ACTIVE | Noted: 2022-05-08

## 2024-03-06 PROBLEM — E10.8 TYPE 1 DIABETES MELLITUS WITH COMPLICATION, WITH LONG-TERM CURRENT USE OF INSULIN (HCC): Status: ACTIVE | Noted: 2024-03-06

## 2024-03-06 PROBLEM — N17.9 AKI (ACUTE KIDNEY INJURY) (HCC): Status: ACTIVE | Noted: 2022-05-08

## 2024-03-06 LAB
ALBUMIN SERPL-MCNC: 4.3 G/DL (ref 3.5–5)
ALBUMIN/GLOB SERPL: 0.9 (ref 1.1–2.2)
ALP SERPL-CCNC: 112 U/L (ref 45–117)
ALT SERPL-CCNC: 20 U/L (ref 12–78)
ANION GAP SERPL CALC-SCNC: 19 MMOL/L (ref 5–15)
APPEARANCE UR: CLEAR
AST SERPL W P-5'-P-CCNC: 18 U/L (ref 15–37)
BACTERIA URNS QL MICRO: NEGATIVE /HPF
BASE DEFICIT BLD-SCNC: 2.2 MMOL/L
BASOPHILS # BLD: 0 K/UL (ref 0–0.1)
BASOPHILS NFR BLD: 0 % (ref 0–1)
BILIRUB SERPL-MCNC: 1.2 MG/DL (ref 0.2–1)
BILIRUB UR QL: NEGATIVE
BUN SERPL-MCNC: 101 MG/DL (ref 6–20)
BUN/CREAT SERPL: 16 (ref 12–20)
CA-I BLD-MCNC: 9.7 MG/DL (ref 8.5–10.1)
CHLORIDE SERPL-SCNC: 99 MMOL/L (ref 97–108)
CO2 SERPL-SCNC: 22 MMOL/L (ref 21–32)
COLOR UR: ABNORMAL
CREAT SERPL-MCNC: 6.44 MG/DL (ref 0.7–1.3)
DIFFERENTIAL METHOD BLD: ABNORMAL
EOSINOPHIL # BLD: 0 K/UL (ref 0–0.4)
EOSINOPHIL NFR BLD: 0 % (ref 0–7)
EPITH CASTS URNS QL MICRO: ABNORMAL /LPF
ERYTHROCYTE [DISTWIDTH] IN BLOOD BY AUTOMATED COUNT: 14.3 % (ref 11.5–14.5)
FLUAV AG NPH QL IA: NEGATIVE
FLUBV AG NOSE QL IA: NEGATIVE
GLOBULIN SER CALC-MCNC: 4.8 G/DL (ref 2–4)
GLUCOSE BLD STRIP.AUTO-MCNC: 282 MG/DL (ref 65–100)
GLUCOSE BLD STRIP.AUTO-MCNC: 352 MG/DL (ref 65–100)
GLUCOSE BLD STRIP.AUTO-MCNC: 358 MG/DL (ref 65–100)
GLUCOSE SERPL-MCNC: 348 MG/DL (ref 65–100)
GLUCOSE UR STRIP.AUTO-MCNC: >500 MG/DL
HCO3 BLD-SCNC: 23.5 MMOL/L (ref 19–28)
HCT VFR BLD AUTO: 50.9 % (ref 36.6–50.3)
HGB BLD-MCNC: 17.4 G/DL (ref 12.1–17)
HGB UR QL STRIP: ABNORMAL
HYALINE CASTS URNS QL MICRO: ABNORMAL /LPF (ref 0–5)
IMM GRANULOCYTES # BLD AUTO: 0 K/UL (ref 0–0.04)
IMM GRANULOCYTES NFR BLD AUTO: 0 % (ref 0–0.5)
KETONES UR QL STRIP.AUTO: 20 MG/DL
LACTATE BLD-SCNC: 2.27 MMOL/L (ref 0.4–2)
LACTATE BLD-SCNC: 2.97 MMOL/L (ref 0.4–2)
LEUKOCYTE ESTERASE UR QL STRIP.AUTO: NEGATIVE
LYMPHOCYTES # BLD: 1 K/UL (ref 0.8–3.5)
LYMPHOCYTES NFR BLD: 5 % (ref 12–49)
MCH RBC QN AUTO: 28.8 PG (ref 26–34)
MCHC RBC AUTO-ENTMCNC: 34.2 G/DL (ref 30–36.5)
MCV RBC AUTO: 84.3 FL (ref 80–99)
MONOCYTES # BLD: 0.6 K/UL (ref 0–1)
MONOCYTES NFR BLD: 4 % (ref 5–13)
MUCOUS THREADS URNS QL MICRO: ABNORMAL /LPF
NEUTS SEG # BLD: 16.2 K/UL (ref 1.8–8)
NEUTS SEG NFR BLD: 91 % (ref 32–75)
NITRITE UR QL STRIP.AUTO: NEGATIVE
PCO2 BLD: 42.5 MMHG (ref 35–45)
PERFORMED BY:: ABNORMAL
PH BLD: 7.35 (ref 7.35–7.45)
PH UR STRIP: 5 (ref 5–8)
PLATELET # BLD AUTO: 364 K/UL (ref 150–400)
PMV BLD AUTO: 10.5 FL (ref 8.9–12.9)
PO2 BLD: 31 MMHG (ref 75–100)
POTASSIUM SERPL-SCNC: 4.9 MMOL/L (ref 3.5–5.1)
PROCALCITONIN SERPL-MCNC: 2.82 NG/ML
PROT SERPL-MCNC: 9.1 G/DL (ref 6.4–8.2)
PROT UR STRIP-MCNC: NEGATIVE MG/DL
RBC # BLD AUTO: 6.04 M/UL (ref 4.1–5.7)
RBC #/AREA URNS HPF: ABNORMAL /HPF (ref 0–5)
SAO2 % BLD: 56.4 %
SERVICE CMNT-IMP: ABNORMAL
SODIUM SERPL-SCNC: 140 MMOL/L (ref 136–145)
SP GR UR REFRACTOMETRY: 1.02 (ref 1–1.03)
SPECIMEN TYPE: ABNORMAL
TROPONIN I SERPL HS-MCNC: 283 NG/L (ref 0–76)
TROPONIN I SERPL HS-MCNC: 355 NG/L (ref 0–76)
URINE CULTURE IF INDICATED: ABNORMAL
UROBILINOGEN UR QL STRIP.AUTO: 0.1 EU/DL (ref 0.1–1)
WBC # BLD AUTO: 17.9 K/UL (ref 4.1–11.1)
WBC URNS QL MICRO: ABNORMAL /HPF (ref 0–4)

## 2024-03-06 PROCEDURE — 81001 URINALYSIS AUTO W/SCOPE: CPT

## 2024-03-06 PROCEDURE — 96361 HYDRATE IV INFUSION ADD-ON: CPT

## 2024-03-06 PROCEDURE — 6360000002 HC RX W HCPCS: Performed by: FAMILY MEDICINE

## 2024-03-06 PROCEDURE — 1100000000 HC RM PRIVATE

## 2024-03-06 PROCEDURE — 99285 EMERGENCY DEPT VISIT HI MDM: CPT

## 2024-03-06 PROCEDURE — 2580000003 HC RX 258: Performed by: HOSPITALIST

## 2024-03-06 PROCEDURE — 82962 GLUCOSE BLOOD TEST: CPT

## 2024-03-06 PROCEDURE — 96365 THER/PROPH/DIAG IV INF INIT: CPT

## 2024-03-06 PROCEDURE — 71045 X-RAY EXAM CHEST 1 VIEW: CPT

## 2024-03-06 PROCEDURE — 87804 INFLUENZA ASSAY W/OPTIC: CPT

## 2024-03-06 PROCEDURE — 6370000000 HC RX 637 (ALT 250 FOR IP): Performed by: FAMILY MEDICINE

## 2024-03-06 PROCEDURE — C9113 INJ PANTOPRAZOLE SODIUM, VIA: HCPCS | Performed by: HOSPITALIST

## 2024-03-06 PROCEDURE — 84145 PROCALCITONIN (PCT): CPT

## 2024-03-06 PROCEDURE — 80053 COMPREHEN METABOLIC PANEL: CPT

## 2024-03-06 PROCEDURE — 87040 BLOOD CULTURE FOR BACTERIA: CPT

## 2024-03-06 PROCEDURE — 83605 ASSAY OF LACTIC ACID: CPT

## 2024-03-06 PROCEDURE — 96375 TX/PRO/DX INJ NEW DRUG ADDON: CPT

## 2024-03-06 PROCEDURE — 36415 COLL VENOUS BLD VENIPUNCTURE: CPT

## 2024-03-06 PROCEDURE — 6360000002 HC RX W HCPCS: Performed by: HOSPITALIST

## 2024-03-06 PROCEDURE — 85025 COMPLETE CBC W/AUTO DIFF WBC: CPT

## 2024-03-06 PROCEDURE — 84484 ASSAY OF TROPONIN QUANT: CPT

## 2024-03-06 PROCEDURE — 74176 CT ABD & PELVIS W/O CONTRAST: CPT

## 2024-03-06 PROCEDURE — 93005 ELECTROCARDIOGRAM TRACING: CPT | Performed by: FAMILY MEDICINE

## 2024-03-06 PROCEDURE — 6370000000 HC RX 637 (ALT 250 FOR IP): Performed by: HOSPITALIST

## 2024-03-06 PROCEDURE — 2580000003 HC RX 258: Performed by: FAMILY MEDICINE

## 2024-03-06 RX ORDER — INSULIN LISPRO 100 [IU]/ML
0-4 INJECTION, SOLUTION INTRAVENOUS; SUBCUTANEOUS NIGHTLY
Status: DISCONTINUED | OUTPATIENT
Start: 2024-03-06 | End: 2024-03-09 | Stop reason: HOSPADM

## 2024-03-06 RX ORDER — ASPIRIN 81 MG/1
324 TABLET, CHEWABLE ORAL ONCE
Status: COMPLETED | OUTPATIENT
Start: 2024-03-06 | End: 2024-03-06

## 2024-03-06 RX ORDER — 0.9 % SODIUM CHLORIDE 0.9 %
1000 INTRAVENOUS SOLUTION INTRAVENOUS
Status: COMPLETED | OUTPATIENT
Start: 2024-03-06 | End: 2024-03-06

## 2024-03-06 RX ORDER — ATORVASTATIN CALCIUM 20 MG/1
20 TABLET, FILM COATED ORAL NIGHTLY
Status: DISCONTINUED | OUTPATIENT
Start: 2024-03-06 | End: 2024-03-09 | Stop reason: HOSPADM

## 2024-03-06 RX ORDER — METOCLOPRAMIDE HYDROCHLORIDE 5 MG/ML
5 INJECTION INTRAMUSCULAR; INTRAVENOUS EVERY 6 HOURS PRN
Status: DISCONTINUED | OUTPATIENT
Start: 2024-03-06 | End: 2024-03-09 | Stop reason: HOSPADM

## 2024-03-06 RX ORDER — BUPROPION HYDROCHLORIDE 150 MG/1
300 TABLET ORAL DAILY
Status: DISCONTINUED | OUTPATIENT
Start: 2024-03-06 | End: 2024-03-09 | Stop reason: HOSPADM

## 2024-03-06 RX ORDER — ACETAMINOPHEN 650 MG/1
650 SUPPOSITORY RECTAL EVERY 6 HOURS PRN
Status: DISCONTINUED | OUTPATIENT
Start: 2024-03-06 | End: 2024-03-09 | Stop reason: HOSPADM

## 2024-03-06 RX ORDER — INSULIN GLARGINE 100 [IU]/ML
0.15 INJECTION, SOLUTION SUBCUTANEOUS NIGHTLY
Status: DISCONTINUED | OUTPATIENT
Start: 2024-03-06 | End: 2024-03-09 | Stop reason: HOSPADM

## 2024-03-06 RX ORDER — ARIPIPRAZOLE 5 MG/1
5 TABLET ORAL DAILY
Status: DISCONTINUED | OUTPATIENT
Start: 2024-03-06 | End: 2024-03-09 | Stop reason: HOSPADM

## 2024-03-06 RX ORDER — INSULIN LISPRO 100 [IU]/ML
0-4 INJECTION, SOLUTION INTRAVENOUS; SUBCUTANEOUS
Status: DISCONTINUED | OUTPATIENT
Start: 2024-03-06 | End: 2024-03-09 | Stop reason: HOSPADM

## 2024-03-06 RX ORDER — PANTOPRAZOLE SODIUM 40 MG/10ML
40 INJECTION, POWDER, LYOPHILIZED, FOR SOLUTION INTRAVENOUS DAILY
Status: DISCONTINUED | OUTPATIENT
Start: 2024-03-06 | End: 2024-03-08

## 2024-03-06 RX ORDER — SODIUM CHLORIDE 0.9 % (FLUSH) 0.9 %
5-40 SYRINGE (ML) INJECTION PRN
Status: DISCONTINUED | OUTPATIENT
Start: 2024-03-06 | End: 2024-03-09 | Stop reason: HOSPADM

## 2024-03-06 RX ORDER — ACETAMINOPHEN 325 MG/1
650 TABLET ORAL EVERY 6 HOURS PRN
Status: DISCONTINUED | OUTPATIENT
Start: 2024-03-06 | End: 2024-03-09 | Stop reason: HOSPADM

## 2024-03-06 RX ORDER — BUSPIRONE HYDROCHLORIDE 5 MG/1
10 TABLET ORAL DAILY
Status: DISCONTINUED | OUTPATIENT
Start: 2024-03-06 | End: 2024-03-09 | Stop reason: HOSPADM

## 2024-03-06 RX ORDER — DEXTROSE MONOHYDRATE 100 MG/ML
INJECTION, SOLUTION INTRAVENOUS CONTINUOUS PRN
Status: DISCONTINUED | OUTPATIENT
Start: 2024-03-06 | End: 2024-03-09 | Stop reason: HOSPADM

## 2024-03-06 RX ORDER — INSULIN LISPRO 100 [IU]/ML
0.05 INJECTION, SOLUTION INTRAVENOUS; SUBCUTANEOUS
Status: DISCONTINUED | OUTPATIENT
Start: 2024-03-06 | End: 2024-03-09 | Stop reason: HOSPADM

## 2024-03-06 RX ORDER — HEPARIN SODIUM 5000 [USP'U]/ML
5000 INJECTION, SOLUTION INTRAVENOUS; SUBCUTANEOUS EVERY 8 HOURS SCHEDULED
Status: DISCONTINUED | OUTPATIENT
Start: 2024-03-06 | End: 2024-03-09 | Stop reason: HOSPADM

## 2024-03-06 RX ORDER — ONDANSETRON 2 MG/ML
4 INJECTION INTRAMUSCULAR; INTRAVENOUS
Status: COMPLETED | OUTPATIENT
Start: 2024-03-06 | End: 2024-03-06

## 2024-03-06 RX ORDER — SODIUM CHLORIDE 0.9 % (FLUSH) 0.9 %
5-40 SYRINGE (ML) INJECTION EVERY 12 HOURS SCHEDULED
Status: DISCONTINUED | OUTPATIENT
Start: 2024-03-06 | End: 2024-03-09 | Stop reason: HOSPADM

## 2024-03-06 RX ORDER — GLUCAGON 1 MG/ML
1 KIT INJECTION PRN
Status: DISCONTINUED | OUTPATIENT
Start: 2024-03-06 | End: 2024-03-09 | Stop reason: HOSPADM

## 2024-03-06 RX ORDER — POLYETHYLENE GLYCOL 3350 17 G/17G
17 POWDER, FOR SOLUTION ORAL DAILY PRN
Status: DISCONTINUED | OUTPATIENT
Start: 2024-03-06 | End: 2024-03-09 | Stop reason: HOSPADM

## 2024-03-06 RX ORDER — 0.9 % SODIUM CHLORIDE 0.9 %
30 INTRAVENOUS SOLUTION INTRAVENOUS ONCE
Status: DISCONTINUED | OUTPATIENT
Start: 2024-03-06 | End: 2024-03-06

## 2024-03-06 RX ORDER — 0.9 % SODIUM CHLORIDE 0.9 %
1604 INTRAVENOUS SOLUTION INTRAVENOUS
Status: COMPLETED | OUTPATIENT
Start: 2024-03-06 | End: 2024-03-06

## 2024-03-06 RX ORDER — SODIUM CHLORIDE 9 MG/ML
INJECTION, SOLUTION INTRAVENOUS PRN
Status: DISCONTINUED | OUTPATIENT
Start: 2024-03-06 | End: 2024-03-09 | Stop reason: HOSPADM

## 2024-03-06 RX ORDER — SODIUM CHLORIDE, SODIUM LACTATE, POTASSIUM CHLORIDE, CALCIUM CHLORIDE 600; 310; 30; 20 MG/100ML; MG/100ML; MG/100ML; MG/100ML
INJECTION, SOLUTION INTRAVENOUS CONTINUOUS
Status: DISCONTINUED | OUTPATIENT
Start: 2024-03-06 | End: 2024-03-08

## 2024-03-06 RX ADMIN — SODIUM CHLORIDE, PRESERVATIVE FREE 10 ML: 5 INJECTION INTRAVENOUS at 21:46

## 2024-03-06 RX ADMIN — HEPARIN SODIUM 5000 UNITS: 5000 INJECTION INTRAVENOUS; SUBCUTANEOUS at 21:45

## 2024-03-06 RX ADMIN — INSULIN LISPRO 4 UNITS: 100 INJECTION, SOLUTION INTRAVENOUS; SUBCUTANEOUS at 22:09

## 2024-03-06 RX ADMIN — SODIUM CHLORIDE, POTASSIUM CHLORIDE, SODIUM LACTATE AND CALCIUM CHLORIDE: 600; 310; 30; 20 INJECTION, SOLUTION INTRAVENOUS at 21:35

## 2024-03-06 RX ADMIN — PIPERACILLIN AND TAZOBACTAM 4500 MG: 4; .5 INJECTION, POWDER, FOR SOLUTION INTRAVENOUS at 16:36

## 2024-03-06 RX ADMIN — ARIPIPRAZOLE 5 MG: 5 TABLET ORAL at 21:46

## 2024-03-06 RX ADMIN — SODIUM CHLORIDE 1000 ML: 9 INJECTION, SOLUTION INTRAVENOUS at 15:36

## 2024-03-06 RX ADMIN — BUPROPION HYDROCHLORIDE 300 MG: 300 TABLET, FILM COATED, EXTENDED RELEASE ORAL at 21:46

## 2024-03-06 RX ADMIN — SODIUM CHLORIDE 1604 ML: 9 INJECTION, SOLUTION INTRAVENOUS at 16:27

## 2024-03-06 RX ADMIN — ONDANSETRON 4 MG: 2 INJECTION INTRAMUSCULAR; INTRAVENOUS at 16:22

## 2024-03-06 RX ADMIN — METOCLOPRAMIDE 5 MG: 5 INJECTION, SOLUTION INTRAMUSCULAR; INTRAVENOUS at 21:31

## 2024-03-06 RX ADMIN — BUSPIRONE HYDROCHLORIDE 10 MG: 10 TABLET ORAL at 21:46

## 2024-03-06 RX ADMIN — ATORVASTATIN CALCIUM 20 MG: 40 TABLET, FILM COATED ORAL at 21:46

## 2024-03-06 RX ADMIN — INSULIN GLARGINE 16 UNITS: 100 INJECTION, SOLUTION SUBCUTANEOUS at 22:09

## 2024-03-06 RX ADMIN — PANTOPRAZOLE SODIUM 40 MG: 40 INJECTION, POWDER, FOR SOLUTION INTRAVENOUS at 21:34

## 2024-03-06 RX ADMIN — ASPIRIN 81 MG 324 MG: 81 TABLET ORAL at 16:22

## 2024-03-06 ASSESSMENT — LIFESTYLE VARIABLES
HOW OFTEN DO YOU HAVE A DRINK CONTAINING ALCOHOL: NEVER
HOW MANY STANDARD DRINKS CONTAINING ALCOHOL DO YOU HAVE ON A TYPICAL DAY: PATIENT DOES NOT DRINK

## 2024-03-06 ASSESSMENT — PAIN DESCRIPTION - LOCATION: LOCATION: ABDOMEN

## 2024-03-06 ASSESSMENT — PAIN - FUNCTIONAL ASSESSMENT
PAIN_FUNCTIONAL_ASSESSMENT: 0-10
PAIN_FUNCTIONAL_ASSESSMENT: 0-10

## 2024-03-06 ASSESSMENT — PAIN SCALES - GENERAL
PAINLEVEL_OUTOF10: 6
PAINLEVEL_OUTOF10: 4

## 2024-03-06 NOTE — ED TRIAGE NOTES
Pt complaining of dry mouth, upset stomach, and malaise. Have not been checking his blood sugar. Has been taking medications but is out of his sensors for his arm that checks his blood sugar. Pt in triage in a wheelchair.

## 2024-03-07 ENCOUNTER — APPOINTMENT (OUTPATIENT)
Facility: HOSPITAL | Age: 54
DRG: 720 | End: 2024-03-07
Payer: COMMERCIAL

## 2024-03-07 LAB
ALBUMIN SERPL-MCNC: 3.6 G/DL (ref 3.5–5)
ALBUMIN/GLOB SERPL: 1 (ref 1.1–2.2)
ALP SERPL-CCNC: 93 U/L (ref 45–117)
ALT SERPL-CCNC: 17 U/L (ref 12–78)
ANION GAP SERPL CALC-SCNC: 4 MMOL/L (ref 5–15)
AST SERPL W P-5'-P-CCNC: 15 U/L (ref 15–37)
BASOPHILS # BLD: 0 K/UL (ref 0–0.1)
BASOPHILS NFR BLD: 0 % (ref 0–1)
BILIRUB DIRECT SERPL-MCNC: 0.4 MG/DL (ref 0–0.2)
BUN SERPL-MCNC: 80 MG/DL (ref 6–20)
BUN/CREAT SERPL: 21 (ref 12–20)
CA-I BLD-MCNC: 9.2 MG/DL (ref 8.5–10.1)
CHLORIDE SERPL-SCNC: 112 MMOL/L (ref 97–108)
CREAT SERPL-MCNC: 3.74 MG/DL (ref 0.7–1.3)
CREAT UR-MCNC: 140 MG/DL
DIFFERENTIAL METHOD BLD: ABNORMAL
EOSINOPHIL # BLD: 0 K/UL (ref 0–0.4)
EOSINOPHIL NFR BLD: 0 % (ref 0–7)
ERYTHROCYTE [DISTWIDTH] IN BLOOD BY AUTOMATED COUNT: 14.5 % (ref 11.5–14.5)
EST. AVERAGE GLUCOSE BLD GHB EST-MCNC: 209 MG/DL
GLOBULIN SER CALC-MCNC: 3.7 G/DL (ref 2–4)
GLUCOSE BLD STRIP.AUTO-MCNC: 126 MG/DL (ref 65–100)
GLUCOSE BLD STRIP.AUTO-MCNC: 134 MG/DL (ref 65–100)
GLUCOSE BLD STRIP.AUTO-MCNC: 141 MG/DL (ref 65–100)
GLUCOSE BLD STRIP.AUTO-MCNC: 147 MG/DL (ref 65–100)
GLUCOSE SERPL-MCNC: 160 MG/DL (ref 65–100)
HBA1C MFR BLD: 8.9 % (ref 4–5.6)
HCT VFR BLD AUTO: 44.8 % (ref 36.6–50.3)
HGB BLD-MCNC: 15.5 G/DL (ref 12.1–17)
IMM GRANULOCYTES # BLD AUTO: 0.1 K/UL (ref 0–0.04)
IMM GRANULOCYTES NFR BLD AUTO: 1 % (ref 0–0.5)
LACTATE SERPL-SCNC: 1.1 MMOL/L (ref 0.4–2)
LACTATE SERPL-SCNC: 1.3 MMOL/L (ref 0.4–2)
LYMPHOCYTES # BLD: 2.1 K/UL (ref 0.8–3.5)
LYMPHOCYTES NFR BLD: 13 % (ref 12–49)
MCH RBC QN AUTO: 28.8 PG (ref 26–34)
MCHC RBC AUTO-ENTMCNC: 34.6 G/DL (ref 30–36.5)
MCV RBC AUTO: 83.1 FL (ref 80–99)
MONOCYTES # BLD: 1.5 K/UL (ref 0–1)
MONOCYTES NFR BLD: 9 % (ref 5–13)
NEUTS SEG # BLD: 12.9 K/UL (ref 1.8–8)
NEUTS SEG NFR BLD: 77 % (ref 32–75)
NRBC # BLD: 0 K/UL (ref 0–0.01)
NRBC BLD-RTO: 0 PER 100 WBC
PERFORMED BY:: ABNORMAL
PHOSPHATE SERPL-MCNC: 3.3 MG/DL (ref 2.6–4.7)
PLATELET # BLD AUTO: 312 K/UL (ref 150–400)
PMV BLD AUTO: 10.8 FL (ref 8.9–12.9)
POTASSIUM SERPL-SCNC: 4.5 MMOL/L (ref 3.5–5.1)
PROT SERPL-MCNC: 7.3 G/DL (ref 6.4–8.2)
PROT UR-MCNC: 37 MG/DL (ref 0–11.9)
PROT/CREAT UR-RTO: 0.3
RBC # BLD AUTO: 5.39 M/UL (ref 4.1–5.7)
SODIUM SERPL-SCNC: 143 MMOL/L (ref 136–145)
TROPONIN I SERPL HS-MCNC: 267 NG/L (ref 0–76)
WBC # BLD AUTO: 16.7 K/UL (ref 4.1–11.1)

## 2024-03-07 PROCEDURE — 6360000002 HC RX W HCPCS: Performed by: NURSE PRACTITIONER

## 2024-03-07 PROCEDURE — C9113 INJ PANTOPRAZOLE SODIUM, VIA: HCPCS | Performed by: HOSPITALIST

## 2024-03-07 PROCEDURE — 82570 ASSAY OF URINE CREATININE: CPT

## 2024-03-07 PROCEDURE — 2580000003 HC RX 258: Performed by: INTERNAL MEDICINE

## 2024-03-07 PROCEDURE — 6370000000 HC RX 637 (ALT 250 FOR IP): Performed by: HOSPITALIST

## 2024-03-07 PROCEDURE — 2580000003 HC RX 258: Performed by: NURSE PRACTITIONER

## 2024-03-07 PROCEDURE — 6370000000 HC RX 637 (ALT 250 FOR IP): Performed by: INTERNAL MEDICINE

## 2024-03-07 PROCEDURE — 84156 ASSAY OF PROTEIN URINE: CPT

## 2024-03-07 PROCEDURE — 36415 COLL VENOUS BLD VENIPUNCTURE: CPT

## 2024-03-07 PROCEDURE — 85025 COMPLETE CBC W/AUTO DIFF WBC: CPT

## 2024-03-07 PROCEDURE — 82962 GLUCOSE BLOOD TEST: CPT

## 2024-03-07 PROCEDURE — 83036 HEMOGLOBIN GLYCOSYLATED A1C: CPT

## 2024-03-07 PROCEDURE — 84484 ASSAY OF TROPONIN QUANT: CPT

## 2024-03-07 PROCEDURE — 6360000002 HC RX W HCPCS: Performed by: HOSPITALIST

## 2024-03-07 PROCEDURE — 80048 BASIC METABOLIC PNL TOTAL CA: CPT

## 2024-03-07 PROCEDURE — 84100 ASSAY OF PHOSPHORUS: CPT

## 2024-03-07 PROCEDURE — 76770 US EXAM ABDO BACK WALL COMP: CPT

## 2024-03-07 PROCEDURE — 80076 HEPATIC FUNCTION PANEL: CPT

## 2024-03-07 PROCEDURE — 1100000000 HC RM PRIVATE

## 2024-03-07 PROCEDURE — 83605 ASSAY OF LACTIC ACID: CPT

## 2024-03-07 PROCEDURE — 2580000003 HC RX 258: Performed by: HOSPITALIST

## 2024-03-07 RX ORDER — ONDANSETRON 2 MG/ML
2 INJECTION INTRAMUSCULAR; INTRAVENOUS EVERY 6 HOURS PRN
Status: DISCONTINUED | OUTPATIENT
Start: 2024-03-07 | End: 2024-03-09 | Stop reason: HOSPADM

## 2024-03-07 RX ORDER — ONDANSETRON 4 MG/1
4 TABLET, ORALLY DISINTEGRATING ORAL EVERY 8 HOURS PRN
Status: DISCONTINUED | OUTPATIENT
Start: 2024-03-07 | End: 2024-03-09 | Stop reason: HOSPADM

## 2024-03-07 RX ORDER — ONDANSETRON 2 MG/ML
4 INJECTION INTRAMUSCULAR; INTRAVENOUS EVERY 6 HOURS PRN
Status: DISCONTINUED | OUTPATIENT
Start: 2024-03-07 | End: 2024-03-07

## 2024-03-07 RX ORDER — 0.9 % SODIUM CHLORIDE 0.9 %
1000 INTRAVENOUS SOLUTION INTRAVENOUS ONCE
Status: COMPLETED | OUTPATIENT
Start: 2024-03-07 | End: 2024-03-07

## 2024-03-07 RX ADMIN — SODIUM CHLORIDE, POTASSIUM CHLORIDE, SODIUM LACTATE AND CALCIUM CHLORIDE: 600; 310; 30; 20 INJECTION, SOLUTION INTRAVENOUS at 21:30

## 2024-03-07 RX ADMIN — SODIUM CHLORIDE, PRESERVATIVE FREE 10 ML: 5 INJECTION INTRAVENOUS at 09:08

## 2024-03-07 RX ADMIN — METOCLOPRAMIDE 5 MG: 5 INJECTION, SOLUTION INTRAMUSCULAR; INTRAVENOUS at 07:37

## 2024-03-07 RX ADMIN — ONDANSETRON 2 MG: 2 INJECTION INTRAMUSCULAR; INTRAVENOUS at 11:50

## 2024-03-07 RX ADMIN — ARIPIPRAZOLE 5 MG: 5 TABLET ORAL at 09:07

## 2024-03-07 RX ADMIN — HEPARIN SODIUM 5000 UNITS: 5000 INJECTION INTRAVENOUS; SUBCUTANEOUS at 05:50

## 2024-03-07 RX ADMIN — PANTOPRAZOLE SODIUM 40 MG: 40 INJECTION, POWDER, FOR SOLUTION INTRAVENOUS at 09:08

## 2024-03-07 RX ADMIN — HEPARIN SODIUM 5000 UNITS: 5000 INJECTION INTRAVENOUS; SUBCUTANEOUS at 20:53

## 2024-03-07 RX ADMIN — SODIUM CHLORIDE, POTASSIUM CHLORIDE, SODIUM LACTATE AND CALCIUM CHLORIDE: 600; 310; 30; 20 INJECTION, SOLUTION INTRAVENOUS at 14:44

## 2024-03-07 RX ADMIN — METOCLOPRAMIDE 5 MG: 5 INJECTION, SOLUTION INTRAMUSCULAR; INTRAVENOUS at 14:45

## 2024-03-07 RX ADMIN — BUSPIRONE HYDROCHLORIDE 10 MG: 10 TABLET ORAL at 09:07

## 2024-03-07 RX ADMIN — SODIUM CHLORIDE 1000 ML: 9 INJECTION, SOLUTION INTRAVENOUS at 09:28

## 2024-03-07 RX ADMIN — BUPROPION HYDROCHLORIDE 300 MG: 300 TABLET, FILM COATED, EXTENDED RELEASE ORAL at 09:07

## 2024-03-07 RX ADMIN — SODIUM CHLORIDE, PRESERVATIVE FREE 10 ML: 5 INJECTION INTRAVENOUS at 20:53

## 2024-03-07 RX ADMIN — ONDANSETRON 2 MG: 2 INJECTION INTRAMUSCULAR; INTRAVENOUS at 18:07

## 2024-03-07 RX ADMIN — METOPROLOL TARTRATE 25 MG: 25 TABLET, FILM COATED ORAL at 20:52

## 2024-03-07 RX ADMIN — INSULIN GLARGINE 16 UNITS: 100 INJECTION, SOLUTION SUBCUTANEOUS at 20:53

## 2024-03-07 RX ADMIN — ATORVASTATIN CALCIUM 20 MG: 40 TABLET, FILM COATED ORAL at 20:52

## 2024-03-07 RX ADMIN — HEPARIN SODIUM 5000 UNITS: 5000 INJECTION INTRAVENOUS; SUBCUTANEOUS at 14:45

## 2024-03-07 RX ADMIN — PIPERACILLIN AND TAZOBACTAM 4500 MG: 4; .5 INJECTION, POWDER, FOR SOLUTION INTRAVENOUS at 18:10

## 2024-03-07 ASSESSMENT — ENCOUNTER SYMPTOMS
RESPIRATORY NEGATIVE: 1
VOMITING: 1
NAUSEA: 1

## 2024-03-07 NOTE — PROGRESS NOTES
Comprehensive Nutrition Assessment    Type and Reason for Visit:  Initial, Positive Nutrition Screen (MST 2)    Nutrition Recommendations/Plan:   Advance diet as medically appropriate/as pt tolerated   Glucerna 3x/day (660 kcal, 30 gm pro) - prefers vanilla   Monitor/document PO/ONS intake in I/Os      Malnutrition Assessment:  Malnutrition Status:  Mild malnutrition (03/07/24 1602)    Context:  Acute Illness     Findings of the 6 clinical characteristics of malnutrition:  Energy Intake:  50% or less of estimated energy requirements for 5 or more days  Weight Loss:  No significant weight loss     Body Fat Loss:  No significant body fat loss     Muscle Mass Loss:  No significant muscle mass loss    Fluid Accumulation:  No significant fluid accumulation     Strength:  Not Performed    Nutrition Assessment:    Presents for hypotension and worsening renal failure, hyperglycemia. Chart reviewed for MST 2. ~5% wt loss x 6 months per EMR (not nutritionally significant), pt reports no recent wt changes. 0% intake at B documented/per pt report. Decreased gabriela/intakes starting ~3 days PTA reported; pt also states that he generally does not eat well, generally consumes meals in the evening. Pending EGD per chart review. ONS ordered however, pt reported he has not yet received - amenable to try vanilla flavor, RD to adjust order. Advance diet as medically appropriate/as pt tolerated. Labs: BUN 80, Creat 3.74, GFR 19, Gluc 160, Bili 1.4. Meds: Lipitor, Heparin, Humalog, Lantus, NaCl, Protonix, Lactated ringers, Reglan, Zofran.    Nutrition Related Findings:    LBM 3/6, +N - on Zofran, no edema, NFPE deferred - pt appears visually nourished, no reported C/S difficulties nor V/D/C. Wound Type: None       Current Nutrition Intake & Therapies:    Average Meal Intake: 0%  Average Supplements Intake: Unable to assess (pt has not received)  ADULT DIET; Full Liquid  ADULT ORAL NUTRITION SUPPLEMENT; Breakfast, Dinner, Lunch; Diabetic  Oral Supplement    Anthropometric Measures:  Height: 193 cm (6' 3.98\")  Ideal Body Weight (IBW): 202 lbs (92 kg)       Current Body Weight: 102.7 kg (226 lb 6.6 oz) (RD obtained 3/7), 112.1 % IBW. Weight Source: Bed Scale  Current BMI (kg/m2): 27.6        Weight Adjustment For: No Adjustment                 BMI Categories: Overweight (BMI 25.0-29.9)    Estimated Daily Nutrient Needs:  Energy Requirements Based On: Kcal/kg  Weight Used for Energy Requirements: Current  Energy (kcal/day): 2568 - 3018 kcal/day (25 - 30 kcal/kg)  Weight Used for Protein Requirements: Current  Protein (g/day): 103 - 123 g/day (1 - 1.2 g/kg)  Method Used for Fluid Requirements: 1 ml/kcal  Fluid (ml/day): 2568 - 3018 ml/day (1ml/kcal)    Nutrition Diagnosis:   Inadequate oral intake related to altered GI function as evidenced by poor intake prior to admission, intake 0-25%, nausea    Nutrition Interventions:   Food and/or Nutrient Delivery: Continue Current Diet, Start Oral Nutrition Supplement  Nutrition Education/Counseling: No recommendation at this time  Coordination of Nutrition Care: Continue to monitor while inpatient  Plan of Care discussed with: pt, RN student    Goals:     Goals: PO intake 50% or greater, by next RD assessment       Nutrition Monitoring and Evaluation:   Behavioral-Environmental Outcomes: None Identified  Food/Nutrient Intake Outcomes: Diet Advancement/Tolerance, Food and Nutrient Intake, Supplement Intake  Physical Signs/Symptoms Outcomes: Meal Time Behavior, Weight, Nausea or Vomiting, Biochemical Data    Discharge Planning:    Too soon to determine     Charlotte Friedman RD  Contact: [phone]

## 2024-03-07 NOTE — PROGRESS NOTES
Admission for hypotension and worsening renal failure requested.  Case discussed with ED attending and admission orders placed but unable to directly evaluate the pt prior to transfer.  Attempts made to contact [facility] Hospitalist but no response.    Full evaluation and HP documentation to be performed upon arrival to [place]    Hypotension  - Acute onset symptoms w/ repeated MAP<60 and SBP<90 w/ hypovolemic shock suspected  - No clinical evidence of septicemia, cardiogenic or hemorrhagic shock reported w/ SBP normalized s/p IVF replacement  - Continue IVF support with Jenkins CX ordered and maintain MAP>60    Acute renal failure superimposed on stage 3a chronic kidney disease (HCC)  - Baseline GFR 40-45 w/ underlying DM nephropathy and HTN sclerosis reported   - Acute Oliguric failure present on admission w/o evidence of obstruction on CT and pre-renal dz and/or ATN the suspected etiology  - ACEi held and and IVF support continued.    - Additional inpt Nephro eval per daytime hospitalist    Type 1 diabetes mellitus with complication, with long-term current use of insulin ([facility])  - Long term control unclear w/ acute inpt hyperglycemia noted  - Continue Wt based Lantus w/ prandial Novolog for inpt management and titrate PRN  - Monitor closely for potential DKA or hypoglycemia given <CrCl

## 2024-03-07 NOTE — CONSULTS
300 mg Oral Daily Chris Marrero DO   300 mg at 03/07/24 0907    busPIRone (BUSPAR) tablet 10 mg  10 mg Oral Daily Chris Marrero DO   10 mg at 03/07/24 0907    sodium chloride flush 0.9 % injection 5-40 mL  5-40 mL IntraVENous 2 times per day Chris Marrero DO   10 mL at 03/07/24 0908    sodium chloride flush 0.9 % injection 5-40 mL  5-40 mL IntraVENous PRN Chris Marrero DO        0.9 % sodium chloride infusion   IntraVENous PRN Chris Marrero DO        polyethylene glycol (GLYCOLAX) packet 17 g  17 g Oral Daily PRN Chris Marrero DO        acetaminophen (TYLENOL) tablet 650 mg  650 mg Oral Q6H PRN Chris Marrero DO        Or    acetaminophen (TYLENOL) suppository 650 mg  650 mg Rectal Q6H PRN Chris Marrero DO        metoclopramide (REGLAN) injection 5 mg  5 mg IntraVENous Q6H PRN Chris Marrero DO   5 mg at 03/07/24 0737    pantoprazole (PROTONIX) injection 40 mg  40 mg IntraVENous Daily Chris Marrero DO   40 mg at 03/07/24 0908    heparin (porcine) injection 5,000 Units  5,000 Units SubCUTAneous 3 times per day Chris Marrero DO   5,000 Units at 03/07/24 0550        Allergies   Allergen Reactions    Erythromycin Hives, Nausea And Vomiting and Nausea Only       Review of Systems:  Review of Systems   Constitutional:  Positive for fatigue.   Respiratory: Negative.     Cardiovascular:  Positive for palpitations.   Gastrointestinal:  Positive for nausea and vomiting.   Endocrine: Negative.    Musculoskeletal:  Positive for arthralgias.   Skin: Negative.    Neurological: Negative.    Hematological: Negative.           Objective:     Vitals:    03/07/24 0400 03/07/24 0804 03/07/24 1145 03/07/24 1155   BP: 108/66 (!) 96/56 (!) 161/88    Pulse: (!) 106 (!) 110 (!) 111    Resp: 17 17     Temp: 98.2 °F (36.8 °C) 97.9 °F (36.6 °C)     TempSrc: Oral Oral     SpO2: 99% 95% 96%    Weight:       Height:    1.93 m (6' 3.98\")        Physical Exam:  Physical Exam  Constitutional:        Appearance: He is ill-appearing.   HENT:      Head: Atraumatic.      Mouth/Throat:      Mouth: Mucous membranes are dry.   Cardiovascular:      Rate and Rhythm: Rhythm irregular.      Heart sounds: Normal heart sounds.   Pulmonary:      Breath sounds: Normal breath sounds.   Abdominal:      General: Abdomen is flat.      Palpations: Abdomen is soft.   Musculoskeletal:         General: Normal range of motion.      Cervical back: Neck supple.   Skin:     General: Skin is warm.      Capillary Refill: Capillary refill takes less than 2 seconds.   Neurological:      Mental Status: Mental status is at baseline.   Psychiatric:         Mood and Affect: Mood normal.         Recent Results (from the past 24 hour(s))   POCT Glucose    Collection Time: 03/06/24  3:22 PM   Result Value Ref Range    POC Glucose 282 (H) 65 - 100 mg/dL    Performed by: Gael Garcia    CBC with Auto Differential    Collection Time: 03/06/24  3:30 PM   Result Value Ref Range    WBC 17.9 (H) 4.1 - 11.1 K/uL    RBC 6.04 (H) 4.10 - 5.70 M/uL    Hemoglobin 17.4 (H) 12.1 - 17.0 g/dL    Hematocrit 50.9 (H) 36.6 - 50.3 %    MCV 84.3 80.0 - 99.0 FL    MCH 28.8 26.0 - 34.0 PG    MCHC 34.2 30.0 - 36.5 g/dL    RDW 14.3 11.5 - 14.5 %    Platelets 364 150 - 400 K/uL    MPV 10.5 8.9 - 12.9 FL    Neutrophils % 91 (H) 32 - 75 %    Lymphocytes % 5 (L) 12 - 49 %    Monocytes % 4 (L) 5 - 13 %    Eosinophils % 0 0 - 7 %    Basophils % 0 0 - 1 %    Immature Granulocytes 0 0.0 - 0.5 %    Neutrophils Absolute 16.2 (H) 1.8 - 8.0 K/UL    Lymphocytes Absolute 1.0 0.8 - 3.5 K/UL    Monocytes Absolute 0.6 0.0 - 1.0 K/UL    Eosinophils Absolute 0.0 0.0 - 0.4 K/UL    Basophils Absolute 0.0 0.0 - 0.1 K/UL    Absolute Immature Granulocyte 0.0 0.00 - 0.04 K/UL    Differential Type AUTOMATED     Comprehensive Metabolic Panel    Collection Time: 03/06/24  3:30 PM   Result Value Ref Range    Sodium 140 136 - 145 mmol/L    Potassium 4.9 3.5 - 5.1 mmol/L    Chloride 99 97 - 108 mmol/L

## 2024-03-07 NOTE — CONSULTS
heparin (porcine) injection 5,000 Units  5,000 Units SubCUTAneous 3 times per day Chris Marrero DO   5,000 Units at 03/07/24 1445        Allergies   Allergen Reactions    Erythromycin Hives, Nausea And Vomiting and Nausea Only       Review of Systems:  A comprehensive review of systems was negative except for that written in the History of Present Illness.    Objective:     Vitals:    03/07/24 0804 03/07/24 1145 03/07/24 1155 03/07/24 1437   BP: (!) 96/56 (!) 161/88  (!) 169/96   Pulse: (!) 110 (!) 111  (!) 108   Resp: 17   20   Temp: 97.9 °F (36.6 °C)   98.1 °F (36.7 °C)   TempSrc: Oral   Oral   SpO2: 95% 96%  97%   Weight:       Height:   1.93 m (6' 3.98\")         Physical Exam:  General: NAD  Eyes: sclera anicteric  Oral Cavity: No thrush or ulcers  Neck: no JVD  Chest: Fair bilateral air entry  Heart: normal sounds  Abdomen: soft and non tender   : no enriquez  Lower Extremities: no edema  Skin: no rash  Neuro: intact  Psychiatric: non-depressed          Assessment/Plan:     1 acute kidney injury.    -Etiology is prerenal azotemia from dehydration and hypotension plus in the setting of fall lisinopril.  -Methotrexate is known to cause KRUPA by causing crystal nephropathy and direct tubular toxicity.    -On admission BUN/creatinine 101/6.4 and have improved down to 80/3.7 in response to IV fluids.    -His baseline creatinine was 1.6 on 11/29/2023.    -Urinalysis showed a bland sediment without hematuria and has only 0.3 g/g of proteinuria.    -Renal ultrasound no evidence of hydronephrosis.    -I agree to discontinue lisinopril and methotrexate for now.    -I will continue IV fluids.    2.  Hypotension.    -Etiology can be volume depletion only.    -He came with systolic blood pressure 69 only.    -He received several fluid boluses.    -Blood pressures are better.    - agree to hold all blood pressure medications.    -I will continue maintenance IV fluids.    -Chest x-ray is negative.    -UA is bland.    -He is  noticed to have leukocytosis and further same reason on IV antibiotics empirically.    3.  History of rheumatoid arthritis.   - On methotrexate 2.5 mg 8 tablets weekly.    -I agree to hold on hold for now because it can cause acute kidney injury.    4.  Esophageal thickening.    -CT of the abdomen did show distal esophageal thickness.    -GI is on board and EGD in AM.    5.  DVT prophylaxis.  Agree with unfractionated subcu heparin.    6.  Hypertension.    -He is now noticed to have elevated blood pressure and tachycardia.    -I will put him on metoprolol 25 mg twice a day.    -I will reduce his IV fluids.    Thank you for the consultation.    Plan:       Signed By: Lucrecia Mane MD     March 7, 2024

## 2024-03-07 NOTE — ASSESSMENT & PLAN NOTE
- Long term control unclear w/ acute inpt hyperglycemia noted  - Continue Wt based Lantus w/ prandial Novolog for inpt management and titrate PRN  - Monitor closely for potential DKA or hypoglycemia given <CrCl

## 2024-03-07 NOTE — CARE COORDINATION
03/07/24 1454   Service Assessment   Patient Orientation Alert and Oriented   Cognition Alert   History Provided By Patient   Primary Caregiver Self   Accompanied By/Relationship mother   Support Systems Parent   Patient's Healthcare Decision Maker is: Named in Scanned ACP Document   PCP Verified by CM Yes  (Radha Flores last seen about 6 months ago)   Last Visit to PCP Within last 6 months   Prior Functional Level Independent in ADLs/IADLs;Mobility   Current Functional Level Independent in ADLs/IADLs;Mobility   Can patient return to prior living arrangement Yes   Ability to make needs known: Good   Family able to assist with home care needs: Yes   Would you like for me to discuss the discharge plan with any other family members/significant others, and if so, who? Yes  (mother)   Financial Resources Medicaid   Community Resources None   CM/SW Referral Other (see comment)  (discharge planning)   Social/Functional History   Home Equipment Cane;Wheelchair-manual   ADL Assistance Independent   Homemaking Assistance Independent   Ambulation Assistance Independent   Transfer Assistance Independent   Discharge Planning   Patient expects to be discharged to: House   Services At/After Discharge   Confirm Follow Up Transport Family     CM met with pt and mother at bedside to discuss dispo and verified demographics. Pt is from home with 7 year old dtr. DME: cane, shower bench, wheelchair. Pt reports hx of HH with [facility]. No hx of IPR/SNF. Mother to transport when cleared for dc.     Rx: [facility]  Declined HH    Advance Care Planning   Healthcare Decision Maker:    Primary Decision Maker: Elsy Aguirre - Parent - [phone]

## 2024-03-07 NOTE — ASSESSMENT & PLAN NOTE
- Baseline GFR 40-45 w/ underlying DM nephropathy and HTN sclerosis reported   - Acute Oliguric failure present on admission w/o evidence of obstruction on CT and pre-renal dz and/or ATN the suspected etiology  - ACEi held and and IVF support continued.    - Additional inpt Nephro eval per daytime hospitalist

## 2024-03-07 NOTE — PROGRESS NOTES
4 Eyes Skin Assessment     NAME:  Al Aguirre  YOB: 1970  MEDICAL RECORD NUMBER:  [id number]    The patient is being assessed for  Admission    I agree that at least one RN has performed a thorough Head to Toe Skin Assessment on the patient. ALL assessment sites listed below have been assessed.      Areas assessed by both nurses:    Head, Face, Ears, Shoulders, Back, Chest, Arms, Elbows, Hands, Sacrum. Buttock, Coccyx, Ischium, Legs. Feet and Heels, and Under Medical Devices         Does the Patient have a Wound? Yes wound(s) were present on assessment. LDA wound assessment was Initiated and completed by RN       Juarez Prevention initiated by RN: Yes  Wound Care Orders initiated by RN: No    Pressure Injury (Stage 3,4, Unstageable, DTI, NWPT, and Complex wounds) if present, place Wound referral order by RN under : No    New Ostomies, if present place, Ostomy referral order under : No     Nurse 1 eSignature: Electronically signed by Curt Cornejo RN on 3/7/24 at 4:08 AM EST    **SHARE this note so that the co-signing nurse can place an eSignature**    Nurse 2 eSignature: {Esignature:[id number]}

## 2024-03-07 NOTE — PROGRESS NOTES
Pharmacy Note - Zosyn    Zosyn 3.375 gm IVPB q 12 h (30 min infusion) ordered for treatment of Possible intra-abdominal infection, sepsis . Per [facility] Policy, Zosyn will be changed to 4.5 gm IVPB x 1 over 30 min to be followed by Zosyn 3.375 gm IVPB q 8 h (4 hr infusion).       Previous:  piperacillin-tazobactam (ZOSYN) 4,500 mg in sodium chloride 0.9 % 100 mL IVPB (mini-bag)  New Bag : 4,500 mg : 200 mL/hr : IntraVENous 03/06/24 1637> 24 Hrs, rebolus indicated          Estimated Creatinine Clearance: Estimated Creatinine Clearance: 31 mL/min (A) (based on SCr of 3.74 mg/dL (H)).  Dialysis Status, KRUPA, CKD: Acute renal failure superimposed on stage 3a chronic kidney disease (HCC)     BMI:  Body mass index is 28.74 kg/m².    Rationale for Adjustment:  [facility] B-Lactam extended infusion policy    Pharmacy will continue to monitor and adjust dose as necessary.      Please call Inpatient Pharmacy with any questions.    Thank you,  DAKOTA HYATT, [facility] MS

## 2024-03-07 NOTE — PLAN OF CARE
Problem: Discharge Planning  Goal: Discharge to home or other facility with appropriate resources  Outcome: Progressing     Problem: Pain  Goal: Verbalizes/displays adequate comfort level or baseline comfort level  Outcome: Progressing     Problem: Chronic Conditions and Co-morbidities  Goal: Patient's chronic conditions and co-morbidity symptoms are monitored and maintained or improved  Outcome: Progressing     Problem: Safety - Adult  Goal: Free from fall injury  Outcome: Progressing     Problem: ABCDS Injury Assessment  Goal: Absence of physical injury  Outcome: Progressing     Problem: Skin/Tissue Integrity - Adult  Goal: Skin integrity remains intact  Outcome: Progressing  Goal: Oral mucous membranes remain intact  Outcome: Progressing     Problem: Musculoskeletal - Adult  Goal: Return mobility to safest level of function  Outcome: Progressing  Goal: Return ADL status to a safe level of function  Outcome: Progressing     Problem: Gastrointestinal - Adult  Goal: Minimal or absence of nausea and vomiting  Outcome: Progressing  Goal: Maintains or returns to baseline bowel function  Outcome: Progressing  Goal: Maintains adequate nutritional intake  Outcome: Progressing     Problem: Metabolic/Fluid and Electrolytes - Adult  Goal: Electrolytes maintained within normal limits  Outcome: Progressing  Goal: Hemodynamic stability and optimal renal function maintained  Outcome: Progressing  Goal: Glucose maintained within prescribed range  Outcome: Progressing

## 2024-03-07 NOTE — ASSESSMENT & PLAN NOTE
- Acute onset symptoms w/ repeated MAP<60 and SBP<90 w/ hypovolemic shock suspected  - No clinical evidence of septicemia, cardiogenic or hemorrhagic shock reported w/ SBP normalized s/p IVF replacement  - Continue IVF support with Jenkins CX ordered and maintain MAP>60

## 2024-03-07 NOTE — H&P
Hospitalist Admission Note    NAME: Al Aguirre   :  1970   MRN:  [id number]     Date/Time:  3/7/2024 5:03 PM    Patient PCP: Radha Flores    ______________________________________________________________________  Given the patient's current clinical presentation, I have a high level of concern for decompensation if discharged from the emergency department.  Complex decision making was performed, which includes reviewing the patient's available past medical records, laboratory results, and x-ray films.       My assessment of this patient's clinical condition and my plan of care is as follows.    Assessment / Plan:    Sepsis  Hypotension  - Acute onset symptoms w/ repeated MAP<60 and SBP<90 w/ hypovolemic shock suspected  - No clinical evidence of septicemia, cardiogenic or hemorrhagic shock reported w/ SBP normalized s/p IVF replacement  - Continue IVF support with Jenkins CX ordered and maintain MAP>60   - WBC 17.9-->16.7  - Continue IV abx  - Blood cultures NGTD  - Flu negative  - CXR negative    Acute renal failure superimposed on stage 3a chronic kidney disease (HCC)  - Baseline GFR 40-45 w/ underlying DM nephropathy and HTN sclerosis reported   - Acute Oliguric failure present on admission w/o evidence of obstruction on CT and pre-renal dz and/or ATN the suspected etiology  - ACEi held and and IVF support continued.    - Nephrology following, appreciate recommendations  -US retroperitoneal showed normal appearance of kidneys without hydronephrosis     Type 1 diabetes mellitus with complication, with long-term current use of insulin (HCC)  - Long term control unclear w/ acute inpt hyperglycemia noted  - Continue Wt based Lantus w/ prandial Novolog for inpt management and titrate PRN  - Monitor closely for potential DKA or hypoglycemia given <CrCl  - primary endocrinologist, Dr. Mcconnell, patient states he has missed his last 2 appointments due to hospitalizations  - Check A1C    Esophageal  Range    Total Protein 7.3 6.4 - 8.2 g/dL    Albumin 3.6 3.5 - 5.0 g/dL    Globulin 3.7 2.0 - 4.0 g/dL    Albumin/Globulin Ratio 1.0 (L) 1.1 - 2.2      Total Bilirubin 1.4 (H) 0.2 - 1.0 mg/dL    Bilirubin, Direct 0.4 (H) 0.0 - 0.2 mg/dL    Alk Phosphatase 93 45 - 117 U/L    AST 15 15 - 37 U/L    ALT 17 12 - 78 U/L   Phosphorus    Collection Time: 03/07/24  6:06 AM   Result Value Ref Range    Phosphorus 3.3 2.6 - 4.7 mg/dL   POCT Glucose    Collection Time: 03/07/24  8:06 AM   Result Value Ref Range    POC Glucose 147 (H) 65 - 100 mg/dL    Performed by: Hermann Valverde    Protein / creatinine ratio, urine    Collection Time: 03/07/24 10:50 AM   Result Value Ref Range    Protein, Urine, Random 37 (H) 0.0 - 11.9 mg/dL    Creatinine, Ur 140.00 mg/dL    PROTEIN/CREAT RATIO URINE RAN 0.3     POCT Glucose    Collection Time: 03/07/24 11:11 AM   Result Value Ref Range    POC Glucose 141 (H) 65 - 100 mg/dL    Performed by: Hermann Valverde    POCT Glucose    Collection Time: 03/07/24  4:11 PM   Result Value Ref Range    POC Glucose 134 (H) 65 - 100 mg/dL    Performed by: Hermann Valverde          CT Result (most recent):  CT ABDOMEN PELVIS WO IV CONTRAST 03/06/2024    Narrative  INDICATION: Abdominal pain, nausea.    Exam: Noncontrast CT of the abdomen and pelvis is performed with 5 mm  collimation. Sagittal and coronal reformatted images were also performed.    CT dose reduction was achieved through the use of a standardized protocol  tailored for this examination and automatic exposure control for dose  modulation.    There is no prior study for direct comparison.    FINDINGS:    The visualized lung bases are clear. There is circumferential thickening of the  visualized distal esophagus.    Abdomen:    Liver: The liver is normal on noncontrast images.    Spleen: The spleen is normal on noncontrast images.    Adrenals: The adrenals are normal on noncontrast images.    Pancreas: The pancreas is normal on noncontrast

## 2024-03-08 ENCOUNTER — ANESTHESIA EVENT (OUTPATIENT)
Facility: HOSPITAL | Age: 54
DRG: 720 | End: 2024-03-08
Payer: COMMERCIAL

## 2024-03-08 ENCOUNTER — ANESTHESIA (OUTPATIENT)
Facility: HOSPITAL | Age: 54
DRG: 720 | End: 2024-03-08
Payer: COMMERCIAL

## 2024-03-08 LAB
ALBUMIN SERPL-MCNC: 3.3 G/DL (ref 3.5–5)
ANION GAP SERPL CALC-SCNC: 3 MMOL/L (ref 5–15)
BUN SERPL-MCNC: 43 MG/DL (ref 6–20)
BUN/CREAT SERPL: 22 (ref 12–20)
CA-I BLD-MCNC: 8.9 MG/DL (ref 8.5–10.1)
CHLORIDE SERPL-SCNC: 115 MMOL/L (ref 97–108)
CK SERPL-CCNC: 112 U/L (ref 39–308)
CO2 SERPL-SCNC: 26 MMOL/L (ref 21–32)
CREAT SERPL-MCNC: 1.97 MG/DL (ref 0.7–1.3)
EKG ATRIAL RATE: 110 BPM
EKG DIAGNOSIS: NORMAL
EKG P AXIS: 66 DEGREES
EKG P-R INTERVAL: 116 MS
EKG Q-T INTERVAL: 330 MS
EKG QRS DURATION: 72 MS
EKG QTC CALCULATION (BAZETT): 446 MS
EKG R AXIS: -27 DEGREES
EKG T AXIS: 65 DEGREES
EKG VENTRICULAR RATE: 110 BPM
GLUCOSE BLD STRIP.AUTO-MCNC: 113 MG/DL (ref 65–100)
GLUCOSE BLD STRIP.AUTO-MCNC: 157 MG/DL (ref 65–100)
GLUCOSE BLD STRIP.AUTO-MCNC: 88 MG/DL (ref 65–100)
GLUCOSE BLD STRIP.AUTO-MCNC: 95 MG/DL (ref 65–100)
GLUCOSE SERPL-MCNC: 137 MG/DL (ref 65–100)
PERFORMED BY:: ABNORMAL
PERFORMED BY:: ABNORMAL
PERFORMED BY:: NORMAL
PERFORMED BY:: NORMAL
PHOSPHATE SERPL-MCNC: 2.4 MG/DL (ref 2.6–4.7)
POTASSIUM SERPL-SCNC: 4.5 MMOL/L (ref 3.5–5.1)
SODIUM SERPL-SCNC: 144 MMOL/L (ref 136–145)

## 2024-03-08 PROCEDURE — 2580000003 HC RX 258: Performed by: HOSPITALIST

## 2024-03-08 PROCEDURE — 88305 TISSUE EXAM BY PATHOLOGIST: CPT

## 2024-03-08 PROCEDURE — 3600007502: Performed by: INTERNAL MEDICINE

## 2024-03-08 PROCEDURE — 6370000000 HC RX 637 (ALT 250 FOR IP): Performed by: HOSPITALIST

## 2024-03-08 PROCEDURE — 3700000001 HC ADD 15 MINUTES (ANESTHESIA): Performed by: INTERNAL MEDICINE

## 2024-03-08 PROCEDURE — 2500000003 HC RX 250 WO HCPCS: Performed by: NURSE ANESTHETIST, CERTIFIED REGISTERED

## 2024-03-08 PROCEDURE — 7100000000 HC PACU RECOVERY - FIRST 15 MIN: Performed by: INTERNAL MEDICINE

## 2024-03-08 PROCEDURE — 3700000000 HC ANESTHESIA ATTENDED CARE: Performed by: INTERNAL MEDICINE

## 2024-03-08 PROCEDURE — 6360000002 HC RX W HCPCS: Performed by: NURSE ANESTHETIST, CERTIFIED REGISTERED

## 2024-03-08 PROCEDURE — 2580000003 HC RX 258: Performed by: NURSE PRACTITIONER

## 2024-03-08 PROCEDURE — 2580000003 HC RX 258: Performed by: INTERNAL MEDICINE

## 2024-03-08 PROCEDURE — 82550 ASSAY OF CK (CPK): CPT

## 2024-03-08 PROCEDURE — 80069 RENAL FUNCTION PANEL: CPT

## 2024-03-08 PROCEDURE — 6370000000 HC RX 637 (ALT 250 FOR IP): Performed by: INTERNAL MEDICINE

## 2024-03-08 PROCEDURE — 2580000003 HC RX 258: Performed by: NURSE ANESTHETIST, CERTIFIED REGISTERED

## 2024-03-08 PROCEDURE — 88312 SPECIAL STAINS GROUP 1: CPT

## 2024-03-08 PROCEDURE — 36415 COLL VENOUS BLD VENIPUNCTURE: CPT

## 2024-03-08 PROCEDURE — 1100000000 HC RM PRIVATE

## 2024-03-08 PROCEDURE — 6360000002 HC RX W HCPCS: Performed by: NURSE PRACTITIONER

## 2024-03-08 PROCEDURE — 3600007512: Performed by: INTERNAL MEDICINE

## 2024-03-08 PROCEDURE — 0DB38ZX EXCISION OF LOWER ESOPHAGUS, VIA NATURAL OR ARTIFICIAL OPENING ENDOSCOPIC, DIAGNOSTIC: ICD-10-PCS | Performed by: INTERNAL MEDICINE

## 2024-03-08 PROCEDURE — 2709999900 HC NON-CHARGEABLE SUPPLY: Performed by: INTERNAL MEDICINE

## 2024-03-08 PROCEDURE — 6360000002 HC RX W HCPCS: Performed by: HOSPITALIST

## 2024-03-08 PROCEDURE — C9113 INJ PANTOPRAZOLE SODIUM, VIA: HCPCS | Performed by: HOSPITALIST

## 2024-03-08 PROCEDURE — 82962 GLUCOSE BLOOD TEST: CPT

## 2024-03-08 RX ORDER — TRAZODONE HYDROCHLORIDE 50 MG/1
50 TABLET ORAL NIGHTLY
Status: DISCONTINUED | OUTPATIENT
Start: 2024-03-08 | End: 2024-03-09 | Stop reason: HOSPADM

## 2024-03-08 RX ORDER — SODIUM CHLORIDE 450 MG/100ML
INJECTION, SOLUTION INTRAVENOUS CONTINUOUS
Status: DISCONTINUED | OUTPATIENT
Start: 2024-03-08 | End: 2024-03-09 | Stop reason: HOSPADM

## 2024-03-08 RX ORDER — METOPROLOL TARTRATE 50 MG/1
50 TABLET, FILM COATED ORAL 2 TIMES DAILY
Status: DISCONTINUED | OUTPATIENT
Start: 2024-03-08 | End: 2024-03-09 | Stop reason: HOSPADM

## 2024-03-08 RX ORDER — PROPOFOL 10 MG/ML
INJECTION, EMULSION INTRAVENOUS PRN
Status: DISCONTINUED | OUTPATIENT
Start: 2024-03-08 | End: 2024-03-08 | Stop reason: SDUPTHER

## 2024-03-08 RX ORDER — PANTOPRAZOLE SODIUM 40 MG/1
40 TABLET, DELAYED RELEASE ORAL
Status: DISCONTINUED | OUTPATIENT
Start: 2024-03-09 | End: 2024-03-09 | Stop reason: HOSPADM

## 2024-03-08 RX ORDER — GLYCOPYRROLATE 0.2 MG/ML
INJECTION INTRAMUSCULAR; INTRAVENOUS PRN
Status: DISCONTINUED | OUTPATIENT
Start: 2024-03-08 | End: 2024-03-08 | Stop reason: SDUPTHER

## 2024-03-08 RX ORDER — DIPHENHYDRAMINE HYDROCHLORIDE 50 MG/ML
25 INJECTION INTRAMUSCULAR; INTRAVENOUS ONCE
Status: COMPLETED | OUTPATIENT
Start: 2024-03-08 | End: 2024-03-08

## 2024-03-08 RX ORDER — LIDOCAINE HYDROCHLORIDE 20 MG/ML
INJECTION, SOLUTION EPIDURAL; INFILTRATION; INTRACAUDAL; PERINEURAL PRN
Status: DISCONTINUED | OUTPATIENT
Start: 2024-03-08 | End: 2024-03-08 | Stop reason: SDUPTHER

## 2024-03-08 RX ORDER — SODIUM CHLORIDE, SODIUM LACTATE, POTASSIUM CHLORIDE, CALCIUM CHLORIDE 600; 310; 30; 20 MG/100ML; MG/100ML; MG/100ML; MG/100ML
INJECTION, SOLUTION INTRAVENOUS CONTINUOUS PRN
Status: DISCONTINUED | OUTPATIENT
Start: 2024-03-08 | End: 2024-03-08 | Stop reason: SDUPTHER

## 2024-03-08 RX ADMIN — PIPERACILLIN AND TAZOBACTAM 3375 MG: 3; .375 INJECTION, POWDER, LYOPHILIZED, FOR SOLUTION INTRAVENOUS at 10:09

## 2024-03-08 RX ADMIN — DIPHENHYDRAMINE HYDROCHLORIDE 25 MG: 50 INJECTION INTRAMUSCULAR; INTRAVENOUS at 14:05

## 2024-03-08 RX ADMIN — TRAZODONE HYDROCHLORIDE 50 MG: 50 TABLET ORAL at 21:11

## 2024-03-08 RX ADMIN — HEPARIN SODIUM 5000 UNITS: 5000 INJECTION INTRAVENOUS; SUBCUTANEOUS at 21:11

## 2024-03-08 RX ADMIN — BUSPIRONE HYDROCHLORIDE 10 MG: 10 TABLET ORAL at 08:00

## 2024-03-08 RX ADMIN — BUPROPION HYDROCHLORIDE 300 MG: 300 TABLET, FILM COATED, EXTENDED RELEASE ORAL at 08:00

## 2024-03-08 RX ADMIN — HEPARIN SODIUM 5000 UNITS: 5000 INJECTION INTRAVENOUS; SUBCUTANEOUS at 14:05

## 2024-03-08 RX ADMIN — HEPARIN SODIUM 5000 UNITS: 5000 INJECTION INTRAVENOUS; SUBCUTANEOUS at 06:39

## 2024-03-08 RX ADMIN — SODIUM CHLORIDE, SODIUM LACTATE, POTASSIUM CHLORIDE, CALCIUM CHLORIDE: 600; 310; 30; 20 INJECTION, SOLUTION INTRAVENOUS at 09:09

## 2024-03-08 RX ADMIN — SODIUM CHLORIDE: 4.5 INJECTION, SOLUTION INTRAVENOUS at 10:09

## 2024-03-08 RX ADMIN — ATORVASTATIN CALCIUM 20 MG: 40 TABLET, FILM COATED ORAL at 21:11

## 2024-03-08 RX ADMIN — LIDOCAINE HYDROCHLORIDE 40 MG: 20 INJECTION, SOLUTION EPIDURAL; INFILTRATION; INTRACAUDAL; PERINEURAL at 09:30

## 2024-03-08 RX ADMIN — PROPOFOL 30 MG: 10 INJECTION, EMULSION INTRAVENOUS at 09:26

## 2024-03-08 RX ADMIN — METOCLOPRAMIDE 5 MG: 5 INJECTION, SOLUTION INTRAMUSCULAR; INTRAVENOUS at 10:58

## 2024-03-08 RX ADMIN — SODIUM CHLORIDE, PRESERVATIVE FREE 10 ML: 5 INJECTION INTRAVENOUS at 08:01

## 2024-03-08 RX ADMIN — ONDANSETRON 2 MG: 2 INJECTION INTRAMUSCULAR; INTRAVENOUS at 17:13

## 2024-03-08 RX ADMIN — TRAZODONE HYDROCHLORIDE 50 MG: 50 TABLET ORAL at 00:38

## 2024-03-08 RX ADMIN — ONDANSETRON 2 MG: 2 INJECTION INTRAMUSCULAR; INTRAVENOUS at 06:39

## 2024-03-08 RX ADMIN — METOPROLOL TARTRATE 25 MG: 25 TABLET, FILM COATED ORAL at 08:00

## 2024-03-08 RX ADMIN — PANTOPRAZOLE SODIUM 40 MG: 40 INJECTION, POWDER, FOR SOLUTION INTRAVENOUS at 07:59

## 2024-03-08 RX ADMIN — SODIUM CHLORIDE, PRESERVATIVE FREE 10 ML: 5 INJECTION INTRAVENOUS at 21:12

## 2024-03-08 RX ADMIN — PIPERACILLIN AND TAZOBACTAM 3375 MG: 3; .375 INJECTION, POWDER, LYOPHILIZED, FOR SOLUTION INTRAVENOUS at 02:57

## 2024-03-08 RX ADMIN — PROPOFOL 100 MG: 10 INJECTION, EMULSION INTRAVENOUS at 09:21

## 2024-03-08 RX ADMIN — PIPERACILLIN AND TAZOBACTAM 3375 MG: 3; .375 INJECTION, POWDER, LYOPHILIZED, FOR SOLUTION INTRAVENOUS at 17:14

## 2024-03-08 RX ADMIN — LIDOCAINE HYDROCHLORIDE 100 MG: 20 INJECTION, SOLUTION EPIDURAL; INFILTRATION; INTRACAUDAL; PERINEURAL at 09:15

## 2024-03-08 RX ADMIN — ARIPIPRAZOLE 5 MG: 5 TABLET ORAL at 08:00

## 2024-03-08 RX ADMIN — PROPOFOL 100 MG: 10 INJECTION, EMULSION INTRAVENOUS at 09:18

## 2024-03-08 RX ADMIN — INSULIN GLARGINE 16 UNITS: 100 INJECTION, SOLUTION SUBCUTANEOUS at 21:20

## 2024-03-08 RX ADMIN — ONDANSETRON 2 MG: 2 INJECTION INTRAMUSCULAR; INTRAVENOUS at 00:09

## 2024-03-08 RX ADMIN — METOPROLOL TARTRATE 50 MG: 50 TABLET, FILM COATED ORAL at 21:11

## 2024-03-08 RX ADMIN — GLYCOPYRROLATE 0.1 MG: 0.2 INJECTION INTRAMUSCULAR; INTRAVENOUS at 09:12

## 2024-03-08 ASSESSMENT — PAIN SCALES - GENERAL: PAINLEVEL_OUTOF10: 0

## 2024-03-08 NOTE — PROGRESS NOTES
RRR without murmur or rub.  GI: Soft and non-tender.   Musculoskeletal: bandage to the right foot  Neuro: No facial asymmetry.  Alert.   Psych: Affect is normal.  Non-anxious.       ________________________________________________________________________    Total NON critical care TIME:  35  Minutes    Total CRITICAL CARE TIME Spent: 0  Minutes non procedure based      Comments   >50% of visit spent in counseling and coordination of care     ________________________________________________________________________  DAVID Cary NP     Procedures: see electronic medical records for all procedures/Xrays and details which were not copied into this note but were reviewed prior to creation of Plan.      LABS:  I reviewed today's most current labs and imaging studies.  Pertinent labs include:  Recent Labs     03/06/24  1530 03/07/24  0224   WBC 17.9* 16.7*   HGB 17.4* 15.5   HCT 50.9* 44.8    312     Recent Labs     03/06/24  1530 03/07/24  0606 03/08/24  0601    143 144   K 4.9 4.5 4.5   CL 99 112* 115*   CO2 22 27 26   * 80* 43*   PHOS  --  3.3 2.4*   ALT 20 17  --        Signed: DAVID Cary NP

## 2024-03-08 NOTE — ANESTHESIA POSTPROCEDURE EVALUATION
Department of Anesthesiology  Postprocedure Note    Patient: Al Aguirre  MRN: [id number]  YOB: 1970  Date of evaluation: 3/8/2024    Procedure Summary       Date: 03/08/24 Room / Location: [facility] ENDO 03 / [facility] ENDOSCOPY    Anesthesia Start: 0909 Anesthesia Stop: 0937    Procedures:       ESOPHAGOGASTRODUODENOSCOPY DIAGNOSTIC ONLY (Upper GI Region)      ESOPHAGOGASTRODUODENOSCOPY BIOPSY (Lower GI Region) Diagnosis:       Gastrointestinal hemorrhage, unspecified gastrointestinal hemorrhage type      (Gastrointestinal hemorrhage, unspecified gastrointestinal hemorrhage type [K92.2])    Surgeons: Jeanette Gonsalez MD Responsible Provider: Brandon Montaño Jr., MD    Anesthesia Type: MAC, TIVA ASA Status: 3            Anesthesia Type: MAC, TIVA    Nilda Phase I:      Nilda Phase II:      Anesthesia Post Evaluation    Patient location during evaluation: bedside  Patient participation: complete - patient participated  Level of consciousness: awake and responsive to light touch  Pain score: 0  Airway patency: patent  Nausea & Vomiting: no nausea and no vomiting  Cardiovascular status: blood pressure returned to baseline and hemodynamically stable  Respiratory status: acceptable  Hydration status: stable  Pain management: adequate    No notable events documented.

## 2024-03-08 NOTE — PROGRESS NOTES
1010 Received patient from endoscopy vitally stable, noted swelling on the right lower lip. Nurse practitioner Maryana made aware.  1320 Seen by PAARMJIT Mijares with order to give Benadryl.

## 2024-03-08 NOTE — PLAN OF CARE
Problem: Discharge Planning  Goal: Discharge to home or other facility with appropriate resources  Outcome: Progressing  Flowsheets (Taken 3/8/2024 0802)  Discharge to home or other facility with appropriate resources: Identify barriers to discharge with patient and caregiver     Problem: Pain  Goal: Verbalizes/displays adequate comfort level or baseline comfort level  Outcome: Progressing     Problem: Chronic Conditions and Co-morbidities  Goal: Patient's chronic conditions and co-morbidity symptoms are monitored and maintained or improved  Outcome: Progressing  Flowsheets (Taken 3/8/2024 0802)  Care Plan - Patient's Chronic Conditions and Co-Morbidity Symptoms are Monitored and Maintained or Improved: Monitor and assess patient's chronic conditions and comorbid symptoms for stability, deterioration, or improvement     Problem: Safety - Adult  Goal: Free from fall injury  Outcome: Progressing  Flowsheets (Taken 3/8/2024 0814)  Free From Fall Injury: Instruct family/caregiver on patient safety     Problem: ABCDS Injury Assessment  Goal: Absence of physical injury  Outcome: Progressing  Flowsheets (Taken 3/8/2024 0814)  Absence of Physical Injury: Implement safety measures based on patient assessment     Problem: Skin/Tissue Integrity - Adult  Goal: Skin integrity remains intact  Outcome: Progressing  Flowsheets  Taken 3/8/2024 0814  Skin Integrity Remains Intact: Monitor for areas of redness and/or skin breakdown  Taken 3/8/2024 0802  Skin Integrity Remains Intact: Monitor for areas of redness and/or skin breakdown  Goal: Oral mucous membranes remain intact  Outcome: Progressing  Flowsheets  Taken 3/8/2024 0814  Oral Mucous Membranes Remain Intact: Assess oral mucosa and hygiene practices  Taken 3/8/2024 0802  Oral Mucous Membranes Remain Intact: Assess oral mucosa and hygiene practices     Problem: Musculoskeletal - Adult  Goal: Return mobility to safest level of function  Outcome: Progressing  Flowsheets (Taken

## 2024-03-08 NOTE — ANESTHESIA PRE PROCEDURE
Department of Anesthesiology  Preprocedure Note       Name:  Al Aguirre   Age:  53 y.o.  :  1970                                          MRN:  [id number]         Date:  3/8/2024      Surgeon: Surgeon(s):  Jeanette Gonsalez MD    Procedure: Procedure(s):  ESOPHAGOGASTRODUODENOSCOPY DIAGNOSTIC ONLY    Medications prior to admission:   Prior to Admission medications    Medication Sig Start Date End Date Taking? Authorizing Provider   insulin lispro (HUMALOG) 100 UNIT/ML SOLN injection vial To use via insulin pump, up to 100 units daily 24   Stephania Mcconnell MD   gabapentin (NEURONTIN) 300 MG capsule INCREASE TO 2 CAPSULES BY MOUTH EVERY MORNING AND THEN 3 CAPSULES BY MOUTH EVERY NIGHT 24  Stephania Mcconnell MD   insulin glargine (LANTUS) 100 UNIT/ML injection vial Inject 14 Units into the skin 2 times daily 23   Marlon Serna MD   ARIPiprazole (ABILIFY) 5 MG tablet take 1 tablet by mouth EVERY NIGHT AS DIRECTED 10/10/23   Jw Stanton MD   pregabalin (LYRICA) 75 MG capsule Take 1 capsule by mouth daily for 90 days. Max Daily Amount: 75 mg 10/25/23 1/23/24  Baldev Salas DPM   buPROPion (WELLBUTRIN XL) 300 MG extended release tablet Take 1 tablet by mouth daily 23   Jw Stanton MD   methotrexate (RHEUMATREX) 2.5 MG chemo tablet take 8 tablets by mouth ONCE A WEEK ON 22   Jw Stanton MD   atorvastatin (LIPITOR) 20 MG tablet One pill at night 23   Stephania Mcconnell MD   lisinopril (PRINIVIL;ZESTRIL) 20 MG tablet take 1 tablet by mouth daily STOP MICARDIS HCTZ 23   Stephania Mcconnell MD   Glucagon (GVOKE HYPOPEN 2-PACK) 0.5 MG/0.1ML SOAJ Use as needed for hypoglycemia (one 2-pack) 23   Stephania Mcconnell MD   busPIRone (BUSPAR) 10 MG tablet Take 1 tablet by mouth daily 22   Automatic Reconciliation, Ar       Current medications:    Current Facility-Administered Medications   Medication Dose Route Frequency Provider Last Rate Last Admin
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
## 2024-03-09 VITALS
TEMPERATURE: 98.4 F | SYSTOLIC BLOOD PRESSURE: 127 MMHG | HEIGHT: 76 IN | HEART RATE: 86 BPM | WEIGHT: 236 LBS | OXYGEN SATURATION: 100 % | DIASTOLIC BLOOD PRESSURE: 81 MMHG | RESPIRATION RATE: 19 BRPM | BODY MASS INDEX: 28.74 KG/M2

## 2024-03-09 LAB
ALBUMIN SERPL-MCNC: 3.2 G/DL (ref 3.5–5)
ANION GAP SERPL CALC-SCNC: 7 MMOL/L (ref 5–15)
BASOPHILS # BLD: 0 K/UL (ref 0–0.1)
BASOPHILS NFR BLD: 0 % (ref 0–1)
BUN SERPL-MCNC: 34 MG/DL (ref 6–20)
BUN/CREAT SERPL: 18 (ref 12–20)
CA-I BLD-MCNC: 8.8 MG/DL (ref 8.5–10.1)
CHLORIDE SERPL-SCNC: 108 MMOL/L (ref 97–108)
CO2 SERPL-SCNC: 25 MMOL/L (ref 21–32)
CREAT SERPL-MCNC: 1.93 MG/DL (ref 0.7–1.3)
DIFFERENTIAL METHOD BLD: ABNORMAL
EOSINOPHIL # BLD: 0 K/UL (ref 0–0.4)
EOSINOPHIL NFR BLD: 0 % (ref 0–7)
ERYTHROCYTE [DISTWIDTH] IN BLOOD BY AUTOMATED COUNT: 13.9 % (ref 11.5–14.5)
GLUCOSE BLD STRIP.AUTO-MCNC: 194 MG/DL (ref 65–100)
GLUCOSE BLD STRIP.AUTO-MCNC: 238 MG/DL (ref 65–100)
GLUCOSE SERPL-MCNC: 290 MG/DL (ref 65–100)
HCT VFR BLD AUTO: 40.7 % (ref 36.6–50.3)
HGB BLD-MCNC: 13.3 G/DL (ref 12.1–17)
IMM GRANULOCYTES # BLD AUTO: 0 K/UL (ref 0–0.04)
IMM GRANULOCYTES NFR BLD AUTO: 0 % (ref 0–0.5)
LYMPHOCYTES # BLD: 1.7 K/UL (ref 0.8–3.5)
LYMPHOCYTES NFR BLD: 18 % (ref 12–49)
MCH RBC QN AUTO: 28.2 PG (ref 26–34)
MCHC RBC AUTO-ENTMCNC: 32.7 G/DL (ref 30–36.5)
MCV RBC AUTO: 86.4 FL (ref 80–99)
MONOCYTES # BLD: 0.5 K/UL (ref 0–1)
MONOCYTES NFR BLD: 6 % (ref 5–13)
NEUTS SEG # BLD: 7.4 K/UL (ref 1.8–8)
NEUTS SEG NFR BLD: 76 % (ref 32–75)
NRBC # BLD: 0 K/UL (ref 0–0.01)
NRBC BLD-RTO: 0 PER 100 WBC
PERFORMED BY:: ABNORMAL
PERFORMED BY:: ABNORMAL
PHOSPHATE SERPL-MCNC: 2.8 MG/DL (ref 2.6–4.7)
PLATELET # BLD AUTO: 223 K/UL (ref 150–400)
PMV BLD AUTO: 10.5 FL (ref 8.9–12.9)
POTASSIUM SERPL-SCNC: 4.7 MMOL/L (ref 3.5–5.1)
RBC # BLD AUTO: 4.71 M/UL (ref 4.1–5.7)
SODIUM SERPL-SCNC: 140 MMOL/L (ref 136–145)
WBC # BLD AUTO: 9.7 K/UL (ref 4.1–11.1)

## 2024-03-09 PROCEDURE — 6360000002 HC RX W HCPCS: Performed by: HOSPITALIST

## 2024-03-09 PROCEDURE — 36415 COLL VENOUS BLD VENIPUNCTURE: CPT

## 2024-03-09 PROCEDURE — 6360000002 HC RX W HCPCS: Performed by: NURSE PRACTITIONER

## 2024-03-09 PROCEDURE — 6370000000 HC RX 637 (ALT 250 FOR IP): Performed by: INTERNAL MEDICINE

## 2024-03-09 PROCEDURE — 2580000003 HC RX 258: Performed by: NURSE PRACTITIONER

## 2024-03-09 PROCEDURE — 2580000003 HC RX 258: Performed by: HOSPITALIST

## 2024-03-09 PROCEDURE — 80069 RENAL FUNCTION PANEL: CPT

## 2024-03-09 PROCEDURE — 85025 COMPLETE CBC W/AUTO DIFF WBC: CPT

## 2024-03-09 PROCEDURE — 6370000000 HC RX 637 (ALT 250 FOR IP): Performed by: HOSPITALIST

## 2024-03-09 PROCEDURE — 82962 GLUCOSE BLOOD TEST: CPT

## 2024-03-09 RX ORDER — ONDANSETRON 4 MG/1
4 TABLET, ORALLY DISINTEGRATING ORAL EVERY 8 HOURS PRN
Qty: 12 TABLET | Refills: 0 | Status: SHIPPED | OUTPATIENT
Start: 2024-03-09

## 2024-03-09 RX ADMIN — ARIPIPRAZOLE 5 MG: 5 TABLET ORAL at 08:30

## 2024-03-09 RX ADMIN — BUSPIRONE HYDROCHLORIDE 10 MG: 10 TABLET ORAL at 08:30

## 2024-03-09 RX ADMIN — SODIUM CHLORIDE, PRESERVATIVE FREE 10 ML: 5 INJECTION INTRAVENOUS at 08:31

## 2024-03-09 RX ADMIN — METOPROLOL TARTRATE 50 MG: 50 TABLET, FILM COATED ORAL at 08:30

## 2024-03-09 RX ADMIN — PIPERACILLIN AND TAZOBACTAM 3375 MG: 3; .375 INJECTION, POWDER, LYOPHILIZED, FOR SOLUTION INTRAVENOUS at 09:56

## 2024-03-09 RX ADMIN — PIPERACILLIN AND TAZOBACTAM 3375 MG: 3; .375 INJECTION, POWDER, LYOPHILIZED, FOR SOLUTION INTRAVENOUS at 01:27

## 2024-03-09 RX ADMIN — INSULIN LISPRO 5 UNITS: 100 INJECTION, SOLUTION INTRAVENOUS; SUBCUTANEOUS at 11:45

## 2024-03-09 RX ADMIN — BUPROPION HYDROCHLORIDE 300 MG: 300 TABLET, FILM COATED, EXTENDED RELEASE ORAL at 08:30

## 2024-03-09 RX ADMIN — INSULIN LISPRO 1 UNITS: 100 INJECTION, SOLUTION INTRAVENOUS; SUBCUTANEOUS at 08:28

## 2024-03-09 RX ADMIN — INSULIN LISPRO 5 UNITS: 100 INJECTION, SOLUTION INTRAVENOUS; SUBCUTANEOUS at 08:29

## 2024-03-09 RX ADMIN — PANTOPRAZOLE SODIUM 40 MG: 40 TABLET, DELAYED RELEASE ORAL at 05:40

## 2024-03-09 RX ADMIN — HEPARIN SODIUM 5000 UNITS: 5000 INJECTION INTRAVENOUS; SUBCUTANEOUS at 05:40

## 2024-03-09 ASSESSMENT — PAIN SCALES - GENERAL: PAINLEVEL_OUTOF10: 0

## 2024-03-09 NOTE — PLAN OF CARE
Problem: Discharge Planning  Goal: Discharge to home or other facility with appropriate resources  Outcome: Progressing  Flowsheets (Taken 3/9/2024 0731)  Discharge to home or other facility with appropriate resources: Identify barriers to discharge with patient and caregiver     Problem: Pain  Goal: Verbalizes/displays adequate comfort level or baseline comfort level  Outcome: Progressing     Problem: Chronic Conditions and Co-morbidities  Goal: Patient's chronic conditions and co-morbidity symptoms are monitored and maintained or improved  Outcome: Progressing  Flowsheets (Taken 3/9/2024 0731)  Care Plan - Patient's Chronic Conditions and Co-Morbidity Symptoms are Monitored and Maintained or Improved: Monitor and assess patient's chronic conditions and comorbid symptoms for stability, deterioration, or improvement     Problem: Safety - Adult  Goal: Free from fall injury  Outcome: Progressing  Flowsheets (Taken 3/9/2024 0738)  Free From Fall Injury: Instruct family/caregiver on patient safety     Problem: ABCDS Injury Assessment  Goal: Absence of physical injury  Outcome: Progressing  Flowsheets (Taken 3/9/2024 0738)  Absence of Physical Injury: Implement safety measures based on patient assessment     Problem: Skin/Tissue Integrity - Adult  Goal: Skin integrity remains intact  Outcome: Progressing  Flowsheets  Taken 3/9/2024 0738  Skin Integrity Remains Intact: Monitor for areas of redness and/or skin breakdown  Taken 3/9/2024 0731  Skin Integrity Remains Intact: Monitor for areas of redness and/or skin breakdown  Goal: Oral mucous membranes remain intact  Outcome: Progressing  Flowsheets  Taken 3/9/2024 0738  Oral Mucous Membranes Remain Intact: Assess oral mucosa and hygiene practices  Taken 3/9/2024 0731  Oral Mucous Membranes Remain Intact: Assess oral mucosa and hygiene practices     Problem: Musculoskeletal - Adult  Goal: Return mobility to safest level of function  Outcome: Progressing  Flowsheets (Taken

## 2024-03-09 NOTE — PROGRESS NOTES
Gastroenterology Progress Note        Patient: Al Aguirre MRN: [id number]  SSN: [id number]    YOB: 1970  Age: 53 y.o.  Sex: male      Admit Date: 3/6/2024    LOS: 3 days     Subjective:   No chest pain no severe heartburn     Past Medical History:   Diagnosis Date   • CKD (chronic kidney disease) stage 3, GFR 30-59 ml/min ([facility])    • Diabetic peripheral neuropathy ([facility])     BLE   • Diabetic ulcer of toe of right foot associated with diabetes mellitus due to underlying condition, with necrosis of bone ([facility]) 08/14/2023   • Equinus contracture of ankle 08/19/2023   • Hyperlipidemia    • Hypertension    • Hypogonadism, male    • MAT on CPAP    • Osteomyelitis of right foot ([facility]) 08/15/2023   • Rheumatoid arthritis ([facility])    • Type 1 diabetes mellitus with complication, with long-term current use of insulin ([facility])         Current Facility-Administered Medications:   •  traZODone (DESYREL) tablet 50 mg, 50 mg, Oral, Nightly, Dewey Gonzalez MD, 50 mg at 03/08/24 2111  •  0.45 % sodium chloride infusion, , IntraVENous, Continuous, Lucrecia Mane MD, Last Rate: 75 mL/hr at 03/08/24 1756, Rate Change at 03/08/24 1756  •  pantoprazole (PROTONIX) tablet 40 mg, 40 mg, Oral, QAM ACWallace Yiping, MD, 40 mg at 03/09/24 0540  •  metoprolol tartrate (LOPRESSOR) tablet 50 mg, 50 mg, Oral, BID, Lucrecia Mane MD, 50 mg at 03/09/24 0830  •  ondansetron (ZOFRAN-ODT) disintegrating tablet 4 mg, 4 mg, Oral, Q8H PRN, Maryana Pompa APRN - NP  •  ondansetron (ZOFRAN) injection 2 mg, 2 mg, IntraVENous, Q6H PRN, Maryana Pompa APRN - NP, 2 mg at 03/08/24 1713  •  piperacillin-tazobactam (ZOSYN) 3,375 mg in sodium chloride 0.9 % 50 mL IVPB (mini-bag), 3,375 mg, IntraVENous, Q8H, Pompa, Maryana, APRN - NP, Last Rate: 12.5 mL/hr at 03/09/24 0956, 3,375 mg at 03/09/24 0956  •  glucose chewable tablet 16 g, 4 tablet, Oral, PRN, Chris Marrero DO  •  dextrose bolus 10% 125 mL, 125 mL, IntraVENous, PRN **OR** dextrose bolus 10%  12.9 FL    Nucleated RBCs 0.0 0.0  WBC    nRBC 0.00 0.00 - 0.01 K/uL    Neutrophils % 76 (H) 32 - 75 %    Lymphocytes % 18 12 - 49 %    Monocytes % 6 5 - 13 %    Eosinophils % 0 0 - 7 %    Basophils % 0 0 - 1 %    Immature Granulocytes 0 0 - 0.5 %    Neutrophils Absolute 7.4 1.8 - 8.0 K/UL    Lymphocytes Absolute 1.7 0.8 - 3.5 K/UL    Monocytes Absolute 0.5 0.0 - 1.0 K/UL    Eosinophils Absolute 0.0 0.0 - 0.4 K/UL    Basophils Absolute 0.0 0.0 - 0.1 K/UL    Absolute Immature Granulocyte 0.0 0.00 - 0.04 K/UL    Differential Type AUTOMATED     Renal Function Panel    Collection Time: 03/09/24  5:45 AM   Result Value Ref Range    Sodium 140 136 - 145 mmol/L    Potassium 4.7 3.5 - 5.1 mmol/L    Chloride 108 97 - 108 mmol/L    CO2 25 21 - 32 mmol/L    Anion Gap 7 5 - 15 mmol/L    Glucose 290 (H) 65 - 100 mg/dL    BUN 34 (H) 6 - 20 mg/dL    Creatinine 1.93 (H) 0.70 - 1.30 mg/dL    Bun/Cre Ratio 18 12 - 20      Est, Glom Filt Rate 41 (L) >60 ml/min/1.73m2    Calcium 8.8 8.5 - 10.1 mg/dL    Phosphorus 2.8 2.6 - 4.7 mg/dL    Albumin 3.2 (L) 3.5 - 5.0 g/dL   POCT Glucose    Collection Time: 03/09/24  8:22 AM   Result Value Ref Range    POC Glucose 238 (H) 65 - 100 mg/dL    Performed by: [facility]         US RETROPERITONEAL COMPLETE   Final Result      Normal appearance of the kidneys without hydronephrosis.         CT ABDOMEN PELVIS WO CONTRAST Additional Contrast? None   Final Result   1. Circumferential thickening of the distal esophagus. Recommend endoscopy for   further evaluation.   2. No bowel obstruction, ileus or perforation. No intra-abdominal abscess.      XR CHEST PORTABLE   Final Result   No acute cardiopulmonary disease.                 [unfilled]  Assessment:     Active Problems:    Acute renal failure superimposed on stage 3a chronic kidney disease (HCC)    Hypotension    Type 1 diabetes mellitus with complication, with long-term current use of insulin (HCC)  Resolved Problems:    * No resolved

## 2024-03-09 NOTE — DISCHARGE SUMMARY
discontinue lisinopril at this time.     Type 1 diabetes mellitus with complication, with long-term current use of insulin (HCC): Please resume insulin pump at home. Please call and schedule a follow-up with Dr. Mcconnell as soon as possible. A1C 8.9.       Esophageal thickening: Patient had an EGD: LA Grade C reflux and erosive esophagitis with no bleeding. Will continue PPI. Continue current diet. Return to GI clinic in 4 weeks.    Discharge Exam:  Patient seen and examined by me on discharge day.  Pertinent Findings:  Patient Vitals for the past 24 hrs:   BP Temp Temp src Pulse Resp SpO2   03/09/24 0830 127/81 -- -- 86 -- --   03/09/24 0728 127/81 98.4 °F (36.9 °C) Oral 86 19 100 %   03/08/24 2348 134/76 98.2 °F (36.8 °C) Oral 88 18 100 %   03/08/24 2034 (!) 159/86 -- Oral 96 18 100 %   03/08/24 1530 (!) 176/103 99 °F (37.2 °C) Oral 93 18 97 %       Gen:    Not in distress  Chest: Clear lungs  CVS:   Regular rhythm.  No edema  Abd:  Soft, not distended, not tender  Neuro:    Discharge/Recent Laboratory Results:  Recent Labs     03/09/24  0545      K 4.7      CO2 25   BUN 34*   CREATININE 1.93*   GLUCOSE 290*   CALCIUM 8.8   PHOS 2.8     Recent Labs     03/09/24  0545   HGB 13.3   HCT 40.7   WBC 9.7          Discharge Medications:     Medication List        START taking these medications      ondansetron 4 MG disintegrating tablet  Commonly known as: ZOFRAN-ODT  Take 1 tablet by mouth every 8 hours as needed for Nausea or Vomiting            CONTINUE taking these medications      ARIPiprazole 5 MG tablet  Commonly known as: ABILIFY     atorvastatin 20 MG tablet  Commonly known as: LIPITOR  One pill at night     buPROPion 300 MG extended release tablet  Commonly known as: WELLBUTRIN XL     busPIRone 10 MG tablet  Commonly known as: BUSPAR     gabapentin 300 MG capsule  Commonly known as: NEURONTIN  INCREASE TO 2 CAPSULES BY MOUTH EVERY MORNING AND THEN 3 CAPSULES BY MOUTH EVERY NIGHT     Gvoke  preparing report for sign off to her PCP) :  35 minutes

## 2024-03-09 NOTE — PROGRESS NOTES
..Discharge plan of care/case management plan validated with provider discharge order. Copy of  AVS, follow up appointment and discharge medications were given and explained to the patient, understood and verbalized understanding. Left in the unit vitally stable per wheelchair. All his personal belongings were brought back home.
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
## 2024-03-09 NOTE — PROGRESS NOTES
Transition of Care Plan:    RUR: 16%  Prior Level of Functioning: independnet  Disposition: home  If SNF or IPR: Date FOC offered: n/a  Date FOC received: n/a  Accepting facility: n/a  Date authorization started with reference number: n/a  Date authorization received and expires: n/a  Follow up appointments: Saturday  DME needed: n/a  Transportation at discharge: family  IM/IMM Medicare/ letter given: n/a  Is patient a  and connected with [facility]? N/a   If yes, was [place] transfer form completed and [facility] notified?   Caregiver Contact: n/a  Discharge Caregiver contacted prior to discharge? N/a  Care Conference needed? N/a  Barriers to discharge:n/a

## 2024-03-09 NOTE — PLAN OF CARE
Problem: Discharge Planning  Goal: Discharge to home or other facility with appropriate resources  3/9/2024 1310 by Lacy Givens RN  Outcome: Adequate for Discharge  3/9/2024 0740 by aLcy Givens RN  Outcome: Progressing  Flowsheets (Taken 3/9/2024 0731)  Discharge to home or other facility with appropriate resources: Identify barriers to discharge with patient and caregiver     Problem: Pain  Goal: Verbalizes/displays adequate comfort level or baseline comfort level  3/9/2024 1310 by Lacy Givens RN  Outcome: Adequate for Discharge  3/9/2024 0740 by Lacy Givens RN  Outcome: Progressing     Problem: Chronic Conditions and Co-morbidities  Goal: Patient's chronic conditions and co-morbidity symptoms are monitored and maintained or improved  3/9/2024 1310 by Lacy Givens RN  Outcome: Adequate for Discharge  3/9/2024 0740 by Lacy Givens RN  Outcome: Progressing  Flowsheets (Taken 3/9/2024 0731)  Care Plan - Patient's Chronic Conditions and Co-Morbidity Symptoms are Monitored and Maintained or Improved: Monitor and assess patient's chronic conditions and comorbid symptoms for stability, deterioration, or improvement     Problem: Safety - Adult  Goal: Free from fall injury  3/9/2024 1310 by Lacy Givens RN  Outcome: Adequate for Discharge  3/9/2024 0740 by Lacy Givens RN  Outcome: Progressing  Flowsheets (Taken 3/9/2024 0738)  Free From Fall Injury: Instruct family/caregiver on patient safety     Problem: ABCDS Injury Assessment  Goal: Absence of physical injury  3/9/2024 1310 by Lacy Givens RN  Outcome: Adequate for Discharge  3/9/2024 0740 by Lacy Givens RN  Outcome: Progressing  Flowsheets (Taken 3/9/2024 0738)  Absence of Physical Injury: Implement safety measures based on patient assessment     Problem: Skin/Tissue Integrity - Adult  Goal: Skin integrity remains intact  3/9/2024 1310 by Lacy Givens RN  Outcome: Adequate for Discharge  3/9/2024 0740 by Lacy Givens

## 2024-03-10 LAB
BACTERIA SPEC CULT: NORMAL
BACTERIA SPEC CULT: NORMAL
Lab: NORMAL
Lab: NORMAL

## 2024-03-10 NOTE — ED PROVIDER NOTES
EMERGENCY DEPARTMENT HISTORY AND PHYSICAL EXAM      Date: 3/6/2024  Patient Name: Al Aguirre    History of Presenting Illness         History Provided By:     HPI: Al Aguirre, is a very pleasant 53 y.o. male presenting to the ED with a chief complaint of nausea fatigue, dry mouth lightheadedness.  Recent onset of symptoms.  States he is diabetic and sugar has been elevated.  Denies any abdominal pain.  No fevers chills rigors.  No nausea or vomiting.  No bloody nor black stools.  No urinary symptoms.  No chest pain or shortness of breath.  No arm or jaw pain.  No back pain.  No pleuritic symptoms    Denies any other symptoms at this time.    PCP: Radha Flores    No current facility-administered medications on file prior to encounter.     Current Outpatient Medications on File Prior to Encounter   Medication Sig Dispense Refill    insulin lispro (HUMALOG) 100 UNIT/ML SOLN injection vial To use via insulin pump, up to 100 units daily 100 mL 3    gabapentin (NEURONTIN) 300 MG capsule INCREASE TO 2 CAPSULES BY MOUTH EVERY MORNING AND THEN 3 CAPSULES BY MOUTH EVERY NIGHT 150 capsule 5    insulin glargine (LANTUS) 100 UNIT/ML injection vial Inject 14 Units into the skin 2 times daily 10 mL 0    ARIPiprazole (ABILIFY) 5 MG tablet take 1 tablet by mouth EVERY NIGHT AS DIRECTED      pregabalin (LYRICA) 75 MG capsule Take 1 capsule by mouth daily for 90 days. Max Daily Amount: 75 mg 30 capsule 2    buPROPion (WELLBUTRIN XL) 300 MG extended release tablet Take 1 tablet by mouth daily      methotrexate (RHEUMATREX) 2.5 MG chemo tablet take 8 tablets by mouth ONCE A WEEK ON FRIDAY      atorvastatin (LIPITOR) 20 MG tablet One pill at night 90 tablet 3    lisinopril (PRINIVIL;ZESTRIL) 20 MG tablet take 1 tablet by mouth daily STOP MICARDIS HCTZ 90 tablet 3    Glucagon (GVOKE HYPOPEN 2-PACK) 0.5 MG/0.1ML SOAJ Use as needed for hypoglycemia (one 2-pack) 2 each 1    busPIRone (BUSPAR) 10 MG tablet Take 1 tablet by

## 2024-03-12 LAB
BACTERIA SPEC CULT: NORMAL
BACTERIA SPEC CULT: NORMAL
Lab: NORMAL
Lab: NORMAL

## 2024-03-12 NOTE — PROGRESS NOTES
Physician Progress Note      PATIENT:               BARRY ALVAREZ  CSN #:                  [id number]  :                       1970  ADMIT DATE:       3/6/2024 3:24 PM  DISCH DATE:        3/9/2024 1:13 PM  RESPONDING  PROVIDER #:        Kevin Adler MD          QUERY TEXT:    Patient admitted with Sepsis. Noted to have Mild malnutrition  in   Dietician Note.  If possible, please document in progress notes and discharge   summary if you are evaluating and /or treating any of the following:      The medical record reflects the following:  Risk Factors: M 53, Sepsis, KRUPA    Clinical Indicators: Albumin 2.2;  Dietician Noted Mild Malnutrition Meets   ASPEN Criteria .1. Energy Intake:  50% or less of estimated energy   requirements for 5 or more days    Treatment: Glucerna 3x/day    Thank you,  Anna Durant RN, CCDS      ASPEN Criteria:    https://aspenjournals.onlinelibrary.carbajal.com/doi/full/10.1177/704271057820959  5  Options provided:  -- Protein calorie malnutrition mild  -- Other - I will add my own diagnosis  -- Disagree - Not applicable / Not valid  -- Disagree - Clinically unable to determine / Unknown  -- Refer to Clinical Documentation Reviewer    PROVIDER RESPONSE TEXT:    This patient has mild protein calorie malnutrition.    Query created by: Anna Durant on 3/8/2024 12:43 PM      Electronically signed by:  Kevin Adler MD 3/12/2024 11:32 AM

## 2024-03-18 ENCOUNTER — TELEPHONE (OUTPATIENT)
Age: 54
End: 2024-03-18

## 2024-03-19 NOTE — TELEPHONE ENCOUNTER
Educate this patient that he is on medicaid  equivalent plan which means he takes charge to call the supply company which ever the one he has been getting supplies from     Plus he also has to keep up his appts every 6 months - it is a must      This is to provide education to him   I have no control on authorizing any thing   The supply company does this authorization     Stephania Mcconnell MD

## 2024-03-20 ENCOUNTER — APPOINTMENT (OUTPATIENT)
Facility: HOSPITAL | Age: 54
DRG: 720 | End: 2024-03-20
Payer: COMMERCIAL

## 2024-03-20 ENCOUNTER — HOSPITAL ENCOUNTER (INPATIENT)
Facility: HOSPITAL | Age: 54
LOS: 7 days | Discharge: ANOTHER HOSPITAL | DRG: 720 | End: 2024-03-27
Attending: STUDENT IN AN ORGANIZED HEALTH CARE EDUCATION/TRAINING PROGRAM | Admitting: INTERNAL MEDICINE
Payer: COMMERCIAL

## 2024-03-20 ENCOUNTER — APPOINTMENT (OUTPATIENT)
Facility: HOSPITAL | Age: 54
DRG: 720 | End: 2024-03-20
Attending: INTERNAL MEDICINE
Payer: COMMERCIAL

## 2024-03-20 DIAGNOSIS — R65.10 SIRS (SYSTEMIC INFLAMMATORY RESPONSE SYNDROME) (HCC): ICD-10-CM

## 2024-03-20 DIAGNOSIS — E10.11 DIABETIC KETOACIDOSIS WITH COMA ASSOCIATED WITH TYPE 1 DIABETES MELLITUS (HCC): ICD-10-CM

## 2024-03-20 DIAGNOSIS — I21.4 NSTEMI (NON-ST ELEVATED MYOCARDIAL INFARCTION) (HCC): ICD-10-CM

## 2024-03-20 DIAGNOSIS — N17.9 AKI (ACUTE KIDNEY INJURY) (HCC): ICD-10-CM

## 2024-03-20 DIAGNOSIS — E11.10 DKA, TYPE 2, NOT AT GOAL (HCC): ICD-10-CM

## 2024-03-20 DIAGNOSIS — I46.9 CARDIAC ARREST (HCC): Primary | ICD-10-CM

## 2024-03-20 LAB
ALBUMIN SERPL-MCNC: 3.2 G/DL (ref 3.5–5)
ALBUMIN/GLOB SERPL: 0.8 (ref 1.1–2.2)
ALP SERPL-CCNC: 104 U/L (ref 45–117)
ALT SERPL-CCNC: 16 U/L (ref 12–78)
AMPHET UR QL SCN: NEGATIVE
ANION GAP SERPL CALC-SCNC: 13 MMOL/L (ref 5–15)
ANION GAP SERPL CALC-SCNC: 23 MMOL/L (ref 5–15)
ANION GAP SERPL CALC-SCNC: 28 MMOL/L (ref 5–15)
ANION GAP SERPL CALC-SCNC: ABNORMAL MMOL/L (ref 5–15)
APPEARANCE UR: CLEAR
APTT PPP: 28.4 SEC (ref 21.2–34.1)
ARTERIAL PATENCY WRIST A: YES
AST SERPL W P-5'-P-CCNC: 19 U/L (ref 15–37)
BACTERIA URNS QL MICRO: NEGATIVE /HPF
BARBITURATES UR QL SCN: NEGATIVE
BASE DEFICIT BLDA-SCNC: 19.4 MMOL/L
BASOPHILS # BLD: 0 K/UL (ref 0–0.1)
BASOPHILS NFR BLD: 0 % (ref 0–1)
BDY SITE: ABNORMAL
BENZODIAZ UR QL: NEGATIVE
BILIRUB SERPL-MCNC: 0.6 MG/DL (ref 0.2–1)
BILIRUB UR QL: NEGATIVE
BNP SERPL-MCNC: 977 PG/ML
BODY TEMPERATURE: 92.6
BUN SERPL-MCNC: 39 MG/DL (ref 6–20)
BUN SERPL-MCNC: 43 MG/DL (ref 6–20)
BUN/CREAT SERPL: 10 (ref 12–20)
BUN/CREAT SERPL: 11 (ref 12–20)
BUN/CREAT SERPL: 11 (ref 12–20)
BUN/CREAT SERPL: 9 (ref 12–20)
CA-I BLD-MCNC: 1.08 MMOL/L (ref 1.12–1.32)
CA-I BLD-MCNC: 8.3 MG/DL (ref 8.5–10.1)
CA-I BLD-MCNC: 8.4 MG/DL (ref 8.5–10.1)
CA-I BLD-MCNC: 8.6 MG/DL (ref 8.5–10.1)
CA-I BLD-MCNC: 9.4 MG/DL (ref 8.5–10.1)
CANNABINOIDS UR QL SCN: POSITIVE
CHLORIDE BLD-SCNC: 109 MMOL/L (ref 98–107)
CHLORIDE SERPL-SCNC: 106 MMOL/L (ref 97–108)
CHLORIDE SERPL-SCNC: 107 MMOL/L (ref 97–108)
CHLORIDE SERPL-SCNC: 112 MMOL/L (ref 97–108)
CHLORIDE SERPL-SCNC: 95 MMOL/L (ref 97–108)
CHLORIDE UR-SCNC: 15 MMOL/L
CO2 BLD-SCNC: <5 MMOL/L
CO2 SERPL-SCNC: 13 MMOL/L (ref 21–32)
CO2 SERPL-SCNC: 22 MMOL/L (ref 21–32)
CO2 SERPL-SCNC: 6 MMOL/L (ref 21–32)
CO2 SERPL-SCNC: <5 MMOL/L (ref 21–32)
COCAINE UR QL SCN: NEGATIVE
COHGB MFR BLD: 0.3 % (ref 1–2)
COLOR UR: ABNORMAL
CREAT SERPL-MCNC: 3.68 MG/DL (ref 0.7–1.3)
CREAT SERPL-MCNC: 3.71 MG/DL (ref 0.7–1.3)
CREAT SERPL-MCNC: 3.94 MG/DL (ref 0.7–1.3)
CREAT SERPL-MCNC: 4.58 MG/DL (ref 0.7–1.3)
CREAT UR-MCNC: 117 MG/DL
CREAT UR-MCNC: 118 MG/DL
CREAT UR-MCNC: 4.09 MG/DL (ref 0.6–1.3)
DIFFERENTIAL METHOD BLD: ABNORMAL
ECHO AO ASC DIAM: 2.9 CM
ECHO AO ASCENDING AORTA INDEX: 1.23 CM/M2
ECHO AO ROOT DIAM: 3.1 CM
ECHO AO ROOT INDEX: 1.32 CM/M2
ECHO AV AREA PEAK VELOCITY: 3 CM2
ECHO AV AREA VTI: 3.4 CM2
ECHO AV AREA/BSA PEAK VELOCITY: 1.3 CM2/M2
ECHO AV AREA/BSA VTI: 1.4 CM2/M2
ECHO AV MEAN GRADIENT: 7 MMHG
ECHO AV MEAN VELOCITY: 1.2 M/S
ECHO AV PEAK GRADIENT: 11 MMHG
ECHO AV PEAK VELOCITY: 1.7 M/S
ECHO AV VELOCITY RATIO: 0.88
ECHO AV VTI: 15.8 CM
ECHO BSA: 2.38 M2
ECHO IVC PROX: 2.2 CM
ECHO LA AREA 2C: 14.2 CM2
ECHO LA AREA 4C: 15.4 CM2
ECHO LA DIAMETER INDEX: 1.66 CM/M2
ECHO LA DIAMETER: 3.9 CM
ECHO LA MAJOR AXIS: 4.4 CM
ECHO LA MINOR AXIS: 5.1 CM
ECHO LA TO AORTIC ROOT RATIO: 1.26
ECHO LA VOL BP: 39 ML (ref 18–58)
ECHO LA VOL MOD A2C: 31 ML (ref 18–58)
ECHO LA VOL MOD A4C: 44 ML (ref 18–58)
ECHO LA VOL/BSA BIPLANE: 17 ML/M2 (ref 16–34)
ECHO LA VOLUME INDEX MOD A2C: 13 ML/M2 (ref 16–34)
ECHO LA VOLUME INDEX MOD A4C: 19 ML/M2 (ref 16–34)
ECHO LV GLOBAL LONGITUDINAL STRAIN (GLS): -10 %
ECHO LV GLOBAL LONGITUDINAL STRAIN (GLS): -5.5 %
ECHO LV GLOBAL LONGITUDINAL STRAIN (GLS): -8.2 %
ECHO LV GLOBAL LONGITUDINAL STRAIN (GLS): -9.1 %
ECHO LVOT AREA: 3.5 CM2
ECHO LVOT AV VTI INDEX: 0.97
ECHO LVOT DIAM: 2.1 CM
ECHO LVOT MEAN GRADIENT: 5 MMHG
ECHO LVOT PEAK GRADIENT: 9 MMHG
ECHO LVOT PEAK VELOCITY: 1.5 M/S
ECHO LVOT STROKE VOLUME INDEX: 22.7 ML/M2
ECHO LVOT SV: 53.3 ML
ECHO LVOT VTI: 15.4 CM
ECHO PV MAX VELOCITY: 1.3 M/S
ECHO PV MEAN GRADIENT: 4 MMHG
ECHO PV MEAN VELOCITY: 0.9 M/S
ECHO PV PEAK GRADIENT: 6 MMHG
ECHO PV VTI: 15.5 CM
ECHO RA AREA 4C: 9.4 CM2
ECHO RA END SYSTOLIC VOLUME APICAL 4 CHAMBER INDEX BSA: 8 ML/M2
ECHO RA VOLUME: 18 ML
ECHO RV BASAL DIMENSION: 2.7 CM
ECHO RV LONGITUDINAL DIMENSION: 7.5 CM
ECHO RV MID DIMENSION: 2.5 CM
EKG ATRIAL RATE: 102 BPM
EKG ATRIAL RATE: 108 BPM
EKG ATRIAL RATE: 125 BPM
EKG DIAGNOSIS: NORMAL
EKG P AXIS: 69 DEGREES
EKG P-R INTERVAL: 132 MS
EKG Q-T INTERVAL: 322 MS
EKG Q-T INTERVAL: 352 MS
EKG Q-T INTERVAL: 374 MS
EKG QRS DURATION: 128 MS
EKG QRS DURATION: 146 MS
EKG QRS DURATION: 76 MS
EKG QTC CALCULATION (BAZETT): 458 MS
EKG QTC CALCULATION (BAZETT): 503 MS
EKG QTC CALCULATION (BAZETT): 570 MS
EKG R AXIS: -39 DEGREES
EKG R AXIS: -41 DEGREES
EKG R AXIS: -68 DEGREES
EKG T AXIS: 23 DEGREES
EKG T AXIS: 36 DEGREES
EKG T AXIS: 62 DEGREES
EKG VENTRICULAR RATE: 102 BPM
EKG VENTRICULAR RATE: 140 BPM
EKG VENTRICULAR RATE: 147 BPM
EOSINOPHIL # BLD: 0 K/UL (ref 0–0.4)
EOSINOPHIL NFR BLD: 0 % (ref 0–7)
EPITH CASTS URNS QL MICRO: ABNORMAL /LPF
ERYTHROCYTE [DISTWIDTH] IN BLOOD BY AUTOMATED COUNT: 14.9 % (ref 11.5–14.5)
ERYTHROCYTE [DISTWIDTH] IN BLOOD BY AUTOMATED COUNT: 15.1 % (ref 11.5–14.5)
EST. AVERAGE GLUCOSE BLD GHB EST-MCNC: 200 MG/DL
ETHANOL SERPL-MCNC: <10 MG/DL (ref 0–0.08)
FIO2 ON VENT: 60 %
GAS FLOW.O2 SETTING OXYMISER: 24
GLOBULIN SER CALC-MCNC: 3.8 G/DL (ref 2–4)
GLUCOSE BLD STRIP.AUTO-MCNC: 258 MG/DL (ref 65–100)
GLUCOSE BLD STRIP.AUTO-MCNC: 262 MG/DL (ref 65–100)
GLUCOSE BLD STRIP.AUTO-MCNC: 286 MG/DL (ref 65–100)
GLUCOSE BLD STRIP.AUTO-MCNC: 351 MG/DL (ref 65–100)
GLUCOSE BLD STRIP.AUTO-MCNC: 412 MG/DL (ref 65–100)
GLUCOSE BLD STRIP.AUTO-MCNC: 438 MG/DL (ref 65–100)
GLUCOSE BLD STRIP.AUTO-MCNC: 463 MG/DL (ref 65–100)
GLUCOSE BLD STRIP.AUTO-MCNC: 496 MG/DL (ref 65–100)
GLUCOSE BLD STRIP.AUTO-MCNC: 551 MG/DL (ref 65–100)
GLUCOSE BLD STRIP.AUTO-MCNC: 581 MG/DL (ref 65–100)
GLUCOSE BLD STRIP.AUTO-MCNC: >600 MG/DL (ref 65–100)
GLUCOSE BLD STRIP.AUTO-MCNC: >600 MG/DL (ref 65–100)
GLUCOSE BLD STRIP.AUTO-MCNC: >700 MG/DL (ref 65–100)
GLUCOSE SERPL-MCNC: 300 MG/DL (ref 65–100)
GLUCOSE SERPL-MCNC: 533 MG/DL (ref 65–100)
GLUCOSE SERPL-MCNC: 711 MG/DL (ref 65–100)
GLUCOSE SERPL-MCNC: 893 MG/DL (ref 65–100)
GLUCOSE UR STRIP.AUTO-MCNC: >500 MG/DL
HBA1C MFR BLD: 8.6 % (ref 4–5.6)
HCO3 BLD-SCNC: 2.7 MMOL/L (ref 19–28)
HCO3 BLDA-SCNC: 9 MMOL/L (ref 22–26)
HCT VFR BLD AUTO: 38.5 % (ref 36.6–50.3)
HCT VFR BLD AUTO: 44.3 % (ref 36.6–50.3)
HGB BLD-MCNC: 11.6 G/DL (ref 12.1–17)
HGB BLD-MCNC: 13.2 G/DL (ref 12.1–17)
HGB UR QL STRIP: NEGATIVE
IMM GRANULOCYTES # BLD AUTO: 0 K/UL
IMM GRANULOCYTES NFR BLD AUTO: 0 %
INR PPP: 1.3 (ref 0.9–1.1)
KETONES UR QL STRIP.AUTO: 80 MG/DL
LACTATE BLD-SCNC: 11.63 MMOL/L (ref 0.4–2)
LACTATE SERPL-SCNC: 5.3 MMOL/L (ref 0.4–2)
LEUKOCYTE ESTERASE UR QL STRIP.AUTO: NEGATIVE
LYMPHOCYTES # BLD: 4.1 K/UL (ref 0.8–3.5)
LYMPHOCYTES NFR BLD: 13 % (ref 12–49)
Lab: ABNORMAL
MAGNESIUM SERPL-MCNC: 1.8 MG/DL (ref 1.6–2.4)
MAGNESIUM SERPL-MCNC: 2.1 MG/DL (ref 1.6–2.4)
MAGNESIUM SERPL-MCNC: 2.5 MG/DL (ref 1.6–2.4)
MCH RBC QN AUTO: 28.1 PG (ref 26–34)
MCH RBC QN AUTO: 28.5 PG (ref 26–34)
MCHC RBC AUTO-ENTMCNC: 29.8 G/DL (ref 30–36.5)
MCHC RBC AUTO-ENTMCNC: 30.1 G/DL (ref 30–36.5)
MCV RBC AUTO: 94.5 FL (ref 80–99)
MCV RBC AUTO: 94.6 FL (ref 80–99)
METHADONE UR QL: NEGATIVE
METHGB MFR BLD: 0.4 % (ref 0–1.4)
MONOCYTES # BLD: 1.9 K/UL (ref 0–1)
MONOCYTES NFR BLD: 6 % (ref 5–13)
MRSA DNA SPEC QL NAA+PROBE: NOT DETECTED
MUCOUS THREADS URNS QL MICRO: ABNORMAL /LPF
MYELOCYTES NFR BLD MANUAL: 1 %
NEUTS BAND NFR BLD MANUAL: 11 % (ref 0–6)
NEUTS SEG # BLD: 25 K/UL (ref 1.8–8)
NEUTS SEG NFR BLD: 69 % (ref 32–75)
NITRITE UR QL STRIP.AUTO: NEGATIVE
NRBC # BLD: 0 K/UL (ref 0–0.01)
NRBC # BLD: 0 K/UL (ref 0–0.01)
NRBC BLD-RTO: 0 PER 100 WBC
NRBC BLD-RTO: 0 PER 100 WBC
OPIATES UR QL: NEGATIVE
OXYHGB MFR BLD: 98.2 % (ref 95–99)
PCO2 BLD: 17.9 MMHG (ref 35–45)
PCO2 BLDA: 25 MMHG (ref 35–45)
PCP UR QL: NEGATIVE
PEEP RESPIRATORY: 3
PERFORMED BY:: ABNORMAL
PH BLD: 6.79 (ref 7.35–7.45)
PH BLDA: 7.15 (ref 7.35–7.45)
PH UR STRIP: 5 (ref 5–8)
PHOSPHATE SERPL-MCNC: 1 MG/DL (ref 2.6–4.7)
PHOSPHATE SERPL-MCNC: 4.8 MG/DL (ref 2.6–4.7)
PHOSPHATE SERPL-MCNC: 9.8 MG/DL (ref 2.6–4.7)
PLATELET # BLD AUTO: 321 K/UL (ref 150–400)
PLATELET # BLD AUTO: 410 K/UL (ref 150–400)
PMV BLD AUTO: 10.9 FL (ref 8.9–12.9)
PMV BLD AUTO: 11.1 FL (ref 8.9–12.9)
PO2 BLD: 76 MMHG (ref 75–100)
PO2 BLDA: 205 MMHG (ref 80–100)
POTASSIUM BLD-SCNC: 7.2 MMOL/L (ref 3.5–5.5)
POTASSIUM SERPL-SCNC: 3.8 MMOL/L (ref 3.5–5.1)
POTASSIUM SERPL-SCNC: 4.6 MMOL/L (ref 3.5–5.1)
POTASSIUM SERPL-SCNC: 5.5 MMOL/L (ref 3.5–5.1)
POTASSIUM SERPL-SCNC: 7.3 MMOL/L (ref 3.5–5.1)
POTASSIUM UR-SCNC: 31 MMOL/L
PROCALCITONIN SERPL-MCNC: 1.6 NG/ML
PROT SERPL-MCNC: 7 G/DL (ref 6.4–8.2)
PROT UR STRIP-MCNC: NEGATIVE MG/DL
PROT UR-MCNC: 36 MG/DL (ref 0–11.9)
PROT UR-MCNC: 38 MG/DL (ref 0–11.9)
PROT/CREAT UR-RTO: 0.3
PROTHROMBIN TIME: 16.5 SEC (ref 11.9–14.6)
RBC # BLD AUTO: 4.07 M/UL (ref 4.1–5.7)
RBC # BLD AUTO: 4.69 M/UL (ref 4.1–5.7)
RBC #/AREA URNS HPF: ABNORMAL /HPF (ref 0–5)
RBC MORPH BLD: ABNORMAL
SAO2 % BLD: 77 %
SAO2 % BLD: 99 % (ref 95–99)
SAO2% DEVICE SAO2% SENSOR NAME: ABNORMAL
SERVICE CMNT-IMP: ABNORMAL
SERVICE CMNT-IMP: ABNORMAL
SODIUM BLD-SCNC: 132 MMOL/L (ref 136–145)
SODIUM SERPL-SCNC: 133 MMOL/L (ref 136–145)
SODIUM SERPL-SCNC: 140 MMOL/L (ref 136–145)
SODIUM SERPL-SCNC: 143 MMOL/L (ref 136–145)
SODIUM SERPL-SCNC: 147 MMOL/L (ref 136–145)
SODIUM UR-SCNC: 38 MMOL/L
SP GR UR REFRACTOMETRY: 1.01 (ref 1–1.03)
SPECIMEN SITE: ABNORMAL
SPECIMEN SITE: ABNORMAL
THERAPEUTIC RANGE: NORMAL SEC (ref 82–109)
TROPONIN I SERPL HS-MCNC: 1469 NG/L (ref 0–76)
TROPONIN I SERPL HS-MCNC: 5930 NG/L (ref 0–76)
TROPONIN I SERPL HS-MCNC: ABNORMAL NG/L (ref 0–76)
UFH PPP CHRO-ACNC: 0.42 IU/ML
UFH PPP CHRO-ACNC: <0.1 IU/ML
URINE CULTURE IF INDICATED: ABNORMAL
UROBILINOGEN UR QL STRIP.AUTO: 0.1 EU/DL (ref 0.1–1)
VENTILATION MODE VENT: ABNORMAL
VT SETTING VENT: 500
WBC # BLD AUTO: 28.8 K/UL (ref 4.1–11.1)
WBC # BLD AUTO: 31.2 K/UL (ref 4.1–11.1)
WBC URNS QL MICRO: ABNORMAL /HPF (ref 0–4)

## 2024-03-20 PROCEDURE — 96375 TX/PRO/DX INJ NEW DRUG ADDON: CPT

## 2024-03-20 PROCEDURE — 6360000002 HC RX W HCPCS: Performed by: INTERNAL MEDICINE

## 2024-03-20 PROCEDURE — 82077 ASSAY SPEC XCP UR&BREATH IA: CPT

## 2024-03-20 PROCEDURE — 84133 ASSAY OF URINE POTASSIUM: CPT

## 2024-03-20 PROCEDURE — 2500000003 HC RX 250 WO HCPCS: Performed by: INTERNAL MEDICINE

## 2024-03-20 PROCEDURE — 96374 THER/PROPH/DIAG INJ IV PUSH: CPT

## 2024-03-20 PROCEDURE — 85610 PROTHROMBIN TIME: CPT

## 2024-03-20 PROCEDURE — 82330 ASSAY OF CALCIUM: CPT

## 2024-03-20 PROCEDURE — 85025 COMPLETE CBC W/AUTO DIFF WBC: CPT

## 2024-03-20 PROCEDURE — 36415 COLL VENOUS BLD VENIPUNCTURE: CPT

## 2024-03-20 PROCEDURE — 70450 CT HEAD/BRAIN W/O DYE: CPT

## 2024-03-20 PROCEDURE — 93005 ELECTROCARDIOGRAM TRACING: CPT | Performed by: INTERNAL MEDICINE

## 2024-03-20 PROCEDURE — 80053 COMPREHEN METABOLIC PANEL: CPT

## 2024-03-20 PROCEDURE — 83735 ASSAY OF MAGNESIUM: CPT

## 2024-03-20 PROCEDURE — 94002 VENT MGMT INPAT INIT DAY: CPT

## 2024-03-20 PROCEDURE — 80048 BASIC METABOLIC PNL TOTAL CA: CPT

## 2024-03-20 PROCEDURE — 71045 X-RAY EXAM CHEST 1 VIEW: CPT

## 2024-03-20 PROCEDURE — C1894 INTRO/SHEATH, NON-LASER: HCPCS

## 2024-03-20 PROCEDURE — 87086 URINE CULTURE/COLONY COUNT: CPT

## 2024-03-20 PROCEDURE — 83605 ASSAY OF LACTIC ACID: CPT

## 2024-03-20 PROCEDURE — 2580000003 HC RX 258: Performed by: INTERNAL MEDICINE

## 2024-03-20 PROCEDURE — 84300 ASSAY OF URINE SODIUM: CPT

## 2024-03-20 PROCEDURE — 6370000000 HC RX 637 (ALT 250 FOR IP): Performed by: STUDENT IN AN ORGANIZED HEALTH CARE EDUCATION/TRAINING PROGRAM

## 2024-03-20 PROCEDURE — 2580000003 HC RX 258: Performed by: STUDENT IN AN ORGANIZED HEALTH CARE EDUCATION/TRAINING PROGRAM

## 2024-03-20 PROCEDURE — 80307 DRUG TEST PRSMV CHEM ANLYZR: CPT

## 2024-03-20 PROCEDURE — 87641 MR-STAPH DNA AMP PROBE: CPT

## 2024-03-20 PROCEDURE — 2000000000 HC ICU R&B

## 2024-03-20 PROCEDURE — 84156 ASSAY OF PROTEIN URINE: CPT

## 2024-03-20 PROCEDURE — 6360000002 HC RX W HCPCS

## 2024-03-20 PROCEDURE — 93005 ELECTROCARDIOGRAM TRACING: CPT | Performed by: STUDENT IN AN ORGANIZED HEALTH CARE EDUCATION/TRAINING PROGRAM

## 2024-03-20 PROCEDURE — 82803 BLOOD GASES ANY COMBINATION: CPT

## 2024-03-20 PROCEDURE — 85730 THROMBOPLASTIN TIME PARTIAL: CPT

## 2024-03-20 PROCEDURE — 84295 ASSAY OF SERUM SODIUM: CPT

## 2024-03-20 PROCEDURE — 85520 HEPARIN ASSAY: CPT

## 2024-03-20 PROCEDURE — 82962 GLUCOSE BLOOD TEST: CPT

## 2024-03-20 PROCEDURE — 36600 WITHDRAWAL OF ARTERIAL BLOOD: CPT

## 2024-03-20 PROCEDURE — 87040 BLOOD CULTURE FOR BACTERIA: CPT

## 2024-03-20 PROCEDURE — 82570 ASSAY OF URINE CREATININE: CPT

## 2024-03-20 PROCEDURE — 51702 INSERT TEMP BLADDER CATH: CPT

## 2024-03-20 PROCEDURE — 83036 HEMOGLOBIN GLYCOSYLATED A1C: CPT

## 2024-03-20 PROCEDURE — 82947 ASSAY GLUCOSE BLOOD QUANT: CPT

## 2024-03-20 PROCEDURE — 2500000003 HC RX 250 WO HCPCS: Performed by: STUDENT IN AN ORGANIZED HEALTH CARE EDUCATION/TRAINING PROGRAM

## 2024-03-20 PROCEDURE — 84484 ASSAY OF TROPONIN QUANT: CPT

## 2024-03-20 PROCEDURE — 84100 ASSAY OF PHOSPHORUS: CPT

## 2024-03-20 PROCEDURE — 6360000002 HC RX W HCPCS: Performed by: STUDENT IN AN ORGANIZED HEALTH CARE EDUCATION/TRAINING PROGRAM

## 2024-03-20 PROCEDURE — 84145 PROCALCITONIN (PCT): CPT

## 2024-03-20 PROCEDURE — 87205 SMEAR GRAM STAIN: CPT

## 2024-03-20 PROCEDURE — 85027 COMPLETE CBC AUTOMATED: CPT

## 2024-03-20 PROCEDURE — 82436 ASSAY OF URINE CHLORIDE: CPT

## 2024-03-20 PROCEDURE — 93005 ELECTROCARDIOGRAM TRACING: CPT | Performed by: NURSE PRACTITIONER

## 2024-03-20 PROCEDURE — 5A1955Z RESPIRATORY VENTILATION, GREATER THAN 96 CONSECUTIVE HOURS: ICD-10-PCS | Performed by: INTERNAL MEDICINE

## 2024-03-20 PROCEDURE — 87070 CULTURE OTHR SPECIMN AEROBIC: CPT

## 2024-03-20 PROCEDURE — 99285 EMERGENCY DEPT VISIT HI MDM: CPT

## 2024-03-20 PROCEDURE — 6370000000 HC RX 637 (ALT 250 FOR IP): Performed by: INTERNAL MEDICINE

## 2024-03-20 PROCEDURE — 84132 ASSAY OF SERUM POTASSIUM: CPT

## 2024-03-20 PROCEDURE — 81001 URINALYSIS AUTO W/SCOPE: CPT

## 2024-03-20 PROCEDURE — 0BH17EZ INSERTION OF ENDOTRACHEAL AIRWAY INTO TRACHEA, VIA NATURAL OR ARTIFICIAL OPENING: ICD-10-PCS | Performed by: INTERNAL MEDICINE

## 2024-03-20 PROCEDURE — 83880 ASSAY OF NATRIURETIC PEPTIDE: CPT

## 2024-03-20 PROCEDURE — 93308 TTE F-UP OR LMTD: CPT

## 2024-03-20 RX ORDER — ONDANSETRON 2 MG/ML
4 INJECTION INTRAMUSCULAR; INTRAVENOUS EVERY 6 HOURS PRN
Status: DISCONTINUED | OUTPATIENT
Start: 2024-03-20 | End: 2024-03-27 | Stop reason: HOSPADM

## 2024-03-20 RX ORDER — MAGNESIUM SULFATE 1 G/100ML
1000 INJECTION INTRAVENOUS ONCE
Status: COMPLETED | OUTPATIENT
Start: 2024-03-20 | End: 2024-03-21

## 2024-03-20 RX ORDER — PHENYLEPHRINE HCL IN 0.9% NACL 50MG/250ML
10-300 PLASTIC BAG, INJECTION (ML) INTRAVENOUS CONTINUOUS
Status: DISCONTINUED | OUTPATIENT
Start: 2024-03-20 | End: 2024-03-26

## 2024-03-20 RX ORDER — 0.9 % SODIUM CHLORIDE 0.9 %
3000 INTRAVENOUS SOLUTION INTRAVENOUS
Status: COMPLETED | OUTPATIENT
Start: 2024-03-20 | End: 2024-03-20

## 2024-03-20 RX ORDER — CALCIUM CHLORIDE 100 MG/ML
INJECTION INTRAVENOUS; INTRAVENTRICULAR DAILY PRN
Status: COMPLETED | OUTPATIENT
Start: 2024-03-20 | End: 2024-03-20

## 2024-03-20 RX ORDER — SODIUM CHLORIDE, SODIUM LACTATE, POTASSIUM CHLORIDE, AND CALCIUM CHLORIDE .6; .31; .03; .02 G/100ML; G/100ML; G/100ML; G/100ML
3000 INJECTION, SOLUTION INTRAVENOUS ONCE
Status: COMPLETED | OUTPATIENT
Start: 2024-03-20 | End: 2024-03-20

## 2024-03-20 RX ORDER — POTASSIUM CHLORIDE 7.45 MG/ML
10 INJECTION INTRAVENOUS PRN
Status: DISCONTINUED | OUTPATIENT
Start: 2024-03-20 | End: 2024-03-27 | Stop reason: HOSPADM

## 2024-03-20 RX ORDER — SODIUM CHLORIDE 0.9 % (FLUSH) 0.9 %
5-40 SYRINGE (ML) INJECTION PRN
Status: DISCONTINUED | OUTPATIENT
Start: 2024-03-20 | End: 2024-03-27 | Stop reason: HOSPADM

## 2024-03-20 RX ORDER — SODIUM CHLORIDE 9 MG/ML
INJECTION, SOLUTION INTRAVENOUS ONCE
Status: COMPLETED | OUTPATIENT
Start: 2024-03-20 | End: 2024-03-20

## 2024-03-20 RX ORDER — FENTANYL CITRATE-0.9 % NACL/PF 10 MCG/ML
PLASTIC BAG, INJECTION (ML) INTRAVENOUS
Status: COMPLETED
Start: 2024-03-20 | End: 2024-03-20

## 2024-03-20 RX ORDER — SODIUM CHLORIDE 450 MG/100ML
INJECTION, SOLUTION INTRAVENOUS CONTINUOUS
Status: DISCONTINUED | OUTPATIENT
Start: 2024-03-20 | End: 2024-03-21

## 2024-03-20 RX ORDER — CLOPIDOGREL 300 MG/1
600 TABLET, FILM COATED ORAL ONCE
Status: COMPLETED | OUTPATIENT
Start: 2024-03-20 | End: 2024-03-20

## 2024-03-20 RX ORDER — EPINEPHRINE IN SOD CHLOR,ISO 1 MG/10 ML
SYRINGE (ML) INTRAVENOUS DAILY PRN
Status: COMPLETED | OUTPATIENT
Start: 2024-03-20 | End: 2024-03-20

## 2024-03-20 RX ORDER — SODIUM CHLORIDE 9 MG/ML
INJECTION, SOLUTION INTRAVENOUS PRN
Status: DISCONTINUED | OUTPATIENT
Start: 2024-03-20 | End: 2024-03-27 | Stop reason: HOSPADM

## 2024-03-20 RX ORDER — ROCURONIUM BROMIDE 10 MG/ML
INJECTION, SOLUTION INTRAVENOUS DAILY PRN
Status: COMPLETED | OUTPATIENT
Start: 2024-03-20 | End: 2024-03-20

## 2024-03-20 RX ORDER — MAGNESIUM SULFATE IN WATER 40 MG/ML
2000 INJECTION, SOLUTION INTRAVENOUS PRN
Status: DISCONTINUED | OUTPATIENT
Start: 2024-03-20 | End: 2024-03-27 | Stop reason: HOSPADM

## 2024-03-20 RX ORDER — FENTANYL CITRATE-0.9 % NACL/PF 10 MCG/ML
25-200 PLASTIC BAG, INJECTION (ML) INTRAVENOUS CONTINUOUS
Status: DISCONTINUED | OUTPATIENT
Start: 2024-03-20 | End: 2024-03-20

## 2024-03-20 RX ORDER — HEPARIN SODIUM 1000 [USP'U]/ML
2000 INJECTION, SOLUTION INTRAVENOUS; SUBCUTANEOUS PRN
Status: DISCONTINUED | OUTPATIENT
Start: 2024-03-20 | End: 2024-03-27 | Stop reason: HOSPADM

## 2024-03-20 RX ORDER — ONDANSETRON 2 MG/ML
4 INJECTION INTRAMUSCULAR; INTRAVENOUS ONCE
Status: COMPLETED | OUTPATIENT
Start: 2024-03-20 | End: 2024-03-20

## 2024-03-20 RX ORDER — FENTANYL CITRATE-0.9 % NACL/PF 10 MCG/ML
25-200 PLASTIC BAG, INJECTION (ML) INTRAVENOUS CONTINUOUS
Status: DISCONTINUED | OUTPATIENT
Start: 2024-03-20 | End: 2024-03-26

## 2024-03-20 RX ORDER — ETOMIDATE 2 MG/ML
INJECTION INTRAVENOUS DAILY PRN
Status: COMPLETED | OUTPATIENT
Start: 2024-03-20 | End: 2024-03-20

## 2024-03-20 RX ORDER — MIDAZOLAM IN NACL,ISO-OSMOT/PF 50 MG/50ML
0-10 INFUSION BOTTLE (ML) INTRAVENOUS
Status: DISCONTINUED | OUTPATIENT
Start: 2024-03-20 | End: 2024-03-26

## 2024-03-20 RX ORDER — HEPARIN SODIUM 1000 [USP'U]/ML
4000 INJECTION, SOLUTION INTRAVENOUS; SUBCUTANEOUS PRN
Status: DISCONTINUED | OUTPATIENT
Start: 2024-03-20 | End: 2024-03-27 | Stop reason: HOSPADM

## 2024-03-20 RX ORDER — CLOPIDOGREL BISULFATE 75 MG/1
75 TABLET ORAL DAILY
Status: DISCONTINUED | OUTPATIENT
Start: 2024-03-21 | End: 2024-03-27 | Stop reason: HOSPADM

## 2024-03-20 RX ORDER — CALCIUM GLUCONATE 94 MG/ML
1000 INJECTION, SOLUTION INTRAVENOUS ONCE
Status: COMPLETED | OUTPATIENT
Start: 2024-03-20 | End: 2024-03-20

## 2024-03-20 RX ORDER — ONDANSETRON 4 MG/1
4 TABLET, ORALLY DISINTEGRATING ORAL EVERY 8 HOURS PRN
Status: DISCONTINUED | OUTPATIENT
Start: 2024-03-20 | End: 2024-03-27 | Stop reason: HOSPADM

## 2024-03-20 RX ORDER — PROPOFOL 10 MG/ML
5-50 INJECTION, EMULSION INTRAVENOUS CONTINUOUS
Status: DISCONTINUED | OUTPATIENT
Start: 2024-03-20 | End: 2024-03-26

## 2024-03-20 RX ORDER — HEPARIN SODIUM 1000 [USP'U]/ML
30 INJECTION, SOLUTION INTRAVENOUS; SUBCUTANEOUS PRN
Status: DISCONTINUED | OUTPATIENT
Start: 2024-03-20 | End: 2024-03-20

## 2024-03-20 RX ORDER — ACETAMINOPHEN 650 MG/1
650 SUPPOSITORY RECTAL EVERY 6 HOURS PRN
Status: DISCONTINUED | OUTPATIENT
Start: 2024-03-20 | End: 2024-03-27 | Stop reason: HOSPADM

## 2024-03-20 RX ORDER — SODIUM CHLORIDE 0.9 % (FLUSH) 0.9 %
5-40 SYRINGE (ML) INJECTION EVERY 12 HOURS SCHEDULED
Status: DISCONTINUED | OUTPATIENT
Start: 2024-03-20 | End: 2024-03-27 | Stop reason: HOSPADM

## 2024-03-20 RX ORDER — HEPARIN SODIUM 1000 [USP'U]/ML
60 INJECTION, SOLUTION INTRAVENOUS; SUBCUTANEOUS PRN
Status: DISCONTINUED | OUTPATIENT
Start: 2024-03-20 | End: 2024-03-20

## 2024-03-20 RX ORDER — ACETAMINOPHEN 325 MG/1
650 TABLET ORAL EVERY 6 HOURS PRN
Status: DISCONTINUED | OUTPATIENT
Start: 2024-03-20 | End: 2024-03-27 | Stop reason: HOSPADM

## 2024-03-20 RX ORDER — POLYETHYLENE GLYCOL 3350 17 G/17G
17 POWDER, FOR SOLUTION ORAL DAILY PRN
Status: DISCONTINUED | OUTPATIENT
Start: 2024-03-20 | End: 2024-03-27 | Stop reason: HOSPADM

## 2024-03-20 RX ORDER — HEPARIN SODIUM 10000 [USP'U]/100ML
5-30 INJECTION, SOLUTION INTRAVENOUS CONTINUOUS
Status: DISCONTINUED | OUTPATIENT
Start: 2024-03-20 | End: 2024-03-27 | Stop reason: HOSPADM

## 2024-03-20 RX ORDER — HEPARIN SODIUM 1000 [USP'U]/ML
4000 INJECTION, SOLUTION INTRAVENOUS; SUBCUTANEOUS ONCE
Status: COMPLETED | OUTPATIENT
Start: 2024-03-20 | End: 2024-03-20

## 2024-03-20 RX ORDER — NOREPINEPHRINE BITARTRATE 0.06 MG/ML
1-100 INJECTION, SOLUTION INTRAVENOUS CONTINUOUS
Status: DISCONTINUED | OUTPATIENT
Start: 2024-03-20 | End: 2024-03-26

## 2024-03-20 RX ORDER — DEXTROSE AND SODIUM CHLORIDE 5; .45 G/100ML; G/100ML
INJECTION, SOLUTION INTRAVENOUS CONTINUOUS PRN
Status: DISCONTINUED | OUTPATIENT
Start: 2024-03-20 | End: 2024-03-27 | Stop reason: HOSPADM

## 2024-03-20 RX ORDER — ENOXAPARIN SODIUM 100 MG/ML
30 INJECTION SUBCUTANEOUS DAILY
Status: DISCONTINUED | OUTPATIENT
Start: 2024-03-20 | End: 2024-03-20

## 2024-03-20 RX ADMIN — SODIUM BICARBONATE: 84 INJECTION, SOLUTION INTRAVENOUS at 11:00

## 2024-03-20 RX ADMIN — INSULIN HUMAN 16.2 UNITS/HR: 1 INJECTION, SOLUTION INTRAVENOUS at 11:16

## 2024-03-20 RX ADMIN — FENTANYL CITRATE 100 MCG/HR: at 21:33

## 2024-03-20 RX ADMIN — EPINEPHRINE 1 MG: 0.1 INJECTION INTRACARDIAC; INTRAVENOUS at 10:10

## 2024-03-20 RX ADMIN — SODIUM BICARBONATE 50 MEQ: 84 INJECTION, SOLUTION INTRAVENOUS at 09:50

## 2024-03-20 RX ADMIN — AMIODARONE HYDROCHLORIDE 1 MG/MIN: 1.8 INJECTION, SOLUTION INTRAVENOUS at 17:11

## 2024-03-20 RX ADMIN — SODIUM BICARBONATE 50 MEQ: 84 INJECTION, SOLUTION INTRAVENOUS at 14:33

## 2024-03-20 RX ADMIN — Medication 5 MCG/MIN: at 11:29

## 2024-03-20 RX ADMIN — INSULIN HUMAN 24.9 UNITS/HR: 1 INJECTION, SOLUTION INTRAVENOUS at 21:16

## 2024-03-20 RX ADMIN — POTASSIUM PHOSPHATE, MONOBASIC POTASSIUM PHOSPHATE, DIBASIC 30 MMOL: 224; 236 INJECTION, SOLUTION, CONCENTRATE INTRAVENOUS at 23:41

## 2024-03-20 RX ADMIN — FENTANYL CITRATE 50 MCG/HR: at 10:30

## 2024-03-20 RX ADMIN — PIPERACILLIN AND TAZOBACTAM 3375 MG: 3; .375 INJECTION, POWDER, LYOPHILIZED, FOR SOLUTION INTRAVENOUS at 15:27

## 2024-03-20 RX ADMIN — HEPARIN SODIUM 9 UNITS/KG/HR: 10000 INJECTION, SOLUTION INTRAVENOUS at 14:57

## 2024-03-20 RX ADMIN — PIPERACILLIN AND TAZOBACTAM 3375 MG: 3; .375 INJECTION, POWDER, LYOPHILIZED, FOR SOLUTION INTRAVENOUS at 21:51

## 2024-03-20 RX ADMIN — INSULIN HUMAN 31.68 UNITS/HR: 1 INJECTION, SOLUTION INTRAVENOUS at 18:10

## 2024-03-20 RX ADMIN — HEPARIN SODIUM 4000 UNITS: 1000 INJECTION INTRAVENOUS; SUBCUTANEOUS at 15:21

## 2024-03-20 RX ADMIN — SODIUM BICARBONATE 100 MEQ: 84 INJECTION, SOLUTION INTRAVENOUS at 19:42

## 2024-03-20 RX ADMIN — CLOPIDOGREL BISULFATE 600 MG: 300 TABLET, FILM COATED ORAL at 20:09

## 2024-03-20 RX ADMIN — SODIUM CHLORIDE 3000 ML: 9 INJECTION, SOLUTION INTRAVENOUS at 10:00

## 2024-03-20 RX ADMIN — AMIODARONE HYDROCHLORIDE 150 MG: 1.5 INJECTION, SOLUTION INTRAVENOUS at 15:07

## 2024-03-20 RX ADMIN — CALCIUM CHLORIDE 1000 MG: 100 INJECTION, SOLUTION INTRAVENOUS at 10:11

## 2024-03-20 RX ADMIN — Medication 2 MG/HR: at 19:43

## 2024-03-20 RX ADMIN — ROCURONIUM BROMIDE 100 MG: 10 INJECTION, SOLUTION INTRAVENOUS at 10:13

## 2024-03-20 RX ADMIN — SODIUM CHLORIDE: 9 INJECTION, SOLUTION INTRAVENOUS at 10:51

## 2024-03-20 RX ADMIN — SODIUM CHLORIDE, POTASSIUM CHLORIDE, SODIUM LACTATE AND CALCIUM CHLORIDE 3000 ML: 600; 310; 30; 20 INJECTION, SOLUTION INTRAVENOUS at 09:42

## 2024-03-20 RX ADMIN — CALCIUM GLUCONATE 1000 MG: 98 INJECTION, SOLUTION INTRAVENOUS at 16:20

## 2024-03-20 RX ADMIN — SODIUM CHLORIDE, PRESERVATIVE FREE 10 ML: 5 INJECTION INTRAVENOUS at 20:09

## 2024-03-20 RX ADMIN — SODIUM BICARBONATE: 84 INJECTION, SOLUTION INTRAVENOUS at 14:49

## 2024-03-20 RX ADMIN — INSULIN HUMAN 26.16 UNITS/HR: 1 INJECTION, SOLUTION INTRAVENOUS at 14:53

## 2024-03-20 RX ADMIN — ETOMIDATE INJECTION 20 MG: 2 SOLUTION INTRAVENOUS at 10:13

## 2024-03-20 RX ADMIN — INSULIN HUMAN 10.82 UNITS/HR: 1 INJECTION, SOLUTION INTRAVENOUS at 10:28

## 2024-03-20 RX ADMIN — MAGNESIUM SULFATE HEPTAHYDRATE 1000 MG: 1 INJECTION, SOLUTION INTRAVENOUS at 23:14

## 2024-03-20 RX ADMIN — SODIUM BICARBONATE: 84 INJECTION, SOLUTION INTRAVENOUS at 14:46

## 2024-03-20 RX ADMIN — ONDANSETRON 4 MG: 2 INJECTION INTRAMUSCULAR; INTRAVENOUS at 09:50

## 2024-03-20 RX ADMIN — PROPOFOL 20 MCG/KG/MIN: 10 INJECTION, EMULSION INTRAVENOUS at 23:05

## 2024-03-20 RX ADMIN — AMIODARONE HYDROCHLORIDE 0.5 MG/MIN: 1.8 INJECTION, SOLUTION INTRAVENOUS at 22:41

## 2024-03-20 RX ADMIN — INSULIN HUMAN 10 UNITS: 100 INJECTION, SOLUTION PARENTERAL at 09:48

## 2024-03-20 RX ADMIN — EPINEPHRINE 1 MG: 0.1 INJECTION INTRACARDIAC; INTRAVENOUS at 10:07

## 2024-03-20 ASSESSMENT — PULMONARY FUNCTION TESTS
PIF_VALUE: 15
PIF_VALUE: 20
PIF_VALUE: 15
PIF_VALUE: 12
PIF_VALUE: 16
PIF_VALUE: 15
PIF_VALUE: 16
PIF_VALUE: 15
PIF_VALUE: 15
PIF_VALUE: 19
PIF_VALUE: 15
PIF_VALUE: 15
PIF_VALUE: 16
PIF_VALUE: 15
PIF_VALUE: 16
PIF_VALUE: 15
PIF_VALUE: 17
PIF_VALUE: 15
PIF_VALUE: 15
PIF_VALUE: 16
PIF_VALUE: 22
PIF_VALUE: 15
PIF_VALUE: 20
PIF_VALUE: 15
PIF_VALUE: 16
PIF_VALUE: 18
PIF_VALUE: 15
PIF_VALUE: 15
PIF_VALUE: 16
PIF_VALUE: 15
PIF_VALUE: 20
PIF_VALUE: 14
PIF_VALUE: 14
PIF_VALUE: 18
PIF_VALUE: 14
PIF_VALUE: 15
PIF_VALUE: 15
PIF_VALUE: 20
PIF_VALUE: 16
PIF_VALUE: 16
PIF_VALUE: 24
PIF_VALUE: 15
PIF_VALUE: 17
PIF_VALUE: 14
PIF_VALUE: 15
PIF_VALUE: 18
PIF_VALUE: 15
PIF_VALUE: 14
PIF_VALUE: 17
PIF_VALUE: 16
PIF_VALUE: 15
PIF_VALUE: 15
PIF_VALUE: 14
PIF_VALUE: 15
PIF_VALUE: 21
PIF_VALUE: 15
PIF_VALUE: 14
PIF_VALUE: 15
PIF_VALUE: 20
PIF_VALUE: 13
PIF_VALUE: 15
PIF_VALUE: 15
PIF_VALUE: 17
PIF_VALUE: 15
PIF_VALUE: 10
PIF_VALUE: 22
PIF_VALUE: 27
PIF_VALUE: 14
PIF_VALUE: 14
PIF_VALUE: 17
PIF_VALUE: 16

## 2024-03-20 ASSESSMENT — PAIN - FUNCTIONAL ASSESSMENT: PAIN_FUNCTIONAL_ASSESSMENT: NONE - DENIES PAIN

## 2024-03-20 ASSESSMENT — PAIN SCALES - GENERAL: PAINLEVEL_OUTOF10: 0

## 2024-03-20 NOTE — ED NOTES
TRANSFER - OUT REPORT:    Verbal report given to TUAN Guevara on Al Aguirre  being transferred to  for urgent transfer       Report consisted of patient's Situation, Background, Assessment and   Recommendations(SBAR).     Information from the following report(s) Nurse Handoff Report, ED Encounter Summary, ED SBAR, MAR, and Recent Results was reviewed with the receiving nurse.    [place] Fall Assessment:                           Lines:   Peripheral IV 03/20/24 Left External Jugular (Active)       Peripheral IV 03/20/24 Distal;Left Forearm (Active)        Opportunity for questions and clarification was provided.      Patient transported with:  Registered Nurse, CARDIAC MONITOR, VENT, RT

## 2024-03-20 NOTE — ED TRIAGE NOTES
Pt went to bed last night at same time at 6 year old daughter, found this morning by her on knees in floor altered. Daughter called grandmother who called EMS. EMS found pt altered with bgl > 600.  Pt has been treated for the flu and was seen yesterday by home health. Hx of DM.    Pt usually on insulin pump which appears to have been removed.

## 2024-03-20 NOTE — H&P
History and Physical      Name:  Al Aguirre /Age/Sex: 1970  (53 y.o. male)   MRN & CSN:  [id number] & [id number] Encounter Date/Time: 3/20/24 / 12:27 PM EDT   Location:  [facility] [place] PCP: Radha Flores       [facility] Day: 1    Assessment and Plan:   Al Aguirre is a 53 y.o. male with a pmh as below who presents with Type 1 diabetic ketoacidosis with coma (HCC)    Hospital Problems             Last Modified POA    * (Principal) Type 1 diabetic ketoacidosis with coma (HCC) 3/20/2024 Yes    DKA, type 2, not at goal (HCC) 3/20/2024 Yes     Plan:  Cardiac arrest: Secondary to severe diabetic ketoacidosis.  Status post ROSC.  No evidence of acute bleeding on noncontrast head CT.  No known history of cardiac disease.  No significant ST changes appreciated on the EKG.  Patient's troponin elevated into 1469.  IR to place a central line  Continue Levophed for MAP of greater than 60  Continue bicarbonate drip.  Switch bicarbonate drip.     2.  DKA: Continue insulin drip.  Continue bicarbonate drip.    Appreciate nephrology and pulmonary recommendation   Repeat basic and potassium every 4 hours  Patient's potassium was 7.3 -1 g of calcium gluconate ordered stat.  Repeat potassium pending    3.  Non-ST elevation myocardial infarction: After cardiac arrest.   Start cardiology consult for possible left heart catheterization   Start heparin drip     4.  Acute kidney injury   Place a enriquez catheter    Continue to monitor     5. Sepsis:    Follow up with cultures    Continue pip-tazo    Full code   The family updated     Disposition:   Current Living situation:   Expected Disposition: To be determined  Estimated D/C: To be determined    Diet Diet NPO   DVT Prophylaxis IV heparin   Code Status Full Code     History from:     The family    History of Present Illness:     Chief Complaint:   Al Aguirre is a 53 y.o. male with pmh type 1 diabetes who presented to the ED for scoring after found altered by daughter.

## 2024-03-20 NOTE — CARE COORDINATION
03/20/24 1547   Service Assessment   Patient Orientation Other (see comment)  (Vent)   Cognition Other (see comment)  (Vent)   History Provided By Child/Family   Primary Caregiver Self   Support Systems Parent;Family Members;Friends/Neighbors   Patient's Healthcare Decision Maker is: Legal Next of Kin   PCP Verified by CM Yes   Last Visit to PCP Within last 3 months   Prior Functional Level Independent in ADLs/IADLs   Current Functional Level Independent in ADLs/IADLs   Can patient return to prior living arrangement Unknown at present   Ability to make needs known: Unable   Family able to assist with home care needs: Yes   Would you like for me to discuss the discharge plan with any other family members/significant others, and if so, who? Yes   Financial Resources None   Community Resources None   Social/Functional History   Lives With Daughter   ADL Assistance Independent   Ambulation Assistance Independent   Transfer Assistance Independent   Active  Yes   Discharge Planning   Potential Assistance Purchasing Medications No   Services At/After Discharge   Mode of Transport at Discharge Other (see comment)   Confirm Follow Up Transport Family     CM reviewed Pt medical, Pt is on the vent. Pt mother confirmed that the information on the face sheet is correct. Pt mother stated that Pt lives with his 7 year old daughter.    No HH, has a cane and walker and independent with ADL    Send RX to [facility]    Family will transport Pt home when D/C.     CM dispo: TBD    CM discussed with Pt mother about Pt diabetic medication. Pt mother stated that Pt gets his insulin through a pump. Pt mother stated that Pt is always running out of G6 Sensors and has to wait a week or two before he can get more. Pt mother stated that when Pt is out of his sensors he has to prick his fingers and he does not always like doing that.     Advance Care Planning     General Advance Care Planning (ACP) Conversation    Date of

## 2024-03-20 NOTE — CONSULTS
CARDIOLOGY CONSULTATION    REASON FOR CONSULT: Cardiac arrest, Elevated troponin    REQUESTING PROVIDER: Dr. Walker     CHIEF COMPLAINT:  AMS, DKA    HISTORY OF PRESENT ILLNESS:  Al Aguirre is a 53 y.o. year-old male with past medical history significant for type 1 diabetes, CKD, neuropathy, sleep apnea, and hypertension,  who was evaluated today due to elevated troponin s/p cardiac arrest. Patient was apparently found on the floor this morning by his daughter altered, semiconscious. Apparently has had nausea and vomiting for days, recently Flu+. Brought to the ED via EMS. Per report, while in the ED, patient's heart rate juliocesar down, subsequent PEA arrest. No strips to review. ROSC obtained after about 6 minutes of chest compressions per ER report. Patient seen in ICU, intubated and sedated. HR now 150s, sinus tachycardia per EKG. No prior cardiac history per medical record. No recent cardiac testing.     Records from hospital admission course thus far reviewed.      Telemetry reviewed.       INPATIENT MEDICATIONS:  Home medications reviewed.    Current Facility-Administered Medications:     dextrose bolus 10% 125 mL, 125 mL, IntraVENous, PRN **OR** dextrose bolus 10% 250 mL, 250 mL, IntraVENous, PRN, Dayday Fischer MD    potassium chloride 10 mEq/100 mL IVPB (Peripheral Line), 10 mEq, IntraVENous, PRN, Dayday Fischer MD    magnesium sulfate 2000 mg in 50 mL IVPB premix, 2,000 mg, IntraVENous, PRN, Dayday Fischer MD    sodium phosphate 15 mmol in sodium chloride 0.9 % 250 mL IVPB, 15 mmol, IntraVENous, PRNAaliyah Benjamin Ross, MD    0.45 % sodium chloride infusion, , IntraVENous, Continuous, Dayday Fischer MD    dextrose 5 % and 0.45 % sodium chloride infusion, , IntraVENous, Continuous PRNAaliyah Benjamin Ross, MD    [COMPLETED] insulin regular (HUMULIN R;NOVOLIN R) injection 10 Units, 10 Units, IntraVENous, Once, 10 Units at 03/20/24 0948 **AND** insulin 
    [facility] Renal Consult Note      NAME:  Al Aguirre   :   1970   MRN:  [id number]     Requesting Physician Farzaneh Walker MD   Reason for Consult:  Metabolic acidosis, acute kidney injury     PCP:  Radha Flores     Date/Time:  3/20/2024 10:14 AM          Subjective:     CHIEF COMPLAINT: DKA    HISTORY OF PRESENT ILLNESS:     Mr. Aguirre is a 53 y.o.   male who is admitted to the medical service with DKA.  We are asked to evaluate for hyperkalemia, acute kidney injury, volume depletion, metabolic acidosis.    Patient recently discharged from hospital around , who presented with acute kidney injury from volume depletion.  History of diabetes mellitus apparently his insulin drip became dislodged.  He presented to the hospital with sugars greater than 700.  Severe metabolic acidosis, hyperkalemic and volume depleted with hypotension.  Patient seen in trauma while in ER.  Being given lactated Ringer's.  Blood pressure low with systolic pressures around 75-80.  pH 6.9.  Getting IV bicarbonate, I changed IVs from lactated Ringer's to a bicarbonate based solution.  Insulin drip has been started.    Past Medical History:   Diagnosis Date    CKD (chronic kidney disease) stage 3, GFR 30-59 ml/min ([facility])     Diabetic peripheral neuropathy ([facility])     BLE    Diabetic ulcer of toe of right foot associated with diabetes mellitus due to underlying condition, with necrosis of bone ([facility]) 2023    Equinus contracture of ankle 2023    Hyperlipidemia     Hypertension     Hypogonadism, male     MAT on CPAP     Osteomyelitis of right foot ([facility]) 08/15/2023    Rheumatoid arthritis ([facility])     Type 1 diabetes mellitus with complication, with long-term current use of insulin ([facility])         Past Surgical History:   Procedure Laterality Date    FOOT SURGERY Right 2023    TRANSMETARASAL AMPUTATION WITH ACHILLES TENDON LENGTHENING RIGHT LOWER EXTREMITY performed by Baldev Salas DPM at 
Labs     03/20/24  0919   CREATININE 4.09*     No results for input(s): \"CKTOTAL\", \"CKMB\", \"CKMBINDEX\", \"TROPONINI\" in the last 72 hours.  No results found for: \"PROBNP\", \"BNP\"   No results found for: \"TSH\"   Results       Procedure Component Value Units Date/Time    Culture, Urine [[id number]] Collected: 03/20/24 1005    Order Status: Sent Specimen: Urine Updated: 03/20/24 1024    Blood Culture 1 [[id number]] Collected: 03/20/24 0926    Order Status: Sent Specimen: Blood Updated: 03/20/24 0932    Blood Culture 2 [[id number]] Collected: 03/20/24 0926    Order Status: Sent Specimen: Blood Updated: 03/20/24 0933    Blood Culture 1 [[id number]] Collected: 03/06/24 1530    Order Status: Completed Specimen: Blood Updated: 03/12/24 0751     Special Requests No Special Requests        Culture No growth 6 days       Blood Culture 2 [[id number]] Collected: 03/06/24 1530    Order Status: Completed Specimen: Blood Updated: 03/12/24 0751     Special Requests No Special Requests        Culture No growth 6 days       Rapid influenza A/B antigens [[id number]] Collected: 03/06/24 1530    Order Status: Completed Specimen: Nasal Washing Updated: 03/06/24 1553     Influenza A Ag Negative        Influenza B Ag Negative              Imaging:  XR CHEST PORTABLE   Final Result   1. Interval development of mild to moderate pulmonary edema.   2. ET tube is in satisfactory position      CT Head W/O Contrast    (Results Pending)       XR CHEST PORTABLE    Result Date: 3/20/2024  EXAM:  XR CHEST PORTABLE INDICATION: Sepsis COMPARISON: 3/6/2024 TECHNIQUE: 1020 hours portable chest AP view FINDINGS: The ET tube is in satisfactory position. Heart size is normal. Lungs demonstrate interval development of mild to moderate pulmonary edema. No pneumonia.     1. Interval development of mild to moderate pulmonary edema. 2. ET tube is in satisfactory position       This care involved high complexity decision making which includes independently reviewing

## 2024-03-20 NOTE — CARE COORDINATION
03/20/24 1545   Readmission Assessment   Number of Days since last admission? 8-30 days   Previous Disposition Home with Home Health   Who is being Interviewed Caregiver   What was the patient's/caregiver's perception as to why they think they needed to return back to the hospital? Other (Comment)  (Admitted a few weeks ago with the flu)   Did you visit your Primary Care Physician after you left the hospital, before you returned this time? No   Why weren't you able to visit your PCP? Did not have an appointment   Did you see a specialist, such as Cardiac, Pulmonary, Orthopedic Physician, etc. after you left the hospital? No   Who advised the patient to return to the hospital? Self-referral   Does the patient report anything that got in the way of taking their medications? Yes   What reasons did they give? Other (Comment)  (Mail his G6 sensor after he had ran out)   In our efforts to provide the best possible care to you and others like you, can you think of anything that we could have done to help you after you left the hospital the first time, so that you might not have needed to return so soon? Additional Community resources available for illness support

## 2024-03-20 NOTE — CODE DOCUMENTATION
Given 20of etomidate and 100 rocuronium.    Pt intubated with 7.5 at 23 at lip, + color change with CO2 detector. Radiology at bedside to confirm placement.

## 2024-03-20 NOTE — ED PROVIDER NOTES
friends, physically hurt you?: Patient unable to answer     How often does anyone, including family and friends, scream or curse at you?: Not on file     How often does anyone, including family and friends, insult or talk down to you?: Not on file     How often does anyone, including family and friends, threaten you with harm?: Not on file   Utilities: Patient Unable To Answer (3/20/2024)    [facility] Utilities     Threatened with loss of utilities: Patient unable to answer       PHYSICAL EXAM   Physical Exam  Constitutional:       Comments: Obtunded   HENT:      Head: Normocephalic and atraumatic.   Eyes:      Pupils: Pupils are equal, round, and reactive to light.   Cardiovascular:      Rate and Rhythm: Regular rhythm. Tachycardia present.   Pulmonary:      Comments: Tachypnea  Kussmaul respirations  Abdominal:      General: There is no distension.      Palpations: Abdomen is soft.   Musculoskeletal:      Cervical back: No rigidity.   Skin:     General: Skin is dry.   Neurological:      GCS: GCS eye subscore is 1. GCS verbal subscore is 1. GCS motor subscore is 5.           SCREENINGS                   LAB, EKG AND DIAGNOSTIC RESULTS   Labs:  Recent Results (from the past 12 hour(s))   POC Blood Gas and Chemistry    Collection Time: 03/20/24  9:19 AM   Result Value Ref Range    POC pH 6.79 (LL) 7.35 - 7.45      POC pCO2 17.9 (L) 35.0 - 45.0 mmHg    POC PO2 76 75 - 100 mmHg    POC Sodium 132 (L) 136 - 145 mmol/L    POC Potassium 7.2 (HH) 3.5 - 5.5 mmol/L    POC Ionized Calcium 1.08 (L) 1.12 - 1.32 mmol/L    POC Glucose >700 (HH) 65 - 100 mg/dL    POC Chloride 109 (H) 98 - 107 MMOL/L    POC Creatinine 4.09 (H) 0.6 - 1.3 MG/DL    eGFR, POC 17 (L) >60 ml/min/1.73m2    Base Excess PENDING mmol/L    Base Deficit (POC) PENDING mmol/L    POC HCO3 2.7 (L) 19.0 - 28.0 mmol/L    POC TCO2 <5 MMOL/L    Source Venous      Performed by: Shaquille Jameson     POC Lactic Acid 11.63 (HH) 0.40 - 2.00 mmol/L    Critical Value Read Back

## 2024-03-20 NOTE — ED NOTES
This nurse at patients bedside when noted to have sudden change in heart rhythm, rhythm from 140 to juliocesar in 40s and then 20s, faint irregular pulse felt in carotid at juliocesar rate. RR droped from 30-40 - agonal at approx 8-10. Code Blue called. CPR and bagging started while code team assembled.

## 2024-03-20 NOTE — TELEPHONE ENCOUNTER
Patient currently in hospital. Will follow-up with him at later date.    Patient needs an appointment requirement for ins to cover sensors.  Will advise patient.   Patient was last seen 7/2023

## 2024-03-21 ENCOUNTER — APPOINTMENT (OUTPATIENT)
Facility: HOSPITAL | Age: 54
DRG: 720 | End: 2024-03-21
Payer: COMMERCIAL

## 2024-03-21 LAB
ALBUMIN SERPL-MCNC: 2.4 G/DL (ref 3.5–5)
ANION GAP SERPL CALC-SCNC: 10 MMOL/L (ref 5–15)
ANION GAP SERPL CALC-SCNC: 14 MMOL/L (ref 5–15)
ANION GAP SERPL CALC-SCNC: 7 MMOL/L (ref 5–15)
ANION GAP SERPL CALC-SCNC: 8 MMOL/L (ref 5–15)
ARTERIAL PATENCY WRIST A: YES
BACTERIA SPEC CULT: NORMAL
BASE EXCESS BLDA CALC-SCNC: 0.3 MMOL/L (ref 0–3)
BASOPHILS # BLD: 0 K/UL (ref 0–0.1)
BASOPHILS NFR BLD: 0 % (ref 0–1)
BDY SITE: ABNORMAL
BODY TEMPERATURE: 99
BUN SERPL-MCNC: 32 MG/DL (ref 6–20)
BUN SERPL-MCNC: 36 MG/DL (ref 6–20)
BUN SERPL-MCNC: 37 MG/DL (ref 6–20)
BUN SERPL-MCNC: 37 MG/DL (ref 6–20)
BUN/CREAT SERPL: 10 (ref 12–20)
BUN/CREAT SERPL: 11 (ref 12–20)
CA-I BLD-MCNC: 6.6 MG/DL (ref 8.5–10.1)
CA-I BLD-MCNC: 8 MG/DL (ref 8.5–10.1)
CA-I BLD-MCNC: 8.1 MG/DL (ref 8.5–10.1)
CA-I BLD-MCNC: 8.4 MG/DL (ref 8.5–10.1)
CHLORIDE SERPL-SCNC: 109 MMOL/L (ref 97–108)
CHLORIDE SERPL-SCNC: 112 MMOL/L (ref 97–108)
CHLORIDE SERPL-SCNC: 114 MMOL/L (ref 97–108)
CHLORIDE SERPL-SCNC: 120 MMOL/L (ref 97–108)
CO2 SERPL-SCNC: 19 MMOL/L (ref 21–32)
CO2 SERPL-SCNC: 22 MMOL/L (ref 21–32)
CO2 SERPL-SCNC: 24 MMOL/L (ref 21–32)
CO2 SERPL-SCNC: 25 MMOL/L (ref 21–32)
COHGB MFR BLD: 0.3 % (ref 1–2)
CREAT SERPL-MCNC: 2.97 MG/DL (ref 0.7–1.3)
CREAT SERPL-MCNC: 3.65 MG/DL (ref 0.7–1.3)
CREAT SERPL-MCNC: 3.69 MG/DL (ref 0.7–1.3)
CREAT SERPL-MCNC: 3.76 MG/DL (ref 0.7–1.3)
DIFFERENTIAL METHOD BLD: ABNORMAL
EKG ATRIAL RATE: 111 BPM
EKG ATRIAL RATE: 267 BPM
EKG DIAGNOSIS: NORMAL
EKG DIAGNOSIS: NORMAL
EKG P AXIS: 83 DEGREES
EKG Q-T INTERVAL: 268 MS
EKG Q-T INTERVAL: 304 MS
EKG QRS DURATION: 82 MS
EKG QRS DURATION: 86 MS
EKG QTC CALCULATION (BAZETT): 322 MS
EKG QTC CALCULATION (BAZETT): 453 MS
EKG R AXIS: -40 DEGREES
EKG R AXIS: -49 DEGREES
EKG T AXIS: 16 DEGREES
EKG T AXIS: 58 DEGREES
EKG VENTRICULAR RATE: 134 BPM
EKG VENTRICULAR RATE: 87 BPM
EOSINOPHIL # BLD: 0 K/UL (ref 0–0.4)
EOSINOPHIL NFR BLD: 0 % (ref 0–7)
ERYTHROCYTE [DISTWIDTH] IN BLOOD BY AUTOMATED COUNT: 14.2 % (ref 11.5–14.5)
FIO2 ON VENT: 40 %
GAS FLOW.O2 SETTING OXYMISER: 18
GLUCOSE BLD STRIP.AUTO-MCNC: 100 MG/DL (ref 65–100)
GLUCOSE BLD STRIP.AUTO-MCNC: 110 MG/DL (ref 65–100)
GLUCOSE BLD STRIP.AUTO-MCNC: 124 MG/DL (ref 65–100)
GLUCOSE BLD STRIP.AUTO-MCNC: 132 MG/DL (ref 65–100)
GLUCOSE BLD STRIP.AUTO-MCNC: 138 MG/DL (ref 65–100)
GLUCOSE BLD STRIP.AUTO-MCNC: 145 MG/DL (ref 65–100)
GLUCOSE BLD STRIP.AUTO-MCNC: 156 MG/DL (ref 65–100)
GLUCOSE BLD STRIP.AUTO-MCNC: 163 MG/DL (ref 65–100)
GLUCOSE BLD STRIP.AUTO-MCNC: 168 MG/DL (ref 65–100)
GLUCOSE BLD STRIP.AUTO-MCNC: 173 MG/DL (ref 65–100)
GLUCOSE BLD STRIP.AUTO-MCNC: 201 MG/DL (ref 65–100)
GLUCOSE BLD STRIP.AUTO-MCNC: 238 MG/DL (ref 65–100)
GLUCOSE BLD STRIP.AUTO-MCNC: 252 MG/DL (ref 65–100)
GLUCOSE BLD STRIP.AUTO-MCNC: 258 MG/DL (ref 65–100)
GLUCOSE BLD STRIP.AUTO-MCNC: 262 MG/DL (ref 65–100)
GLUCOSE BLD STRIP.AUTO-MCNC: 279 MG/DL (ref 65–100)
GLUCOSE BLD STRIP.AUTO-MCNC: 300 MG/DL (ref 65–100)
GLUCOSE BLD STRIP.AUTO-MCNC: 324 MG/DL (ref 65–100)
GLUCOSE BLD STRIP.AUTO-MCNC: 85 MG/DL (ref 65–100)
GLUCOSE SERPL-MCNC: 100 MG/DL (ref 65–100)
GLUCOSE SERPL-MCNC: 152 MG/DL (ref 65–100)
GLUCOSE SERPL-MCNC: 172 MG/DL (ref 65–100)
GLUCOSE SERPL-MCNC: 349 MG/DL (ref 65–100)
HCO3 BLDA-SCNC: 22 MMOL/L (ref 22–26)
HCT VFR BLD AUTO: 32.9 % (ref 36.6–50.3)
HGB BLD-MCNC: 11.4 G/DL (ref 12.1–17)
IMM GRANULOCYTES # BLD AUTO: 0.4 K/UL (ref 0–0.04)
IMM GRANULOCYTES NFR BLD AUTO: 2 % (ref 0–0.5)
LACTATE SERPL-SCNC: 2.6 MMOL/L (ref 0.4–2)
LYMPHOCYTES # BLD: 1.8 K/UL (ref 0.8–3.5)
LYMPHOCYTES NFR BLD: 8 % (ref 12–49)
Lab: NORMAL
MAGNESIUM SERPL-MCNC: 1.5 MG/DL (ref 1.6–2.4)
MAGNESIUM SERPL-MCNC: 1.9 MG/DL (ref 1.6–2.4)
MAGNESIUM SERPL-MCNC: 2.1 MG/DL (ref 1.6–2.4)
MCH RBC QN AUTO: 28.7 PG (ref 26–34)
MCHC RBC AUTO-ENTMCNC: 34.7 G/DL (ref 30–36.5)
MCV RBC AUTO: 82.9 FL (ref 80–99)
METHGB MFR BLD: 0.5 % (ref 0–1.4)
MONOCYTES # BLD: 1.8 K/UL (ref 0–1)
MONOCYTES NFR BLD: 8 % (ref 5–13)
NEUTS SEG # BLD: 17.9 K/UL (ref 1.8–8)
NEUTS SEG NFR BLD: 82 % (ref 32–75)
NRBC # BLD: 0 K/UL (ref 0–0.01)
NRBC BLD-RTO: 0 PER 100 WBC
OXYHGB MFR BLD: 97.9 % (ref 95–99)
PCO2 BLDA: 28 MMHG (ref 35–45)
PEEP RESPIRATORY: 5
PERFORMED BY:: ABNORMAL
PERFORMED BY:: NORMAL
PERFORMED BY:: NORMAL
PH BLDA: 7.52 (ref 7.35–7.45)
PHOSPHATE SERPL-MCNC: 1 MG/DL (ref 2.6–4.7)
PHOSPHATE SERPL-MCNC: 2.7 MG/DL (ref 2.6–4.7)
PHOSPHATE SERPL-MCNC: 3.6 MG/DL (ref 2.6–4.7)
PLATELET # BLD AUTO: 302 K/UL (ref 150–400)
PMV BLD AUTO: 10.5 FL (ref 8.9–12.9)
PO2 BLDA: 172 MMHG (ref 80–100)
POTASSIUM SERPL-SCNC: 3.2 MMOL/L (ref 3.5–5.1)
POTASSIUM SERPL-SCNC: 3.5 MMOL/L (ref 3.5–5.1)
POTASSIUM SERPL-SCNC: 4.4 MMOL/L (ref 3.5–5.1)
POTASSIUM SERPL-SCNC: ABNORMAL MMOL/L (ref 3.5–5.1)
RBC # BLD AUTO: 3.97 M/UL (ref 4.1–5.7)
SAO2 % BLD: 99 % (ref 95–99)
SAO2% DEVICE SAO2% SENSOR NAME: ABNORMAL
SODIUM SERPL-SCNC: 142 MMOL/L (ref 136–145)
SODIUM SERPL-SCNC: 146 MMOL/L (ref 136–145)
SODIUM SERPL-SCNC: 147 MMOL/L (ref 136–145)
SODIUM SERPL-SCNC: 149 MMOL/L (ref 136–145)
SPECIMEN SITE: ABNORMAL
TROPONIN I SERPL HS-MCNC: ABNORMAL NG/L (ref 0–76)
TROPONIN I SERPL HS-MCNC: ABNORMAL NG/L (ref 0–76)
UFH PPP CHRO-ACNC: 0.34 IU/ML
VENTILATION MODE VENT: ABNORMAL
VT SETTING VENT: 500
WBC # BLD AUTO: 21.9 K/UL (ref 4.1–11.1)

## 2024-03-21 PROCEDURE — 2500000003 HC RX 250 WO HCPCS: Performed by: INTERNAL MEDICINE

## 2024-03-21 PROCEDURE — 82962 GLUCOSE BLOOD TEST: CPT

## 2024-03-21 PROCEDURE — 6360000002 HC RX W HCPCS: Performed by: STUDENT IN AN ORGANIZED HEALTH CARE EDUCATION/TRAINING PROGRAM

## 2024-03-21 PROCEDURE — 2580000003 HC RX 258: Performed by: INTERNAL MEDICINE

## 2024-03-21 PROCEDURE — 82803 BLOOD GASES ANY COMBINATION: CPT

## 2024-03-21 PROCEDURE — 6370000000 HC RX 637 (ALT 250 FOR IP): Performed by: INTERNAL MEDICINE

## 2024-03-21 PROCEDURE — 85520 HEPARIN ASSAY: CPT

## 2024-03-21 PROCEDURE — 83735 ASSAY OF MAGNESIUM: CPT

## 2024-03-21 PROCEDURE — 84484 ASSAY OF TROPONIN QUANT: CPT

## 2024-03-21 PROCEDURE — 2500000003 HC RX 250 WO HCPCS: Performed by: STUDENT IN AN ORGANIZED HEALTH CARE EDUCATION/TRAINING PROGRAM

## 2024-03-21 PROCEDURE — 93005 ELECTROCARDIOGRAM TRACING: CPT | Performed by: NURSE PRACTITIONER

## 2024-03-21 PROCEDURE — 6370000000 HC RX 637 (ALT 250 FOR IP): Performed by: HOSPITALIST

## 2024-03-21 PROCEDURE — 6370000000 HC RX 637 (ALT 250 FOR IP)

## 2024-03-21 PROCEDURE — 80048 BASIC METABOLIC PNL TOTAL CA: CPT

## 2024-03-21 PROCEDURE — 36600 WITHDRAWAL OF ARTERIAL BLOOD: CPT

## 2024-03-21 PROCEDURE — 85025 COMPLETE CBC W/AUTO DIFF WBC: CPT

## 2024-03-21 PROCEDURE — 80069 RENAL FUNCTION PANEL: CPT

## 2024-03-21 PROCEDURE — 84100 ASSAY OF PHOSPHORUS: CPT

## 2024-03-21 PROCEDURE — 6370000000 HC RX 637 (ALT 250 FOR IP): Performed by: STUDENT IN AN ORGANIZED HEALTH CARE EDUCATION/TRAINING PROGRAM

## 2024-03-21 PROCEDURE — 83605 ASSAY OF LACTIC ACID: CPT

## 2024-03-21 PROCEDURE — 6360000002 HC RX W HCPCS: Performed by: INTERNAL MEDICINE

## 2024-03-21 PROCEDURE — 36415 COLL VENOUS BLD VENIPUNCTURE: CPT

## 2024-03-21 PROCEDURE — 2000000000 HC ICU R&B

## 2024-03-21 PROCEDURE — 94003 VENT MGMT INPAT SUBQ DAY: CPT

## 2024-03-21 PROCEDURE — 71045 X-RAY EXAM CHEST 1 VIEW: CPT

## 2024-03-21 PROCEDURE — 2580000003 HC RX 258: Performed by: STUDENT IN AN ORGANIZED HEALTH CARE EDUCATION/TRAINING PROGRAM

## 2024-03-21 RX ORDER — ESCITALOPRAM OXALATE 10 MG/1
20 TABLET ORAL DAILY
Status: DISCONTINUED | OUTPATIENT
Start: 2024-03-21 | End: 2024-03-27 | Stop reason: HOSPADM

## 2024-03-21 RX ORDER — ASPIRIN 81 MG/1
81 TABLET, CHEWABLE ORAL DAILY
Status: DISCONTINUED | OUTPATIENT
Start: 2024-03-21 | End: 2024-03-27 | Stop reason: HOSPADM

## 2024-03-21 RX ORDER — INSULIN GLARGINE 100 [IU]/ML
15 INJECTION, SOLUTION SUBCUTANEOUS EVERY 12 HOURS
Status: DISCONTINUED | OUTPATIENT
Start: 2024-03-21 | End: 2024-03-22

## 2024-03-21 RX ORDER — ALPRAZOLAM 1 MG/1
1 TABLET ORAL 2 TIMES DAILY
COMMUNITY

## 2024-03-21 RX ORDER — CHLORHEXIDINE GLUCONATE ORAL RINSE 1.2 MG/ML
15 SOLUTION DENTAL 2 TIMES DAILY
Status: DISCONTINUED | OUTPATIENT
Start: 2024-03-21 | End: 2024-03-27

## 2024-03-21 RX ORDER — DEXTROAMPHETAMINE SACCHARATE, AMPHETAMINE ASPARTATE, DEXTROAMPHETAMINE SULFATE AND AMPHETAMINE SULFATE 2.5; 2.5; 2.5; 2.5 MG/1; MG/1; MG/1; MG/1
10 TABLET ORAL DAILY
COMMUNITY

## 2024-03-21 RX ORDER — DEXTROSE MONOHYDRATE 100 MG/ML
INJECTION, SOLUTION INTRAVENOUS CONTINUOUS PRN
Status: DISCONTINUED | OUTPATIENT
Start: 2024-03-21 | End: 2024-03-27 | Stop reason: HOSPADM

## 2024-03-21 RX ORDER — GABAPENTIN 300 MG/1
300 CAPSULE ORAL NIGHTLY
Status: DISCONTINUED | OUTPATIENT
Start: 2024-03-21 | End: 2024-03-22

## 2024-03-21 RX ORDER — MIRTAZAPINE 15 MG/1
15 TABLET, FILM COATED ORAL NIGHTLY
Status: DISCONTINUED | OUTPATIENT
Start: 2024-03-21 | End: 2024-03-27 | Stop reason: HOSPADM

## 2024-03-21 RX ORDER — SODIUM CHLORIDE 450 MG/100ML
INJECTION, SOLUTION INTRAVENOUS CONTINUOUS
Status: DISCONTINUED | OUTPATIENT
Start: 2024-03-21 | End: 2024-03-22

## 2024-03-21 RX ORDER — INSULIN LISPRO 100 [IU]/ML
0-8 INJECTION, SOLUTION INTRAVENOUS; SUBCUTANEOUS
Status: DISCONTINUED | OUTPATIENT
Start: 2024-03-21 | End: 2024-03-21

## 2024-03-21 RX ORDER — HYDROCHLOROTHIAZIDE 25 MG/1
25 TABLET ORAL DAILY
COMMUNITY

## 2024-03-21 RX ORDER — GLUCAGON 1 MG/ML
1 KIT INJECTION PRN
Status: DISCONTINUED | OUTPATIENT
Start: 2024-03-21 | End: 2024-03-27 | Stop reason: HOSPADM

## 2024-03-21 RX ORDER — GABAPENTIN 100 MG/1
200 CAPSULE ORAL DAILY
Status: DISCONTINUED | OUTPATIENT
Start: 2024-03-21 | End: 2024-03-27 | Stop reason: HOSPADM

## 2024-03-21 RX ORDER — MIRTAZAPINE 15 MG/1
15 TABLET, FILM COATED ORAL NIGHTLY
COMMUNITY

## 2024-03-21 RX ORDER — ROSUVASTATIN CALCIUM 20 MG/1
40 TABLET, COATED ORAL NIGHTLY
Status: DISCONTINUED | OUTPATIENT
Start: 2024-03-21 | End: 2024-03-27 | Stop reason: HOSPADM

## 2024-03-21 RX ORDER — LANSOPRAZOLE 30 MG/1
30 TABLET, ORALLY DISINTEGRATING, DELAYED RELEASE ORAL DAILY
Status: DISCONTINUED | OUTPATIENT
Start: 2024-03-21 | End: 2024-03-27

## 2024-03-21 RX ORDER — ALPRAZOLAM 0.5 MG/1
1 TABLET ORAL 2 TIMES DAILY
Status: DISCONTINUED | OUTPATIENT
Start: 2024-03-21 | End: 2024-03-27 | Stop reason: HOSPADM

## 2024-03-21 RX ORDER — INSULIN LISPRO 100 [IU]/ML
0-8 INJECTION, SOLUTION INTRAVENOUS; SUBCUTANEOUS EVERY 4 HOURS
Status: DISCONTINUED | OUTPATIENT
Start: 2024-03-21 | End: 2024-03-27 | Stop reason: HOSPADM

## 2024-03-21 RX ORDER — ESCITALOPRAM OXALATE 20 MG/1
20 TABLET ORAL DAILY
COMMUNITY

## 2024-03-21 RX ORDER — INSULIN LISPRO 100 [IU]/ML
0-4 INJECTION, SOLUTION INTRAVENOUS; SUBCUTANEOUS NIGHTLY
Status: DISCONTINUED | OUTPATIENT
Start: 2024-03-21 | End: 2024-03-21

## 2024-03-21 RX ORDER — DEXMEDETOMIDINE HYDROCHLORIDE 4 UG/ML
.1-1.5 INJECTION, SOLUTION INTRAVENOUS CONTINUOUS
Status: DISCONTINUED | OUTPATIENT
Start: 2024-03-21 | End: 2024-03-26

## 2024-03-21 RX ORDER — ARIPIPRAZOLE 5 MG/1
5 TABLET ORAL
Status: DISCONTINUED | OUTPATIENT
Start: 2024-03-21 | End: 2024-03-27 | Stop reason: HOSPADM

## 2024-03-21 RX ORDER — PREGABALIN 75 MG/1
75 CAPSULE ORAL DAILY
Status: DISCONTINUED | OUTPATIENT
Start: 2024-03-21 | End: 2024-03-21

## 2024-03-21 RX ADMIN — SODIUM BICARBONATE: 84 INJECTION, SOLUTION INTRAVENOUS at 02:33

## 2024-03-21 RX ADMIN — INSULIN HUMAN 12.96 UNITS/HR: 1 INJECTION, SOLUTION INTRAVENOUS at 00:49

## 2024-03-21 RX ADMIN — GABAPENTIN 200 MG: 100 CAPSULE ORAL at 15:38

## 2024-03-21 RX ADMIN — ARIPIPRAZOLE 5 MG: 5 TABLET ORAL at 15:38

## 2024-03-21 RX ADMIN — PROPOFOL 40 MCG/KG/MIN: 10 INJECTION, EMULSION INTRAVENOUS at 15:00

## 2024-03-21 RX ADMIN — INSULIN LISPRO 4 UNITS: 100 INJECTION, SOLUTION INTRAVENOUS; SUBCUTANEOUS at 23:58

## 2024-03-21 RX ADMIN — POTASSIUM CHLORIDE 10 MEQ: 7.46 INJECTION, SOLUTION INTRAVENOUS at 13:18

## 2024-03-21 RX ADMIN — CHLORHEXIDINE GLUCONATE 0.12% ORAL RINSE 15 ML: 1.2 LIQUID ORAL at 22:44

## 2024-03-21 RX ADMIN — PROPOFOL 40 MCG/KG/MIN: 10 INJECTION, EMULSION INTRAVENOUS at 18:08

## 2024-03-21 RX ADMIN — FENTANYL CITRATE 100 MCG/HR: at 16:47

## 2024-03-21 RX ADMIN — AMIODARONE HYDROCHLORIDE 0.5 MG/MIN: 1.8 INJECTION, SOLUTION INTRAVENOUS at 22:35

## 2024-03-21 RX ADMIN — SODIUM CHLORIDE: 4.5 INJECTION, SOLUTION INTRAVENOUS at 06:09

## 2024-03-21 RX ADMIN — PIPERACILLIN AND TAZOBACTAM 3375 MG: 3; .375 INJECTION, POWDER, LYOPHILIZED, FOR SOLUTION INTRAVENOUS at 21:56

## 2024-03-21 RX ADMIN — ESCITALOPRAM OXALATE 20 MG: 10 TABLET ORAL at 15:38

## 2024-03-21 RX ADMIN — MAGNESIUM SULFATE HEPTAHYDRATE 2000 MG: 40 INJECTION, SOLUTION INTRAVENOUS at 09:38

## 2024-03-21 RX ADMIN — PROPOFOL 35 MCG/KG/MIN: 10 INJECTION, EMULSION INTRAVENOUS at 09:55

## 2024-03-21 RX ADMIN — Medication 8 MCG/MIN: at 06:44

## 2024-03-21 RX ADMIN — AMIODARONE HYDROCHLORIDE 0.5 MG/MIN: 1.8 INJECTION, SOLUTION INTRAVENOUS at 08:35

## 2024-03-21 RX ADMIN — INSULIN HUMAN 4.8 UNITS/HR: 1 INJECTION, SOLUTION INTRAVENOUS at 13:57

## 2024-03-21 RX ADMIN — INSULIN LISPRO 2 UNITS: 100 INJECTION, SOLUTION INTRAVENOUS; SUBCUTANEOUS at 18:00

## 2024-03-21 RX ADMIN — DEXTROSE AND SODIUM CHLORIDE: 5; 450 INJECTION, SOLUTION INTRAVENOUS at 04:44

## 2024-03-21 RX ADMIN — PROPOFOL 25 MCG/KG/MIN: 10 INJECTION, EMULSION INTRAVENOUS at 23:03

## 2024-03-21 RX ADMIN — MIRTAZAPINE 15 MG: 15 TABLET, FILM COATED ORAL at 22:45

## 2024-03-21 RX ADMIN — PROPOFOL 20 MCG/KG/MIN: 10 INJECTION, EMULSION INTRAVENOUS at 02:34

## 2024-03-21 RX ADMIN — INSULIN LISPRO 2 UNITS: 100 INJECTION, SOLUTION INTRAVENOUS; SUBCUTANEOUS at 20:19

## 2024-03-21 RX ADMIN — CLOPIDOGREL BISULFATE 75 MG: 75 TABLET ORAL at 08:30

## 2024-03-21 RX ADMIN — INSULIN GLARGINE 15 UNITS: 100 INJECTION, SOLUTION SUBCUTANEOUS at 15:37

## 2024-03-21 RX ADMIN — POTASSIUM CHLORIDE 10 MEQ: 7.46 INJECTION, SOLUTION INTRAVENOUS at 12:09

## 2024-03-21 RX ADMIN — HEPARIN SODIUM 9 UNITS/KG/HR: 10000 INJECTION, SOLUTION INTRAVENOUS at 15:39

## 2024-03-21 RX ADMIN — POTASSIUM CHLORIDE 10 MEQ: 7.46 INJECTION, SOLUTION INTRAVENOUS at 10:58

## 2024-03-21 RX ADMIN — LANSOPRAZOLE 30 MG: 30 TABLET, ORALLY DISINTEGRATING ORAL at 15:38

## 2024-03-21 RX ADMIN — SODIUM CHLORIDE, PRESERVATIVE FREE 10 ML: 5 INJECTION INTRAVENOUS at 21:56

## 2024-03-21 RX ADMIN — ROSUVASTATIN CALCIUM 40 MG: 20 TABLET, COATED ORAL at 22:44

## 2024-03-21 RX ADMIN — SODIUM CHLORIDE, PRESERVATIVE FREE 10 ML: 5 INJECTION INTRAVENOUS at 08:40

## 2024-03-21 RX ADMIN — ASPIRIN 81 MG: 81 TABLET, CHEWABLE ORAL at 11:06

## 2024-03-21 RX ADMIN — POTASSIUM CHLORIDE 10 MEQ: 7.46 INJECTION, SOLUTION INTRAVENOUS at 09:39

## 2024-03-21 RX ADMIN — INSULIN HUMAN 2.97 UNITS/HR: 1 INJECTION, SOLUTION INTRAVENOUS at 13:00

## 2024-03-21 RX ADMIN — Medication 1 MG/HR: at 08:33

## 2024-03-21 RX ADMIN — INSULIN HUMAN 7.67 UNITS/HR: 1 INJECTION, SOLUTION INTRAVENOUS at 14:54

## 2024-03-21 RX ADMIN — ALPRAZOLAM 1 MG: 0.5 TABLET ORAL at 22:44

## 2024-03-21 RX ADMIN — FENTANYL CITRATE 100 MCG/HR: at 06:46

## 2024-03-21 RX ADMIN — SODIUM CHLORIDE: 4.5 INJECTION, SOLUTION INTRAVENOUS at 10:58

## 2024-03-21 RX ADMIN — PIPERACILLIN AND TAZOBACTAM 3375 MG: 3; .375 INJECTION, POWDER, LYOPHILIZED, FOR SOLUTION INTRAVENOUS at 14:14

## 2024-03-21 RX ADMIN — GABAPENTIN 300 MG: 300 CAPSULE ORAL at 21:12

## 2024-03-21 RX ADMIN — PIPERACILLIN AND TAZOBACTAM 3375 MG: 3; .375 INJECTION, POWDER, LYOPHILIZED, FOR SOLUTION INTRAVENOUS at 05:46

## 2024-03-21 ASSESSMENT — PULMONARY FUNCTION TESTS
PIF_VALUE: 19
PIF_VALUE: 17
PIF_VALUE: 18
PIF_VALUE: 19
PIF_VALUE: 17
PIF_VALUE: 19
PIF_VALUE: 18
PIF_VALUE: 17
PIF_VALUE: 19
PIF_VALUE: 17
PIF_VALUE: 18
PIF_VALUE: 17
PIF_VALUE: 17
PIF_VALUE: 16
PIF_VALUE: 17
PIF_VALUE: 17
PIF_VALUE: 19
PIF_VALUE: 19

## 2024-03-21 NOTE — CARE COORDINATION
CM reviewed Pt medicals, Pt is on the vent.    Pt lives with his 7 year old daughter, his support system is his mom and sister.    CM will follow for D/C needs.

## 2024-03-22 ENCOUNTER — APPOINTMENT (OUTPATIENT)
Facility: HOSPITAL | Age: 54
DRG: 720 | End: 2024-03-22
Payer: COMMERCIAL

## 2024-03-22 LAB
ALBUMIN SERPL-MCNC: 2.1 G/DL (ref 3.5–5)
ANION GAP SERPL CALC-SCNC: 10 MMOL/L (ref 5–15)
BACTERIA SPEC CULT: NORMAL
BASOPHILS # BLD: 0.1 K/UL (ref 0–0.1)
BASOPHILS NFR BLD: 0 % (ref 0–1)
BUN SERPL-MCNC: 37 MG/DL (ref 6–20)
BUN/CREAT SERPL: 11 (ref 12–20)
CA-I BLD-MCNC: 7.7 MG/DL (ref 8.5–10.1)
CHLORIDE SERPL-SCNC: 109 MMOL/L (ref 97–108)
CO2 SERPL-SCNC: 22 MMOL/L (ref 21–32)
CREAT SERPL-MCNC: 3.4 MG/DL (ref 0.7–1.3)
DIFFERENTIAL METHOD BLD: ABNORMAL
ECHO AO ROOT DIAM: 3.4 CM
ECHO AO ROOT INDEX: 1.45 CM/M2
ECHO AV AREA PEAK VELOCITY: 3.3 CM2
ECHO AV AREA VTI: 3.3 CM2
ECHO AV AREA/BSA PEAK VELOCITY: 1.4 CM2/M2
ECHO AV AREA/BSA VTI: 1.4 CM2/M2
ECHO AV MEAN GRADIENT: 3 MMHG
ECHO AV MEAN VELOCITY: 0.8 M/S
ECHO AV PEAK GRADIENT: 6 MMHG
ECHO AV PEAK VELOCITY: 1.2 M/S
ECHO AV VELOCITY RATIO: 0.92
ECHO AV VTI: 21.3 CM
ECHO BSA: 2.38 M2
ECHO LA AREA 2C: 17.4 CM2
ECHO LA AREA 4C: 14.5 CM2
ECHO LA DIAMETER INDEX: 1.45 CM/M2
ECHO LA DIAMETER: 3.4 CM
ECHO LA MAJOR AXIS: 4.3 CM
ECHO LA MINOR AXIS: 4.7 CM
ECHO LA TO AORTIC ROOT RATIO: 1
ECHO LA VOL BP: 43 ML (ref 18–58)
ECHO LA VOL MOD A2C: 45 ML (ref 18–58)
ECHO LA VOL MOD A4C: 38 ML (ref 18–58)
ECHO LA VOL/BSA BIPLANE: 18 ML/M2 (ref 16–34)
ECHO LA VOLUME INDEX MOD A2C: 19 ML/M2 (ref 16–34)
ECHO LA VOLUME INDEX MOD A4C: 16 ML/M2 (ref 16–34)
ECHO LV E' LATERAL VELOCITY: 9 CM/S
ECHO LV E' SEPTAL VELOCITY: 7 CM/S
ECHO LV FRACTIONAL SHORTENING: 31 % (ref 28–44)
ECHO LV GLOBAL LONGITUDINAL STRAIN (GLS): -10.1 %
ECHO LV GLOBAL LONGITUDINAL STRAIN (GLS): -9.2 %
ECHO LV GLOBAL LONGITUDINAL STRAIN (GLS): -9.4 %
ECHO LV GLOBAL LONGITUDINAL STRAIN (GLS): -9.6 %
ECHO LV INTERNAL DIMENSION DIASTOLE INDEX: 1.91 CM/M2
ECHO LV INTERNAL DIMENSION DIASTOLIC: 4.5 CM (ref 4.2–5.9)
ECHO LV INTERNAL DIMENSION SYSTOLIC INDEX: 1.32 CM/M2
ECHO LV INTERNAL DIMENSION SYSTOLIC: 3.1 CM
ECHO LV IVSD: 1.2 CM (ref 0.6–1)
ECHO LV MASS 2D: 186.4 G (ref 88–224)
ECHO LV MASS INDEX 2D: 79.3 G/M2 (ref 49–115)
ECHO LV POSTERIOR WALL DIASTOLIC: 1.1 CM (ref 0.6–1)
ECHO LV RELATIVE WALL THICKNESS RATIO: 0.49
ECHO LVOT AREA: 3.8 CM2
ECHO LVOT AV VTI INDEX: 0.87
ECHO LVOT DIAM: 2.2 CM
ECHO LVOT MEAN GRADIENT: 2 MMHG
ECHO LVOT PEAK GRADIENT: 4 MMHG
ECHO LVOT PEAK VELOCITY: 1.1 M/S
ECHO LVOT STROKE VOLUME INDEX: 30.1 ML/M2
ECHO LVOT SV: 70.7 ML
ECHO LVOT VTI: 18.6 CM
ECHO MV A VELOCITY: 0.84 M/S
ECHO MV E DECELERATION TIME (DT): 174 MS
ECHO MV E VELOCITY: 1.09 M/S
ECHO MV E/A RATIO: 1.3
ECHO MV E/E' LATERAL: 12.11
ECHO MV E/E' RATIO (AVERAGED): 13.84
ECHO MV REGURGITANT PEAK GRADIENT: 21 MMHG
ECHO MV REGURGITANT PEAK VELOCITY: 2.3 M/S
ECHO PV MAX VELOCITY: 1 M/S
ECHO PV MEAN GRADIENT: 2 MMHG
ECHO PV MEAN VELOCITY: 0.7 M/S
ECHO PV PEAK GRADIENT: 4 MMHG
ECHO PV VTI: 20.1 CM
ECHO RA AREA 4C: 8 CM2
ECHO RA END SYSTOLIC VOLUME APICAL 4 CHAMBER INDEX BSA: 6 ML/M2
ECHO RA VOLUME: 13 ML
ECHO RV BASAL DIMENSION: 3.2 CM
ECHO RV LONGITUDINAL DIMENSION: 8.5 CM
ECHO RV MID DIMENSION: 2.2 CM
ECHO RV TAPSE: 2.4 CM (ref 1.7–?)
ECHO RVOT MEAN GRADIENT: 1 MMHG
ECHO RVOT PEAK GRADIENT: 2 MMHG
ECHO RVOT PEAK VELOCITY: 0.7 M/S
ECHO RVOT VTI: 13.2 CM
ECHO TV REGURGITANT MAX VELOCITY: 2.35 M/S
ECHO TV REGURGITANT PEAK GRADIENT: 22 MMHG
EOSINOPHIL # BLD: 0 K/UL (ref 0–0.4)
EOSINOPHIL NFR BLD: 0 % (ref 0–7)
ERYTHROCYTE [DISTWIDTH] IN BLOOD BY AUTOMATED COUNT: 14.8 % (ref 11.5–14.5)
GLUCOSE BLD STRIP.AUTO-MCNC: 246 MG/DL (ref 65–100)
GLUCOSE BLD STRIP.AUTO-MCNC: 274 MG/DL (ref 65–100)
GLUCOSE BLD STRIP.AUTO-MCNC: 276 MG/DL (ref 65–100)
GLUCOSE BLD STRIP.AUTO-MCNC: 299 MG/DL (ref 65–100)
GLUCOSE BLD STRIP.AUTO-MCNC: 304 MG/DL (ref 65–100)
GLUCOSE SERPL-MCNC: 275 MG/DL (ref 65–100)
GRAM STN SPEC: NORMAL
GRAM STN SPEC: NORMAL
HCT VFR BLD AUTO: 31.3 % (ref 36.6–50.3)
HGB BLD-MCNC: 10.9 G/DL (ref 12.1–17)
IMM GRANULOCYTES # BLD AUTO: 0.1 K/UL (ref 0–0.04)
IMM GRANULOCYTES NFR BLD AUTO: 1 % (ref 0–0.5)
LYMPHOCYTES # BLD: 1.9 K/UL (ref 0.8–3.5)
LYMPHOCYTES NFR BLD: 12 % (ref 12–49)
Lab: NORMAL
MAGNESIUM SERPL-MCNC: 2.1 MG/DL (ref 1.6–2.4)
MCH RBC QN AUTO: 28.5 PG (ref 26–34)
MCHC RBC AUTO-ENTMCNC: 34.8 G/DL (ref 30–36.5)
MCV RBC AUTO: 81.9 FL (ref 80–99)
MONOCYTES # BLD: 0.8 K/UL (ref 0–1)
MONOCYTES NFR BLD: 5 % (ref 5–13)
NEUTS SEG # BLD: 12.2 K/UL (ref 1.8–8)
NEUTS SEG NFR BLD: 82 % (ref 32–75)
NRBC # BLD: 0 K/UL (ref 0–0.01)
NRBC BLD-RTO: 0 PER 100 WBC
PERFORMED BY:: ABNORMAL
PHOSPHATE SERPL-MCNC: 3 MG/DL (ref 2.6–4.7)
PLATELET # BLD AUTO: 222 K/UL (ref 150–400)
PMV BLD AUTO: 10.7 FL (ref 8.9–12.9)
POTASSIUM SERPL-SCNC: 4 MMOL/L (ref 3.5–5.1)
PROCALCITONIN SERPL-MCNC: 3.64 NG/ML
RBC # BLD AUTO: 3.82 M/UL (ref 4.1–5.7)
SODIUM SERPL-SCNC: 141 MMOL/L (ref 136–145)
UFH PPP CHRO-ACNC: 0.27 IU/ML
UFH PPP CHRO-ACNC: 0.35 IU/ML
UFH PPP CHRO-ACNC: 0.42 IU/ML
WBC # BLD AUTO: 15.1 K/UL (ref 4.1–11.1)

## 2024-03-22 PROCEDURE — 84145 PROCALCITONIN (PCT): CPT

## 2024-03-22 PROCEDURE — 83735 ASSAY OF MAGNESIUM: CPT

## 2024-03-22 PROCEDURE — 6360000002 HC RX W HCPCS: Performed by: STUDENT IN AN ORGANIZED HEALTH CARE EDUCATION/TRAINING PROGRAM

## 2024-03-22 PROCEDURE — 2580000003 HC RX 258: Performed by: INTERNAL MEDICINE

## 2024-03-22 PROCEDURE — 94761 N-INVAS EAR/PLS OXIMETRY MLT: CPT

## 2024-03-22 PROCEDURE — 36415 COLL VENOUS BLD VENIPUNCTURE: CPT

## 2024-03-22 PROCEDURE — 6370000000 HC RX 637 (ALT 250 FOR IP): Performed by: INTERNAL MEDICINE

## 2024-03-22 PROCEDURE — 94003 VENT MGMT INPAT SUBQ DAY: CPT

## 2024-03-22 PROCEDURE — 6360000004 HC RX CONTRAST MEDICATION

## 2024-03-22 PROCEDURE — 6360000002 HC RX W HCPCS: Performed by: INTERNAL MEDICINE

## 2024-03-22 PROCEDURE — 6370000000 HC RX 637 (ALT 250 FOR IP)

## 2024-03-22 PROCEDURE — 85520 HEPARIN ASSAY: CPT

## 2024-03-22 PROCEDURE — 2500000003 HC RX 250 WO HCPCS: Performed by: INTERNAL MEDICINE

## 2024-03-22 PROCEDURE — 6370000000 HC RX 637 (ALT 250 FOR IP): Performed by: HOSPITALIST

## 2024-03-22 PROCEDURE — 2000000000 HC ICU R&B

## 2024-03-22 PROCEDURE — 80069 RENAL FUNCTION PANEL: CPT

## 2024-03-22 PROCEDURE — 93325 DOPPLER ECHO COLOR FLOW MAPG: CPT

## 2024-03-22 PROCEDURE — 85025 COMPLETE CBC W/AUTO DIFF WBC: CPT

## 2024-03-22 PROCEDURE — 82962 GLUCOSE BLOOD TEST: CPT

## 2024-03-22 RX ORDER — INSULIN GLARGINE 100 [IU]/ML
20 INJECTION, SOLUTION SUBCUTANEOUS EVERY 12 HOURS
Status: DISCONTINUED | OUTPATIENT
Start: 2024-03-22 | End: 2024-03-23

## 2024-03-22 RX ORDER — INSULIN GLARGINE 100 [IU]/ML
5 INJECTION, SOLUTION SUBCUTANEOUS ONCE
Status: COMPLETED | OUTPATIENT
Start: 2024-03-22 | End: 2024-03-22

## 2024-03-22 RX ORDER — FUROSEMIDE 10 MG/ML
20 INJECTION INTRAMUSCULAR; INTRAVENOUS ONCE
Status: COMPLETED | OUTPATIENT
Start: 2024-03-22 | End: 2024-03-22

## 2024-03-22 RX ADMIN — ESCITALOPRAM OXALATE 20 MG: 10 TABLET ORAL at 09:04

## 2024-03-22 RX ADMIN — AMIODARONE HYDROCHLORIDE 0.5 MG/MIN: 1.8 INJECTION, SOLUTION INTRAVENOUS at 20:28

## 2024-03-22 RX ADMIN — INSULIN LISPRO 4 UNITS: 100 INJECTION, SOLUTION INTRAVENOUS; SUBCUTANEOUS at 12:38

## 2024-03-22 RX ADMIN — PIPERACILLIN AND TAZOBACTAM 3375 MG: 3; .375 INJECTION, POWDER, LYOPHILIZED, FOR SOLUTION INTRAVENOUS at 14:13

## 2024-03-22 RX ADMIN — FENTANYL CITRATE 100 MCG/HR: at 03:18

## 2024-03-22 RX ADMIN — PIPERACILLIN AND TAZOBACTAM 3375 MG: 3; .375 INJECTION, POWDER, LYOPHILIZED, FOR SOLUTION INTRAVENOUS at 21:33

## 2024-03-22 RX ADMIN — FENTANYL CITRATE 25 MCG/HR: at 18:27

## 2024-03-22 RX ADMIN — SODIUM CHLORIDE: 4.5 INJECTION, SOLUTION INTRAVENOUS at 11:16

## 2024-03-22 RX ADMIN — PROPOFOL 10 MCG/KG/MIN: 10 INJECTION, EMULSION INTRAVENOUS at 16:41

## 2024-03-22 RX ADMIN — INSULIN GLARGINE 15 UNITS: 100 INJECTION, SOLUTION SUBCUTANEOUS at 03:20

## 2024-03-22 RX ADMIN — AMIODARONE HYDROCHLORIDE 0.5 MG/MIN: 1.8 INJECTION, SOLUTION INTRAVENOUS at 07:25

## 2024-03-22 RX ADMIN — PERFLUTREN 1 ML: 6.52 INJECTION, SUSPENSION INTRAVENOUS at 11:58

## 2024-03-22 RX ADMIN — FUROSEMIDE 20 MG: 10 INJECTION, SOLUTION INTRAMUSCULAR; INTRAVENOUS at 18:26

## 2024-03-22 RX ADMIN — CHLORHEXIDINE GLUCONATE 0.12% ORAL RINSE 15 ML: 1.2 LIQUID ORAL at 09:04

## 2024-03-22 RX ADMIN — SODIUM CHLORIDE, PRESERVATIVE FREE 10 ML: 5 INJECTION INTRAVENOUS at 09:47

## 2024-03-22 RX ADMIN — PROPOFOL 25 MCG/KG/MIN: 10 INJECTION, EMULSION INTRAVENOUS at 07:24

## 2024-03-22 RX ADMIN — SODIUM CHLORIDE: 4.5 INJECTION, SOLUTION INTRAVENOUS at 02:56

## 2024-03-22 RX ADMIN — PROPOFOL 25 MCG/KG/MIN: 10 INJECTION, EMULSION INTRAVENOUS at 02:32

## 2024-03-22 RX ADMIN — LANSOPRAZOLE 30 MG: 30 TABLET, ORALLY DISINTEGRATING ORAL at 09:04

## 2024-03-22 RX ADMIN — INSULIN LISPRO 4 UNITS: 100 INJECTION, SOLUTION INTRAVENOUS; SUBCUTANEOUS at 16:48

## 2024-03-22 RX ADMIN — GABAPENTIN 200 MG: 100 CAPSULE ORAL at 09:04

## 2024-03-22 RX ADMIN — HEPARIN SODIUM 2000 UNITS: 1000 INJECTION INTRAVENOUS; SUBCUTANEOUS at 07:25

## 2024-03-22 RX ADMIN — INSULIN LISPRO 2 UNITS: 100 INJECTION, SOLUTION INTRAVENOUS; SUBCUTANEOUS at 20:00

## 2024-03-22 RX ADMIN — ROSUVASTATIN CALCIUM 40 MG: 20 TABLET, COATED ORAL at 21:18

## 2024-03-22 RX ADMIN — SODIUM CHLORIDE, PRESERVATIVE FREE 10 ML: 5 INJECTION INTRAVENOUS at 21:18

## 2024-03-22 RX ADMIN — INSULIN GLARGINE 5 UNITS: 100 INJECTION, SOLUTION SUBCUTANEOUS at 09:46

## 2024-03-22 RX ADMIN — CLOPIDOGREL BISULFATE 75 MG: 75 TABLET ORAL at 09:04

## 2024-03-22 RX ADMIN — ALPRAZOLAM 1 MG: 0.5 TABLET ORAL at 21:18

## 2024-03-22 RX ADMIN — ALPRAZOLAM 1 MG: 0.5 TABLET ORAL at 09:04

## 2024-03-22 RX ADMIN — INSULIN LISPRO 6 UNITS: 100 INJECTION, SOLUTION INTRAVENOUS; SUBCUTANEOUS at 09:04

## 2024-03-22 RX ADMIN — INSULIN GLARGINE 20 UNITS: 100 INJECTION, SOLUTION SUBCUTANEOUS at 21:18

## 2024-03-22 RX ADMIN — CHLORHEXIDINE GLUCONATE 0.12% ORAL RINSE 15 ML: 1.2 LIQUID ORAL at 21:18

## 2024-03-22 RX ADMIN — INSULIN LISPRO 4 UNITS: 100 INJECTION, SOLUTION INTRAVENOUS; SUBCUTANEOUS at 05:04

## 2024-03-22 RX ADMIN — HEPARIN SODIUM 9 UNITS/KG/HR: 10000 INJECTION, SOLUTION INTRAVENOUS at 03:26

## 2024-03-22 RX ADMIN — ASPIRIN 81 MG: 81 TABLET, CHEWABLE ORAL at 09:04

## 2024-03-22 RX ADMIN — PIPERACILLIN AND TAZOBACTAM 3375 MG: 3; .375 INJECTION, POWDER, LYOPHILIZED, FOR SOLUTION INTRAVENOUS at 06:27

## 2024-03-22 ASSESSMENT — PULMONARY FUNCTION TESTS
PIF_VALUE: 17
PIF_VALUE: 19
PIF_VALUE: 20
PIF_VALUE: 17
PIF_VALUE: 17
PIF_VALUE: 18
PIF_VALUE: 17
PIF_VALUE: 20
PIF_VALUE: 20
PIF_VALUE: 19
PIF_VALUE: 18
PIF_VALUE: 17
PIF_VALUE: 19
PIF_VALUE: 17
PIF_VALUE: 19
PIF_VALUE: 20
PIF_VALUE: 19
PIF_VALUE: 20
PIF_VALUE: 19
PIF_VALUE: 18
PIF_VALUE: 17
PIF_VALUE: 18
PIF_VALUE: 20
PIF_VALUE: 19
PIF_VALUE: 18
PIF_VALUE: 20
PIF_VALUE: 19
PIF_VALUE: 20
PIF_VALUE: 17
PIF_VALUE: 19
PIF_VALUE: 17
PIF_VALUE: 18

## 2024-03-22 NOTE — CARE COORDINATION
CM reviewed Pt medicals, Pt is on the vent.     Pt lives with his 7 year old daughter, his support system is his mom and sister.     CM will follow for D/C needs

## 2024-03-23 ENCOUNTER — APPOINTMENT (OUTPATIENT)
Facility: HOSPITAL | Age: 54
DRG: 720 | End: 2024-03-23
Payer: COMMERCIAL

## 2024-03-23 LAB
ALBUMIN SERPL-MCNC: 2 G/DL (ref 3.5–5)
ANION GAP SERPL CALC-SCNC: 6 MMOL/L (ref 5–15)
ARTERIAL PATENCY WRIST A: YES
BASE EXCESS BLDA CALC-SCNC: 1.7 MMOL/L (ref 0–3)
BASOPHILS # BLD: 0.1 K/UL (ref 0–0.1)
BASOPHILS NFR BLD: 0 % (ref 0–1)
BDY SITE: ABNORMAL
BODY TEMPERATURE: 99.4
BUN SERPL-MCNC: 33 MG/DL (ref 6–20)
BUN/CREAT SERPL: 12 (ref 12–20)
CA-I BLD-MCNC: 7.9 MG/DL (ref 8.5–10.1)
CHLORIDE SERPL-SCNC: 109 MMOL/L (ref 97–108)
CHOLEST SERPL-MCNC: 86 MG/DL
CO2 SERPL-SCNC: 26 MMOL/L (ref 21–32)
COHGB MFR BLD: 0.2 % (ref 1–2)
CREAT SERPL-MCNC: 2.66 MG/DL (ref 0.7–1.3)
DIFFERENTIAL METHOD BLD: ABNORMAL
EOSINOPHIL # BLD: 0 K/UL (ref 0–0.4)
EOSINOPHIL NFR BLD: 0 % (ref 0–7)
ERYTHROCYTE [DISTWIDTH] IN BLOOD BY AUTOMATED COUNT: 14.7 % (ref 11.5–14.5)
FIO2 ON VENT: 25 %
GAS FLOW.O2 SETTING OXYMISER: 18
GLUCOSE BLD STRIP.AUTO-MCNC: 205 MG/DL (ref 65–100)
GLUCOSE BLD STRIP.AUTO-MCNC: 217 MG/DL (ref 65–100)
GLUCOSE BLD STRIP.AUTO-MCNC: 223 MG/DL (ref 65–100)
GLUCOSE BLD STRIP.AUTO-MCNC: 238 MG/DL (ref 65–100)
GLUCOSE BLD STRIP.AUTO-MCNC: 259 MG/DL (ref 65–100)
GLUCOSE BLD STRIP.AUTO-MCNC: 275 MG/DL (ref 65–100)
GLUCOSE BLD STRIP.AUTO-MCNC: 275 MG/DL (ref 65–100)
GLUCOSE SERPL-MCNC: 289 MG/DL (ref 65–100)
HCO3 BLDA-SCNC: 24 MMOL/L (ref 22–26)
HCT VFR BLD AUTO: 30.9 % (ref 36.6–50.3)
HDLC SERPL-MCNC: 27 MG/DL
HDLC SERPL: 3.2 (ref 0–5)
HGB BLD-MCNC: 10.8 G/DL (ref 12.1–17)
IMM GRANULOCYTES # BLD AUTO: 0.1 K/UL (ref 0–0.04)
IMM GRANULOCYTES NFR BLD AUTO: 1 % (ref 0–0.5)
LDLC SERPL CALC-MCNC: 37 MG/DL (ref 0–100)
LIPID PANEL: NORMAL
LYMPHOCYTES # BLD: 1.5 K/UL (ref 0.8–3.5)
LYMPHOCYTES NFR BLD: 12 % (ref 12–49)
MAGNESIUM SERPL-MCNC: 1.9 MG/DL (ref 1.6–2.4)
MCH RBC QN AUTO: 28.2 PG (ref 26–34)
MCHC RBC AUTO-ENTMCNC: 35 G/DL (ref 30–36.5)
MCV RBC AUTO: 80.7 FL (ref 80–99)
METHGB MFR BLD: 0.4 % (ref 0–1.4)
MONOCYTES # BLD: 0.6 K/UL (ref 0–1)
MONOCYTES NFR BLD: 5 % (ref 5–13)
NEUTS SEG # BLD: 10 K/UL (ref 1.8–8)
NEUTS SEG NFR BLD: 82 % (ref 32–75)
NRBC # BLD: 0 K/UL (ref 0–0.01)
NRBC BLD-RTO: 0 PER 100 WBC
OXYHGB MFR BLD: 94.1 % (ref 95–99)
PCO2 BLDA: 31 MMHG (ref 35–45)
PEEP RESPIRATORY: 5
PERFORMED BY:: ABNORMAL
PH BLDA: 7.51 (ref 7.35–7.45)
PHOSPHATE SERPL-MCNC: 2.2 MG/DL (ref 2.6–4.7)
PLATELET # BLD AUTO: 204 K/UL (ref 150–400)
PMV BLD AUTO: 11.3 FL (ref 8.9–12.9)
PO2 BLDA: 72 MMHG (ref 80–100)
POTASSIUM SERPL-SCNC: 3.9 MMOL/L (ref 3.5–5.1)
RBC # BLD AUTO: 3.83 M/UL (ref 4.1–5.7)
SAO2 % BLD: 95 % (ref 95–99)
SAO2% DEVICE SAO2% SENSOR NAME: ABNORMAL
SODIUM SERPL-SCNC: 141 MMOL/L (ref 136–145)
SPECIMEN SITE: ABNORMAL
TRIGL SERPL-MCNC: 110 MG/DL
UFH PPP CHRO-ACNC: 0.32 IU/ML
VENTILATION MODE VENT: ABNORMAL
VLDLC SERPL CALC-MCNC: 22 MG/DL
VT SETTING VENT: 480
WBC # BLD AUTO: 12.2 K/UL (ref 4.1–11.1)

## 2024-03-23 PROCEDURE — 6360000002 HC RX W HCPCS: Performed by: INTERNAL MEDICINE

## 2024-03-23 PROCEDURE — 85520 HEPARIN ASSAY: CPT

## 2024-03-23 PROCEDURE — 71045 X-RAY EXAM CHEST 1 VIEW: CPT

## 2024-03-23 PROCEDURE — 36600 WITHDRAWAL OF ARTERIAL BLOOD: CPT

## 2024-03-23 PROCEDURE — 6370000000 HC RX 637 (ALT 250 FOR IP): Performed by: INTERNAL MEDICINE

## 2024-03-23 PROCEDURE — 83735 ASSAY OF MAGNESIUM: CPT

## 2024-03-23 PROCEDURE — 80061 LIPID PANEL: CPT

## 2024-03-23 PROCEDURE — 6370000000 HC RX 637 (ALT 250 FOR IP): Performed by: HOSPITALIST

## 2024-03-23 PROCEDURE — 82962 GLUCOSE BLOOD TEST: CPT

## 2024-03-23 PROCEDURE — 2580000003 HC RX 258: Performed by: INTERNAL MEDICINE

## 2024-03-23 PROCEDURE — 82803 BLOOD GASES ANY COMBINATION: CPT

## 2024-03-23 PROCEDURE — 2500000003 HC RX 250 WO HCPCS: Performed by: INTERNAL MEDICINE

## 2024-03-23 PROCEDURE — 94003 VENT MGMT INPAT SUBQ DAY: CPT

## 2024-03-23 PROCEDURE — 6360000002 HC RX W HCPCS: Performed by: STUDENT IN AN ORGANIZED HEALTH CARE EDUCATION/TRAINING PROGRAM

## 2024-03-23 PROCEDURE — 85025 COMPLETE CBC W/AUTO DIFF WBC: CPT

## 2024-03-23 PROCEDURE — 6370000000 HC RX 637 (ALT 250 FOR IP)

## 2024-03-23 PROCEDURE — 36415 COLL VENOUS BLD VENIPUNCTURE: CPT

## 2024-03-23 PROCEDURE — 80069 RENAL FUNCTION PANEL: CPT

## 2024-03-23 PROCEDURE — 94761 N-INVAS EAR/PLS OXIMETRY MLT: CPT

## 2024-03-23 PROCEDURE — 2000000000 HC ICU R&B

## 2024-03-23 RX ORDER — INSULIN GLARGINE 100 [IU]/ML
24 INJECTION, SOLUTION SUBCUTANEOUS EVERY 12 HOURS
Status: DISCONTINUED | OUTPATIENT
Start: 2024-03-23 | End: 2024-03-24

## 2024-03-23 RX ORDER — FUROSEMIDE 10 MG/ML
40 INJECTION INTRAMUSCULAR; INTRAVENOUS ONCE
Status: COMPLETED | OUTPATIENT
Start: 2024-03-23 | End: 2024-03-23

## 2024-03-23 RX ADMIN — PROPOFOL 15 MCG/KG/MIN: 10 INJECTION, EMULSION INTRAVENOUS at 01:44

## 2024-03-23 RX ADMIN — INSULIN LISPRO 4 UNITS: 100 INJECTION, SOLUTION INTRAVENOUS; SUBCUTANEOUS at 21:14

## 2024-03-23 RX ADMIN — PROPOFOL 15 MCG/KG/MIN: 10 INJECTION, EMULSION INTRAVENOUS at 20:23

## 2024-03-23 RX ADMIN — INSULIN LISPRO 4 UNITS: 100 INJECTION, SOLUTION INTRAVENOUS; SUBCUTANEOUS at 04:20

## 2024-03-23 RX ADMIN — ASPIRIN 81 MG: 81 TABLET, CHEWABLE ORAL at 09:07

## 2024-03-23 RX ADMIN — ROSUVASTATIN CALCIUM 40 MG: 20 TABLET, COATED ORAL at 20:16

## 2024-03-23 RX ADMIN — INSULIN LISPRO 2 UNITS: 100 INJECTION, SOLUTION INTRAVENOUS; SUBCUTANEOUS at 12:54

## 2024-03-23 RX ADMIN — INSULIN GLARGINE 20 UNITS: 100 INJECTION, SOLUTION SUBCUTANEOUS at 09:00

## 2024-03-23 RX ADMIN — SODIUM CHLORIDE, PRESERVATIVE FREE 10 ML: 5 INJECTION INTRAVENOUS at 09:10

## 2024-03-23 RX ADMIN — FUROSEMIDE 40 MG: 10 INJECTION, SOLUTION INTRAMUSCULAR; INTRAVENOUS at 14:31

## 2024-03-23 RX ADMIN — CLOPIDOGREL BISULFATE 75 MG: 75 TABLET ORAL at 09:07

## 2024-03-23 RX ADMIN — INSULIN GLARGINE 24 UNITS: 100 INJECTION, SOLUTION SUBCUTANEOUS at 21:13

## 2024-03-23 RX ADMIN — PROPOFOL 15 MCG/KG/MIN: 10 INJECTION, EMULSION INTRAVENOUS at 10:44

## 2024-03-23 RX ADMIN — CHLORHEXIDINE GLUCONATE 0.12% ORAL RINSE 15 ML: 1.2 LIQUID ORAL at 09:05

## 2024-03-23 RX ADMIN — AMIODARONE HYDROCHLORIDE 0.5 MG/MIN: 1.8 INJECTION, SOLUTION INTRAVENOUS at 21:14

## 2024-03-23 RX ADMIN — INSULIN LISPRO 4 UNITS: 100 INJECTION, SOLUTION INTRAVENOUS; SUBCUTANEOUS at 23:56

## 2024-03-23 RX ADMIN — INSULIN LISPRO 2 UNITS: 100 INJECTION, SOLUTION INTRAVENOUS; SUBCUTANEOUS at 16:41

## 2024-03-23 RX ADMIN — PIPERACILLIN AND TAZOBACTAM 3375 MG: 3; .375 INJECTION, POWDER, LYOPHILIZED, FOR SOLUTION INTRAVENOUS at 05:27

## 2024-03-23 RX ADMIN — ARIPIPRAZOLE 5 MG: 5 TABLET ORAL at 20:16

## 2024-03-23 RX ADMIN — PIPERACILLIN AND TAZOBACTAM 3375 MG: 3; .375 INJECTION, POWDER, LYOPHILIZED, FOR SOLUTION INTRAVENOUS at 14:00

## 2024-03-23 RX ADMIN — CHLORHEXIDINE GLUCONATE 0.12% ORAL RINSE 15 ML: 1.2 LIQUID ORAL at 20:16

## 2024-03-23 RX ADMIN — ESCITALOPRAM OXALATE 20 MG: 10 TABLET ORAL at 09:07

## 2024-03-23 RX ADMIN — ALPRAZOLAM 1 MG: 0.5 TABLET ORAL at 09:07

## 2024-03-23 RX ADMIN — ALPRAZOLAM 1 MG: 0.5 TABLET ORAL at 20:16

## 2024-03-23 RX ADMIN — ACETAMINOPHEN 650 MG: 325 TABLET ORAL at 09:07

## 2024-03-23 RX ADMIN — AMIODARONE HYDROCHLORIDE 0.5 MG/MIN: 1.8 INJECTION, SOLUTION INTRAVENOUS at 09:05

## 2024-03-23 RX ADMIN — SODIUM CHLORIDE, PRESERVATIVE FREE 10 ML: 5 INJECTION INTRAVENOUS at 21:02

## 2024-03-23 RX ADMIN — INSULIN LISPRO 2 UNITS: 100 INJECTION, SOLUTION INTRAVENOUS; SUBCUTANEOUS at 09:07

## 2024-03-23 RX ADMIN — HEPARIN SODIUM 11 UNITS/KG/HR: 10000 INJECTION, SOLUTION INTRAVENOUS at 01:45

## 2024-03-23 RX ADMIN — PIPERACILLIN AND TAZOBACTAM 3375 MG: 3; .375 INJECTION, POWDER, LYOPHILIZED, FOR SOLUTION INTRAVENOUS at 21:26

## 2024-03-23 RX ADMIN — GABAPENTIN 200 MG: 100 CAPSULE ORAL at 09:07

## 2024-03-23 RX ADMIN — LANSOPRAZOLE 30 MG: 30 TABLET, ORALLY DISINTEGRATING ORAL at 09:08

## 2024-03-23 RX ADMIN — INSULIN LISPRO 2 UNITS: 100 INJECTION, SOLUTION INTRAVENOUS; SUBCUTANEOUS at 00:30

## 2024-03-23 ASSESSMENT — PULMONARY FUNCTION TESTS
PIF_VALUE: 16
PIF_VALUE: 17
PIF_VALUE: 18
PIF_VALUE: 17
PIF_VALUE: 15
PIF_VALUE: 17
PIF_VALUE: 16
PIF_VALUE: 17
PIF_VALUE: 15
PIF_VALUE: 17
PIF_VALUE: 18
PIF_VALUE: 17
PIF_VALUE: 15
PIF_VALUE: 18
PIF_VALUE: 18
PIF_VALUE: 17
PIF_VALUE: 18
PIF_VALUE: 15
PIF_VALUE: 18
PIF_VALUE: 16
PIF_VALUE: 16
PIF_VALUE: 17
PIF_VALUE: 16
PIF_VALUE: 17
PIF_VALUE: 18
PIF_VALUE: 17
PIF_VALUE: 17
PIF_VALUE: 19
PIF_VALUE: 17
PIF_VALUE: 18
PIF_VALUE: 18

## 2024-03-23 ASSESSMENT — PAIN SCALES - GENERAL
PAINLEVEL_OUTOF10: 0

## 2024-03-24 ENCOUNTER — APPOINTMENT (OUTPATIENT)
Facility: HOSPITAL | Age: 54
DRG: 720 | End: 2024-03-24
Payer: COMMERCIAL

## 2024-03-24 LAB
ALBUMIN SERPL-MCNC: 1.8 G/DL (ref 3.5–5)
ANION GAP SERPL CALC-SCNC: 4 MMOL/L (ref 5–15)
ARTERIAL PATENCY WRIST A: YES
BASE EXCESS BLDA CALC-SCNC: 1.5 MMOL/L (ref 0–3)
BASOPHILS # BLD: 0 K/UL (ref 0–0.1)
BASOPHILS NFR BLD: 0 % (ref 0–1)
BDY SITE: ABNORMAL
BODY TEMPERATURE: 98
BUN SERPL-MCNC: 32 MG/DL (ref 6–20)
BUN/CREAT SERPL: 16 (ref 12–20)
CA-I BLD-MCNC: 8.4 MG/DL (ref 8.5–10.1)
CHLORIDE SERPL-SCNC: 108 MMOL/L (ref 97–108)
CO2 SERPL-SCNC: 29 MMOL/L (ref 21–32)
COHGB MFR BLD: 0.3 % (ref 1–2)
CREAT SERPL-MCNC: 1.98 MG/DL (ref 0.7–1.3)
DIFFERENTIAL METHOD BLD: ABNORMAL
EOSINOPHIL # BLD: 0 K/UL (ref 0–0.4)
EOSINOPHIL NFR BLD: 0 % (ref 0–7)
ERYTHROCYTE [DISTWIDTH] IN BLOOD BY AUTOMATED COUNT: 15.2 % (ref 11.5–14.5)
FIO2 ON VENT: 30 %
GAS FLOW.O2 SETTING OXYMISER: 12
GLUCOSE BLD STRIP.AUTO-MCNC: 177 MG/DL (ref 65–100)
GLUCOSE BLD STRIP.AUTO-MCNC: 246 MG/DL (ref 65–100)
GLUCOSE BLD STRIP.AUTO-MCNC: 262 MG/DL (ref 65–100)
GLUCOSE BLD STRIP.AUTO-MCNC: 263 MG/DL (ref 65–100)
GLUCOSE BLD STRIP.AUTO-MCNC: 265 MG/DL (ref 65–100)
GLUCOSE SERPL-MCNC: 258 MG/DL (ref 65–100)
HCO3 BLDA-SCNC: 26 MMOL/L (ref 22–26)
HCT VFR BLD AUTO: 32.2 % (ref 36.6–50.3)
HGB BLD-MCNC: 11 G/DL (ref 12.1–17)
IMM GRANULOCYTES # BLD AUTO: 0 K/UL (ref 0–0.04)
IMM GRANULOCYTES NFR BLD AUTO: 1 % (ref 0–0.5)
LYMPHOCYTES # BLD: 1.5 K/UL (ref 0.8–3.5)
LYMPHOCYTES NFR BLD: 18 % (ref 12–49)
MCH RBC QN AUTO: 28.2 PG (ref 26–34)
MCHC RBC AUTO-ENTMCNC: 34.2 G/DL (ref 30–36.5)
MCV RBC AUTO: 82.6 FL (ref 80–99)
METHGB MFR BLD: 0.4 % (ref 0–1.4)
MONOCYTES # BLD: 0.5 K/UL (ref 0–1)
MONOCYTES NFR BLD: 6 % (ref 5–13)
NEUTS SEG # BLD: 6.1 K/UL (ref 1.8–8)
NEUTS SEG NFR BLD: 75 % (ref 32–75)
NRBC # BLD: 0 K/UL (ref 0–0.01)
NRBC BLD-RTO: 0 PER 100 WBC
OXYHGB MFR BLD: 96.8 % (ref 95–99)
PCO2 BLDA: 38 MMHG (ref 35–45)
PEEP RESPIRATORY: 5
PERFORMED BY:: ABNORMAL
PH BLDA: 7.44 (ref 7.35–7.45)
PHOSPHATE SERPL-MCNC: 2.9 MG/DL (ref 2.6–4.7)
PLATELET # BLD AUTO: 187 K/UL (ref 150–400)
PMV BLD AUTO: 11.4 FL (ref 8.9–12.9)
PO2 BLDA: 98 MMHG (ref 80–100)
POTASSIUM SERPL-SCNC: 3.9 MMOL/L (ref 3.5–5.1)
RBC # BLD AUTO: 3.9 M/UL (ref 4.1–5.7)
SAO2 % BLD: 98 % (ref 95–99)
SAO2% DEVICE SAO2% SENSOR NAME: ABNORMAL
SODIUM SERPL-SCNC: 141 MMOL/L (ref 136–145)
SPECIMEN SITE: ABNORMAL
UFH PPP CHRO-ACNC: 0.19 IU/ML
UFH PPP CHRO-ACNC: 0.4 IU/ML
UFH PPP CHRO-ACNC: 0.51 IU/ML
VENTILATION MODE VENT: ABNORMAL
VT SETTING VENT: 480
WBC # BLD AUTO: 8.1 K/UL (ref 4.1–11.1)

## 2024-03-24 PROCEDURE — 94003 VENT MGMT INPAT SUBQ DAY: CPT

## 2024-03-24 PROCEDURE — 2000000000 HC ICU R&B

## 2024-03-24 PROCEDURE — 6360000002 HC RX W HCPCS: Performed by: INTERNAL MEDICINE

## 2024-03-24 PROCEDURE — 85520 HEPARIN ASSAY: CPT

## 2024-03-24 PROCEDURE — 6370000000 HC RX 637 (ALT 250 FOR IP): Performed by: INTERNAL MEDICINE

## 2024-03-24 PROCEDURE — 82803 BLOOD GASES ANY COMBINATION: CPT

## 2024-03-24 PROCEDURE — 94761 N-INVAS EAR/PLS OXIMETRY MLT: CPT

## 2024-03-24 PROCEDURE — 6370000000 HC RX 637 (ALT 250 FOR IP): Performed by: HOSPITALIST

## 2024-03-24 PROCEDURE — 80069 RENAL FUNCTION PANEL: CPT

## 2024-03-24 PROCEDURE — 6370000000 HC RX 637 (ALT 250 FOR IP)

## 2024-03-24 PROCEDURE — 82962 GLUCOSE BLOOD TEST: CPT

## 2024-03-24 PROCEDURE — 2580000003 HC RX 258: Performed by: INTERNAL MEDICINE

## 2024-03-24 PROCEDURE — 2500000003 HC RX 250 WO HCPCS: Performed by: INTERNAL MEDICINE

## 2024-03-24 PROCEDURE — 6360000002 HC RX W HCPCS: Performed by: STUDENT IN AN ORGANIZED HEALTH CARE EDUCATION/TRAINING PROGRAM

## 2024-03-24 PROCEDURE — 36600 WITHDRAWAL OF ARTERIAL BLOOD: CPT

## 2024-03-24 PROCEDURE — 36415 COLL VENOUS BLD VENIPUNCTURE: CPT

## 2024-03-24 PROCEDURE — 85025 COMPLETE CBC W/AUTO DIFF WBC: CPT

## 2024-03-24 PROCEDURE — 71045 X-RAY EXAM CHEST 1 VIEW: CPT

## 2024-03-24 RX ORDER — AMLODIPINE BESYLATE 5 MG/1
5 TABLET ORAL DAILY
Status: DISCONTINUED | OUTPATIENT
Start: 2024-03-24 | End: 2024-03-26

## 2024-03-24 RX ORDER — HYDRALAZINE HYDROCHLORIDE 20 MG/ML
10 INJECTION INTRAMUSCULAR; INTRAVENOUS EVERY 6 HOURS PRN
Status: DISCONTINUED | OUTPATIENT
Start: 2024-03-24 | End: 2024-03-26

## 2024-03-24 RX ORDER — INSULIN GLARGINE 100 [IU]/ML
28 INJECTION, SOLUTION SUBCUTANEOUS EVERY 12 HOURS
Status: DISCONTINUED | OUTPATIENT
Start: 2024-03-24 | End: 2024-03-27 | Stop reason: HOSPADM

## 2024-03-24 RX ADMIN — CLOPIDOGREL BISULFATE 75 MG: 75 TABLET ORAL at 08:19

## 2024-03-24 RX ADMIN — PIPERACILLIN AND TAZOBACTAM 3375 MG: 3; .375 INJECTION, POWDER, LYOPHILIZED, FOR SOLUTION INTRAVENOUS at 05:48

## 2024-03-24 RX ADMIN — INSULIN GLARGINE 24 UNITS: 100 INJECTION, SOLUTION SUBCUTANEOUS at 08:18

## 2024-03-24 RX ADMIN — ALPRAZOLAM 1 MG: 0.5 TABLET ORAL at 08:19

## 2024-03-24 RX ADMIN — FENTANYL CITRATE 25 MCG/HR: at 05:55

## 2024-03-24 RX ADMIN — INSULIN LISPRO 4 UNITS: 100 INJECTION, SOLUTION INTRAVENOUS; SUBCUTANEOUS at 04:35

## 2024-03-24 RX ADMIN — CHLORHEXIDINE GLUCONATE 0.12% ORAL RINSE 15 ML: 1.2 LIQUID ORAL at 19:52

## 2024-03-24 RX ADMIN — ALPRAZOLAM 1 MG: 0.5 TABLET ORAL at 19:52

## 2024-03-24 RX ADMIN — INSULIN LISPRO 4 UNITS: 100 INJECTION, SOLUTION INTRAVENOUS; SUBCUTANEOUS at 15:43

## 2024-03-24 RX ADMIN — SODIUM CHLORIDE, PRESERVATIVE FREE 10 ML: 5 INJECTION INTRAVENOUS at 19:52

## 2024-03-24 RX ADMIN — LANSOPRAZOLE 30 MG: 30 TABLET, ORALLY DISINTEGRATING ORAL at 08:19

## 2024-03-24 RX ADMIN — AMLODIPINE BESYLATE 5 MG: 5 TABLET ORAL at 14:28

## 2024-03-24 RX ADMIN — HEPARIN SODIUM 2000 UNITS: 1000 INJECTION INTRAVENOUS; SUBCUTANEOUS at 05:53

## 2024-03-24 RX ADMIN — INSULIN GLARGINE 28 UNITS: 100 INJECTION, SOLUTION SUBCUTANEOUS at 20:11

## 2024-03-24 RX ADMIN — SODIUM CHLORIDE, PRESERVATIVE FREE 10 ML: 5 INJECTION INTRAVENOUS at 08:19

## 2024-03-24 RX ADMIN — ASPIRIN 81 MG: 81 TABLET, CHEWABLE ORAL at 08:19

## 2024-03-24 RX ADMIN — PIPERACILLIN AND TAZOBACTAM 3375 MG: 3; .375 INJECTION, POWDER, LYOPHILIZED, FOR SOLUTION INTRAVENOUS at 14:28

## 2024-03-24 RX ADMIN — CHLORHEXIDINE GLUCONATE 0.12% ORAL RINSE 15 ML: 1.2 LIQUID ORAL at 08:19

## 2024-03-24 RX ADMIN — INSULIN LISPRO 4 UNITS: 100 INJECTION, SOLUTION INTRAVENOUS; SUBCUTANEOUS at 11:43

## 2024-03-24 RX ADMIN — PIPERACILLIN AND TAZOBACTAM 3375 MG: 3; .375 INJECTION, POWDER, LYOPHILIZED, FOR SOLUTION INTRAVENOUS at 22:01

## 2024-03-24 RX ADMIN — ROSUVASTATIN CALCIUM 40 MG: 20 TABLET, COATED ORAL at 19:52

## 2024-03-24 RX ADMIN — HEPARIN SODIUM 13 UNITS/KG/HR: 10000 INJECTION, SOLUTION INTRAVENOUS at 22:55

## 2024-03-24 RX ADMIN — PROPOFOL 15 MCG/KG/MIN: 10 INJECTION, EMULSION INTRAVENOUS at 04:57

## 2024-03-24 RX ADMIN — ESCITALOPRAM OXALATE 20 MG: 10 TABLET ORAL at 08:19

## 2024-03-24 RX ADMIN — INSULIN LISPRO 2 UNITS: 100 INJECTION, SOLUTION INTRAVENOUS; SUBCUTANEOUS at 08:37

## 2024-03-24 RX ADMIN — AMIODARONE HYDROCHLORIDE 0.5 MG/MIN: 1.8 INJECTION, SOLUTION INTRAVENOUS at 19:34

## 2024-03-24 RX ADMIN — ARIPIPRAZOLE 5 MG: 5 TABLET ORAL at 19:52

## 2024-03-24 RX ADMIN — HEPARIN SODIUM 11 UNITS/KG/HR: 10000 INJECTION, SOLUTION INTRAVENOUS at 01:42

## 2024-03-24 ASSESSMENT — PULMONARY FUNCTION TESTS
PIF_VALUE: 18
PIF_VALUE: 17
PIF_VALUE: 18
PIF_VALUE: 17
PIF_VALUE: 16
PIF_VALUE: 17
PIF_VALUE: 18
PIF_VALUE: 17
PIF_VALUE: 18
PIF_VALUE: 16
PIF_VALUE: 18
PIF_VALUE: 17
PIF_VALUE: 18
PIF_VALUE: 20
PIF_VALUE: 16
PIF_VALUE: 16
PIF_VALUE: 18
PIF_VALUE: 17
PIF_VALUE: 16

## 2024-03-24 ASSESSMENT — PAIN SCALES - GENERAL
PAINLEVEL_OUTOF10: 0

## 2024-03-25 ENCOUNTER — APPOINTMENT (OUTPATIENT)
Facility: HOSPITAL | Age: 54
DRG: 720 | End: 2024-03-25
Payer: COMMERCIAL

## 2024-03-25 LAB
ALBUMIN SERPL-MCNC: 1.8 G/DL (ref 3.5–5)
ANION GAP SERPL CALC-SCNC: 4 MMOL/L (ref 5–15)
ARTERIAL PATENCY WRIST A: YES
BASE EXCESS BLDA CALC-SCNC: 4.4 MMOL/L (ref 0–3)
BASOPHILS # BLD: 0 K/UL (ref 0–0.1)
BASOPHILS NFR BLD: 0 % (ref 0–1)
BDY SITE: ABNORMAL
BODY TEMPERATURE: 98.6
BUN SERPL-MCNC: 24 MG/DL (ref 6–20)
BUN/CREAT SERPL: 16 (ref 12–20)
CA-I BLD-MCNC: 8.4 MG/DL (ref 8.5–10.1)
CHLORIDE SERPL-SCNC: 109 MMOL/L (ref 97–108)
CO2 SERPL-SCNC: 31 MMOL/L (ref 21–32)
COHGB MFR BLD: 0.3 % (ref 1–2)
CREAT SERPL-MCNC: 1.54 MG/DL (ref 0.7–1.3)
DIFFERENTIAL METHOD BLD: ABNORMAL
EOSINOPHIL # BLD: 0.1 K/UL (ref 0–0.4)
EOSINOPHIL NFR BLD: 1 % (ref 0–7)
ERYTHROCYTE [DISTWIDTH] IN BLOOD BY AUTOMATED COUNT: 14.8 % (ref 11.5–14.5)
FIO2 ON VENT: 30 %
GAS FLOW.O2 SETTING OXYMISER: 12
GLUCOSE BLD STRIP.AUTO-MCNC: 103 MG/DL (ref 65–100)
GLUCOSE BLD STRIP.AUTO-MCNC: 105 MG/DL (ref 65–100)
GLUCOSE BLD STRIP.AUTO-MCNC: 131 MG/DL (ref 65–100)
GLUCOSE BLD STRIP.AUTO-MCNC: 141 MG/DL (ref 65–100)
GLUCOSE BLD STRIP.AUTO-MCNC: 58 MG/DL (ref 65–100)
GLUCOSE BLD STRIP.AUTO-MCNC: 64 MG/DL (ref 65–100)
GLUCOSE BLD STRIP.AUTO-MCNC: 90 MG/DL (ref 65–100)
GLUCOSE BLD STRIP.AUTO-MCNC: 92 MG/DL (ref 65–100)
GLUCOSE SERPL-MCNC: 143 MG/DL (ref 65–100)
HCO3 BLDA-SCNC: 28 MMOL/L (ref 22–26)
HCT VFR BLD AUTO: 33.2 % (ref 36.6–50.3)
HGB BLD-MCNC: 11.1 G/DL (ref 12.1–17)
IMM GRANULOCYTES # BLD AUTO: 0.1 K/UL (ref 0–0.04)
IMM GRANULOCYTES NFR BLD AUTO: 1 % (ref 0–0.5)
LYMPHOCYTES # BLD: 1.6 K/UL (ref 0.8–3.5)
LYMPHOCYTES NFR BLD: 20 % (ref 12–49)
MAGNESIUM SERPL-MCNC: 1.9 MG/DL (ref 1.6–2.4)
MCH RBC QN AUTO: 28.1 PG (ref 26–34)
MCHC RBC AUTO-ENTMCNC: 33.4 G/DL (ref 30–36.5)
MCV RBC AUTO: 84.1 FL (ref 80–99)
METHGB MFR BLD: 0.2 % (ref 0–1.4)
MONOCYTES # BLD: 0.6 K/UL (ref 0–1)
MONOCYTES NFR BLD: 7 % (ref 5–13)
NEUTS SEG # BLD: 5.6 K/UL (ref 1.8–8)
NEUTS SEG NFR BLD: 71 % (ref 32–75)
NRBC # BLD: 0 K/UL (ref 0–0.01)
NRBC BLD-RTO: 0 PER 100 WBC
OXYHGB MFR BLD: 95.7 % (ref 95–99)
PCO2 BLDA: 40 MMHG (ref 35–45)
PEEP RESPIRATORY: 5
PERFORMED BY:: ABNORMAL
PERFORMED BY:: NORMAL
PERFORMED BY:: NORMAL
PH BLDA: 7.47 (ref 7.35–7.45)
PHOSPHATE SERPL-MCNC: 1.9 MG/DL (ref 2.6–4.7)
PLATELET # BLD AUTO: 218 K/UL (ref 150–400)
PMV BLD AUTO: 10.8 FL (ref 8.9–12.9)
PO2 BLDA: 83 MMHG (ref 80–100)
POTASSIUM SERPL-SCNC: 3.4 MMOL/L (ref 3.5–5.1)
RBC # BLD AUTO: 3.95 M/UL (ref 4.1–5.7)
SAO2 % BLD: 96 % (ref 95–99)
SAO2% DEVICE SAO2% SENSOR NAME: ABNORMAL
SODIUM SERPL-SCNC: 144 MMOL/L (ref 136–145)
SPECIMEN SITE: ABNORMAL
UFH PPP CHRO-ACNC: 0.35 IU/ML
VENTILATION MODE VENT: ABNORMAL
VT SETTING VENT: 480
WBC # BLD AUTO: 7.9 K/UL (ref 4.1–11.1)

## 2024-03-25 PROCEDURE — 2500000003 HC RX 250 WO HCPCS: Performed by: INTERNAL MEDICINE

## 2024-03-25 PROCEDURE — 85025 COMPLETE CBC W/AUTO DIFF WBC: CPT

## 2024-03-25 PROCEDURE — 82962 GLUCOSE BLOOD TEST: CPT

## 2024-03-25 PROCEDURE — 6370000000 HC RX 637 (ALT 250 FOR IP): Performed by: HOSPITALIST

## 2024-03-25 PROCEDURE — 6370000000 HC RX 637 (ALT 250 FOR IP): Performed by: INTERNAL MEDICINE

## 2024-03-25 PROCEDURE — 2580000003 HC RX 258: Performed by: INTERNAL MEDICINE

## 2024-03-25 PROCEDURE — 2580000003 HC RX 258: Performed by: HOSPITALIST

## 2024-03-25 PROCEDURE — 6360000002 HC RX W HCPCS: Performed by: INTERNAL MEDICINE

## 2024-03-25 PROCEDURE — 82803 BLOOD GASES ANY COMBINATION: CPT

## 2024-03-25 PROCEDURE — 83735 ASSAY OF MAGNESIUM: CPT

## 2024-03-25 PROCEDURE — 36415 COLL VENOUS BLD VENIPUNCTURE: CPT

## 2024-03-25 PROCEDURE — 85520 HEPARIN ASSAY: CPT

## 2024-03-25 PROCEDURE — 94003 VENT MGMT INPAT SUBQ DAY: CPT

## 2024-03-25 PROCEDURE — 71045 X-RAY EXAM CHEST 1 VIEW: CPT

## 2024-03-25 PROCEDURE — 51702 INSERT TEMP BLADDER CATH: CPT

## 2024-03-25 PROCEDURE — 74018 RADEX ABDOMEN 1 VIEW: CPT

## 2024-03-25 PROCEDURE — 80069 RENAL FUNCTION PANEL: CPT

## 2024-03-25 PROCEDURE — 2000000000 HC ICU R&B

## 2024-03-25 PROCEDURE — 36600 WITHDRAWAL OF ARTERIAL BLOOD: CPT

## 2024-03-25 PROCEDURE — 6370000000 HC RX 637 (ALT 250 FOR IP)

## 2024-03-25 PROCEDURE — 6360000002 HC RX W HCPCS: Performed by: STUDENT IN AN ORGANIZED HEALTH CARE EDUCATION/TRAINING PROGRAM

## 2024-03-25 PROCEDURE — 94640 AIRWAY INHALATION TREATMENT: CPT

## 2024-03-25 RX ORDER — POTASSIUM CHLORIDE 7.45 MG/ML
10 INJECTION INTRAVENOUS
Status: COMPLETED | OUTPATIENT
Start: 2024-03-25 | End: 2024-03-25

## 2024-03-25 RX ORDER — DEXTROSE MONOHYDRATE 50 MG/ML
INJECTION, SOLUTION INTRAVENOUS CONTINUOUS
Status: DISCONTINUED | OUTPATIENT
Start: 2024-03-25 | End: 2024-03-27 | Stop reason: HOSPADM

## 2024-03-25 RX ORDER — IPRATROPIUM BROMIDE AND ALBUTEROL SULFATE 2.5; .5 MG/3ML; MG/3ML
1 SOLUTION RESPIRATORY (INHALATION) 4 TIMES DAILY PRN
Status: DISCONTINUED | OUTPATIENT
Start: 2024-03-25 | End: 2024-03-27 | Stop reason: HOSPADM

## 2024-03-25 RX ADMIN — INSULIN GLARGINE 28 UNITS: 100 INJECTION, SOLUTION SUBCUTANEOUS at 08:27

## 2024-03-25 RX ADMIN — ESCITALOPRAM OXALATE 20 MG: 10 TABLET ORAL at 08:27

## 2024-03-25 RX ADMIN — LANSOPRAZOLE 30 MG: 30 TABLET, ORALLY DISINTEGRATING ORAL at 08:27

## 2024-03-25 RX ADMIN — PROPOFOL 5 MCG/KG/MIN: 10 INJECTION, EMULSION INTRAVENOUS at 05:58

## 2024-03-25 RX ADMIN — POTASSIUM CHLORIDE 10 MEQ: 7.46 INJECTION, SOLUTION INTRAVENOUS at 08:00

## 2024-03-25 RX ADMIN — CHLORHEXIDINE GLUCONATE 0.12% ORAL RINSE 15 ML: 1.2 LIQUID ORAL at 08:27

## 2024-03-25 RX ADMIN — HEPARIN SODIUM 13 UNITS/KG/HR: 10000 INJECTION, SOLUTION INTRAVENOUS at 19:03

## 2024-03-25 RX ADMIN — PIPERACILLIN AND TAZOBACTAM 3375 MG: 3; .375 INJECTION, POWDER, LYOPHILIZED, FOR SOLUTION INTRAVENOUS at 14:55

## 2024-03-25 RX ADMIN — POTASSIUM CHLORIDE 10 MEQ: 7.46 INJECTION, SOLUTION INTRAVENOUS at 07:15

## 2024-03-25 RX ADMIN — PIPERACILLIN AND TAZOBACTAM 3375 MG: 3; .375 INJECTION, POWDER, LYOPHILIZED, FOR SOLUTION INTRAVENOUS at 21:31

## 2024-03-25 RX ADMIN — AMLODIPINE BESYLATE 5 MG: 5 TABLET ORAL at 08:27

## 2024-03-25 RX ADMIN — IPRATROPIUM BROMIDE AND ALBUTEROL SULFATE 1 DOSE: .5; 3 SOLUTION RESPIRATORY (INHALATION) at 10:34

## 2024-03-25 RX ADMIN — POTASSIUM CHLORIDE 10 MEQ: 7.46 INJECTION, SOLUTION INTRAVENOUS at 09:29

## 2024-03-25 RX ADMIN — ALPRAZOLAM 1 MG: 0.5 TABLET ORAL at 08:27

## 2024-03-25 RX ADMIN — SODIUM CHLORIDE, PRESERVATIVE FREE 10 ML: 5 INJECTION INTRAVENOUS at 08:28

## 2024-03-25 RX ADMIN — CHLORHEXIDINE GLUCONATE 0.12% ORAL RINSE 15 ML: 1.2 LIQUID ORAL at 21:31

## 2024-03-25 RX ADMIN — ACETAMINOPHEN 650 MG: 650 SUPPOSITORY RECTAL at 21:36

## 2024-03-25 RX ADMIN — DEXTROSE MONOHYDRATE 125 ML: 100 INJECTION, SOLUTION INTRAVENOUS at 15:09

## 2024-03-25 RX ADMIN — DEXTROSE MONOHYDRATE: 50 INJECTION, SOLUTION INTRAVENOUS at 18:27

## 2024-03-25 RX ADMIN — PIPERACILLIN AND TAZOBACTAM 3375 MG: 3; .375 INJECTION, POWDER, LYOPHILIZED, FOR SOLUTION INTRAVENOUS at 05:40

## 2024-03-25 RX ADMIN — AMIODARONE HYDROCHLORIDE 0.5 MG/MIN: 1.8 INJECTION, SOLUTION INTRAVENOUS at 08:36

## 2024-03-25 RX ADMIN — POTASSIUM CHLORIDE 10 MEQ: 7.46 INJECTION, SOLUTION INTRAVENOUS at 05:50

## 2024-03-25 RX ADMIN — SODIUM CHLORIDE, PRESERVATIVE FREE 10 ML: 5 INJECTION INTRAVENOUS at 22:13

## 2024-03-25 RX ADMIN — ASPIRIN 81 MG: 81 TABLET, CHEWABLE ORAL at 08:27

## 2024-03-25 RX ADMIN — CLOPIDOGREL BISULFATE 75 MG: 75 TABLET ORAL at 08:27

## 2024-03-25 RX ADMIN — POTASSIUM PHOSPHATE, MONOBASIC POTASSIUM PHOSPHATE, DIBASIC 20 MMOL: 224; 236 INJECTION, SOLUTION, CONCENTRATE INTRAVENOUS at 09:25

## 2024-03-25 RX ADMIN — POTASSIUM CHLORIDE 10 MEQ: 7.46 INJECTION, SOLUTION INTRAVENOUS at 09:26

## 2024-03-25 ASSESSMENT — PULMONARY FUNCTION TESTS
PIF_VALUE: 14
PIF_VALUE: 21
PIF_VALUE: 14
PIF_VALUE: 16
PIF_VALUE: 14
PIF_VALUE: 19
PIF_VALUE: 19

## 2024-03-25 ASSESSMENT — PAIN DESCRIPTION - LOCATION
LOCATION: BUTTOCKS
LOCATION: BUTTOCKS

## 2024-03-25 ASSESSMENT — PAIN DESCRIPTION - ORIENTATION: ORIENTATION: MID

## 2024-03-25 ASSESSMENT — PAIN SCALES - GENERAL
PAINLEVEL_OUTOF10: 0
PAINLEVEL_OUTOF10: 8
PAINLEVEL_OUTOF10: 0
PAINLEVEL_OUTOF10: 4

## 2024-03-25 NOTE — CARE COORDINATION
CM reviewed Pt medicals, Pt is on the vent.    Pt lives with his 7 year old daughter, he support system is his mom and sister.    CM following for D/C needs.

## 2024-03-26 ENCOUNTER — APPOINTMENT (OUTPATIENT)
Facility: HOSPITAL | Age: 54
DRG: 720 | End: 2024-03-26
Payer: COMMERCIAL

## 2024-03-26 LAB
ALBUMIN SERPL-MCNC: 1.8 G/DL (ref 3.5–5)
AMMONIA PLAS-SCNC: 28 UMOL/L
ANION GAP SERPL CALC-SCNC: 4 MMOL/L (ref 5–15)
ARTERIAL PATENCY WRIST A: YES
BACTERIA SPEC CULT: NORMAL
BACTERIA SPEC CULT: NORMAL
BASE EXCESS BLDA CALC-SCNC: 3 MMOL/L (ref 0–3)
BASOPHILS # BLD: 0 K/UL (ref 0–0.1)
BASOPHILS NFR BLD: 0 % (ref 0–1)
BDY SITE: ABNORMAL
BUN SERPL-MCNC: 14 MG/DL (ref 6–20)
BUN/CREAT SERPL: 10 (ref 12–20)
CA-I BLD-MCNC: 8.5 MG/DL (ref 8.5–10.1)
CHLORIDE SERPL-SCNC: 110 MMOL/L (ref 97–108)
CO2 SERPL-SCNC: 28 MMOL/L (ref 21–32)
COHGB MFR BLD: 0.3 % (ref 1–2)
CREAT SERPL-MCNC: 1.44 MG/DL (ref 0.7–1.3)
DIFFERENTIAL METHOD BLD: ABNORMAL
EOSINOPHIL # BLD: 0.1 K/UL (ref 0–0.4)
EOSINOPHIL NFR BLD: 1 % (ref 0–7)
ERYTHROCYTE [DISTWIDTH] IN BLOOD BY AUTOMATED COUNT: 14.6 % (ref 11.5–14.5)
FIO2 ON VENT: 0.4 %
GLUCOSE BLD STRIP.AUTO-MCNC: 136 MG/DL (ref 65–100)
GLUCOSE BLD STRIP.AUTO-MCNC: 141 MG/DL (ref 65–100)
GLUCOSE BLD STRIP.AUTO-MCNC: 154 MG/DL (ref 65–100)
GLUCOSE BLD STRIP.AUTO-MCNC: 155 MG/DL (ref 65–100)
GLUCOSE SERPL-MCNC: 146 MG/DL (ref 65–100)
HCO3 BLDA-SCNC: 27 MMOL/L (ref 22–26)
HCT VFR BLD AUTO: 31.8 % (ref 36.6–50.3)
HGB BLD-MCNC: 10.5 G/DL (ref 12.1–17)
IMM GRANULOCYTES # BLD AUTO: 0.1 K/UL (ref 0–0.04)
IMM GRANULOCYTES NFR BLD AUTO: 1 % (ref 0–0.5)
LYMPHOCYTES # BLD: 1.7 K/UL (ref 0.8–3.5)
LYMPHOCYTES NFR BLD: 16 % (ref 12–49)
Lab: NORMAL
Lab: NORMAL
MCH RBC QN AUTO: 27.9 PG (ref 26–34)
MCHC RBC AUTO-ENTMCNC: 33 G/DL (ref 30–36.5)
MCV RBC AUTO: 84.6 FL (ref 80–99)
METHGB MFR BLD: 0.3 % (ref 0–1.4)
MONOCYTES # BLD: 1 K/UL (ref 0–1)
MONOCYTES NFR BLD: 9 % (ref 5–13)
NEUTS SEG # BLD: 7.8 K/UL (ref 1.8–8)
NEUTS SEG NFR BLD: 73 % (ref 32–75)
NRBC # BLD: 0.03 K/UL (ref 0–0.01)
NRBC BLD-RTO: 0.3 PER 100 WBC
OXYHGB MFR BLD: 94.8 % (ref 95–99)
PCO2 BLDA: 39 MMHG (ref 35–45)
PERFORMED BY:: ABNORMAL
PH BLDA: 7.46 (ref 7.35–7.45)
PHOSPHATE SERPL-MCNC: 1.8 MG/DL (ref 2.6–4.7)
PLATELET # BLD AUTO: 250 K/UL (ref 150–400)
PMV BLD AUTO: 10.8 FL (ref 8.9–12.9)
PO2 BLDA: 78 MMHG (ref 80–100)
POTASSIUM SERPL-SCNC: 3.6 MMOL/L (ref 3.5–5.1)
RBC # BLD AUTO: 3.76 M/UL (ref 4.1–5.7)
SAO2 % BLD: 95 % (ref 95–99)
SAO2% DEVICE SAO2% SENSOR NAME: ABNORMAL
SODIUM SERPL-SCNC: 142 MMOL/L (ref 136–145)
SPECIMEN SITE: ABNORMAL
UFH PPP CHRO-ACNC: 0.2 IU/ML
UFH PPP CHRO-ACNC: >1.1 IU/ML
WBC # BLD AUTO: 10.7 K/UL (ref 4.1–11.1)

## 2024-03-26 PROCEDURE — 80069 RENAL FUNCTION PANEL: CPT

## 2024-03-26 PROCEDURE — 6370000000 HC RX 637 (ALT 250 FOR IP): Performed by: HOSPITALIST

## 2024-03-26 PROCEDURE — 6360000004 HC RX CONTRAST MEDICATION: Performed by: INTERNAL MEDICINE

## 2024-03-26 PROCEDURE — 51701 INSERT BLADDER CATHETER: CPT

## 2024-03-26 PROCEDURE — 2580000003 HC RX 258: Performed by: INTERNAL MEDICINE

## 2024-03-26 PROCEDURE — 2700000000 HC OXYGEN THERAPY PER DAY

## 2024-03-26 PROCEDURE — 85025 COMPLETE CBC W/AUTO DIFF WBC: CPT

## 2024-03-26 PROCEDURE — 93458 L HRT ARTERY/VENTRICLE ANGIO: CPT | Performed by: INTERNAL MEDICINE

## 2024-03-26 PROCEDURE — 6370000000 HC RX 637 (ALT 250 FOR IP): Performed by: INTERNAL MEDICINE

## 2024-03-26 PROCEDURE — 82140 ASSAY OF AMMONIA: CPT

## 2024-03-26 PROCEDURE — B2151ZZ FLUOROSCOPY OF LEFT HEART USING LOW OSMOLAR CONTRAST: ICD-10-PCS | Performed by: INTERNAL MEDICINE

## 2024-03-26 PROCEDURE — 76937 US GUIDE VASCULAR ACCESS: CPT | Performed by: INTERNAL MEDICINE

## 2024-03-26 PROCEDURE — 6360000002 HC RX W HCPCS: Performed by: INTERNAL MEDICINE

## 2024-03-26 PROCEDURE — 6370000000 HC RX 637 (ALT 250 FOR IP)

## 2024-03-26 PROCEDURE — 36415 COLL VENOUS BLD VENIPUNCTURE: CPT

## 2024-03-26 PROCEDURE — 71045 X-RAY EXAM CHEST 1 VIEW: CPT

## 2024-03-26 PROCEDURE — C1769 GUIDE WIRE: HCPCS | Performed by: INTERNAL MEDICINE

## 2024-03-26 PROCEDURE — 36600 WITHDRAWAL OF ARTERIAL BLOOD: CPT

## 2024-03-26 PROCEDURE — 4A023N7 MEASUREMENT OF CARDIAC SAMPLING AND PRESSURE, LEFT HEART, PERCUTANEOUS APPROACH: ICD-10-PCS | Performed by: INTERNAL MEDICINE

## 2024-03-26 PROCEDURE — 2709999900 HC NON-CHARGEABLE SUPPLY: Performed by: INTERNAL MEDICINE

## 2024-03-26 PROCEDURE — 82803 BLOOD GASES ANY COMBINATION: CPT

## 2024-03-26 PROCEDURE — B2111ZZ FLUOROSCOPY OF MULTIPLE CORONARY ARTERIES USING LOW OSMOLAR CONTRAST: ICD-10-PCS | Performed by: INTERNAL MEDICINE

## 2024-03-26 PROCEDURE — 85520 HEPARIN ASSAY: CPT

## 2024-03-26 PROCEDURE — 94761 N-INVAS EAR/PLS OXIMETRY MLT: CPT

## 2024-03-26 PROCEDURE — 82962 GLUCOSE BLOOD TEST: CPT

## 2024-03-26 PROCEDURE — 2500000003 HC RX 250 WO HCPCS: Performed by: INTERNAL MEDICINE

## 2024-03-26 PROCEDURE — C1894 INTRO/SHEATH, NON-LASER: HCPCS | Performed by: INTERNAL MEDICINE

## 2024-03-26 PROCEDURE — 51702 INSERT TEMP BLADDER CATH: CPT

## 2024-03-26 PROCEDURE — 51798 US URINE CAPACITY MEASURE: CPT

## 2024-03-26 PROCEDURE — 92610 EVALUATE SWALLOWING FUNCTION: CPT

## 2024-03-26 PROCEDURE — 2000000000 HC ICU R&B

## 2024-03-26 PROCEDURE — 92526 ORAL FUNCTION THERAPY: CPT

## 2024-03-26 RX ORDER — NITROGLYCERIN 20 MG/100ML
INJECTION INTRAVENOUS PRN
Status: DISCONTINUED | OUTPATIENT
Start: 2024-03-26 | End: 2024-03-26 | Stop reason: HOSPADM

## 2024-03-26 RX ORDER — HEPARIN SODIUM 200 [USP'U]/100ML
INJECTION, SOLUTION INTRAVENOUS CONTINUOUS PRN
Status: COMPLETED | OUTPATIENT
Start: 2024-03-26 | End: 2024-03-26

## 2024-03-26 RX ORDER — HEPARIN SODIUM 1000 [USP'U]/ML
INJECTION, SOLUTION INTRAVENOUS; SUBCUTANEOUS PRN
Status: DISCONTINUED | OUTPATIENT
Start: 2024-03-26 | End: 2024-03-26 | Stop reason: HOSPADM

## 2024-03-26 RX ORDER — LIDOCAINE HYDROCHLORIDE 10 MG/ML
INJECTION, SOLUTION INFILTRATION; PERINEURAL PRN
Status: DISCONTINUED | OUTPATIENT
Start: 2024-03-26 | End: 2024-03-26 | Stop reason: HOSPADM

## 2024-03-26 RX ORDER — HYDRALAZINE HYDROCHLORIDE 20 MG/ML
10 INJECTION INTRAMUSCULAR; INTRAVENOUS EVERY 4 HOURS PRN
Status: DISCONTINUED | OUTPATIENT
Start: 2024-03-26 | End: 2024-03-27 | Stop reason: HOSPADM

## 2024-03-26 RX ORDER — AMLODIPINE BESYLATE 5 MG/1
10 TABLET ORAL DAILY
Status: DISCONTINUED | OUTPATIENT
Start: 2024-03-27 | End: 2024-03-27 | Stop reason: HOSPADM

## 2024-03-26 RX ORDER — SODIUM CHLORIDE 9 MG/ML
INJECTION, SOLUTION INTRAVENOUS CONTINUOUS PRN
Status: COMPLETED | OUTPATIENT
Start: 2024-03-26 | End: 2024-03-26

## 2024-03-26 RX ADMIN — HEPARIN SODIUM 2000 UNITS: 1000 INJECTION INTRAVENOUS; SUBCUTANEOUS at 05:09

## 2024-03-26 RX ADMIN — ALPRAZOLAM 1 MG: 0.5 TABLET ORAL at 21:19

## 2024-03-26 RX ADMIN — ARIPIPRAZOLE 5 MG: 5 TABLET ORAL at 21:19

## 2024-03-26 RX ADMIN — PIPERACILLIN AND TAZOBACTAM 3375 MG: 3; .375 INJECTION, POWDER, LYOPHILIZED, FOR SOLUTION INTRAVENOUS at 14:00

## 2024-03-26 RX ADMIN — POTASSIUM PHOSPHATE, MONOBASIC POTASSIUM PHOSPHATE, DIBASIC 20 MMOL: 224; 236 INJECTION, SOLUTION, CONCENTRATE INTRAVENOUS at 17:50

## 2024-03-26 RX ADMIN — SODIUM CHLORIDE, PRESERVATIVE FREE 10 ML: 5 INJECTION INTRAVENOUS at 09:04

## 2024-03-26 RX ADMIN — HEPARIN SODIUM 15 UNITS/KG/HR: 10000 INJECTION, SOLUTION INTRAVENOUS at 12:50

## 2024-03-26 RX ADMIN — HYDRALAZINE HYDROCHLORIDE 10 MG: 20 INJECTION INTRAMUSCULAR; INTRAVENOUS at 16:17

## 2024-03-26 RX ADMIN — ROSUVASTATIN CALCIUM 40 MG: 20 TABLET, COATED ORAL at 21:20

## 2024-03-26 RX ADMIN — INSULIN GLARGINE 28 UNITS: 100 INJECTION, SOLUTION SUBCUTANEOUS at 21:19

## 2024-03-26 RX ADMIN — PIPERACILLIN AND TAZOBACTAM 3375 MG: 3; .375 INJECTION, POWDER, LYOPHILIZED, FOR SOLUTION INTRAVENOUS at 21:34

## 2024-03-26 RX ADMIN — HEPARIN SODIUM 15 UNITS/KG/HR: 10000 INJECTION, SOLUTION INTRAVENOUS at 05:10

## 2024-03-26 RX ADMIN — INSULIN GLARGINE 28 UNITS: 100 INJECTION, SOLUTION SUBCUTANEOUS at 09:04

## 2024-03-26 RX ADMIN — PIPERACILLIN AND TAZOBACTAM 3375 MG: 3; .375 INJECTION, POWDER, LYOPHILIZED, FOR SOLUTION INTRAVENOUS at 06:12

## 2024-03-26 RX ADMIN — SODIUM CHLORIDE, PRESERVATIVE FREE 10 ML: 5 INJECTION INTRAVENOUS at 20:09

## 2024-03-26 RX ADMIN — CHLORHEXIDINE GLUCONATE 0.12% ORAL RINSE 15 ML: 1.2 LIQUID ORAL at 21:19

## 2024-03-26 ASSESSMENT — PAIN SCALES - GENERAL
PAINLEVEL_OUTOF10: 0

## 2024-03-26 NOTE — CARE COORDINATION
CM reviewed Pt medicals, Pt was extubated yesterday.    Will need PT/OT eval/recommendation.     Pt mother stated that Pt gets his insulin through a pump. Pt mother stated that Pt is always running out of G6 Sensors and has to wait a week or two before he can get more. Pt mother stated that when Pt is out of his sensors he has to prick his fingers and he does not always like doing that.

## 2024-03-27 ENCOUNTER — APPOINTMENT (OUTPATIENT)
Facility: HOSPITAL | Age: 54
DRG: 720 | End: 2024-03-27
Payer: COMMERCIAL

## 2024-03-27 VITALS
HEIGHT: 75 IN | SYSTOLIC BLOOD PRESSURE: 134 MMHG | WEIGHT: 224.87 LBS | OXYGEN SATURATION: 95 % | RESPIRATION RATE: 24 BRPM | DIASTOLIC BLOOD PRESSURE: 83 MMHG | HEART RATE: 96 BPM | BODY MASS INDEX: 27.96 KG/M2 | TEMPERATURE: 98.3 F

## 2024-03-27 LAB
ALBUMIN SERPL-MCNC: 1.7 G/DL (ref 3.5–5)
ANION GAP SERPL CALC-SCNC: 6 MMOL/L (ref 5–15)
ARTERIAL PATENCY WRIST A: YES
BASE DEFICIT BLDA-SCNC: 1.3 MMOL/L
BASOPHILS # BLD: 0 K/UL (ref 0–0.1)
BASOPHILS NFR BLD: 1 % (ref 0–1)
BDY SITE: ABNORMAL
BUN SERPL-MCNC: 14 MG/DL (ref 6–20)
BUN/CREAT SERPL: 11 (ref 12–20)
CA-I BLD-MCNC: 8.1 MG/DL (ref 8.5–10.1)
CHLORIDE SERPL-SCNC: 112 MMOL/L (ref 97–108)
CO2 SERPL-SCNC: 24 MMOL/L (ref 21–32)
COHGB MFR BLD: 0.3 % (ref 1–2)
CREAT SERPL-MCNC: 1.23 MG/DL (ref 0.7–1.3)
DIFFERENTIAL METHOD BLD: ABNORMAL
EOSINOPHIL # BLD: 0.1 K/UL (ref 0–0.4)
EOSINOPHIL NFR BLD: 2 % (ref 0–7)
ERYTHROCYTE [DISTWIDTH] IN BLOOD BY AUTOMATED COUNT: 14.4 % (ref 11.5–14.5)
GLUCOSE BLD STRIP.AUTO-MCNC: 151 MG/DL (ref 65–100)
GLUCOSE BLD STRIP.AUTO-MCNC: 175 MG/DL (ref 65–100)
GLUCOSE BLD STRIP.AUTO-MCNC: 183 MG/DL (ref 65–100)
GLUCOSE SERPL-MCNC: 153 MG/DL (ref 65–100)
HCO3 BLDA-SCNC: 22 MMOL/L (ref 22–26)
HCT VFR BLD AUTO: 32.4 % (ref 36.6–50.3)
HGB BLD-MCNC: 10.8 G/DL (ref 12.1–17)
IMM GRANULOCYTES # BLD AUTO: 0 K/UL (ref 0–0.04)
IMM GRANULOCYTES NFR BLD AUTO: 1 % (ref 0–0.5)
LYMPHOCYTES # BLD: 1.5 K/UL (ref 0.8–3.5)
LYMPHOCYTES NFR BLD: 25 % (ref 12–49)
MCH RBC QN AUTO: 28.2 PG (ref 26–34)
MCHC RBC AUTO-ENTMCNC: 33.3 G/DL (ref 30–36.5)
MCV RBC AUTO: 84.6 FL (ref 80–99)
METHGB MFR BLD: 0.4 % (ref 0–1.4)
MONOCYTES # BLD: 0.8 K/UL (ref 0–1)
MONOCYTES NFR BLD: 14 % (ref 5–13)
NEUTS SEG # BLD: 3.5 K/UL (ref 1.8–8)
NEUTS SEG NFR BLD: 57 % (ref 32–75)
NRBC # BLD: 0 K/UL (ref 0–0.01)
NRBC BLD-RTO: 0 PER 100 WBC
OXYHGB MFR BLD: 91.3 % (ref 95–99)
PCO2 BLDA: 32 MMHG (ref 35–45)
PERFORMED BY:: ABNORMAL
PH BLDA: 7.45 (ref 7.35–7.45)
PHOSPHATE SERPL-MCNC: 2.8 MG/DL (ref 2.6–4.7)
PLATELET # BLD AUTO: 289 K/UL (ref 150–400)
PMV BLD AUTO: 10.7 FL (ref 8.9–12.9)
PO2 BLDA: 64 MMHG (ref 80–100)
POTASSIUM SERPL-SCNC: 4 MMOL/L (ref 3.5–5.1)
RBC # BLD AUTO: 3.83 M/UL (ref 4.1–5.7)
SAO2 % BLD: 92 % (ref 95–99)
SAO2% DEVICE SAO2% SENSOR NAME: ABNORMAL
SODIUM SERPL-SCNC: 142 MMOL/L (ref 136–145)
SPECIMEN SITE: ABNORMAL
UFH PPP CHRO-ACNC: <0.1 IU/ML
WBC # BLD AUTO: 6 K/UL (ref 4.1–11.1)

## 2024-03-27 PROCEDURE — 6360000002 HC RX W HCPCS: Performed by: INTERNAL MEDICINE

## 2024-03-27 PROCEDURE — 36600 WITHDRAWAL OF ARTERIAL BLOOD: CPT

## 2024-03-27 PROCEDURE — 92526 ORAL FUNCTION THERAPY: CPT

## 2024-03-27 PROCEDURE — 36415 COLL VENOUS BLD VENIPUNCTURE: CPT

## 2024-03-27 PROCEDURE — 2580000003 HC RX 258: Performed by: INTERNAL MEDICINE

## 2024-03-27 PROCEDURE — 6370000000 HC RX 637 (ALT 250 FOR IP): Performed by: INTERNAL MEDICINE

## 2024-03-27 PROCEDURE — 85520 HEPARIN ASSAY: CPT

## 2024-03-27 PROCEDURE — 6370000000 HC RX 637 (ALT 250 FOR IP)

## 2024-03-27 PROCEDURE — 82962 GLUCOSE BLOOD TEST: CPT

## 2024-03-27 PROCEDURE — 82803 BLOOD GASES ANY COMBINATION: CPT

## 2024-03-27 PROCEDURE — 6370000000 HC RX 637 (ALT 250 FOR IP): Performed by: HOSPITALIST

## 2024-03-27 PROCEDURE — 71045 X-RAY EXAM CHEST 1 VIEW: CPT

## 2024-03-27 PROCEDURE — 85025 COMPLETE CBC W/AUTO DIFF WBC: CPT

## 2024-03-27 PROCEDURE — 80069 RENAL FUNCTION PANEL: CPT

## 2024-03-27 RX ORDER — PANTOPRAZOLE SODIUM 40 MG/1
40 TABLET, DELAYED RELEASE ORAL
Status: DISCONTINUED | OUTPATIENT
Start: 2024-03-27 | End: 2024-03-27 | Stop reason: HOSPADM

## 2024-03-27 RX ORDER — ONDANSETRON 2 MG/ML
4 INJECTION INTRAMUSCULAR; INTRAVENOUS EVERY 6 HOURS PRN
Status: DISCONTINUED | OUTPATIENT
Start: 2024-03-27 | End: 2024-03-27

## 2024-03-27 RX ORDER — SODIUM CHLORIDE 0.9 % (FLUSH) 0.9 %
5-40 SYRINGE (ML) INJECTION EVERY 12 HOURS SCHEDULED
Status: DISCONTINUED | OUTPATIENT
Start: 2024-03-27 | End: 2024-03-27 | Stop reason: HOSPADM

## 2024-03-27 RX ORDER — SODIUM CHLORIDE 9 MG/ML
INJECTION, SOLUTION INTRAVENOUS PRN
Status: DISCONTINUED | OUTPATIENT
Start: 2024-03-27 | End: 2024-03-27 | Stop reason: HOSPADM

## 2024-03-27 RX ORDER — SODIUM CHLORIDE 0.9 % (FLUSH) 0.9 %
5-40 SYRINGE (ML) INJECTION PRN
Status: DISCONTINUED | OUTPATIENT
Start: 2024-03-27 | End: 2024-03-27 | Stop reason: HOSPADM

## 2024-03-27 RX ORDER — ACETAMINOPHEN 325 MG/1
650 TABLET ORAL EVERY 4 HOURS PRN
Status: DISCONTINUED | OUTPATIENT
Start: 2024-03-27 | End: 2024-03-27 | Stop reason: HOSPADM

## 2024-03-27 RX ORDER — PANTOPRAZOLE SODIUM 40 MG/10ML
20 INJECTION, POWDER, LYOPHILIZED, FOR SOLUTION INTRAVENOUS
Status: DISCONTINUED | OUTPATIENT
Start: 2024-03-27 | End: 2024-03-27

## 2024-03-27 RX ADMIN — ASPIRIN 81 MG: 81 TABLET, CHEWABLE ORAL at 09:24

## 2024-03-27 RX ADMIN — ESCITALOPRAM OXALATE 20 MG: 10 TABLET ORAL at 09:24

## 2024-03-27 RX ADMIN — SODIUM CHLORIDE, PRESERVATIVE FREE 10 ML: 5 INJECTION INTRAVENOUS at 08:37

## 2024-03-27 RX ADMIN — ACETAMINOPHEN 650 MG: 325 TABLET ORAL at 00:52

## 2024-03-27 RX ADMIN — Medication 10 ML: at 08:37

## 2024-03-27 RX ADMIN — HYDRALAZINE HYDROCHLORIDE 10 MG: 20 INJECTION INTRAMUSCULAR; INTRAVENOUS at 05:20

## 2024-03-27 RX ADMIN — INSULIN GLARGINE 28 UNITS: 100 INJECTION, SOLUTION SUBCUTANEOUS at 09:24

## 2024-03-27 RX ADMIN — PIPERACILLIN AND TAZOBACTAM 3375 MG: 3; .375 INJECTION, POWDER, LYOPHILIZED, FOR SOLUTION INTRAVENOUS at 05:31

## 2024-03-27 RX ADMIN — ALPRAZOLAM 1 MG: 0.5 TABLET ORAL at 09:24

## 2024-03-27 RX ADMIN — LANSOPRAZOLE 30 MG: 30 TABLET, ORALLY DISINTEGRATING ORAL at 09:24

## 2024-03-27 RX ADMIN — HEPARIN SODIUM 4000 UNITS: 1000 INJECTION INTRAVENOUS; SUBCUTANEOUS at 05:44

## 2024-03-27 RX ADMIN — AMLODIPINE BESYLATE 10 MG: 5 TABLET ORAL at 09:24

## 2024-03-27 ASSESSMENT — PAIN SCALES - GENERAL
PAINLEVEL_OUTOF10: 0
PAINLEVEL_OUTOF10: 0

## 2024-03-27 NOTE — DISCHARGE SUMMARY
mmol/L (LL; Ref range: 21 - 32 mmol/L); CO2 13 mmol/L (LL; Ref range: 21 - 32 mmol/L); CO2 22 mmol/L (Ref range: 21 - 32 mmol/L); Creatinine 4.58 mg/dL (H; Ref range: 0.70 - 1.30 mg/dL); Creatinine 3.94 mg/dL (H; Ref range: 0.70 - 1.30 mg/dL); Creatinine 3.68 mg/dL (H; Ref range: 0.70 - 1.30 mg/dL); Creatinine 3.71 mg/dL (H; Ref range: 0.70 - 1.30 mg/dL); Glucose 893 mg/dL (HH; Ref range: 65 - 100 mg/dL); Glucose 711 mg/dL (HH; Ref range: 65 - 100 mg/dL); Glucose 533 mg/dL (H; Ref range: 65 - 100 mg/dL); Glucose 300 mg/dL (H; Ref range: 65 - 100 mg/dL); Hematocrit 44.3 % (Ref range: 36.6 - 50.3 %); Hematocrit 38.5 % (Ref range: 36.6 - 50.3 %); Hemoglobin 13.2 g/dL (Ref range: 12.1 - 17.0 g/dL); Hemoglobin 11.6 g/dL (L; Ref range: 12.1 - 17.0 g/dL); POC Creatinine 4.09 MG/DL (H; Ref range: 0.6 - 1.3 MG/DL); Potassium 7.3 mmol/L (HH; Ref range: 3.5 - 5.1 mmol/L); Potassium 5.5 mmol/L (H; Ref range: 3.5 - 5.1 mmol/L); Potassium 4.6 mmol/L (Ref range: 3.5 - 5.1 mmol/L); Potassium 3.8 mmol/L (Ref range: 3.5 - 5.1 mmol/L); Sodium 133 mmol/L (L; Ref range: 136 - 145 mmol/L); Sodium 140 mmol/L (Ref range: 136 - 145 mmol/L); Sodium 143 mmol/L (Ref range: 136 - 145 mmol/L); Sodium 147 mmol/L (H; Ref range: 136 - 145 mmol/L)  3/21/2024: BUN 37 mg/dL (H; Ref range: 6 - 20 mg/dL); BUN 32 mg/dL (H; Ref range: 6 - 20 mg/dL); BUN 37 mg/dL (H; Ref range: 6 - 20 mg/dL); BUN 36 mg/dL (H; Ref range: 6 - 20 mg/dL); Calcium 8.4 mg/dL (L; Ref range: 8.5 - 10.1 mg/dL); Calcium 6.6 mg/dL (L; Ref range: 8.5 - 10.1 mg/dL); Calcium 8.1 mg/dL (L; Ref range: 8.5 - 10.1 mg/dL); Calcium 8.0 mg/dL (L; Ref range: 8.5 - 10.1 mg/dL); Chloride 114 mmol/L (H; Ref range: 97 - 108 mmol/L); Chloride 120 mmol/L (H; Ref range: 97 - 108 mmol/L); Chloride 112 mmol/L (H; Ref range: 97 - 108 mmol/L); Chloride 109 mmol/L (H; Ref range: 97 - 108 mmol/L); CO2 25 mmol/L (Ref range: 21 - 32 mmol/L); CO2 22 mmol/L (Ref range: 21 - 32 mmol/L); CO2 24 mmol/L (Ref range:

## 2024-03-27 NOTE — PLAN OF CARE
Speech LAnguage Pathology Dysphagia EVALUATION    Patient: Al Aguirre (53 y.o. male)  Date: 3/26/2024  Primary Diagnosis: Cardiac arrest ([facility]) [I46.9]  SIRS (systemic inflammatory response syndrome) ([facility]) [R65.10]  KRUPA (acute kidney injury) ([facility]) [N17.9]  Type 1 diabetic ketoacidosis with coma ([facility]) [E10.11]  Diabetic ketoacidosis with coma associated with type 1 diabetes mellitus ([facility]) [E10.11]  DKA, type 2, not at goal ([facility]) [E11.10]  Procedure(s) (LRB):  Left heart cath / coronary angiography (N/A) Day of Surgery   Precautions: Aspiration, Fall                  DIET RECOMMENDATIONS: NPO, meds crushed if able in applesauce or pudding,    SWALLOW SAFETY PRECAUTIONS: strict aspiration precautions    ASSESSMENT :  Based on the objective data described below, the patient presents with mild oropharyngeal dysphagia and concerns for esophageal dysphagia. Mother at bedside w/ consent.   Patient drowsy and agitated reports odynophagia. Recent EGD on 3/8/24 by Dr. Gonsalez w/ results of severe reflux esophagitis and gastritis w/ recs to f/u in 4 weeks. .  Oral phase c/b reduced bolus control and manipulation, expectoration of puree trials and slow A-P. Pharyngeal phase c/b mild swallow delay w/ reduced coordination likely s/t pain and respiratory status and HLE is wfl upon palpation.   Overt s/s of pen/asp observed w/ all thin trials c/b significant cough and 75% of mildly thick trials.   Introduced strategies to improve coordination w/ breathing swallow reflex as negatively impacting by HFNC. Patient is drowsy and agitated and not consistent w/ strategies. Education provided on aspiration precautions, A&P of swallowing, and swallow strategies. .   Patient will benefit from skilled intervention to address the above impairments.    GOALS:    Problem: SLP Adult - Impaired Swallowing  Goal: By Discharge: Advance to least restrictive diet without signs or symptoms of aspiration for planned discharge setting.  See evaluation 
    Speech LAnguage Pathology Dysphagia TREATMENT    Patient: Al Aguirre (53 y.o. male)  Date: 3/27/2024  Primary Diagnosis: Cardiac arrest ([facility]) [I46.9]  SIRS (systemic inflammatory response syndrome) ([facility]) [R65.10]  KRUPA (acute kidney injury) ([facility]) [N17.9]  Type 1 diabetic ketoacidosis with coma ([facility]) [E10.11]  Diabetic ketoacidosis with coma associated with type 1 diabetes mellitus ([facility]) [E10.11]  DKA, type 2, not at goal ([facility]) [E11.10]  Procedure(s) (LRB):  Left heart cath / coronary angiography (N/A)  Ultrasound guided vascular access (N/A) 1 Day Post-Op   Precautions: Aspiration, fall                  DIET RECOMMENDATIONS: Easy to chew, thin liquids, and GI bland , meds whole with applesauce or pudding,    SWALLOW SAFETY PRECAUTIONS: Rec slow rate of intake, small bites/sips, 6 small meals, double swallow, liquid wash, remain upright 1 hour after PO and do not eat 3 hours before bedtime.       ASSESSMENT :  Patient sitting up in bed w/ RN at bedside. He is more alert and conversant requesting water. Currently on RA w/ stable vitals. RN has meds for patient.  Oral phase is wfl w/ good bolus control and coordination. Pharyngeal phase c/b timely swallow and HLE Is wfl upon palpation. No coordination impairments observed and no evidence of SOB w/ PO. There is delayed cough w/ pills via water this is dry cough and appears s/t irritation. He indicates globus sensation w/ pills. Given recent dx of Grade C reflux esophagitis, gastritis and duodenitis w/ recs for PPI or proton pump inhibitor 30 mins before meals and this was not followed by patient s/t hospitalizations rec initiate as medically appropriate. Patient educated on GI bland/soft diet and GERD precautions. Discussed w/ RN, who notified MD of recs.     GOALS:    Problem: SLP Adult - Impaired Swallowing  Goal: By Discharge: Advance to least restrictive diet without signs or symptoms of aspiration for planned discharge setting.  See evaluation for 
  Problem: Discharge Planning  Goal: Discharge to home or other facility with appropriate resources  3/20/2024 2234 by Marta Lim RN  Outcome: Progressing  3/20/2024 1654 by Ulysses Jameson RN  Outcome: Progressing  Flowsheets  Taken 3/20/2024 1352  Discharge to home or other facility with appropriate resources:   Identify barriers to discharge with patient and caregiver   Identify discharge learning needs (meds, wound care, etc)   Arrange for needed discharge resources and transportation as appropriate  Taken 3/20/2024 1200  Discharge to home or other facility with appropriate resources:   Identify barriers to discharge with patient and caregiver   Arrange for needed discharge resources and transportation as appropriate   Identify discharge learning needs (meds, wound care, etc)     Problem: Neurosensory - Adult  Goal: Achieves stable or improved neurological status  3/20/2024 1654 by Ulysses Jameson RN  Outcome: Progressing  Flowsheets (Taken 3/20/2024 1200)  Achieves stable or improved neurological status:   Assess for and report changes in neurological status   Initiate measures to prevent increased intracranial pressure  Goal: Achieves maximal functionality and self care  3/20/2024 1654 by Ulysses Jameson RN  Outcome: Progressing  Flowsheets (Taken 3/20/2024 1200)  Achieves maximal functionality and self care:   Monitor swallowing and airway patency with patient fatigue and changes in neurological status   Encourage and assist patient to increase activity and self care with guidance from physical therapy/occupational therapy     Problem: Respiratory - Adult  Goal: Achieves optimal ventilation and oxygenation  3/20/2024 2234 by Marta Lim, RN  Outcome: Progressing  3/20/2024 1654 by Ulysses Jameson RN  Outcome: Progressing  Flowsheets (Taken 3/20/2024 1200)  Achieves optimal ventilation and oxygenation:   Assess for changes in respiratory status   Oxygen supplementation based on oxygen saturation or 
  Problem: Discharge Planning  Goal: Discharge to home or other facility with appropriate resources  3/26/2024 0826 by Nereyda Tomas RN  Outcome: Progressing  3/26/2024 0140 by Eric Jurado RN  Outcome: Progressing     Problem: Neurosensory - Adult  Goal: Achieves stable or improved neurological status  3/26/2024 0140 by Eric Jurado RN  Outcome: Progressing  Goal: Achieves maximal functionality and self care  3/26/2024 0140 by Eric Jurado RN  Outcome: Progressing     Problem: Respiratory - Adult  Goal: Achieves optimal ventilation and oxygenation  3/26/2024 0140 by Eric Jurado RN  Outcome: Progressing     Problem: Cardiovascular - Adult  Goal: Maintains optimal cardiac output and hemodynamic stability  3/26/2024 0140 by Eric Jurado RN  Outcome: Progressing  Goal: Absence of cardiac dysrhythmias or at baseline  3/26/2024 0140 by Eric Jurado RN  Outcome: Progressing     Problem: Skin/Tissue Integrity - Adult  Goal: Skin integrity remains intact  3/26/2024 0140 by Eric Jurado RN  Outcome: Progressing     Problem: Gastrointestinal - Adult  Goal: Minimal or absence of nausea and vomiting  3/26/2024 0140 by Eric Jurado RN  Outcome: Progressing  Goal: Maintains or returns to baseline bowel function  3/26/2024 0140 by Eric Jurado RN  Outcome: Progressing  Goal: Maintains adequate nutritional intake  3/26/2024 0140 by Eric Jurado RN  Outcome: Progressing     Problem: Genitourinary - Adult  Goal: Absence of urinary retention  3/26/2024 0140 by Eric Jurado RN  Outcome: Progressing     Problem: Infection - Adult  Goal: Absence of infection at discharge  3/26/2024 0140 by Eric Jurado RN  Outcome: Progressing     Problem: Metabolic/Fluid and Electrolytes - Adult  Goal: Electrolytes maintained within normal limits  3/26/2024 0140 by Eric Jurado RN  Outcome: Progressing  Goal: Hemodynamic stability and optimal renal function maintained  3/26/2024 0140 by Eric Jurado RN  Outcome: Progressing  Goal: 
  Problem: Discharge Planning  Goal: Discharge to home or other facility with appropriate resources  3/27/2024 0722 by Nereyda Tomas RN  Outcome: Progressing  3/26/2024 2238 by Marta Lim RN  Outcome: Progressing  Flowsheets (Taken 3/26/2024 1915)  Discharge to home or other facility with appropriate resources: Identify barriers to discharge with patient and caregiver     Problem: Neurosensory - Adult  Goal: Achieves stable or improved neurological status  3/26/2024 2238 by Marta Lim RN  Outcome: Progressing  Flowsheets (Taken 3/26/2024 1915)  Achieves stable or improved neurological status: Assess for and report changes in neurological status  Goal: Achieves maximal functionality and self care  3/26/2024 2238 by Marta Lim RN  Outcome: Progressing  Flowsheets (Taken 3/26/2024 1915)  Achieves maximal functionality and self care: Monitor swallowing and airway patency with patient fatigue and changes in neurological status     Problem: Respiratory - Adult  Goal: Achieves optimal ventilation and oxygenation  3/26/2024 2238 by Marta Lim RN  Outcome: Progressing  Flowsheets (Taken 3/26/2024 1915)  Achieves optimal ventilation and oxygenation: Position to facilitate oxygenation and minimize respiratory effort     Problem: Cardiovascular - Adult  Goal: Maintains optimal cardiac output and hemodynamic stability  3/26/2024 2238 by Marta Lim RN  Outcome: Progressing  Flowsheets (Taken 3/26/2024 1915)  Maintains optimal cardiac output and hemodynamic stability:   Monitor urine output and notify Licensed Independent Practitioner for values outside of normal range   Monitor blood pressure and heart rate  Goal: Absence of cardiac dysrhythmias or at baseline  3/26/2024 2238 by Marta Lim, RN  Outcome: Progressing  Flowsheets (Taken 3/26/2024 1915)  Absence of cardiac dysrhythmias or at baseline: Monitor cardiac rate and rhythm     
  Problem: Discharge Planning  Goal: Discharge to home or other facility with appropriate resources  Outcome: Progressing     Problem: Neurosensory - Adult  Goal: Achieves stable or improved neurological status  Outcome: Progressing  Goal: Achieves maximal functionality and self care  Outcome: Progressing     Problem: Respiratory - Adult  Goal: Achieves optimal ventilation and oxygenation  Outcome: Progressing     Problem: Cardiovascular - Adult  Goal: Maintains optimal cardiac output and hemodynamic stability  Outcome: Progressing  Goal: Absence of cardiac dysrhythmias or at baseline  Outcome: Progressing     Problem: SLP Adult - Impaired Swallowing  Goal: By Discharge: Advance to least restrictive diet without signs or symptoms of aspiration for planned discharge setting.  See evaluation for individualized goals.  Description: Speech Therapy Swallow Goals  Initiated 3/26/2024    -Patient will tolerate PO trials without clinical indicators of aspiration given no cues within 7 day(s).      -Patient will participate in modified barium swallow study within 7 day(s).      -Patient will demonstrate understanding of swallow safety precautions and aspiration precautions, diet recs with no cues within 7day(s).    -Patient/caregiver goal: swallow safely           3/26/2024 1617 by Sarah Noonan, SLP  Outcome: Progressing     
  Problem: Discharge Planning  Goal: Discharge to home or other facility with appropriate resources  Outcome: Progressing     Problem: Neurosensory - Adult  Goal: Achieves stable or improved neurological status  Outcome: Progressing  Goal: Achieves maximal functionality and self care  Outcome: Progressing     Problem: Respiratory - Adult  Goal: Achieves optimal ventilation and oxygenation  Outcome: Progressing     Problem: Cardiovascular - Adult  Goal: Maintains optimal cardiac output and hemodynamic stability  Outcome: Progressing  Goal: Absence of cardiac dysrhythmias or at baseline  Outcome: Progressing     Problem: Skin/Tissue Integrity - Adult  Goal: Skin integrity remains intact  Outcome: Progressing     Problem: Gastrointestinal - Adult  Goal: Minimal or absence of nausea and vomiting  Outcome: Progressing  Goal: Maintains or returns to baseline bowel function  Outcome: Progressing  Goal: Maintains adequate nutritional intake  Outcome: Progressing     Problem: Genitourinary - Adult  Goal: Absence of urinary retention  Outcome: Progressing     Problem: Infection - Adult  Goal: Absence of infection at discharge  Outcome: Progressing     Problem: Metabolic/Fluid and Electrolytes - Adult  Goal: Electrolytes maintained within normal limits  Outcome: Progressing  Goal: Hemodynamic stability and optimal renal function maintained  Outcome: Progressing  Goal: Glucose maintained within prescribed range  Outcome: Progressing     Problem: Skin/Tissue Integrity  Goal: Absence of new skin breakdown  Description: 1.  Monitor for areas of redness and/or skin breakdown  2.  Assess vascular access sites hourly  3.  Every 4-6 hours minimum:  Change oxygen saturation probe site  4.  Every 4-6 hours:  If on nasal continuous positive airway pressure, respiratory therapy assess nares and determine need for appliance change or resting period.  Outcome: Progressing     Problem: Safety - Adult  Goal: Free from fall injury  Outcome: 
  Problem: Discharge Planning  Goal: Discharge to home or other facility with appropriate resources  Recent Flowsheet Documentation  Taken 3/20/2024 1352 by Ulysses Jameson RN  Discharge to home or other facility with appropriate resources:   Identify barriers to discharge with patient and caregiver   Identify discharge learning needs (meds, wound care, etc)   Arrange for needed discharge resources and transportation as appropriate  Taken 3/20/2024 1200 by Ulysses Jameson RN  Discharge to home or other facility with appropriate resources:   Identify barriers to discharge with patient and caregiver   Arrange for needed discharge resources and transportation as appropriate   Identify discharge learning needs (meds, wound care, etc)     Problem: Neurosensory - Adult  Goal: Achieves stable or improved neurological status  Recent Flowsheet Documentation  Taken 3/20/2024 1200 by Ulysses Jameson RN  Achieves stable or improved neurological status:   Assess for and report changes in neurological status   Initiate measures to prevent increased intracranial pressure  Goal: Achieves maximal functionality and self care  Recent Flowsheet Documentation  Taken 3/20/2024 1200 by Ulysses Jameson RN  Achieves maximal functionality and self care:   Monitor swallowing and airway patency with patient fatigue and changes in neurological status   Encourage and assist patient to increase activity and self care with guidance from physical therapy/occupational therapy     Problem: Respiratory - Adult  Goal: Achieves optimal ventilation and oxygenation  Recent Flowsheet Documentation  Taken 3/20/2024 1200 by Ulysses Jameson RN  Achieves optimal ventilation and oxygenation:   Assess for changes in respiratory status   Oxygen supplementation based on oxygen saturation or arterial blood gases   Initiate smoking cessation protocol as indicated   Assess for changes in mentation and behavior   Position to facilitate oxygenation and minimize 
  Problem: Neurosensory - Adult  Goal: Achieves stable or improved neurological status  Outcome: Progressing  Flowsheets (Taken 3/21/2024 0800 by Ryanne Lund, RN)  Achieves stable or improved neurological status:   Assess for and report changes in neurological status   Maintain blood pressure and fluid volume within ordered parameters to optimize cerebral perfusion and minimize risk of hemorrhage   Monitor temperature, glucose, and sodium. Initiate appropriate interventions as ordered  Goal: Achieves maximal functionality and self care  Outcome: Progressing     Problem: Respiratory - Adult  Goal: Achieves optimal ventilation and oxygenation  Outcome: Progressing  Flowsheets (Taken 3/21/2024 0800 by Ryanne Lund, RN)  Achieves optimal ventilation and oxygenation:   Position to facilitate oxygenation and minimize respiratory effort   Oxygen supplementation based on oxygen saturation or arterial blood gases   Respiratory therapy support as indicated     Problem: Cardiovascular - Adult  Goal: Maintains optimal cardiac output and hemodynamic stability  Outcome: Progressing  Flowsheets (Taken 3/21/2024 0800 by Ryanne Lund, RN)  Maintains optimal cardiac output and hemodynamic stability:   Monitor blood pressure and heart rate   Monitor urine output and notify Licensed Independent Practitioner for values outside of normal range   Assess for signs of decreased cardiac output   Administer vasoactive medications as ordered     
Jennifer Aparicio RN  Remains free of injury from restraints (restraint for interference with medical device):   Determine that other, less restrictive measures have been tried or would not be effective before applying the restraint   Every 2 hours: Monitor safety, psychosocial status, comfort, nutrition and hydration  Taken 3/22/2024 1400 by Jennifer Ledbetter RN  Remains free of injury from restraints (restraint for interference with medical device):   Determine that other, less restrictive measures have been tried or would not be effective before applying the restraint   Every 2 hours: Monitor safety, psychosocial status, comfort, nutrition and hydration  Taken 3/22/2024 1200 by Jennifer Ledbetter RN  Remains free of injury from restraints (restraint for interference with medical device):   Determine that other, less restrictive measures have been tried or would not be effective before applying the restraint   Every 2 hours: Monitor safety, psychosocial status, comfort, nutrition and hydration  Taken 3/22/2024 1000 by Jennifer Ledbetter RN  Remains free of injury from restraints (restraint for interference with medical device):   Determine that other, less restrictive measures have been tried or would not be effective before applying the restraint   Every 2 hours: Monitor safety, psychosocial status, comfort, nutrition and hydration  Taken 3/22/2024 0900 by Jennifer Ledbetter RN  Remains free of injury from restraints (restraint for interference with medical device):   Determine that other, less restrictive measures have been tried or would not be effective before applying the restraint   Every 2 hours: Monitor safety, psychosocial status, comfort, nutrition and hydration  Taken 3/22/2024 0800 by Jennifer Ledbetter RN  Remains free of injury from restraints (restraint for interference with medical device):   Determine that other, less restrictive measures have been tried or would not be

## 2024-03-27 NOTE — CARE COORDINATION
CM reviewed Pt medicals, Pt is extubated and on high flow nasal cannula.    Will need PT/OT eval/recommendation.     Pt is being transferred to [facility].     Transition of Care Plan:    RUR: 19%  Prior Level of Functioning: IND  Disposition: Transferred to [facility]     Transportation at discharge: Stretcher  IM/[facility] Medicare/[employer] letter given: No     Caregiver Contact: no  Discharge Caregiver contacted prior to discharge? no  Care Conference needed? no  Barriers to discharge: none

## 2024-03-28 LAB — ECHO BSA: 2.38 M2

## 2024-03-28 NOTE — PROGRESS NOTES
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 Medications   Medication Dose Route Frequency    ipratropium 0.5 mg-albuterol 2.5 mg (DUONEB) nebulizer solution 1 Dose  1 Dose Inhalation 4x Daily PRN    dextrose 5 % solution   IntraVENous Continuous    insulin glargine (LANTUS) injection vial 28 Units  28 Units SubCUTAneous Q12H    amLODIPine (NORVASC) tablet 5 mg  5 mg Oral Daily    hydrALAZINE (APRESOLINE) injection 10 mg  10 mg IntraVENous Q6H PRN    aspirin chewable tablet 81 mg  81 mg Per NG tube Daily    rosuvastatin (CRESTOR) tablet 40 mg  40 mg Oral Nightly    glucose chewable tablet 16 g  4 tablet Oral PRN    dextrose bolus 10% 125 mL  125 mL IntraVENous PRN    Or    dextrose bolus 10% 250 mL  250 mL IntraVENous PRN    glucagon injection 1 mg  1 mg SubCUTAneous PRN    dextrose 10 % infusion   IntraVENous Continuous PRN    insulin lispro (HUMALOG) injection vial 0-8 Units  0-8 Units SubCUTAneous Q4H    ALPRAZolam (XANAX) tablet 1 mg  1 mg Per NG tube BID    ARIPiprazole (ABILIFY) tablet 5 mg  5 mg Per NG tube QHS    
  IMPRESSION:   Acute hypoxic respiratory failure  Status post cardiac arrest  DKA  Pulmonary edema  Hypovolemic shock  Severe metabolic acidosis  Acute kidney injury  Chronic kidney disease stage III  Obstructive sleep apnea syndrome on CPAP machine at night  Additional workup outlined below  Pt is at high risk of sudden decline and decompensation with life threatening consequenses and continued end organ dysfunction and failure  Pt is critically ill. Time spent with pt and staff actively rendering care, managing pt and coordinating care as stated below; 30 minutes, exclusive of any procedures      RECOMMENDATIONS/PLAN:   ICU monitoring  Ventilator for mechanical life support and prevent respiratory arrest with protective lung strategies  ABG acceptable will do sedation vacation  Diabetic ketoacidosis resolved off insulin drip continue with IV hydration acute kidney injury elevated creatinine with given history of chronic kidney disease stage III anion gap much improved and also with acidosis  Patient is on ventilator now on fentanyl and propofol and Versed  Status post cardiac cardiac patient is intubated on ventilator   Chest x-ray shows pulmonary edema  Follow culture results  patient on Zosyn  Will be available to assist in medical management while in the CCU pending disposition     [x] High complexity decision making was performed  [x] See my orders for details  HPI  53-year-old male came in as he was found unresponsive at home he lives with his daughter who is 7-year-old.  He was recently discharged from the hospital and he had flu nog came in as he was found unresponsive blood sugars elevated around 700 dehydrated intubated on ventilator low blood pressure history of rheumatoid arthritis obstructive sleep apnea on CPAP machine at home  PMH:  has a past medical history of CKD (chronic kidney disease) stage 3, GFR 30-59 ml/min ([facility]), Diabetic peripheral neuropathy ([facility]), Diabetic ulcer of toe of right foot 
  IMPRESSION:   Acute hypoxic respiratory failure  Status post cardiac arrest  DKA  Pulmonary edema  Hypovolemic shock  Severe metabolic acidosis  Acute kidney injury  Chronic kidney disease stage III  Obstructive sleep apnea syndrome on CPAP machine at night  Additional workup outlined below  Pt is at high risk of sudden decline and decompensation with life threatening consequenses and continued end organ dysfunction and failure  Pt is critically ill. Time spent with pt and staff actively rendering care, managing pt and coordinating care as stated below; 30 minutes, exclusive of any procedures      RECOMMENDATIONS/PLAN:   ICU monitoring  Ventilator for mechanical life support and prevent respiratory arrest with protective lung strategies  Will get arterial blood gases and change the vent settings sedated with propofol  ABG much improved will decrease FiO2 decrease the rate I will do sedation vacation  Diabetic ketoacidosis off insulin drip continue with IV hydration acute kidney injury elevated creatinine with given history of chronic kidney disease stage III anion gap much improved and also with acidosis  Patient is on ventilator now on fentanyl and propofol and Versed  Status post cardiac cardiac patient is intubated on ventilator   Chest x-ray shows pulmonary edema  Follow culture results will start patient on Zosyn  Will be available to assist in medical management while in the CCU pending disposition     [x] High complexity decision making was performed  [x] See my orders for details  HPI  53-year-old male came in as he was found unresponsive at home he lives with his daughter who is 7-year-old.  He was recently discharged from the hospital and he had flu nog came in as he was found unresponsive blood sugars elevated around 700 dehydrated intubated on ventilator low blood pressure history of rheumatoid arthritis obstructive sleep apnea on CPAP machine at home  PMH:  has a past medical history of CKD (chronic 
Comprehensive Nutrition Assessment    Type and Reason for Visit:  Positive Nutrition Screen (vent)    Nutrition Recommendations/Plan:   NPO  As medically able, rec TF:  Vital HP (peptide based high protein) via OGT at 20mL/hr, advancing 10mL q4hrs as tolerated to goal rate of 70mL/hr  Flush 150mL H2O q4hrs  Provides 1680kcal (74%), 147g protein (100%), 2304mL H2O (101%)  +594kcal/d propofol (2274kcal/d total, 100%)  If pt requires high dose or multiple pressors, rec hold TF  Monitor and record TF rate, flushes, and Bms in I/Os     Malnutrition Assessment:  Malnutrition Status:  Mild malnutrition (03/21/24 1124)    Context:  Acute Illness     Findings of the 6 clinical characteristics of malnutrition:  Energy Intake:  75% or less of estimated energy requirements for 7 or more days  Weight Loss:  No significant weight loss (per EMR wt hx)     Body Fat Loss:  No significant body fat loss     Muscle Mass Loss:  No significant muscle mass loss    Fluid Accumulation:  No significant fluid accumulation     Strength:  Not Performed    Nutrition Assessment:    Admitted for DKA, T1DM. Per MST, mother reported pt with decreased appetite/intakes, weight loss r/t flu - no recent significant weight loss per available EMR wt hx. Currently intubated, sedated, x1 low dose pressor. Rec's above. Labs: Na 146, K 3.2, BUN 37, Creat 3.65, Gluc 172, Mag 1.5, Phos 2.7. Meds: insulin regular, 0.45%NaCl, D5%/0.45%NaCl, norepinephrine @8mcg/min, propofol @22.5mL/hr, magnesium sulfate, KCl    Nutrition Related Findings:    NFPE without acute findings. No n/v, d/c, NPO, no hx of dysphagia. 1+ b/l LE edema. BM 3/20. Wound Type: None       Current Nutrition Intake & Therapies:    Average Meal Intake: NPO  Average Supplements Intake: NPO  Diet NPO    Anthropometric Measures:  Height: 190.5 cm (6' 3\")  Ideal Body Weight (IBW): 196 lbs (89 kg)    Current Body Weight: 107.3 kg (236 lb 8.9 oz), 120.7 % IBW. Weight Source: Bed Scale  Current BMI 
Patient was having short period of apnea. Patient was then put back to previous setting : ; RR 18; Fi02 40% and peep of 5.  
Phase 2 outpatient cardiac rehab referral is not appropriate for this patient at this time due to physical and/or mental limitations, diagnosis and/or treatment plans. Defer to cardiology for referral if/when appropriate.  
Pt extubated at 0950. Nurse at bedside and pt is currently stable and placed on 4l NC.   SpO2: 93% HR: 93 RR:19.   Will give duoneb.    
RENAL    New labs show continued metabolic improvement:  03/20/24 21:50  Sodium: 147 (H)  Potassium: 3.8  Chloride: 112 (H)  CO2: 22  BUN,BUNPL: 39 (H)  Creatinine: 3.71 (H)  Bun/Cre Ratio: 11 (L)  Anion Gap: 13  Est, Glom Filt Rate: 19 (L)  Magnesium: 1.8  Glucose, Random: 300 (H)  CALCIUM, SERUM, 850710: 8.3 (L)  Phosphorus: 1.0 (L)    Plan:  Replete Mg and KPO4  Cont IVF  Consider 1 dose of lasix in AM to augment urine output    MD Donald  
RENAL    Patient seen around 5pm with Dr. Walker, family at bedside. Intubated on low dose levophed.  PE:  Lung clear with ventilated BS  Cor: RRR no gallops  Abd: soft  Ext: no edema  Neuro: sedated    Acid base balance improved with increase in pH to 7.15 (from 6.79). Beginning to make urine. .  Most recent labs show reduction in serum creatinine with generation of serum bicarbonate.    03/20/24 17:20  Sodium: 143  Potassium: 4.6  Chloride: 107  CO2: 13 (LL)  BUN,BUNPL: 39 (H)  Creatinine: 3.68 (H)  Bun/Cre Ratio: 11 (L)  Anion Gap: 23 (H)  Est, Glom Filt Rate: 19 (L)  Magnesium: 2.1  Lactic Acid, Plasma: 5.3 (HH)  Glucose, Random: 533 (H)  CALCIUM, SERUM, [id number]: 8.6  Phosphorus: 4.8 (H)      Urine studies: 03/20/24 10:05  Chloride: 15  Creatinine, Ur: 117.00    118.00  Protein, Urine, Random: 36 (H)    38 (H)  PROTEIN/CREAT RATIO URINE RAN: 0.3  SODIUM, RANDOM URINE: 38    Plan:  Cont insulin drip as directed by hospitalist service  Increased IVF 0.45Ns with 50m meq HCO3/l from 50 cc/hr to 100 cc/hr. D/W ICU RN  No RRT needed as labs are correcting. Urine studies suggest underlying intact renal daysi Bennett MD    i  
Spiritual Care Assessment/Progress Note  [facility]    Name: Al Aguirre MRN: [id number]    Age: 53 y.o.     Sex: male   Language: English     Date: 3/20/2024            Total Time Calculated: 15 min              Spiritual Assessment begun in [facility] EMERGENCY DEPT  Service Provided For:: Patient not available  Referral/Consult From:: Nurse  Encounter Overview/Reason : Initial Encounter, Crisis    Spiritual beliefs:      [] Involved in a mya tradition/spiritual practice:      [] Supported by a mya community:      [] Claims no spiritual orientation:      [] Seeking spiritual identity:           [] Adheres to an individual form of spirituality:      [x] Not able to assess:                Identified resources for coping and support system:   Support System: Children       [] Prayer                  [] Devotional reading               [] Music                  [] Guided Imagery     [] Pet visits                                        [] Other: (COMMENT)     Specific area/focus of visit   Encounter:    Crisis: Type: Code Blue  Spiritual/Emotional needs:    Ritual, Rites and Sacraments:    Grief, Loss, and Adjustments:    Ethics/Mediation:    Behavioral Health:    Palliative Care:    Advance Care Planning:      Plan/Referrals: Other (Comment) ( is available if needed)    Narrative:  responded to a Code Blue for pt Al Aguirre in ER T1. Pt is being cared for by medical team. There is no family present at this time.  is available for family care upon arrival.  provided peaceful and supportive presence to pt and staff.     Please contact [facility] with any emotional/spiritual needs or referrals. Thank you.    Castro Taveras, Chaplain Resident, [facility]        
Verbal order received from Dr. Kaur to hold heparin gtt until two hours after TRB comes off.   
- 7.45      pCO2, Arterial 28 (L) 35 - 45 mmHg    pO2, Arterial 172 (H) 80 - 100 mmHg    O2 Sat, Arterial 99 95 - 99 %    HCO3, Arterial 22 22 - 26 mmol/L    Base Excess, Arterial 0.3 0 - 3 mmol/L    O2 Method VENT      FIO2 Arterial 40 %    Mode ASSIST CONTROL      POC TIDAL VOLUME 500.0      Set Rate, POC 18      POC PEEP/CPA 5.0      Source Arterial      Site Right Radial      Juan Test YES      Carboxyhgb, Arterial 0.3 (L) 1 - 2 %    Methemoglobin, Arterial 0.5 0 - 1.4 %    Oxyhemoglobin 97.9 95 - 99 %    Performed by: Susan Albert     Temperature 99.0     POCT Glucose    Collection Time: 03/21/24  5:21 AM   Result Value Ref Range    POC Glucose 85 65 - 100 mg/dL    Performed by: Trino Arora    POCT Glucose    Collection Time: 03/21/24  6:25 AM   Result Value Ref Range    POC Glucose 124 (H) 65 - 100 mg/dL    Performed by: Kyle Arciniega ([facility])    Basic Metabolic Panel    Collection Time: 03/21/24  6:53 AM   Result Value Ref Range    Sodium 149 (H) 136 - 145 mmol/L    Potassium 3.2 (L) 3.5 - 5.1 mmol/L    Chloride 120 (H) 97 - 108 mmol/L    CO2 22 21 - 32 mmol/L    Anion Gap 7 5 - 15 mmol/L    Glucose 100 65 - 100 mg/dL    BUN 32 (H) 6 - 20 mg/dL    Creatinine 2.97 (H) 0.70 - 1.30 mg/dL    Bun/Cre Ratio 11 (L) 12 - 20      Est, Glom Filt Rate 25 (L) >60 ml/min/1.73m2    Calcium 6.6 (L) 8.5 - 10.1 mg/dL   Magnesium    Collection Time: 03/21/24  6:53 AM   Result Value Ref Range    Magnesium 1.5 (L) 1.6 - 2.4 mg/dL   Phosphorus    Collection Time: 03/21/24  6:53 AM   Result Value Ref Range    Phosphorus 2.7 2.6 - 4.7 mg/dL   EKG 12 Lead    Collection Time: 03/21/24  7:19 AM   Result Value Ref Range    Ventricular Rate 87 BPM    Atrial Rate 267 BPM    QRS Duration 86 ms    Q-T Interval 268 ms    QTc Calculation (Bazett) 322 ms    P Axis 83 degrees    R Axis -40 degrees    T Axis 16 degrees    Diagnosis       Sinus Rhythm  Left axis deviation  T wave abnormality, consider lateral ischemia  Abnormal 
Aurelio Nowak MD        Or    dextrose bolus 10% 250 mL  250 mL IntraVENous PRN Aurelio Nowak MD        glucagon injection 1 mg  1 mg SubCUTAneous PRN Aurelio Nowak MD        dextrose 10 % infusion   IntraVENous Continuous PRN Aurelio Nowak MD        insulin glargine (LANTUS) injection vial 15 Units  15 Units SubCUTAneous Q12H Aurelio Nowak MD   15 Units at 03/22/24 0320    insulin lispro (HUMALOG) injection vial 0-8 Units  0-8 Units SubCUTAneous Q4H Aurelio Nowak MD   4 Units at 03/22/24 0504    ALPRAZolam (XANAX) tablet 1 mg  1 mg Per NG tube BID Aurelio Nowak MD   1 mg at 03/21/24 2244    ARIPiprazole (ABILIFY) tablet 5 mg  5 mg Per NG tube QHS Aurelio Nowak MD   5 mg at 03/21/24 1538    escitalopram (LEXAPRO) tablet 20 mg  20 mg Per NG tube Daily Aurelio Nowak MD   20 mg at 03/21/24 1538    mirtazapine (REMERON) tablet 15 mg  15 mg Per NG tube Nightly Aurelio Nowak MD   15 mg at 03/21/24 2245    gabapentin (NEURONTIN) capsule 200 mg  200 mg Per NG tube Daily Aurelio Nowak MD   200 mg at 03/21/24 1538    gabapentin (NEURONTIN) capsule 300 mg  300 mg Per NG tube Nightly Aurelio Nowak MD   300 mg at 03/21/24 2112    dexmedeTOMIDine (PRECEDEX) 400 mcg in sodium chloride 0.9 % 100 mL infusion  0.1-1.5 mcg/kg/hr IntraVENous Continuous Stef Short MD        chlorhexidine (PERIDEX) 0.12 % solution 15 mL  15 mL Mouth/Throat BID Stef Short MD   15 mL at 03/21/24 2244    lansoprazole (PREVACID SOLUTAB) disintegrating tablet 30 mg  30 mg Per NG tube Daily Stef Short MD   30 mg at 03/21/24 1538    potassium chloride 10 mEq/100 mL IVPB (Peripheral Line)  10 mEq IntraVENous PRN Dayday Fischer  mL/hr at 03/21/24 1318 10 mEq at 03/21/24 1318    magnesium sulfate 2000 mg in 50 mL IVPB premix  2,000 mg IntraVENous PRN Dayday Fischer MD   Stopped at 03/21/24 1301    sodium phosphate 15 mmol in sodium chloride 0.9 % 250 mL IVPB  15 mmol IntraVENous PRN Dayday Fischer MD        dextrose 5 % 
Ben       Impression and recommendations are as follows:  Elevated troponin, peaked at [address], now trending down   Continue heparin gtt  Continue ASA and Plavix, statin  Serial ECGs   Echo with EF 65-70%, NWM, normal diastolic function    Will need ischemic evaluation with cardiac cath, some-time later this week to allow some renal recovery prior to cath, discussed with patient's mother, sister and with Dr. Maen.  Patient's family willing to proceed with cath when appropriate.  DKA, Per primary   CKD, per nephrology   Sepsis, per primary  Sleep apnea, CPAP at home. Per primary    Please do not hesitate to call if additional questions arise.    Greg Syed MD  3/24/2024  
tablet 4 mg  4 mg Oral Q8H PRN Farzaneh Walker MD        Or    ondansetron (ZOFRAN) injection 4 mg  4 mg IntraVENous Q6H PRN Farzaneh Walker MD        polyethylene glycol (GLYCOLAX) packet 17 g  17 g Oral Daily PRN Farzaneh Walker MD        acetaminophen (TYLENOL) tablet 650 mg  650 mg Oral Q6H PRN Farzaneh Walker MD        Or    acetaminophen (TYLENOL) suppository 650 mg  650 mg Rectal Q6H PRN Farzaneh Walker MD        heparin (porcine) injection 4,000 Units  4,000 Units IntraVENous PRN Farzaneh Walker MD        heparin (porcine) injection 2,000 Units  2,000 Units IntraVENous PRN Farzaneh Walker MD   2,000 Units at 03/22/24 0725    heparin 25,000 units in dextrose 5% 250 mL (premix) infusion  5-30 Units/kg/hr IntraVENous Continuous Farzaneh Walker MD 11.8 mL/hr at 03/22/24 0720 11 Units/kg/hr at 03/22/24 0720    phenylephrine (CHANO-SYNEPHRINE) 50 mg/250 mL infusion   mcg/min IntraVENous Continuous Farzaneh Walker MD   Held at 03/20/24 1436    amiodarone (NEXTERONE) 360 mg in dextrose 5% 200 ml  0.5 mg/min IntraVENous Continuous Farzaneh Walker MD 16.7 mL/hr at 03/22/24 0725 0.5 mg/min at 03/22/24 0725    Midazolam in normal saline (VERSED) 50 MG/50ML infusion  0-10 mg/hr IntraVENous TITRATE Stef Short MD   Stopped at 03/21/24 2117    clopidogrel (PLAVIX) tablet 75 mg  75 mg Oral Daily Sabino Talavera MD   75 mg at 03/22/24 0904        Allergies   Allergen Reactions    Erythromycin Hives, Nausea And Vomiting and Nausea Only       Review of Systems:  Unable to obtain    Objective:     Vitals:    03/22/24 1130 03/22/24 1200 03/22/24 1230 03/22/24 1551   BP: 104/66 105/64 103/66    Pulse: 83 85 85 85   Resp: 16 16 11 12   Temp:       TempSrc:       SpO2:    99%   Weight:       Height:            Physical Exam:  General: On sedation  Eyes: sclera anicteric  Oral Cavity: ET tube in place  Neck: no JVD  Respiratory.  On ventilatory support  Heart: normal sounds  Abdomen: soft and non tender   :  
Exam:  General: On sedation  Eyes: sclera anicteric  Oral Cavity: ET tube in place  Neck: no JVD  Respiratory.  On ventilatory support  Heart: normal sounds  Abdomen: soft and non tender   :  enriquez+  Lower Extremities: no edema  Neuro: On sedation        Assessment/Plan:     Acute kidney injury on underlying chronic kidney disease stage III.  -Secondary to prerenal azotemia from volume depletion/DKA/septic shock/cardiac arrest  -On admission creatinine at 4.58 and has improved to 1.54.  -Creatinine was 1.93 on 3/9/2024.  -[place] UA and 0.3 g/g of proteinuria.  -good urine output  -off  IVF.    metabolic acidosis  -High anion gap  -From DKA and lactic acidosis  -Resolved  -Off bicarb drip    cardiac arrest.    -Status post ROSC.    -CT head no acute process.  Elevated troponins.    -Cardiology is on board.    -On ventilatory support.    -Off single pressor.    DKA.    -Came with severe hyperglycemia and metabolic acidosis.    -Resolved DKA.    -off IV insulin drip.    Severe sepsis with shock.    -Etiology can be aspiration.    -Cultures are eg.    -Resolved leukocytosis.    -On IV Zosyn.    Acute NSTEMI  Elevated troponins  Cardiology possibly planning for cardiac cath today  Ok to proceed from renal standpoint  I have spoken with the family about the potential risk involved for contrast-induced kidney injury.      Hypokalemia  IV KCl 10 mEq x 6 runs    Hypophosphatemia  IV K-Phos 20 mmol x 1 dose    Plan:       Signed By: Lucrecia Mane MD     March 25, 2024       
Ref Range    WBC 6.0 4.1 - 11.1 K/uL    RBC 3.83 (L) 4.10 - 5.70 M/uL    Hemoglobin 10.8 (L) 12.1 - 17.0 g/dL    Hematocrit 32.4 (L) 36.6 - 50.3 %    MCV 84.6 80.0 - 99.0 FL    MCH 28.2 26.0 - 34.0 PG    MCHC 33.3 30.0 - 36.5 g/dL    RDW 14.4 11.5 - 14.5 %    Platelets 289 150 - 400 K/uL    MPV 10.7 8.9 - 12.9 FL    Nucleated RBCs 0.0 0.0  WBC    nRBC 0.00 0.00 - 0.01 K/uL    Neutrophils % 57 32 - 75 %    Lymphocytes % 25 12 - 49 %    Monocytes % 14 (H) 5 - 13 %    Eosinophils % 2 0 - 7 %    Basophils % 1 0 - 1 %    Immature Granulocytes 1 (H) 0 - 0.5 %    Neutrophils Absolute 3.5 1.8 - 8.0 K/UL    Lymphocytes Absolute 1.5 0.8 - 3.5 K/UL    Monocytes Absolute 0.8 0.0 - 1.0 K/UL    Eosinophils Absolute 0.1 0.0 - 0.4 K/UL    Basophils Absolute 0.0 0.0 - 0.1 K/UL    Absolute Immature Granulocyte 0.0 0.00 - 0.04 K/UL    Differential Type AUTOMATED     Blood Gas, Arterial    Collection Time: 03/27/24  4:55 AM   Result Value Ref Range    pH, Arterial 7.45 7.35 - 7.45      pCO2, Arterial 32 (L) 35 - 45 mmHg    pO2, Arterial 64 (L) 80 - 100 mmHg    O2 Sat, Arterial 92 (L) 95 - 99 %    HCO3, Arterial 22 22 - 26 mmol/L    Base deficit, arterial blood 1.3 mmol/L    O2 Method Room air      Source Arterial      Site Left Radial      Juan Test YES      Carboxyhgb, Arterial 0.3 (L) 1 - 2 %    Methemoglobin, Arterial 0.4 0 - 1.4 %    Oxyhemoglobin 91.3 (L) 95 - 99 %    Performed by: Ruma Loomis        XR CHEST 1 VIEW   Final Result   1.  Interval extubation and removal of an enteric tube.   2.  Unchanged mild edema and minimal atelectasis at the bases.         XR ABDOMEN (KUB) (SINGLE AP VIEW)   Final Result   Nonspecific bowel gas pattern         XR CHEST 1 VIEW   Final Result   Persistent left basilar atelectasis with mild interstitial edema and trace   pleural effusions.      XR CHEST 1 VIEW   Final Result   No significant interval change with probable left pleural effusion   and left basilar atelectasis. Support 
chronic kidney disease stage III.  -Secondary to prerenal azotemia from volume depletion/DKA/septic shock/cardiac arrest  -On admission creatinine at 4.58 and has improved to 2.66 in response to volume  -Creatinine was 1.93 on 3/9/2024 which seems to be his baseline.  -[place] UA and 0.3 g/g of proteinuria.  -Appropriate urine output  -DC IV hydration.      2 metabolic acidosis  -High anion gap  -From DKA and lactic acidosis  -Resolved  -Off bicarb drip    3 cardiac arrest.    -Status post ROSC.    -CT head no acute process.  Elevated troponins.    -Cardiology is on board.    -On ventilatory support.  On single pressor.    4.  DKA.    -Came with severe hyperglycemia and metabolic acidosis.    -Resolved DKA.    -off IV insulin drip.    5.  Severe sepsis with shock.    -Etiology can be aspiration.    -Cultures are pending.    -Resolving leukocytosis.    -On IV Zosyn.    Acute NSTEMI  Elevated troponins  Cardiology possibly planning for cardiac cath on Monday    Plan:       Signed By: Lucrecia Mane MD     March 23, 2024       
severe hyperglycemia and metabolic acidosis.    -Resolved DKA.    -off IV insulin drip.    Severe sepsis with shock.    -Etiology can be aspiration.    -Cultures are eg.    -Resolved leukocytosis.    -On IV Zosyn.    Acute NSTEMI  Elevated troponins  Cardiology possibly planning for cardiac cath today  Ok to proceed from renal standpoint  I have spoken with the family about the potential risk involved for contrast-induced kidney injury.      Hypokalemia  Received IV KCl 10 mEq x 6 runs    Hypophosphatemia  IV K-Phos 20 mmol x 1 dose    Plan:       Signed By: Lucrecia Mane MD     March 26, 2024       
ondansetron (ZOFRAN) injection 4 mg  4 mg IntraVENous Q6H PRN Farzaneh Walker MD        polyethylene glycol (GLYCOLAX) packet 17 g  17 g Oral Daily PRN Farzaneh Walker MD        acetaminophen (TYLENOL) tablet 650 mg  650 mg Oral Q6H PRN Farzaneh Walker MD        Or    acetaminophen (TYLENOL) suppository 650 mg  650 mg Rectal Q6H PRN Farzaneh Walker MD        heparin (porcine) injection 4,000 Units  4,000 Units IntraVENous PRN Farzaneh Walker MD        heparin (porcine) injection 2,000 Units  2,000 Units IntraVENous PRN Farazneh Walker MD        heparin 25,000 units in dextrose 5% 250 mL (premix) infusion  5-30 Units/kg/hr IntraVENous Continuous Farzaneh Walker MD 9.6 mL/hr at 03/21/24 0801 9 Units/kg/hr at 03/21/24 0801    phenylephrine (CHANO-SYNEPHRINE) 50 mg/250 mL infusion   mcg/min IntraVENous Continuous Farzaneh Walker MD   Held at 03/20/24 1436    amiodarone (NEXTERONE) 360 mg in dextrose 5% 200 ml  0.5 mg/min IntraVENous Continuous Farzaneh Walker MD 16.7 mL/hr at 03/21/24 0835 0.5 mg/min at 03/21/24 0835    Midazolam in normal saline (VERSED) 50 MG/50ML infusion  0-10 mg/hr IntraVENous TITRATE Stef Short MD 1 mL/hr at 03/21/24 0833 1 mg/hr at 03/21/24 0833    clopidogrel (PLAVIX) tablet 75 mg  75 mg Oral Daily Sabino Talavera MD   75 mg at 03/21/24 0830        ARTERIAL BLOOD GAS  Recent Labs     03/21/24  0424   PHART 7.52*   SOV9QQR 28*   PO2ART 172*   AIA6PKL 22   BEART 0.3   FIO2A 40   X9PTWJTT 99   OXYHEM 97.9   CARBOXHGBART 0.3*   METHGBART 0.5   TEMP 99.0     Labs:    Recent Labs     03/20/24  0926 03/20/24  1226 03/21/24  0229   WBC 31.2* 28.8* 21.9*   HGB 13.2 11.6* 11.4*   * 321 302   INR  --  1.3*  --    APTT  --  28.4  --        Recent Labs     03/20/24  0926 03/20/24  1226 03/20/24  2150 03/21/24  0234 03/21/24  0653   *   < > 147* 147* 149*   K 7.3*   < > 3.8 3.5 3.2*   CL 95*   < > 112* 114* 120*   CO2 <5*   < > 22 25 22   GLUCOSE 893*   < > 300* 152* 100   BUN 43*   < 
  K 7.3*   < > Hemolyzed, Recollection Recommended 4.4 4.0 3.9   CL 95*   < > 112* 109* 109* 109*   CO2 <5*   < > 24 19* 22 26   GLUCOSE 893*   < > 172* 349* 275* 289*   BUN 43*   < > 37* 36* 37* 33*   CREATININE 4.58*   < > 3.65* 3.69* 3.40* 2.66*   CALCIUM 9.4   < > 8.1* 8.0* 7.7* 7.9*   MG  --    < >  --  2.1 2.1 1.9   PHOS  --    < > 3.6  --  3.0 2.2*   LABALBU 3.2*  --  2.4*  --  2.1* 2.0*   BILITOT 0.6  --   --   --   --   --    AST 19  --   --   --   --   --    ALT 16  --   --   --   --   --     < > = values in this interval not displayed.       No results for input(s): \"CKTOTAL\", \"CKMB\", \"CKMBINDEX\", \"TROPONINI\" in the last 72 hours.  No results found for: \"PROBNP\", \"BNP\"   No results found for: \"TSH\"   Results       Procedure Component Value Units Date/Time    Culture, Respiratory [[id number]]     Order Status: Sent Specimen: Sputum Expectorated     MRSA by PCR [[id number]] Collected: 03/20/24 1245    Order Status: Canceled Specimen: Nares     MRSA by PCR [[id number]] Collected: 03/20/24 1202    Order Status: Completed Specimen: Swab Updated: 03/20/24 1427     MRSA by PCR Not Detected       Culture, Urine [[id number]] Collected: 03/20/24 1005    Order Status: Completed Specimen: Urine Updated: 03/21/24 1251     Special Requests No Special Requests        Culture No Growth (<1000 cfu/mL)       Blood Culture 1 [[id number]] Collected: 03/20/24 0926    Order Status: Completed Specimen: Blood Updated: 03/23/24 0815     Special Requests No Special Requests        Culture No growth 3 days       Blood Culture 2 [[id number]] Collected: 03/20/24 0926    Order Status: Completed Specimen: Blood Updated: 03/23/24 0815     Special Requests No Special Requests        Culture No growth 3 days       Culture, Respiratory [[id number]] Collected: 03/20/24 0552    Order Status: Completed Specimen: Endotracheal Updated: 03/22/24 1142     Special Requests No Special Requests        Gram Stain 2+ WBCs seen         No organisms 
expected positions.      XR CHEST 1 VIEW   Final Result   New small left pleural effusion with left basilar atelectasis or airspace   disease.      XR CHEST 1 VIEW   Final Result   1. No significant interval change in the mild interstitial edema.      XR CHEST PORTABLE   Final Result   1. No pneumothorax      IR NONTUNNELED VASCULAR CATHETER > 5 YEARS   Final Result   Technically successful ultrasound guided placement of a right internal jugular   vein temporary central venous catheter.  A post procedure chest x-ray is   pending.         CT Head W/O Contrast   Final Result   1. No evidence of acute intracranial abnormality.         XR CHEST PORTABLE   Final Result   1. Interval development of mild to moderate pulmonary edema.   2. ET tube is in satisfactory position          XR CHEST PORTABLE    Result Date: 3/20/2024  EXAM:  XR CHEST PORTABLE INDICATION: Sepsis COMPARISON: 3/6/2024 TECHNIQUE: 1020 hours portable chest AP view FINDINGS: The ET tube is in satisfactory position. Heart size is normal. Lungs demonstrate interval development of mild to moderate pulmonary edema. No pneumonia.     1. Interval development of mild to moderate pulmonary edema. 2. ET tube is in satisfactory position     3/21 Vent setting has been adjusted sedation has been increased chest x-ray still shows pulmonary edema replace potassium sodium elevated will repeat lab white count is also elevated continue with antibiotics cultures pending  3/22 remain intubated on ventilator chest x-ray still shows mild interstitial edema ABG much improved continue with IV fluids  3/23 remain intubated on ventilator ABG shows respiratory alkalosis bicarb improved pH is 7.5 pCO2 31 will decrease the rate blood sugar much improved off IV insulin drip continue with Zosyn  3/24 remain intubated on ventilator ABG acceptable 30% FiO2 we will start weaning do sedation vacation patient on IV amiodarone rate is not controlled converted back to sinus rhythm either 
history of sleep apnea noncompliant to CPAP machine.

## 2024-04-02 NOTE — TELEPHONE ENCOUNTER
Another message was left by a family member to try and get his sensors, called patient and advised that he is not meeting the medicaid guidelines but he can try calling his dme  company to advise them of the situation and see if they can do anything in mean time he has to check manually, patient agreed.

## 2024-04-02 NOTE — TELEPHONE ENCOUNTER
Spoke with patient mother and patient today.  Advised  that it has been last July since visit and insurance may not pay. Require visits every 6 months.  Patient is currently in [facility] waiting open heart surgery.  I also advised patient he really needs to keep appointments so he does not run out of supplies.    CMN faxed to [facility] and will wait to see if they can get him supplies.  [facility] told patient that all we had to do is fill out the paper.  We did fill out paperwork based on last visit of 7/24/2023.    Patient will call office once he has surgery and released from hospital to schedule a follow-up

## 2024-04-17 ENCOUNTER — OFFICE VISIT (OUTPATIENT)
Age: 54
End: 2024-04-17
Payer: COMMERCIAL

## 2024-04-17 VITALS
TEMPERATURE: 98.7 F | SYSTOLIC BLOOD PRESSURE: 104 MMHG | HEART RATE: 84 BPM | DIASTOLIC BLOOD PRESSURE: 67 MMHG | OXYGEN SATURATION: 95 % | HEIGHT: 75 IN | BODY MASS INDEX: 27.73 KG/M2 | WEIGHT: 223 LBS | RESPIRATION RATE: 12 BRPM

## 2024-04-17 DIAGNOSIS — L97.515 NON-PRESSURE CHRONIC ULCER OF OTHER PART OF RIGHT FOOT WITH MUSCLE INVOLVEMENT WITHOUT EVIDENCE OF NECROSIS (HCC): ICD-10-CM

## 2024-04-17 DIAGNOSIS — I10 ESSENTIAL (PRIMARY) HYPERTENSION: ICD-10-CM

## 2024-04-17 DIAGNOSIS — E10.65 TYPE 1 DIABETES MELLITUS WITH HYPERGLYCEMIA (HCC): Primary | ICD-10-CM

## 2024-04-17 DIAGNOSIS — Z96.41 PRESENCE OF INSULIN PUMP (EXTERNAL) (INTERNAL): ICD-10-CM

## 2024-04-17 DIAGNOSIS — Z79.4 LONG TERM (CURRENT) USE OF INSULIN (HCC): ICD-10-CM

## 2024-04-17 DIAGNOSIS — N18.4 CHRONIC KIDNEY DISEASE, STAGE 4 (SEVERE) (HCC): ICD-10-CM

## 2024-04-17 DIAGNOSIS — E78.2 MIXED HYPERLIPIDEMIA: ICD-10-CM

## 2024-04-17 DIAGNOSIS — G62.9 POLYNEUROPATHY, UNSPECIFIED: ICD-10-CM

## 2024-04-17 PROCEDURE — 3078F DIAST BP <80 MM HG: CPT | Performed by: INTERNAL MEDICINE

## 2024-04-17 PROCEDURE — 3074F SYST BP LT 130 MM HG: CPT | Performed by: INTERNAL MEDICINE

## 2024-04-17 PROCEDURE — 3052F HG A1C>EQUAL 8.0%<EQUAL 9.0%: CPT | Performed by: INTERNAL MEDICINE

## 2024-04-17 PROCEDURE — 99215 OFFICE O/P EST HI 40 MIN: CPT | Performed by: INTERNAL MEDICINE

## 2024-04-17 RX ORDER — TRAMADOL HYDROCHLORIDE 50 MG/1
50 TABLET ORAL EVERY 6 HOURS PRN
COMMUNITY

## 2024-04-17 RX ORDER — INSULIN GLARGINE 100 [IU]/ML
40 INJECTION, SOLUTION SUBCUTANEOUS NIGHTLY
Qty: 15 ML | Refills: 1 | Status: SHIPPED | OUTPATIENT
Start: 2024-04-17

## 2024-04-17 RX ORDER — FUROSEMIDE 40 MG/1
40 TABLET ORAL DAILY
COMMUNITY

## 2024-04-17 RX ORDER — ROSUVASTATIN CALCIUM 40 MG/1
TABLET, COATED ORAL
COMMUNITY
Start: 2024-04-15

## 2024-04-17 RX ORDER — ASPIRIN 81 MG/1
81 TABLET ORAL DAILY
COMMUNITY

## 2024-04-17 RX ORDER — PEN NEEDLE, DIABETIC 30 GX3/16"
NEEDLE, DISPOSABLE MISCELLANEOUS
Qty: 50 EACH | Refills: 0 | Status: SHIPPED | OUTPATIENT
Start: 2024-04-17

## 2024-04-17 NOTE — PATIENT INSTRUCTIONS
responsible for prior authorizations if  required    - HEALTH MAINTENANCE IS NOT GOING TO BE UP TO DATE ON YOUR AVS- PLEASE IGNORE     Results     *Normal results will not be notified by a phone call starting January 1 2021   *If you have an upcoming visit, the results will be discussed at the visit   *Please sign up for MY CHART if you want access to your lab and test results  *Abnormal results which require immediate attention will be notified by phone call   *Abnormal results which do not require immediate assistance will be notified in 1-2 weeks       Refills    -    have your pharmacy send us a refill request . Refills are done max for one year and a visit is a must before refills are extended    Follow up appointments -  highly encourage you to make it when you are checking out. We can accommodate you into the schedule based on your clinical situation, but not for extending refills beyond a year. Labs are important to give refills and is important to get labs before the visit     Phone calls  -  Allow  24 hrs. for non-urgent calls to be returned  Prior authorization - It may take 2-4 weeks to process  Forms  -  FMLA, DMV etc., will take up to 2 weeks to process  Cancellations - please notify the office 2 days in advance   Samples  - will only be dispensed at visits       If not showing for the appointments and cancelling appointments within 24 hours are kept track of and three  of such situations in  two consecutive years will likely be considered for termination from the practice    -------------------------------------------------------------------------------------------------------------------

## 2024-04-17 NOTE — PROGRESS NOTES
Chief Complaint   Patient presents with    Diabetes    Follow-up        /67 (Site: Left Upper Arm, Position: Sitting, Cuff Size: Large Adult)   Pulse 84   Temp 98.7 °F (37.1 °C) (Temporal)   Resp 12   Ht 1.905 m (6' 3\")   Wt 101.2 kg (223 lb)   SpO2 95%   BMI 27.87 kg/m²      1. Have you been to the ER, urgent care clinic since your last visit?  Hospitalized since your last visit?3/20/2024 - 3/27/2024 (7 days)  [facility] for flu and type 1 ketoacidosis         2. Have you seen or consulted any other health care providers outside of the [facility] since your last visit?  Include any pap smears or colon screening. Yes Where: PCP  Radha Flores

## 2024-04-17 NOTE — PROGRESS NOTES
[facility] DIABETES AND ENDOCRINOLOGY                Stephania Mcconnell MD FACE          HISTORY OF PRESENT ILLNESS      Al Aguirre is a 53  y.o. male.   HPI   Patient here for    FOLLOW UP  AFTER  Last  visit  For  Type 1 diabetes mellitus   with neuropathy, nephropathy and retinopathy ,   PAD - s/p right hallux amputation, still working with wound  care center   for right foot diabetic ulcer ,   last seen  in  2023       In the interim, he got hospitalized with very high sugars  and   had severe CAD   He was coded ( almost  for 8 min per him ) he says he learnt it hard way   He had quadruple bypass surgery       He Missed an appt with me and the DME company stopped sending supplies of dexcom   This led to him not checking /not knowing  his sugars     He got off the pump but unsure if he used back up regimen     He is today accompanied by his mother   He is checking few times a day now  ( sugars are between  81  to  186 mg )             2023     He has a diabetic ulcer on right pinky toe   He is now  OFF prednsione / steroids and he is getting meds  for  Auto immune condition   He is  requesting  lyrica          2023       He is taking prednsione 5 mg a day  for DJD    It is keeping his sugars  higher at odd times     He is scheduled to get foot surgery  in 2 days  by Dr. Salas    He is requesting to go higher on dose of neurontin   He had toe amputated on right hallux           OLD HISTORY         Current A1C is 13.2 % and symptoms/problems include polyuria, polydipsia and visual disturbances    H/o DM type 1 for 30 years     He moved from [facility], scott villalba was his pcp    Many years ago , he was seeing Dr. Goodwin    Current diabetic medications include insulin pump      Review of Systems   Better with mood          Physical Exam    Constitutional :   oriented to person, place, and time.  appears well-developed and well-nourished.    Right  hallux amputated ;  right

## 2024-04-25 DIAGNOSIS — E10.65 TYPE 1 DIABETES MELLITUS WITH HYPERGLYCEMIA (HCC): Primary | ICD-10-CM

## 2024-04-25 RX ORDER — PROCHLORPERAZINE 25 MG/1
SUPPOSITORY RECTAL
Qty: 9 EACH | Refills: 3 | Status: CANCELLED | OUTPATIENT
Start: 2024-04-25

## 2024-04-25 NOTE — TELEPHONE ENCOUNTER
He is supposed to call [facility]  and ask them     You can ca\ll that DME too     Stephania Mcconnell MD

## 2024-04-26 NOTE — TELEPHONE ENCOUNTER
Spoke to patient and he called [facility] and asked them to fax us a request for what they need for G 6 sensors.    Waiting for fax.

## 2024-05-07 ENCOUNTER — TRANSCRIBE ORDERS (OUTPATIENT)
Facility: HOSPITAL | Age: 54
End: 2024-05-07

## 2024-05-07 DIAGNOSIS — Z95.1 S/P CABG (CORONARY ARTERY BYPASS GRAFT): Primary | ICD-10-CM

## 2024-05-14 ENCOUNTER — HOSPITAL ENCOUNTER (OUTPATIENT)
Facility: HOSPITAL | Age: 54
Setting detail: RECURRING SERIES
Discharge: HOME | End: 2024-05-17
Payer: COMMERCIAL

## 2024-05-14 VITALS — BODY MASS INDEX: 28.86 KG/M2 | OXYGEN SATURATION: 96 % | HEIGHT: 76 IN | WEIGHT: 237 LBS

## 2024-05-14 PROCEDURE — 93798 PHYS/QHP OP CAR RHAB W/ECG: CPT

## 2024-05-14 PROCEDURE — 93797 PHYS/QHP OP CAR RHAB WO ECG: CPT

## 2024-05-14 ASSESSMENT — PATIENT HEALTH QUESTIONNAIRE - PHQ9
2. FEELING DOWN, DEPRESSED OR HOPELESS: MORE THAN HALF THE DAYS
7. TROUBLE CONCENTRATING ON THINGS, SUCH AS READING THE NEWSPAPER OR WATCHING TELEVISION: MORE THAN HALF THE DAYS
SUM OF ALL RESPONSES TO PHQ QUESTIONS 1-9: 14
SUM OF ALL RESPONSES TO PHQ QUESTIONS 1-9: 14
6. FEELING BAD ABOUT YOURSELF - OR THAT YOU ARE A FAILURE OR HAVE LET YOURSELF OR YOUR FAMILY DOWN: MORE THAN HALF THE DAYS
8. MOVING OR SPEAKING SO SLOWLY THAT OTHER PEOPLE COULD HAVE NOTICED. OR THE OPPOSITE, BEING SO FIGETY OR RESTLESS THAT YOU HAVE BEEN MOVING AROUND A LOT MORE THAN USUAL: NOT AT ALL
3. TROUBLE FALLING OR STAYING ASLEEP: MORE THAN HALF THE DAYS
5. POOR APPETITE OR OVEREATING: MORE THAN HALF THE DAYS
1. LITTLE INTEREST OR PLEASURE IN DOING THINGS: MORE THAN HALF THE DAYS
SUM OF ALL RESPONSES TO PHQ QUESTIONS 1-9: 14
4. FEELING TIRED OR HAVING LITTLE ENERGY: MORE THAN HALF THE DAYS
10. IF YOU CHECKED OFF ANY PROBLEMS, HOW DIFFICULT HAVE THESE PROBLEMS MADE IT FOR YOU TO DO YOUR WORK, TAKE CARE OF THINGS AT HOME, OR GET ALONG WITH OTHER PEOPLE: VERY DIFFICULT
9. THOUGHTS THAT YOU WOULD BE BETTER OFF DEAD, OR OF HURTING YOURSELF: NOT AT ALL
SUM OF ALL RESPONSES TO PHQ9 QUESTIONS 1 & 2: 4
SUM OF ALL RESPONSES TO PHQ QUESTIONS 1-9: 14

## 2024-05-14 ASSESSMENT — EXERCISE STRESS TEST
PEAK_HR: 120
PEAK_RPE: 12
PEAK_BP: 146/82
PEAK_RPD: 3
PEAK_HR: 120
PEAK_RPE: 12
PEAK_BP: 146/82

## 2024-05-14 ASSESSMENT — EJECTION FRACTION
EF_VALUE: 50
EF_VALUE: 50

## 2024-05-14 NOTE — CARDIO/PULMONARY
INTAKE APPOINTMENT NOTE  2024    NAME: Al Aguirre : 1970 AGE: 53 y.o.  GENDER: male    CARDIAC REHAB ADMITTING DIAGNOSIS: S/P CABG (coronary artery bypass graft) [Z95.1]    REFERRING PHYSICIAN: Tye Marino MD    MEDICAL HX:  Past Medical History:   Diagnosis Date    CKD (chronic kidney disease) stage 3, GFR 30-59 ml/min ([facility])     Diabetic peripheral neuropathy ([facility])     BLE    Diabetic ulcer of toe of right foot associated with diabetes mellitus due to underlying condition, with necrosis of bone ([facility]) 2023    Equinus contracture of ankle 2023    Hyperlipidemia     Hypertension     Hypogonadism, male     MAT on CPAP     Osteomyelitis of right foot ([facility]) 08/15/2023    Rheumatoid arthritis ([facility])     Type 1 diabetes mellitus with complication, with long-term current use of insulin ([facility])        LABS:     Lab Results   Component Value Date/Time    UOL3KQJP 6.6 2021 01:17 PM     Lab Results   Component Value Date/Time    CHOL 86 2024 03:34 AM    HDL 27 2024 03:34 AM    LDL 37 2024 03:34 AM    VLDL 22 2024 03:34 AM    VLDL 13 2022 02:48 PM         ANTHROPOMETRICS:      Ht Readings from Last 1 Encounters:   24 1.93 m (6' 4\")      Wt Readings from Last 1 Encounters:   24 107.5 kg (237 lb)        WAIST: 46       VISIT SUMMARY:    Al Aguirre 53 y.o. presented to Cardiac Rehab for program orientation and 6 minute walk test today with a primary diagnosis of S/P CABG (coronary artery bypass graft) [Z95.1]. EF is 50 % Cardiac risk factors include smoking/ tobacco exposure, family history, dyslipidemia, diabetes mellitus, hypertension, stress. Patient is a former smoker.  Al Aguirre is , lives with young daughter, and recently on disability for right foot total toe amputation. Patient was evaluated for depression using the PHQ-9 assessment tool with a result of 14 which is considered moderate. The result was discussed with

## 2024-05-15 ENCOUNTER — HOSPITAL ENCOUNTER (OUTPATIENT)
Facility: HOSPITAL | Age: 54
Setting detail: RECURRING SERIES
Discharge: HOME | End: 2024-05-18
Payer: COMMERCIAL

## 2024-05-15 VITALS — BODY MASS INDEX: 28.36 KG/M2 | WEIGHT: 233 LBS

## 2024-05-15 PROCEDURE — 93798 PHYS/QHP OP CAR RHAB W/ECG: CPT

## 2024-05-15 ASSESSMENT — EXERCISE STRESS TEST
PEAK_HR: 112
PEAK_RPE: 12

## 2024-05-20 ENCOUNTER — HOSPITAL ENCOUNTER (OUTPATIENT)
Facility: HOSPITAL | Age: 54
Setting detail: RECURRING SERIES
End: 2024-05-20
Payer: COMMERCIAL

## 2024-05-20 ENCOUNTER — APPOINTMENT (OUTPATIENT)
Facility: HOSPITAL | Age: 54
End: 2024-05-20
Payer: COMMERCIAL

## 2024-05-22 ENCOUNTER — HOSPITAL ENCOUNTER (OUTPATIENT)
Facility: HOSPITAL | Age: 54
Setting detail: RECURRING SERIES
End: 2024-05-22
Payer: COMMERCIAL

## 2024-05-29 ENCOUNTER — HOSPITAL ENCOUNTER (OUTPATIENT)
Facility: HOSPITAL | Age: 54
Setting detail: RECURRING SERIES
End: 2024-05-29
Payer: COMMERCIAL

## 2024-06-24 DIAGNOSIS — E10.65 TYPE 1 DIABETES MELLITUS WITH HYPERGLYCEMIA (HCC): ICD-10-CM

## 2024-06-24 RX ORDER — INSULIN GLARGINE 100 [IU]/ML
40 INJECTION, SOLUTION SUBCUTANEOUS NIGHTLY
Qty: 45 ML | Refills: 3 | Status: SHIPPED | OUTPATIENT
Start: 2024-06-24

## 2024-06-26 ENCOUNTER — OFFICE VISIT (OUTPATIENT)
Age: 54
End: 2024-06-26
Payer: COMMERCIAL

## 2024-06-26 VITALS
HEART RATE: 100 BPM | OXYGEN SATURATION: 98 % | HEIGHT: 76 IN | BODY MASS INDEX: 28.37 KG/M2 | WEIGHT: 233 LBS | SYSTOLIC BLOOD PRESSURE: 134 MMHG | DIASTOLIC BLOOD PRESSURE: 89 MMHG | TEMPERATURE: 97.8 F

## 2024-06-26 DIAGNOSIS — M79.672 PAIN IN LEFT FOOT: Primary | ICD-10-CM

## 2024-06-26 PROCEDURE — 99214 OFFICE O/P EST MOD 30 MIN: CPT | Performed by: PODIATRIST

## 2024-06-26 NOTE — PROGRESS NOTES
Chief Complaint   Patient presents with    Follow-up    Ankle Pain     /89 (Site: Left Upper Arm, Position: Sitting, Cuff Size: Medium Adult)   Pulse 100   Temp 97.8 °F (36.6 °C) (Temporal)   Ht 1.93 m (6' 4\")   Wt 105.7 kg (233 lb)   SpO2 98%   BMI 28.36 kg/m²     1. Have you been to the ER, urgent care clinic since your last visit?  Hospitalized since your last visit? 03/20/24-03/27/24 [facility]. [facility] 03/27/24-for three weeks      2. Have you seen or consulted any other health care providers outside of the [facility] since your last visit?  Include any pap smears or colon screening. No

## 2024-07-09 DIAGNOSIS — E10.65 TYPE 1 DIABETES MELLITUS WITH HYPERGLYCEMIA (HCC): Primary | ICD-10-CM

## 2024-07-09 RX ORDER — INSULIN GLARGINE 100 [IU]/ML
INJECTION, SOLUTION SUBCUTANEOUS
Qty: 10 ML | Refills: 3 | Status: SHIPPED | OUTPATIENT
Start: 2024-07-09

## 2024-07-09 NOTE — TELEPHONE ENCOUNTER
Patient is requesting vials on Lantus instead of pen.  Patient prefers the vial. Does not like pen.  [facility]

## 2024-07-30 DIAGNOSIS — Z79.4 LONG TERM (CURRENT) USE OF INSULIN (HCC): ICD-10-CM

## 2024-07-30 DIAGNOSIS — L97.515 NON-PRESSURE CHRONIC ULCER OF OTHER PART OF RIGHT FOOT WITH MUSCLE INVOLVEMENT WITHOUT EVIDENCE OF NECROSIS (HCC): ICD-10-CM

## 2024-07-30 DIAGNOSIS — E10.65 TYPE 1 DIABETES MELLITUS WITH HYPERGLYCEMIA (HCC): ICD-10-CM

## 2024-07-30 DIAGNOSIS — G62.9 POLYNEUROPATHY, UNSPECIFIED: ICD-10-CM

## 2024-07-30 DIAGNOSIS — Z96.41 PRESENCE OF INSULIN PUMP (EXTERNAL) (INTERNAL): ICD-10-CM

## 2024-07-31 DIAGNOSIS — E10.65 TYPE 1 DIABETES MELLITUS WITH HYPERGLYCEMIA (HCC): ICD-10-CM

## 2024-07-31 DIAGNOSIS — Z96.41 PRESENCE OF INSULIN PUMP (EXTERNAL) (INTERNAL): ICD-10-CM

## 2024-07-31 DIAGNOSIS — G62.9 POLYNEUROPATHY, UNSPECIFIED: ICD-10-CM

## 2024-07-31 DIAGNOSIS — L97.515 NON-PRESSURE CHRONIC ULCER OF OTHER PART OF RIGHT FOOT WITH MUSCLE INVOLVEMENT WITHOUT EVIDENCE OF NECROSIS (HCC): ICD-10-CM

## 2024-07-31 DIAGNOSIS — Z79.4 LONG TERM (CURRENT) USE OF INSULIN (HCC): ICD-10-CM

## 2024-07-31 RX ORDER — GABAPENTIN 300 MG/1
CAPSULE ORAL
Qty: 150 CAPSULE | Refills: 5 | Status: SHIPPED | OUTPATIENT
Start: 2024-07-31 | End: 2025-08-15

## 2024-07-31 RX ORDER — GABAPENTIN 300 MG/1
CAPSULE ORAL
Refills: 3 | OUTPATIENT
Start: 2024-07-31

## 2024-08-09 ENCOUNTER — LAB (OUTPATIENT)
Age: 54
End: 2024-08-09

## 2024-08-09 DIAGNOSIS — E78.2 MIXED HYPERLIPIDEMIA: ICD-10-CM

## 2024-08-09 DIAGNOSIS — E10.65 TYPE 1 DIABETES MELLITUS WITH HYPERGLYCEMIA (HCC): ICD-10-CM

## 2024-08-09 DIAGNOSIS — Z79.4 LONG TERM (CURRENT) USE OF INSULIN (HCC): ICD-10-CM

## 2024-08-09 DIAGNOSIS — Z96.41 PRESENCE OF INSULIN PUMP (EXTERNAL) (INTERNAL): ICD-10-CM

## 2024-08-09 DIAGNOSIS — L97.515 NON-PRESSURE CHRONIC ULCER OF OTHER PART OF RIGHT FOOT WITH MUSCLE INVOLVEMENT WITHOUT EVIDENCE OF NECROSIS (HCC): ICD-10-CM

## 2024-08-09 DIAGNOSIS — G62.9 POLYNEUROPATHY, UNSPECIFIED: ICD-10-CM

## 2024-08-10 LAB
ALBUMIN SERPL-MCNC: 3.1 G/DL (ref 3.5–5)
ALBUMIN/GLOB SERPL: 0.7 (ref 1.1–2.2)
ALP SERPL-CCNC: 116 U/L (ref 45–117)
ALT SERPL-CCNC: 28 U/L (ref 12–78)
ANION GAP SERPL CALC-SCNC: 1 MMOL/L (ref 5–15)
AST SERPL-CCNC: 32 U/L (ref 15–37)
BILIRUB SERPL-MCNC: 0.5 MG/DL (ref 0.2–1)
BUN SERPL-MCNC: 27 MG/DL (ref 6–20)
BUN/CREAT SERPL: 19 (ref 12–20)
CALCIUM SERPL-MCNC: 9.3 MG/DL (ref 8.5–10.1)
CHLORIDE SERPL-SCNC: 105 MMOL/L (ref 97–108)
CHOLEST SERPL-MCNC: 115 MG/DL
CO2 SERPL-SCNC: 30 MMOL/L (ref 21–32)
CREAT SERPL-MCNC: 1.4 MG/DL (ref 0.7–1.3)
CREAT UR-MCNC: 41.3 MG/DL
EST. AVERAGE GLUCOSE BLD GHB EST-MCNC: 249 MG/DL
GLOBULIN SER CALC-MCNC: 4.2 G/DL (ref 2–4)
GLUCOSE SERPL-MCNC: 302 MG/DL (ref 65–100)
HBA1C MFR BLD: 10.3 % (ref 4–5.6)
HDLC SERPL-MCNC: 55 MG/DL
HDLC SERPL: 2.1 (ref 0–5)
LDLC SERPL CALC-MCNC: 48 MG/DL (ref 0–100)
MICROALBUMIN UR-MCNC: 1.14 MG/DL
MICROALBUMIN/CREAT UR-RTO: 28 MG/G (ref 0–30)
POTASSIUM SERPL-SCNC: 5.9 MMOL/L (ref 3.5–5.1)
PROT SERPL-MCNC: 7.3 G/DL (ref 6.4–8.2)
SODIUM SERPL-SCNC: 136 MMOL/L (ref 136–145)
TRIGL SERPL-MCNC: 60 MG/DL
VLDLC SERPL CALC-MCNC: 12 MG/DL

## 2024-08-12 ENCOUNTER — OFFICE VISIT (OUTPATIENT)
Age: 54
End: 2024-08-12
Payer: COMMERCIAL

## 2024-08-12 ENCOUNTER — TELEPHONE (OUTPATIENT)
Age: 54
End: 2024-08-12

## 2024-08-12 VITALS
SYSTOLIC BLOOD PRESSURE: 143 MMHG | HEART RATE: 101 BPM | OXYGEN SATURATION: 96 % | BODY MASS INDEX: 25.46 KG/M2 | DIASTOLIC BLOOD PRESSURE: 95 MMHG | WEIGHT: 209.2 LBS | TEMPERATURE: 97.2 F

## 2024-08-12 DIAGNOSIS — E10.65 TYPE 1 DIABETES MELLITUS WITH HYPERGLYCEMIA (HCC): ICD-10-CM

## 2024-08-12 PROCEDURE — 3046F HEMOGLOBIN A1C LEVEL >9.0%: CPT | Performed by: INTERNAL MEDICINE

## 2024-08-12 PROCEDURE — 99215 OFFICE O/P EST HI 40 MIN: CPT | Performed by: INTERNAL MEDICINE

## 2024-08-12 RX ORDER — HYDROCHLOROTHIAZIDE 25 MG/1
TABLET ORAL
Qty: 45 TABLET | Refills: 1 | Status: SHIPPED | OUTPATIENT
Start: 2024-08-12

## 2024-08-12 RX ORDER — INSULIN GLARGINE 100 [IU]/ML
INJECTION, SOLUTION SUBCUTANEOUS
Qty: 10 ML | Refills: 3 | Status: SHIPPED | OUTPATIENT
Start: 2024-08-12

## 2024-08-12 RX ORDER — CARIPRAZINE 3 MG/1
3 CAPSULE, GELATIN COATED ORAL DAILY
COMMUNITY
Start: 2024-07-16

## 2024-08-12 RX ORDER — INSULIN LISPRO 100 [IU]/ML
INJECTION, SOLUTION INTRAVENOUS; SUBCUTANEOUS
Qty: 45 ML | Refills: 3 | Status: SHIPPED | OUTPATIENT
Start: 2024-08-12 | End: 2024-08-13

## 2024-08-12 NOTE — PATIENT INSTRUCTIONS
SPECIFIC INSTRUCTIONS BELOW     DRINK water   Do not skip meals  Do not eat in between meals     Reduce carbs- pasta, rice, potatoes, bread   Try to avoid processed bread products like BISCUITS, CROISSANTS, MUFFINS     Do not drink juices or sodas, even if they are calorie zero or diet drinks and especially avoid using powders like crystalloids , MEGHAN-AIDS      Do not eat peanut butter      Do not eat sugar free cookies and cakes   Do not eat peaches, oranges, pineapples, raisins, grapes , canteloupe , honey dew, mangoes , watermelon  and fruit medleys     ---------------------------------------------------------------------------------------------------------------     stay  on  Lisinopril      Stay   On atorvostatin 20 mg at night        OFF PUMP -  and  it is NOT working         Check blood sugars immediately before each meal and at bedtime        Take  lantus  insulin  40   units  at bed time     Take humalog  Insulin at carb to insulin ratio of 6 gm : 1 unit (  OR   )     2  units to  6 units with meals )         Also, add additional humalog  as follows with meals  If blood sugars are::     151-200 mg 1 units     201-250 mg 3 units     251-300 mg 5 units     301-350 mg 7 units     351-400 mg 9 units     401-450 mg 11 units     451-500 mg 13 units     Less than 70 mg NO INSULIN        -------------PAY ATTENTION TO THESE GENERAL INSTRUCTIONS -----------------      - The medications prescribed at this visit will not be available at pharmacy until 6 pm       - YOUR MED LIST IS NOT UP TO DATE AS SOME CHANGES ARE BEING MADE AFTER THE VISIT - FOLLOW SPECIFIC INSTRUCTIONS  ABOVE     -ANY tests other than blood work, which you opt to do  outside the  [facility] facilities, you are responsible for prior authorizations if  required    - HEALTH MAINTENANCE IS NOT GOING TO BE UP TO DATE ON YOUR AVS- PLEASE IGNORE     Results     *Normal results will not be notified by a phone call starting January 1 2021   *If 
No

## 2024-08-12 NOTE — TELEPHONE ENCOUNTER
Pharmacist needs clarification on insulin. Sent as pen but directions are for vials. Patient is no longer on pump. Please either call or send corrected rx

## 2024-08-12 NOTE — PROGRESS NOTES
[facility] DIABETES AND ENDOCRINOLOGY                Stephania Mcconnell MD FACE          HISTORY OF PRESENT ILLNESS      Al Aguirre is a 54   y.o. male.   HPI   Patient here for    FOLLOW UP  AFTER  Last  visit  For  Type 1 diabetes mellitus   with neuropathy, nephropathy and retinopathy ,   PAD - s/p right hallux amputation, still working with wound  care center   for right foot diabetic ulcer ,   last seen  in  2024          He comes in saying  - pump is NOT working, defective he says   He comes in with checking sugars  but  DID NOT bring the meter   He says he wants the [employer] pump   But he fails to take MEAL TIME insulins and AGREES that he is not being loyal to himself   LOST 34 lbs        2024     In the interim, he got hospitalized with very high sugars  and   had severe CAD   He was coded ( almost  for 8 min per him ) he says he learnt it hard way   He had quadruple bypass surgery     He Missed an appt with me and the [employer] company stopped sending supplies of dexcom   This led to him not checking /not knowing  his sugars     He got off the pump but unsure if he used back up regimen     He is today accompanied by his mother   He is checking few times a day now  ( sugars are between  81  to  186 mg )          OLD HISTORY         Current A1C is 13.2 % and symptoms/problems include polyuria, polydipsia and visual disturbances    H/o DM type 1 for 30 years     He moved from [facility], scott villalba was his pcp    Many years ago , he was seeing Dr. Goodwin    Current diabetic medications include insulin pump      Review of Systems   Better with mood          Physical Exam    Constitutional :   oriented to person, place, and time.  appears well-developed and well-nourished.    Right  hallux amputated ;  right pinky toe   has an ulcer    Has  compression stocking on  right leg          Labs    Diabetes    Lab Results   Component Value Date    LABA1C 10.3 (H) 2024    LABA1C 8.6

## 2024-08-13 RX ORDER — INSULIN LISPRO 100 [IU]/ML
INJECTION, SOLUTION INTRAVENOUS; SUBCUTANEOUS
Qty: 30 ML | Refills: 3 | Status: SHIPPED | OUTPATIENT
Start: 2024-08-13

## 2024-08-26 ENCOUNTER — APPOINTMENT (OUTPATIENT)
Facility: HOSPITAL | Age: 54
End: 2024-08-26
Payer: COMMERCIAL

## 2024-08-26 ENCOUNTER — HOSPITAL ENCOUNTER (EMERGENCY)
Facility: HOSPITAL | Age: 54
Discharge: HOME | End: 2024-08-26
Attending: EMERGENCY MEDICINE
Payer: COMMERCIAL

## 2024-08-26 VITALS
WEIGHT: 210 LBS | OXYGEN SATURATION: 99 % | DIASTOLIC BLOOD PRESSURE: 100 MMHG | SYSTOLIC BLOOD PRESSURE: 160 MMHG | BODY MASS INDEX: 25.57 KG/M2 | HEART RATE: 87 BPM | TEMPERATURE: 97.5 F | RESPIRATION RATE: 18 BRPM | HEIGHT: 76 IN

## 2024-08-26 DIAGNOSIS — S22.32XA CLOSED FRACTURE OF ONE RIB OF LEFT SIDE, INITIAL ENCOUNTER: Primary | ICD-10-CM

## 2024-08-26 DIAGNOSIS — J90 PLEURAL EFFUSION ON LEFT: ICD-10-CM

## 2024-08-26 LAB
ANION GAP SERPL CALC-SCNC: 8 MMOL/L (ref 5–15)
BASOPHILS # BLD: 0.1 K/UL (ref 0–0.1)
BASOPHILS NFR BLD: 1 % (ref 0–1)
BUN SERPL-MCNC: 41 MG/DL (ref 6–20)
BUN/CREAT SERPL: 24 (ref 12–20)
CA-I BLD-MCNC: 8.9 MG/DL (ref 8.5–10.1)
CHLORIDE SERPL-SCNC: 100 MMOL/L (ref 97–108)
CO2 SERPL-SCNC: 25 MMOL/L (ref 21–32)
CREAT SERPL-MCNC: 1.74 MG/DL (ref 0.7–1.3)
DIFFERENTIAL METHOD BLD: ABNORMAL
EOSINOPHIL # BLD: 0.2 K/UL (ref 0–0.4)
EOSINOPHIL NFR BLD: 3 % (ref 0–7)
ERYTHROCYTE [DISTWIDTH] IN BLOOD BY AUTOMATED COUNT: 21.6 % (ref 11.5–14.5)
GLUCOSE SERPL-MCNC: 424 MG/DL (ref 65–100)
HCT VFR BLD AUTO: 33.5 % (ref 36.6–50.3)
HGB BLD-MCNC: 11.2 G/DL (ref 12.1–17)
IMM GRANULOCYTES # BLD AUTO: 0 K/UL (ref 0–0.04)
IMM GRANULOCYTES NFR BLD AUTO: 0 % (ref 0–0.5)
LYMPHOCYTES # BLD: 1.3 K/UL (ref 0.8–3.5)
LYMPHOCYTES NFR BLD: 20 % (ref 12–49)
MCH RBC QN AUTO: 26 PG (ref 26–34)
MCHC RBC AUTO-ENTMCNC: 33.4 G/DL (ref 30–36.5)
MCV RBC AUTO: 77.7 FL (ref 80–99)
MONOCYTES # BLD: 0.6 K/UL (ref 0–1)
MONOCYTES NFR BLD: 10 % (ref 5–13)
NEUTS SEG # BLD: 4.1 K/UL (ref 1.8–8)
NEUTS SEG NFR BLD: 66 % (ref 32–75)
PLATELET # BLD AUTO: 282 K/UL (ref 150–400)
PMV BLD AUTO: 9.8 FL (ref 8.9–12.9)
POTASSIUM SERPL-SCNC: 5.4 MMOL/L (ref 3.5–5.1)
RBC # BLD AUTO: 4.31 M/UL (ref 4.1–5.7)
RBC MORPH BLD: ABNORMAL
RBC MORPH BLD: ABNORMAL
SODIUM SERPL-SCNC: 133 MMOL/L (ref 136–145)
TROPONIN I SERPL HS-MCNC: 13 NG/L (ref 0–76)
WBC # BLD AUTO: 6.3 K/UL (ref 4.1–11.1)

## 2024-08-26 PROCEDURE — 96374 THER/PROPH/DIAG INJ IV PUSH: CPT

## 2024-08-26 PROCEDURE — 84484 ASSAY OF TROPONIN QUANT: CPT

## 2024-08-26 PROCEDURE — 71275 CT ANGIOGRAPHY CHEST: CPT

## 2024-08-26 PROCEDURE — 71101 X-RAY EXAM UNILAT RIBS/CHEST: CPT

## 2024-08-26 PROCEDURE — 85025 COMPLETE CBC W/AUTO DIFF WBC: CPT

## 2024-08-26 PROCEDURE — 99285 EMERGENCY DEPT VISIT HI MDM: CPT

## 2024-08-26 PROCEDURE — 36415 COLL VENOUS BLD VENIPUNCTURE: CPT

## 2024-08-26 PROCEDURE — 80048 BASIC METABOLIC PNL TOTAL CA: CPT

## 2024-08-26 PROCEDURE — 6360000004 HC RX CONTRAST MEDICATION: Performed by: EMERGENCY MEDICINE

## 2024-08-26 PROCEDURE — 96372 THER/PROPH/DIAG INJ SC/IM: CPT

## 2024-08-26 PROCEDURE — 6370000000 HC RX 637 (ALT 250 FOR IP): Performed by: EMERGENCY MEDICINE

## 2024-08-26 PROCEDURE — 6360000002 HC RX W HCPCS: Performed by: EMERGENCY MEDICINE

## 2024-08-26 RX ORDER — OXYCODONE AND ACETAMINOPHEN 5; 325 MG/1; MG/1
1 TABLET ORAL EVERY 6 HOURS PRN
Qty: 28 TABLET | Refills: 0 | Status: SHIPPED | OUTPATIENT
Start: 2024-08-26 | End: 2024-09-02

## 2024-08-26 RX ORDER — IOPAMIDOL 755 MG/ML
100 INJECTION, SOLUTION INTRAVASCULAR
Status: COMPLETED | OUTPATIENT
Start: 2024-08-26 | End: 2024-08-26

## 2024-08-26 RX ORDER — MORPHINE SULFATE 4 MG/ML
4 INJECTION, SOLUTION INTRAMUSCULAR; INTRAVENOUS
Status: COMPLETED | OUTPATIENT
Start: 2024-08-26 | End: 2024-08-26

## 2024-08-26 RX ORDER — KETOROLAC TROMETHAMINE 30 MG/ML
30 INJECTION, SOLUTION INTRAMUSCULAR; INTRAVENOUS
Status: COMPLETED | OUTPATIENT
Start: 2024-08-26 | End: 2024-08-26

## 2024-08-26 RX ORDER — LIDOCAINE 4 G/G
1 PATCH TOPICAL
Status: DISCONTINUED | OUTPATIENT
Start: 2024-08-26 | End: 2024-08-26 | Stop reason: HOSPADM

## 2024-08-26 RX ADMIN — MORPHINE SULFATE 4 MG: 4 INJECTION, SOLUTION INTRAMUSCULAR; INTRAVENOUS at 10:52

## 2024-08-26 RX ADMIN — IOPAMIDOL 100 ML: 755 INJECTION, SOLUTION INTRAVENOUS at 11:53

## 2024-08-26 RX ADMIN — KETOROLAC TROMETHAMINE 30 MG: 30 INJECTION, SOLUTION INTRAMUSCULAR; INTRAVENOUS at 09:58

## 2024-08-26 ASSESSMENT — PAIN SCALES - GENERAL
PAINLEVEL_OUTOF10: 10
PAINLEVEL_OUTOF10: 8

## 2024-08-26 ASSESSMENT — PAIN - FUNCTIONAL ASSESSMENT: PAIN_FUNCTIONAL_ASSESSMENT: 0-10

## 2024-08-26 NOTE — ED PROVIDER NOTES
[facility] EMERGENCY DEPT  EMERGENCY DEPARTMENT HISTORY AND PHYSICAL EXAM      Date: 8/26/2024  Patient Name: Al Aguirre  MRN: [id number]  Birthdate 1970  Date of evaluation: 8/26/2024  Provider: Reynaldo Talley MD   Note Started: 9:33 AM EDT 8/26/24    HISTORY OF PRESENT ILLNESS     Chief Complaint   Patient presents with    Back Pain       History Provided By: Patient    HPI: Al Aguirre is a 54 y.o. male presents for evaluation of left-sided thoracic back pain.  Patient states the pain started last night after he sat down and hit something.  Patient denies head injury or any other pain complaint.    PAST MEDICAL HISTORY   Past Medical History:  Past Medical History:   Diagnosis Date    CKD (chronic kidney disease) stage 3, GFR 30-59 ml/min ([facility])     Diabetic peripheral neuropathy ([facility])     BLE    Diabetic ulcer of toe of right foot associated with diabetes mellitus due to underlying condition, with necrosis of bone ([facility]) 08/14/2023    Equinus contracture of ankle 08/19/2023    Hyperlipidemia     Hypertension     Hypogonadism, male     MAT on CPAP     Osteomyelitis of right foot ([facility]) 08/15/2023    Rheumatoid arthritis ([facility])     Type 1 diabetes mellitus with complication, with long-term current use of insulin ([facility])        Past Surgical History:  Past Surgical History:   Procedure Laterality Date    CARDIAC PROCEDURE N/A 3/26/2024    Left heart cath / coronary angiography performed by Marv Kaur MD at [facility] CARDIAC CATH LAB    FOOT SURGERY Right 8/16/2023    TRANSMETARASAL AMPUTATION WITH ACHILLES TENDON LENGTHENING RIGHT LOWER EXTREMITY performed by Baldev Salas DPM at [facility] MAIN OR    INVASIVE VASCULAR N/A 3/26/2024    Ultrasound guided vascular access performed by Marv Kaur MD at [facility] CARDIAC CATH LAB    IR NONTUNNELED VASCULAR CATHETER  3/20/2024    IR NONTUNNELED VASCULAR CATHETER 3/20/2024 Leonor Braga APRN - NP [facility] RAD ANGIO IR    ORTHOPEDIC SURGERY      Arthroscopy left ankle    OTHER    oxyCODONE-acetaminophen 5-325 MG per tablet           DISCONTINUED MEDICATIONS:  Current Discharge Medication List          I am the Primary Clinician of Record. Reynaldo Talley MD (electronically signed)    (Please note that parts of this dictation were completed with voice recognition software. Quite often unanticipated grammatical, syntax, homophones, and other interpretive errors are inadvertently transcribed by the computer software. Please disregards these errors. Please excuse any errors that have escaped final proofreading.)     Reynaldo Talley MD  08/26/24 0360

## 2024-08-26 NOTE — DISCHARGE INSTRUCTIONS
Thank you for choosing our Emergency Department for your care.  It is our privilege to care for you in your time of need.  In the next several days, you may receive a survey via email or mailed to your home about your experience with our team.  We would greatly appreciate you taking a few minutes to complete the survey, as we use this information to learn what we have done well and what we could be doing better. Thank you for trusting us with your care!    Below you will find a list of your tests from today's visit.   Labs  Recent Results (from the past 12 hour(s))   CBC with Auto Differential    Collection Time: 08/26/24 10:30 AM   Result Value Ref Range    WBC 6.3 4.1 - 11.1 K/uL    RBC 4.31 4.10 - 5.70 M/uL    Hemoglobin 11.2 (L) 12.1 - 17.0 g/dL    Hematocrit 33.5 (L) 36.6 - 50.3 %    MCV 77.7 (L) 80.0 - 99.0 FL    MCH 26.0 26.0 - 34.0 PG    MCHC 33.4 30.0 - 36.5 g/dL    RDW 21.6 (H) 11.5 - 14.5 %    Platelets 282 150 - 400 K/uL    MPV 9.8 8.9 - 12.9 FL    Neutrophils % 66 32 - 75 %    Lymphocytes % 20 12 - 49 %    Monocytes % 10 5 - 13 %    Eosinophils % 3 0 - 7 %    Basophils % 1 0 - 1 %    Immature Granulocytes % 0 0.0 - 0.5 %    Neutrophils Absolute 4.1 1.8 - 8.0 K/UL    Lymphocytes Absolute 1.3 0.8 - 3.5 K/UL    Monocytes Absolute 0.6 0.0 - 1.0 K/UL    Eosinophils Absolute 0.2 0.0 - 0.4 K/UL    Basophils Absolute 0.1 0.0 - 0.1 K/UL    Immature Granulocytes Absolute 0.0 0.00 - 0.04 K/UL    Differential Type Smear Scanned      RBC Comment Microcytosis  1+        RBC Comment Anisocytosis  2+       BMP    Collection Time: 08/26/24 10:30 AM   Result Value Ref Range    Sodium 133 (L) 136 - 145 mmol/L    Potassium 5.4 (H) 3.5 - 5.1 mmol/L    Chloride 100 97 - 108 mmol/L    CO2 25 21 - 32 mmol/L    Anion Gap 8 5 - 15 mmol/L    Glucose 424 (H) 65 - 100 mg/dL    BUN 41 (H) 6 - 20 mg/dL    Creatinine 1.74 (H) 0.70 - 1.30 mg/dL    BUN/Creatinine Ratio 24 (H) 12 - 20      Est, Glom Filt Rate 47 (L) >60 ml/min/1.73m2

## 2024-08-27 ENCOUNTER — TELEPHONE (OUTPATIENT)
Facility: HOSPITAL | Age: 54
End: 2024-08-27

## 2024-10-23 NOTE — TELEPHONE ENCOUNTER
Patient called new pump came in, do you want him to wait to get it set up until his appt in Nov.? Please call to discuss. Thank you

## 2024-10-24 NOTE — TELEPHONE ENCOUNTER
Spoke with pt and he states he received the [employer] pump. I told him I would reach out to the [employer] rep to see about getting him set up. I called Kelley at [employer] and left voicemail for her to contact me.

## 2024-11-07 NOTE — TELEPHONE ENCOUNTER
Hi,    Pt is requesting a a refill on his Test Strips and would like a brand new BG meter.  please send to [facility].

## 2024-11-20 ENCOUNTER — OFFICE VISIT (OUTPATIENT)
Age: 54
End: 2024-11-20
Payer: COMMERCIAL

## 2024-11-20 VITALS
DIASTOLIC BLOOD PRESSURE: 81 MMHG | TEMPERATURE: 98.1 F | BODY MASS INDEX: 25.04 KG/M2 | HEART RATE: 100 BPM | SYSTOLIC BLOOD PRESSURE: 145 MMHG | HEIGHT: 76 IN | WEIGHT: 205.6 LBS | OXYGEN SATURATION: 98 %

## 2024-11-20 DIAGNOSIS — I10 ESSENTIAL (PRIMARY) HYPERTENSION: ICD-10-CM

## 2024-11-20 DIAGNOSIS — E78.2 MIXED HYPERLIPIDEMIA: ICD-10-CM

## 2024-11-20 DIAGNOSIS — E10.65 TYPE 1 DIABETES MELLITUS WITH HYPERGLYCEMIA (HCC): Primary | ICD-10-CM

## 2024-11-20 DIAGNOSIS — Z79.4 LONG TERM (CURRENT) USE OF INSULIN (HCC): ICD-10-CM

## 2024-11-20 DIAGNOSIS — I25.810 CORONARY ARTERY DISEASE INVOLVING CORONARY BYPASS GRAFT OF NATIVE HEART WITHOUT ANGINA PECTORIS: ICD-10-CM

## 2024-11-20 DIAGNOSIS — Z96.41 PRESENCE OF INSULIN PUMP (EXTERNAL) (INTERNAL): ICD-10-CM

## 2024-11-20 PROCEDURE — 3046F HEMOGLOBIN A1C LEVEL >9.0%: CPT | Performed by: INTERNAL MEDICINE

## 2024-11-20 PROCEDURE — 3079F DIAST BP 80-89 MM HG: CPT | Performed by: INTERNAL MEDICINE

## 2024-11-20 PROCEDURE — 3077F SYST BP >= 140 MM HG: CPT | Performed by: INTERNAL MEDICINE

## 2024-11-20 PROCEDURE — 99215 OFFICE O/P EST HI 40 MIN: CPT | Performed by: INTERNAL MEDICINE

## 2024-11-20 PROCEDURE — 95251 CONT GLUC MNTR ANALYSIS I&R: CPT | Performed by: INTERNAL MEDICINE

## 2024-11-20 RX ORDER — INSULIN GLARGINE 100 [IU]/ML
INJECTION, SOLUTION SUBCUTANEOUS
Qty: 10 ML | Refills: 3 | Status: SHIPPED | OUTPATIENT
Start: 2024-11-20

## 2024-11-20 RX ORDER — INSULIN LISPRO 100 [IU]/ML
INJECTION, SOLUTION INTRAVENOUS; SUBCUTANEOUS
Qty: 30 ML | Refills: 3 | Status: SHIPPED | OUTPATIENT
Start: 2024-11-20

## 2024-11-20 NOTE — PROGRESS NOTES
Al Aguirre is a 54 y.o. male here for   Chief Complaint   Patient presents with    Diabetes       1. Have you been to the ER, urgent care clinic since your last visit?  Hospitalized since your last visit? -NO    2. Have you seen or consulted any other health care providers outside of the [facility] since your last visit?  Include any pap smears or colon screening.-PCP      
compression stocking on  right leg          Labs    REVIEWED   FROM  OCTOBER 2024   - DOES NOT HAVE A1C IN IT     Lab Results   Component Value Date    LABA1C 10.3 (H) 08/09/2024    LABA1C 8.6 (H) 03/20/2024    LABA1C 8.9 (H) 03/07/2024       Lab Results   Component Value Date     (L) 08/26/2024    K 5.4 (H) 08/26/2024     08/26/2024    CO2 25 08/26/2024    BUN 41 (H) 08/26/2024    CREATININE 1.74 (H) 08/26/2024    GLUCOSE 424 (H) 08/26/2024    CALCIUM 8.9 08/26/2024    BILITOT 0.5 08/09/2024    ALKPHOS 116 08/09/2024    AST 32 08/09/2024    ALT 28 08/09/2024    LABGLOM 47 (L) 08/26/2024    GFRAA 59 (L) 09/21/2022    AGRATIO 1.1 12/13/2022    GLOB 4.2 (H) 08/09/2024    CHOL 115 08/09/2024    TRIG 60 08/09/2024    LDL 48 08/09/2024    HDL 55 08/09/2024       Lab Results   Component Value Date    MALBCR 28 08/09/2024             ASSESSMENT and PLAN     1.  Type 1DM, UNcontrolled , on insulin pump.:  a1c is    ?.    Compared   to    over 10 %    from    August 2024   compared   to    8.6 %    from march 2024   compared   to    6.8 %     from July 2023    compared   to     6.6 %    from dec 2022   compared to     7.1 %   from   pcp office  by FS    compared to  6.6 %     from    dec 2022    compared to     7.2 %   from sept 2022    compared to   8%   FROM   July 2022   COMPARED TO   7. 5  %   From  May  2022    Compared to       Nov 2021    Compared to       6.6 %      From    Today by  poc     July 2021     Compared to     6.6 %    From    March 2021  Compared to     7.5  %   By labs from nov 2020    Compared to    12.9 %      By  POC      Compared   To    12 %   From nov 2019, compared to  A1C IS OVER 10 %   FROM PT FIRST    A long gap in follow up visit from 2016 to 2019 - almost 3 years         Nov 2024     A1c  is   not drawn      On MOBI / tandem pump   and  dexcom  November 2024      Again educated him on top with office visits to get  dexcom sensors  [facility]  Stay On top with tandem supplies

## 2024-11-20 NOTE — PATIENT INSTRUCTIONS
SPECIFIC INSTRUCTIONS BELOW     DRINK water   Do not skip meals  Do not eat in between meals     Reduce carbs- pasta, rice, potatoes, bread   Try to avoid processed bread products like BISCUITS, CROISSANTS, MUFFINS     Do not drink juices or sodas, even if they are calorie zero or diet drinks and especially avoid using powders like crystalloids , MEGHAN-AIDS      Do not eat peanut butter      Do not eat sugar free cookies and cakes   Do not eat peaches, oranges, pineapples, raisins, grapes , canteloupe , honey dew, mangoes , watermelon  and fruit medleys     ---------------------------------------------------------------------------------------------------------------     stay  on  Lisinopril      Stay   On atorvostatin 20 mg at night     ONTO  mobi tANDEM  PUMP -      90 UNITS  A  DAY  MAX DOSE    HUMALOG         Check blood sugars immediately before each meal and at bedtime        Take  lantus  insulin  40   units  at bed time     Take humalog  Insulin at carb to insulin ratio of 6 gm : 1 unit (  OR   )     2  units to  6 units with meals )         Also, add additional humalog  as follows with meals  If blood sugars are::     151-200 mg 1 units     201-250 mg 3 units     251-300 mg 5 units     301-350 mg 7 units     351-400 mg 9 units     401-450 mg 11 units     451-500 mg 13 units     Less than 70 mg NO INSULIN        -------------PAY ATTENTION TO THESE GENERAL INSTRUCTIONS -----------------      - The medications prescribed at this visit will not be available at pharmacy until 6 pm       - YOUR MED LIST IS NOT UP TO DATE AS SOME CHANGES ARE BEING MADE AFTER THE VISIT - FOLLOW SPECIFIC INSTRUCTIONS  ABOVE     -ANY tests other than blood work, which you opt to do  outside the  [facility] facilities, you are responsible for prior authorizations if  required    - HEALTH MAINTENANCE IS NOT GOING TO BE UP TO DATE ON YOUR AVS- PLEASE IGNORE     Results     *Normal results will not be notified by a phone

## 2024-11-21 LAB — HBA1C MFR BLD: 10.3 % (ref 4.8–5.6)

## 2024-12-09 ENCOUNTER — TELEPHONE (OUTPATIENT)
Age: 54
End: 2024-12-09

## 2024-12-09 NOTE — TELEPHONE ENCOUNTER
[facility] called to check on requested office notes, advised sent on 12/03 they stated they did not receive did not see scanned if someone has please refax otherwise I asked them to resend request

## 2024-12-10 ENCOUNTER — TELEPHONE (OUTPATIENT)
Facility: HOSPITAL | Age: 54
End: 2024-12-10

## 2024-12-10 NOTE — TELEPHONE ENCOUNTER
[facility]  Lung Navigator Encounter    Name:    Al Aguirre  Age:    54 y.o.  Diagnosis: follow up CT Chest      Narrative: PCP notified patient is due for a follow up chest CT as of 11/26/2024 based on scan from 8/26/2024. Requested order.    Michelle ALMEIDA, RN.  Primary Lung Navigator   [facility] Navigator  [address] [place] [address]. [address] [place] [address]  Cell: [phone]  F: [phone]  [email or web address]  Good Help to Those in Need®

## (undated) DEVICE — SPONGE LAP 18INX18IN XR ST 5/PK 40PK HPG

## (undated) DEVICE — PENCIL ES CRD L10FT HND SWCHING ROCK SWCH W/ EDGE COAT BLDE

## (undated) DEVICE — SUTURE VCRL + SZ 2-0 L27IN ABSRB UD CT-2 L26MM 1/2 CIR TAPR VCP269H

## (undated) DEVICE — MINOR EXTREMITY PACK: Brand: MEDLINE INDUSTRIES, INC.

## (undated) DEVICE — PREP PAD BNS: Brand: CONVERTORS

## (undated) DEVICE — 1010 S-DRAPE TOWEL DRAPE 10/BX: Brand: STERI-DRAPE™

## (undated) DEVICE — DRSG GAUZE OIL EMUL 3X16IN -- CURAD

## (undated) DEVICE — TOWEL SURG W17XL27IN STD BLU COT NONFENESTRATED PREWASHED

## (undated) DEVICE — GLOVE SURG SZ 8 L12IN FNGR THK79MIL GRN LTX FREE

## (undated) DEVICE — T-DRAPE,EXTREMITY,STERILE: Brand: MEDLINE

## (undated) DEVICE — SUTURE VCRL + SZ 0 L27IN ABSRB WHT CT-2 1/2 CIR TAPERPOINT VCP270H

## (undated) DEVICE — BANDAGE COMPR W4INXL5YD WHT BGE POLY COT M E WRP WV HK AND

## (undated) DEVICE — SPONGE GZ W4XL4IN COT 12 PLY TYP VII WVN C FLD DSGN STERILE

## (undated) DEVICE — PROBE US DEB DISP SONICVAC

## (undated) DEVICE — SOLUTION IRRIG 500ML 0.9% SOD CHLO USP POUR PLAS BTL

## (undated) DEVICE — ZIMMER® STERILE DISPOSABLE TOURNIQUET CUFF WITH PLC, DUAL PORT, SINGLE BLADDER, 18 IN. (46 CM)

## (undated) DEVICE — TUBING, SUCTION, 1/4" X 12', STRAIGHT: Brand: MEDLINE

## (undated) DEVICE — KIT PRECLEANING BS ENDOSCP

## (undated) DEVICE — BANDAGE,GAUZE,BULKEE II,4.5"X4.1YD,STRL: Brand: MEDLINE

## (undated) DEVICE — T-DRAPE,EXTREMITY, ULTRAGARD: Brand: MEDLINE

## (undated) DEVICE — DRAPE,U/ SHT,SPLIT,PLAS,STERIL: Brand: MEDLINE

## (undated) DEVICE — DRESSING GZ W3XL16IN CELOS ACETT OIL EMUL N ADH

## (undated) DEVICE — BASIC SINGLE BASIN-LF: Brand: MEDLINE INDUSTRIES, INC.

## (undated) DEVICE — SEALANT TISS 10 CC FIBRIN VISTASEAL

## (undated) DEVICE — CONTAINER,SPECIMEN,PNEU TUBE,4OZ,OR STRL: Brand: MEDLINE

## (undated) DEVICE — Device

## (undated) DEVICE — GLOVE ORANGE PI 7 1/2   MSG9075

## (undated) DEVICE — SUTURE PROL SZ 3-0 L18IN NONABSORBABLE BLU L19MM FS-2 3/8 8665G

## (undated) DEVICE — Device: Brand: JELCO

## (undated) DEVICE — SUTURE ABSORBABLE MONOFILAMENT 2-0 SH 27 IN VIO MONOCRYL + MCP317H

## (undated) DEVICE — KIT BNE CEM ANK SHLDR EL APPLICATIONS W/ SM APPL MIX AND

## (undated) DEVICE — SOUTHSIDE TURNOVER: Brand: MEDLINE INDUSTRIES, INC.

## (undated) DEVICE — BNDG SELF ADH 4INX5YD STRL LF -- COFLEX LF2

## (undated) DEVICE — SUTURE MCRYL + SZ 3-0 L27IN ABSRB UD L26MM SH 1/2 CIR MCP416H

## (undated) DEVICE — PADDING CST 4IN STERILE --

## (undated) DEVICE — BNDG ELAS HK LOOP 4X5YD NS -- MATRIX

## (undated) DEVICE — SUT PROL 2-0 30IN SH BLU --

## (undated) DEVICE — DRAPE,REIN 53X77,STERILE: Brand: MEDLINE

## (undated) DEVICE — BNDG ELAS HK LOOP 6X5YD NS -- MATRIX

## (undated) DEVICE — SUTURE NONABSORBABLE MONOFILAMENT 2-0 FS 18 IN ETHILON 664H

## (undated) DEVICE — SPONGE LAP 18X18IN STRL -- 5/PK

## (undated) DEVICE — BLOCK BITE STD GRN ORAL AD W/O STRP SLD PLAS DISP BITEGARD

## (undated) DEVICE — PAD,ABDOMINAL,8"X7.5",STERILE,LF,1/PK: Brand: MEDLINE

## (undated) DEVICE — ZIMMER® STERILE DISPOSABLE TOURNIQUET CUFF WITH PLC, SINGLE PORT, SINGLE BLADDER, 24 IN. (61 CM)

## (undated) DEVICE — SUTURE ABSORBABLE MONOFILAMENT 3-0 SH 27 IN UD MONOCRYL + MCP316H

## (undated) DEVICE — PRECISION THIN (9.0 X 0.38 X 25.0MM)

## (undated) DEVICE — SUTURE FIBERWIRE SZ 2 W/ TAPERED NEEDLE BLUE L38IN NONABSORB BLU L26.5MM 1/2 CIRCLE AR7200

## (undated) DEVICE — SET IRRIG TB DISP FOR BONESCALPEL

## (undated) DEVICE — GLOVE SURG SZ 8 CRM LTX FREE POLYISOPRENE POLYMER BEAD ANTI

## (undated) DEVICE — LAMINECTOMY ARM CRADLE FOAM POSITIONER: Brand: CARDINAL HEALTH

## (undated) DEVICE — SYRINGE IRRIG 60ML SFT PLIABLE BLB EZ TO GRP 1 HND USE W/

## (undated) DEVICE — COVER LT HNDL BLU PLAS

## (undated) DEVICE — WET SKIN PREP TRAY: Brand: MEDLINE INDUSTRIES, INC.

## (undated) DEVICE — TUBE IRRIG L8IN LNG PT W/ CONN FOR PMP SYS REDEUCE

## (undated) DEVICE — INTENDED FOR TISSUE SEPARATION, AND OTHER PROCEDURES THAT REQUIRE A SHARP SURGICAL BLADE TO PUNCTURE OR CUT.: Brand: BARD-PARKER SAFETY BLADES SIZE 10, STERILE

## (undated) DEVICE — SOLUTION IRRIG 500ML 0.9% SOD CHL USP POUR PLAS BTL

## (undated) DEVICE — PREP SKN CHLRAPRP APL 26ML STR --

## (undated) DEVICE — SUTURE ABSORBABLE MONOFILAMENT 4-0 RB1 27 IN VIO MONOCRYL + MCP304H

## (undated) DEVICE — SOL INJ SOD CL 0.9% 250ML BG --

## (undated) DEVICE — SPONGE GZ W4XL4IN COT 12 PLY TYP VII WVN C FLD DSGN

## (undated) DEVICE — SYSTEM CPRP CONC UP TO 18X BASELINE ADJ LEUK CONC ANGEL

## (undated) DEVICE — VAGINAL PREP TRAY: Brand: MEDLINE INDUSTRIES, INC.

## (undated) DEVICE — PADDING CAST W6INXL4YD NONSTERILE COT RAYON MICROPLEATED

## (undated) DEVICE — TUBING PMP L8FT LNG W/ CONN FOR AR-6400 REDEUCE

## (undated) DEVICE — FORCEPS BX L240CM JAW DIA2.4MM ORNG L CAP W/ NDL DISP RAD

## (undated) DEVICE — GARMENT,MEDLINE,DVT,INT,CALF,MED, GEN2: Brand: MEDLINE

## (undated) DEVICE — LINE SAMP LNG AD ORAL NSL PT O2 TBNG SMRT CAPNOLN H +

## (undated) DEVICE — ELECTRODE PT RET AD L9FT HI MOIST COND ADH HYDRGEL CORDED

## (undated) DEVICE — DISSECTOR ENDOSCP L7MM DIA3MM FOR RESECT SM JT COOLCUT

## (undated) DEVICE — TUBING SUCTION UNIV 3/16 INX20 FT W/ SCALLOPED CONN STRL

## (undated) DEVICE — SOLUTION IRRIG 3000ML 0.9% SOD CHL USP UROMATIC PLAS CONT

## (undated) DEVICE — THE STERILE LIGHT HANDLE COVER IS USED WITH STERIS SURGICAL LIGHTING AND VISUALIZATION SYSTEMS.

## (undated) DEVICE — BLADE,CARBON-STEEL,11,STRL,DISPOSABLE,TB: Brand: MEDLINE